# Patient Record
Sex: FEMALE | Race: WHITE | NOT HISPANIC OR LATINO | Employment: OTHER | URBAN - METROPOLITAN AREA
[De-identification: names, ages, dates, MRNs, and addresses within clinical notes are randomized per-mention and may not be internally consistent; named-entity substitution may affect disease eponyms.]

---

## 2023-09-17 PROBLEM — M85.80 OSTEOPENIA: Status: ACTIVE | Noted: 2023-09-17

## 2023-09-17 PROBLEM — M79.672 LEFT FOOT PAIN: Status: ACTIVE | Noted: 2023-09-17

## 2023-09-17 PROBLEM — I71.20 THORACIC AORTIC ANEURYSM (TAA) (CMS-HCC): Status: ACTIVE | Noted: 2023-09-17

## 2023-09-17 PROBLEM — R79.83 HOMOCYSTEINEMIA: Status: ACTIVE | Noted: 2023-09-17

## 2023-09-17 PROBLEM — E05.90 THYROTOXICOSIS WITH OR WITHOUT GOITER: Status: ACTIVE | Noted: 2023-09-17

## 2023-09-17 PROBLEM — E03.9 ACQUIRED HYPOTHYROIDISM: Status: ACTIVE | Noted: 2023-09-17

## 2023-09-17 PROBLEM — F51.01 PRIMARY INSOMNIA: Status: ACTIVE | Noted: 2023-09-17

## 2023-09-17 PROBLEM — I25.10 ASYMPTOMATIC ARTERIOSCLEROSIS OF CORONARY ARTERY: Status: ACTIVE | Noted: 2023-09-17

## 2023-09-17 PROBLEM — E04.2 NONTOXIC MULTINODULAR GOITER: Status: ACTIVE | Noted: 2023-09-17

## 2023-09-17 PROBLEM — J30.89 PERENNIAL ALLERGIC RHINITIS: Status: ACTIVE | Noted: 2023-09-17

## 2023-09-17 PROBLEM — I25.810 ATHEROSCLEROSIS OF CABG W/O ANGINA PECTORIS: Status: ACTIVE | Noted: 2023-09-17

## 2023-09-17 PROBLEM — H35.30 MACULAR DEGENERATION: Status: ACTIVE | Noted: 2023-09-17

## 2023-09-17 PROBLEM — F41.1 GAD (GENERALIZED ANXIETY DISORDER): Status: ACTIVE | Noted: 2023-09-17

## 2023-09-17 PROBLEM — F51.04 PSYCHOPHYSIOLOGIC INSOMNIA: Status: ACTIVE | Noted: 2023-09-17

## 2023-09-17 PROBLEM — B35.1 ONYCHOMYCOSIS: Status: ACTIVE | Noted: 2023-09-17

## 2023-09-17 PROBLEM — D23.70: Status: ACTIVE | Noted: 2023-09-17

## 2023-09-17 PROBLEM — R15.9 INCONTINENCE OF FECES: Status: ACTIVE | Noted: 2023-09-17

## 2023-09-17 PROBLEM — I48.0 PAROXYSMAL ATRIAL FIBRILLATION (MULTI): Status: ACTIVE | Noted: 2023-09-17

## 2023-09-17 PROBLEM — N18.31 CHRONIC KIDNEY DISEASE (CKD) STAGE G3A/A1, MODERATELY DECREASED GLOMERULAR FILTRATION RATE (GFR) BETWEEN 45-59 ML/MIN/1.73 SQUARE METER AND ALBUMINURIA CREATININE RATIO LESS THAN 30 MG/G (CMS* (MULTI): Status: ACTIVE | Noted: 2023-09-17

## 2023-09-17 PROBLEM — I65.23 OCCLUSION AND STENOSIS OF BILATERAL CAROTID ARTERIES: Status: ACTIVE | Noted: 2023-09-17

## 2023-09-17 PROBLEM — E78.1 PURE HYPERGLYCERIDEMIA: Status: ACTIVE | Noted: 2023-09-17

## 2023-09-17 PROBLEM — K59.00 CONSTIPATION: Status: ACTIVE | Noted: 2023-09-17

## 2023-09-17 PROBLEM — E78.2 MIXED HYPERLIPIDEMIA: Status: ACTIVE | Noted: 2023-09-17

## 2023-09-17 PROBLEM — M81.0 OSTEOPOROSIS: Status: ACTIVE | Noted: 2023-09-17

## 2023-09-17 PROBLEM — L60.3 ONYCHODYSTROPHY: Status: ACTIVE | Noted: 2023-09-17

## 2023-09-17 PROBLEM — Z95.2 HISTORY OF PROSTHETIC AORTIC VALVE: Status: ACTIVE | Noted: 2023-09-17

## 2023-09-17 PROBLEM — I73.9 CLAUDICATION (CMS-HCC): Status: ACTIVE | Noted: 2023-09-17

## 2023-09-17 PROBLEM — I10 ESSENTIAL HYPERTENSION: Status: ACTIVE | Noted: 2023-09-17

## 2023-09-17 PROBLEM — N81.10 BLADDER PROLAPSE, FEMALE, ACQUIRED: Status: ACTIVE | Noted: 2023-09-17

## 2023-09-17 PROBLEM — R41.3 MEMORY LOSS: Status: ACTIVE | Noted: 2023-09-17

## 2023-09-17 PROBLEM — I73.9 PERIPHERAL VASCULAR DISEASE, UNSPECIFIED (CMS-HCC): Status: ACTIVE | Noted: 2023-09-17

## 2023-09-17 PROBLEM — M79.671 RIGHT FOOT PAIN: Status: ACTIVE | Noted: 2023-09-17

## 2023-09-17 RX ORDER — MIRTAZAPINE 15 MG/1
0.5 TABLET, FILM COATED ORAL NIGHTLY
COMMUNITY
Start: 2023-08-30 | End: 2024-02-16 | Stop reason: ENTERED-IN-ERROR

## 2023-09-17 RX ORDER — VALSARTAN 160 MG/1
160 TABLET ORAL DAILY
COMMUNITY
End: 2024-03-01 | Stop reason: HOSPADM

## 2023-09-17 RX ORDER — POTASSIUM CHLORIDE 600 MG/1
8 TABLET, FILM COATED, EXTENDED RELEASE ORAL
COMMUNITY
Start: 2016-08-16 | End: 2024-02-16 | Stop reason: ENTERED-IN-ERROR

## 2023-09-17 RX ORDER — CETIRIZINE HYDROCHLORIDE 10 MG/1
1 TABLET ORAL DAILY
COMMUNITY
End: 2024-02-16 | Stop reason: ENTERED-IN-ERROR

## 2023-09-17 RX ORDER — FERROUS SULFATE 325(65) MG
65 TABLET ORAL DAILY
COMMUNITY
Start: 2012-08-08

## 2023-09-17 RX ORDER — ACETAMINOPHEN AND PHENYLEPHRINE HCL 325; 5 MG/1; MG/1
1 TABLET ORAL DAILY
COMMUNITY

## 2023-09-17 RX ORDER — ESCITALOPRAM OXALATE 10 MG/1
10 TABLET ORAL DAILY
COMMUNITY

## 2023-09-17 RX ORDER — SPIRONOLACTONE AND HYDROCHLOROTHIAZIDE 25; 25 MG/1; MG/1
0.5 TABLET ORAL DAILY
COMMUNITY
Start: 2023-07-21 | End: 2024-03-01 | Stop reason: HOSPADM

## 2023-09-17 RX ORDER — CYANOCOBALAMIN/FOLIC AC/VIT B6 2-2.5-25MG
1 TABLET ORAL DAILY
COMMUNITY
End: 2024-01-25 | Stop reason: HOSPADM

## 2023-09-17 RX ORDER — PRENATAL VIT 49/IRON FUM/FOLIC 6.75-0.2MG
TABLET ORAL
COMMUNITY
End: 2023-10-18 | Stop reason: ALTCHOICE

## 2023-09-17 RX ORDER — POTASSIUM CHLORIDE 600 MG/1
1 TABLET, FILM COATED, EXTENDED RELEASE ORAL 2 TIMES DAILY
COMMUNITY
End: 2023-10-18 | Stop reason: ALTCHOICE

## 2023-09-17 RX ORDER — ALENDRONATE SODIUM 70 MG/1
70 TABLET ORAL
COMMUNITY
Start: 2020-11-04

## 2023-09-17 RX ORDER — AMOXICILLIN 500 MG/1
4 CAPSULE ORAL SEE ADMIN INSTRUCTIONS
COMMUNITY
Start: 2022-12-06 | End: 2024-02-16 | Stop reason: ENTERED-IN-ERROR

## 2023-09-17 RX ORDER — FLUTICASONE PROPIONATE 50 MCG
1 SPRAY, SUSPENSION (ML) NASAL DAILY
COMMUNITY
End: 2023-10-18 | Stop reason: ALTCHOICE

## 2023-09-17 RX ORDER — FOLIC ACID 1 MG/1
5 TABLET ORAL DAILY
COMMUNITY
Start: 2012-08-08 | End: 2023-10-18 | Stop reason: SDUPTHER

## 2023-09-17 RX ORDER — CLOPIDOGREL BISULFATE 75 MG/1
1 TABLET, FILM COATED ORAL DAILY
COMMUNITY
Start: 2022-04-18 | End: 2023-10-05 | Stop reason: SDUPTHER

## 2023-09-17 RX ORDER — VALSARTAN 160 MG/1
160 TABLET ORAL DAILY
COMMUNITY
Start: 2023-08-30 | End: 2024-01-25 | Stop reason: HOSPADM

## 2023-09-17 RX ORDER — CHOLECALCIFEROL (VITAMIN D3) 50 MCG
50 TABLET ORAL DAILY
COMMUNITY

## 2023-09-17 RX ORDER — ATENOLOL 50 MG/1
50 TABLET ORAL 2 TIMES DAILY
COMMUNITY
Start: 2018-04-17

## 2023-09-17 RX ORDER — CEPHALEXIN 500 MG/1
500 CAPSULE ORAL EVERY 12 HOURS
COMMUNITY
Start: 2023-05-21 | End: 2024-01-25 | Stop reason: HOSPADM

## 2023-09-17 RX ORDER — CHLORPHENIRAMINE MALEATE 4 MG
TABLET ORAL
COMMUNITY
End: 2024-02-16 | Stop reason: ENTERED-IN-ERROR

## 2023-09-17 RX ORDER — ALIROCUMAB 150 MG/ML
INJECTION, SOLUTION SUBCUTANEOUS SEE ADMIN INSTRUCTIONS
COMMUNITY
Start: 2022-11-28 | End: 2023-10-16

## 2023-09-17 RX ORDER — CHLORTHALIDONE 25 MG/1
0.5 TABLET ORAL DAILY
COMMUNITY
Start: 2022-10-25 | End: 2023-10-18 | Stop reason: ALTCHOICE

## 2023-09-17 RX ORDER — ROSUVASTATIN CALCIUM 40 MG/1
40 TABLET, COATED ORAL NIGHTLY
COMMUNITY
End: 2024-03-01 | Stop reason: HOSPADM

## 2023-09-17 RX ORDER — IPRATROPIUM BROMIDE 21 UG/1
2 SPRAY, METERED NASAL 2 TIMES DAILY
COMMUNITY
Start: 2023-08-26

## 2023-09-17 RX ORDER — AZELASTINE 1 MG/ML
2 SPRAY, METERED NASAL 2 TIMES DAILY
COMMUNITY

## 2023-09-17 RX ORDER — AMLODIPINE BESYLATE 2.5 MG/1
5 TABLET ORAL DAILY
COMMUNITY
End: 2023-10-18

## 2023-09-17 RX ORDER — FOLIC ACID 1 MG/1
5 TABLET ORAL DAILY
COMMUNITY

## 2023-09-17 RX ORDER — HYDROCODONE BITARTRATE AND ACETAMINOPHEN 5; 325 MG/1; MG/1
1 TABLET ORAL EVERY 6 HOURS PRN
COMMUNITY
Start: 2023-05-22 | End: 2023-10-18 | Stop reason: ALTCHOICE

## 2023-09-17 RX ORDER — CLORAZEPATE DIPOTASSIUM 7.5 MG/1
0.5 TABLET ORAL 2 TIMES DAILY PRN
COMMUNITY
End: 2024-01-25 | Stop reason: HOSPADM

## 2023-09-17 RX ORDER — ASPIRIN 81 MG/1
81 TABLET ORAL DAILY
COMMUNITY
Start: 2008-12-08

## 2023-09-17 RX ORDER — CILOSTAZOL 100 MG/1
100 TABLET ORAL 2 TIMES DAILY
COMMUNITY
Start: 2023-08-01 | End: 2024-01-25 | Stop reason: HOSPADM

## 2023-10-05 DIAGNOSIS — I25.119 ATHEROSCLEROSIS OF NATIVE CORONARY ARTERY OF NATIVE HEART WITH ANGINA PECTORIS (CMS-HCC): Primary | ICD-10-CM

## 2023-10-05 RX ORDER — CLOPIDOGREL BISULFATE 75 MG/1
75 TABLET, FILM COATED ORAL DAILY
Qty: 30 TABLET | Refills: 0 | Status: SHIPPED | OUTPATIENT
Start: 2023-10-05 | End: 2023-10-10 | Stop reason: SDUPTHER

## 2023-10-09 ENCOUNTER — OFFICE VISIT (OUTPATIENT)
Dept: WOUND CARE | Facility: CLINIC | Age: 88
End: 2023-10-09
Payer: MEDICARE

## 2023-10-09 PROCEDURE — 99213 OFFICE O/P EST LOW 20 MIN: CPT

## 2023-10-09 PROCEDURE — G0463 HOSPITAL OUTPT CLINIC VISIT: HCPCS | Mod: 25

## 2023-10-09 PROCEDURE — 11042 DBRDMT SUBQ TIS 1ST 20SQCM/<: CPT

## 2023-10-10 ENCOUNTER — TELEPHONE (OUTPATIENT)
Dept: CARDIOLOGY | Facility: CLINIC | Age: 88
End: 2023-10-10
Payer: COMMERCIAL

## 2023-10-10 DIAGNOSIS — I25.119 ATHEROSCLEROSIS OF NATIVE CORONARY ARTERY OF NATIVE HEART WITH ANGINA PECTORIS (CMS-HCC): ICD-10-CM

## 2023-10-10 RX ORDER — CLOPIDOGREL BISULFATE 75 MG/1
75 TABLET ORAL DAILY
Qty: 30 TABLET | Refills: 0 | Status: SHIPPED | OUTPATIENT
Start: 2023-10-10 | End: 2023-10-10

## 2023-10-10 RX ORDER — CLOPIDOGREL BISULFATE 75 MG/1
75 TABLET ORAL DAILY
Qty: 90 TABLET | Refills: 0 | Status: SHIPPED | OUTPATIENT
Start: 2023-10-10 | End: 2024-03-01 | Stop reason: HOSPADM

## 2023-10-16 ENCOUNTER — OFFICE VISIT (OUTPATIENT)
Dept: WOUND CARE | Facility: CLINIC | Age: 88
End: 2023-10-16
Payer: MEDICARE

## 2023-10-16 DIAGNOSIS — E78.2 MIXED HYPERLIPIDEMIA: Primary | ICD-10-CM

## 2023-10-16 PROCEDURE — 11042 DBRDMT SUBQ TIS 1ST 20SQCM/<: CPT

## 2023-10-16 RX ORDER — ALIROCUMAB 150 MG/ML
INJECTION, SOLUTION SUBCUTANEOUS
Qty: 2 PEN | Refills: 0 | Status: SHIPPED | OUTPATIENT
Start: 2023-10-16 | End: 2023-11-09

## 2023-10-18 ENCOUNTER — HOSPITAL ENCOUNTER (OUTPATIENT)
Dept: CARDIOLOGY | Facility: CLINIC | Age: 88
Discharge: HOME | End: 2023-10-18
Payer: MEDICARE

## 2023-10-18 ENCOUNTER — OFFICE VISIT (OUTPATIENT)
Dept: CARDIOLOGY | Facility: CLINIC | Age: 88
End: 2023-10-18
Payer: MEDICARE

## 2023-10-18 VITALS
BODY MASS INDEX: 17.35 KG/M2 | HEIGHT: 63 IN | WEIGHT: 97.9 LBS | SYSTOLIC BLOOD PRESSURE: 151 MMHG | HEART RATE: 73 BPM | DIASTOLIC BLOOD PRESSURE: 69 MMHG

## 2023-10-18 DIAGNOSIS — I10 ESSENTIAL HYPERTENSION: Primary | ICD-10-CM

## 2023-10-18 DIAGNOSIS — Z95.2 PERSONAL HISTORY OF HEART VALVE REPLACEMENT: ICD-10-CM

## 2023-10-18 DIAGNOSIS — Z95.2 HISTORY OF PROSTHETIC AORTIC VALVE: ICD-10-CM

## 2023-10-18 DIAGNOSIS — I25.810 ATHEROSCLEROSIS OF CABG W/O ANGINA PECTORIS: ICD-10-CM

## 2023-10-18 DIAGNOSIS — I65.23 OCCLUSION AND STENOSIS OF BILATERAL CAROTID ARTERIES: ICD-10-CM

## 2023-10-18 DIAGNOSIS — E78.2 MIXED HYPERLIPIDEMIA: ICD-10-CM

## 2023-10-18 DIAGNOSIS — I25.810 ATHEROSCLEROSIS OF AUTOLOGOUS VEIN CORONARY ARTERY BYPASS GRAFT, UNSPECIFIED WHETHER ANGINA PRESENT: ICD-10-CM

## 2023-10-18 PROBLEM — Z95.828 STATUS POST INSERTION OF ILIAC ARTERY STENT: Status: ACTIVE | Noted: 2023-10-18

## 2023-10-18 PROBLEM — I25.10 ASYMPTOMATIC ARTERIOSCLEROSIS OF CORONARY ARTERY: Status: RESOLVED | Noted: 2023-09-17 | Resolved: 2023-10-18

## 2023-10-18 LAB — EJECTION FRACTION APICAL 4 CHAMBER: 74.3

## 2023-10-18 PROCEDURE — 3077F SYST BP >= 140 MM HG: CPT | Performed by: INTERNAL MEDICINE

## 2023-10-18 PROCEDURE — 3078F DIAST BP <80 MM HG: CPT | Performed by: INTERNAL MEDICINE

## 2023-10-18 PROCEDURE — 1036F TOBACCO NON-USER: CPT | Performed by: INTERNAL MEDICINE

## 2023-10-18 PROCEDURE — 1160F RVW MEDS BY RX/DR IN RCRD: CPT | Performed by: INTERNAL MEDICINE

## 2023-10-18 PROCEDURE — 93306 TTE W/DOPPLER COMPLETE: CPT | Performed by: INTERNAL MEDICINE

## 2023-10-18 PROCEDURE — 99214 OFFICE O/P EST MOD 30 MIN: CPT | Performed by: INTERNAL MEDICINE

## 2023-10-18 PROCEDURE — 1159F MED LIST DOCD IN RCRD: CPT | Performed by: INTERNAL MEDICINE

## 2023-10-18 PROCEDURE — 93306 TTE W/DOPPLER COMPLETE: CPT

## 2023-10-18 NOTE — PROGRESS NOTES
"Chief Complaint:   Valve Disorder     History Of Present Illness:    Maggi Gordon is a 88 y.o. female presenting with aortic valve disease s/p bioprosthetic aortic valve replacement.  The patient has been well since their last appointment and has no cardiac complaints.  The patient denies chest pain, shortness of breath, or any systemic symptoms.  The patient is compliant with aspirin and antibiotic prophylaxis to prevent endocarditis.  Their most recent echocardiogram demonstrates normal prosthetic valve function.      Review of Systems  All pertinent systems have been reviewed and are negative except for what is stated in the history of present illness.    All other systems have been reviewed and are negative and noncontributory to this patient's current ailments.  .     Last Recorded Vitals:  Visit Vitals  /69 (BP Location: Right arm, Patient Position: Sitting, BP Cuff Size: Adult)   Pulse 73   Ht 1.6 m (5' 3\")   Wt (!) 44.4 kg (97 lb 14.4 oz)   BMI 17.34 kg/m²   Smoking Status Never   BSA 1.4 m²        Past Medical History:  She has a past medical history of Asymptomatic premature menopause, Atherosclerosis of coronary artery bypass graft(s) without angina pectoris (10/11/2021), Occlusion and stenosis of bilateral carotid arteries (01/09/2020), Personal history of other diseases of the circulatory system, Personal history of other diseases of the circulatory system, Personal history of other diseases of the circulatory system, Personal history of other endocrine, nutritional and metabolic disease, Personal history of other endocrine, nutritional and metabolic disease, Personal history of other endocrine, nutritional and metabolic disease, and S/P AVR (aortic valve replacement) (10/18/2023).    Past Surgical History:  She has a past surgical history that includes Other surgical history (12/03/2018); Other surgical history (12/03/2018); Other surgical history (12/03/2018); Other surgical history " (12/03/2018); Other surgical history (12/03/2018); and CT angio aorta and bilateral iliofemoral runoff w and or wo IV contrast (7/13/2023).      Social History:  She reports that she has never smoked. She has never used smokeless tobacco. No history on file for alcohol use and drug use.    Family History:  No family history on file.     Allergies:  Ezetimibe    Outpatient Medications:  Current Outpatient Medications   Medication Instructions    alendronate (FOSAMAX) 70 mg, oral, Every 7 days    amoxicillin (Amoxil) 500 mg capsule 4 capsules, oral, See admin instructions, Take 1 hour before dental appt     aspirin 81 mg, oral, Daily    atenolol (TENORMIN) 50 mg, oral, 2 times daily    azelastine (Astelin) 137 mcg (0.1 %) nasal spray nasal    BACILLUS COAGULANS-INULIN ORAL oral    biotin 2,500 mcg capsule oral    cephalexin (KEFLEX) 500 mg, oral, Every 12 hours    cetirizine (ZyrTEC) 10 mg tablet 1 tablet, oral, Daily    chlorpheniramine (Chlor-Trimeton) 4 mg tablet oral    cholecalciferol (Vitamin D-3) 25 MCG (1000 UT) capsule oral    cilostazol (PLETAL) 100 mg, oral, 2 times daily    clopidogrel (PLAVIX) 75 mg, oral, Daily    clorazepate (Tranxene) 7.5 mg tablet 0.5 tablets, oral, 2 times daily PRN    CLORAZEPATE DIPOTASSIUM ORAL oral    escitalopram (LEXAPRO) 10 mg, Daily    ferrous sulfate 325 (65 Fe) MG tablet 65 mg of iron, oral, Daily    folic acid (Folvite) 1 mg tablet 2 tablets, oral, Daily    ipratropium (Atrovent) 21 mcg (0.03 %) nasal spray 2 sprays, Each Nostril, 2 times daily    mirtazapine (Remeron) 15 mg tablet 0.5 tablets, oral, Nightly    mv-min/FA/vit K/lutein/zeaxant (PRESERVISION AREDS 2 PLUS MV ORAL) oral    potassium chloride CR (Klor-Con 8) 8 mEq ER tablet 8 mEq, oral, 3 times daily with meals    Praluent Pen 150 mg/mL pen injector INJECT 1 MILLILITER INTO SKIN EVERY 2 WEEKS IN ABDOMEN, THIGH, OR UPPER ARM ROTATING INJECTION SITES    rosuvastatin (CRESTOR) 40 mg, oral, Nightly     "spironolacton-hydrochlorothiaz (Aldactazide) 25-25 mg tablet 0.5 tablets, oral, Daily    valsartan (Diovan) 320 mg tablet oral    valsartan (DIOVAN) 160 mg, oral, Daily    vit A/vit C/vit E/zinc/copper (PRESERVISION AREDS ORAL) 1 capsule, oral, Daily    WesTab Max 2.5-25-2 mg tablet 1 tablet, oral, Daily       Physical Exam:  Physical Exam  Vitals reviewed.   Constitutional:       General: She is not in acute distress.     Appearance: Normal appearance.   HENT:      Head: Normocephalic and atraumatic.      Nose: Nose normal.   Eyes:      Conjunctiva/sclera: Conjunctivae normal.   Cardiovascular:      Rate and Rhythm: Normal rate and regular rhythm.      Pulses: Normal pulses.      Heart sounds: Murmur heard.   Pulmonary:      Effort: Pulmonary effort is normal. No respiratory distress.      Breath sounds: Normal breath sounds. No wheezing, rhonchi or rales.   Abdominal:      General: Bowel sounds are normal. There is no distension.      Palpations: Abdomen is soft.      Tenderness: There is no abdominal tenderness.   Musculoskeletal:         General: No swelling.      Right lower leg: No edema.      Left lower leg: No edema.   Skin:     General: Skin is warm and dry.      Capillary Refill: Capillary refill takes less than 2 seconds.   Neurological:      General: No focal deficit present.      Mental Status: She is alert.   Psychiatric:         Mood and Affect: Mood normal.            Last Labs:  CBC -  Lab Results   Component Value Date    WBC 9.4 09/16/2023    HGB 11.8 (L) 09/16/2023    HCT 35.7 (L) 09/16/2023    MCV 98 09/16/2023     09/16/2023       CMP -  Lab Results   Component Value Date    CALCIUM 9.7 09/16/2023    PHOS 3.2 02/06/2023    PROT 6.1 (L) 09/16/2023    ALBUMIN 3.7 09/16/2023    AST 28 09/16/2023    ALT 18 09/16/2023    ALKPHOS 52 09/16/2023    BILITOT 0.8 09/16/2023       LIPID PANEL -   No results found for: \"CHOL\", \"HDL\", \"CHHDL\", \"LDL\", \"VLDL\", \"TRIG\", \"NHDL\"    RENAL FUNCTION PANEL - " "  Lab Results   Component Value Date    K 3.9 09/16/2023    PHOS 3.2 02/06/2023       No results found for: \"BNP\", \"HGBA1C\"    Last Cardiology Tests:  ECG:  No results found for this or any previous visit from the past 1095 days.    Echo:  No echocardiogram results found for the past 12 months     Echo Normal LV Fx and normal AVR      Assessment/Plan       1. Atherosclerosis of CABG w/o angina pectoris  CAD: The patient's CAD, as detailed in the HPI, has been clinically stable, without any anginal symptoms or dyspnea.  The patient will continue treatment with guideline-directed medical therapy with antiplatelet and statin medications and will continue a heart healthy diet.        - ECG 12 lead (Clinic Performed)  - Transthoracic echo (TTE) complete; Future  - Follow Up In Cardiology; Future    2. Essential hypertension  OP BP has been well controlled.    - Transthoracic echo (TTE) complete; Future  - Follow Up In Cardiology; Future    3. History of prosthetic aortic valve  s/p bioprosthetic AVR: Stable and asymptomatic s/p uncomplicated bioprosthetic AVR.  Echocardiogram today shows normal LV function and normal prosthetic valve function without significant aortic insufficiency or stenosis.  Will continue treatment with ASA 81 mg every day and lifelong SBE antibiotic prophylaxis.     - Transthoracic echo (TTE) complete; Future  - Follow Up In Cardiology; Future    4. Mixed hyperlipidemia  Hyperlipidemia: The patient's lipids are well controlled on chronic statin therapy and they are meeting their goal LDL cholesterol per the ACC/AHA guidelines (LDL 16)  The patient is compliant and tolerating statin therapy and is following a heart healthy diet     - Transthoracic echo (TTE) complete; Future  - Follow Up In Cardiology; Future    5. Occlusion and stenosis of bilateral carotid arteries  Stable, ASX.  Moderate stenosis bilateral ICA (May 2023)  - Transthoracic echo (TTE) complete; Future  - Follow Up In Cardiology; " Future       Jose Weathers MD    Exclusive of any other services or procedures performed, I, Jose Weathers MD , spent 30 minutes in duration for this visit today.  This time consisted of chart review, obtaining history, and/or performing the exam as documented above as well as documenting the clinical information for the encounter in the electronic record, discussing treatment options, plans, and/or goals with patient, family, and/or caregiver, refilling medications, updating the electronic record, ordering medicines, lab work, imaging, referrals, and/or procedures as documented above and communicating with other [facility] professionals. I have discussed the results of laboratory, radiology, and cardiology studies with the patient and their family/caregiver.

## 2023-10-23 ENCOUNTER — OFFICE VISIT (OUTPATIENT)
Dept: WOUND CARE | Facility: CLINIC | Age: 88
End: 2023-10-23
Payer: MEDICARE

## 2023-10-23 PROCEDURE — 11042 DBRDMT SUBQ TIS 1ST 20SQCM/<: CPT

## 2023-10-25 ENCOUNTER — TELEPHONE (OUTPATIENT)
Dept: CARDIOLOGY | Facility: CLINIC | Age: 88
End: 2023-10-25
Payer: COMMERCIAL

## 2023-10-25 NOTE — TELEPHONE ENCOUNTER
Pt needs cleared for left lower extremity profundoplasty under general.  Form on counter to be signed

## 2023-10-27 ENCOUNTER — OFFICE VISIT (OUTPATIENT)
Dept: VASCULAR SURGERY | Facility: CLINIC | Age: 88
End: 2023-10-27
Payer: MEDICARE

## 2023-10-27 VITALS — WEIGHT: 99 LBS | DIASTOLIC BLOOD PRESSURE: 71 MMHG | BODY MASS INDEX: 17.54 KG/M2 | SYSTOLIC BLOOD PRESSURE: 146 MMHG

## 2023-10-27 DIAGNOSIS — I73.9 PAD (PERIPHERAL ARTERY DISEASE) (CMS-HCC): Primary | ICD-10-CM

## 2023-10-27 PROCEDURE — 99213 OFFICE O/P EST LOW 20 MIN: CPT | Performed by: INTERNAL MEDICINE

## 2023-10-27 PROCEDURE — 3078F DIAST BP <80 MM HG: CPT | Performed by: INTERNAL MEDICINE

## 2023-10-27 PROCEDURE — 1036F TOBACCO NON-USER: CPT | Performed by: INTERNAL MEDICINE

## 2023-10-27 PROCEDURE — 1160F RVW MEDS BY RX/DR IN RCRD: CPT | Performed by: INTERNAL MEDICINE

## 2023-10-27 PROCEDURE — 1159F MED LIST DOCD IN RCRD: CPT | Performed by: INTERNAL MEDICINE

## 2023-10-27 PROCEDURE — 3077F SYST BP >= 140 MM HG: CPT | Performed by: INTERNAL MEDICINE

## 2023-10-27 ASSESSMENT — ENCOUNTER SYMPTOMS
MUSCULOSKELETAL NEGATIVE: 1
CARDIOVASCULAR NEGATIVE: 1
GASTROINTESTINAL NEGATIVE: 1
ENDOCRINE NEGATIVE: 1
CONSTITUTIONAL NEGATIVE: 1
RESPIRATORY NEGATIVE: 1
WOUND: 1
ALLERGIC/IMMUNOLOGIC NEGATIVE: 1
EYES NEGATIVE: 1
PSYCHIATRIC NEGATIVE: 1
NEUROLOGICAL NEGATIVE: 1
HEMATOLOGIC/LYMPHATIC NEGATIVE: 1

## 2023-10-27 NOTE — PROGRESS NOTES
Subjective   Patient ID: Maggi Gordon is a 88 y.o. female who presents for No chief complaint on file..  HPI    88 year old female with a past medical history of carotid artery stenosis, CKD, HTN, Pafib and PVD that presents to the office for a hospital follow up s/p  aortobifemoral angiogram with stenting of the left external iliac artery per Dr. Hood on 9/15/23. She did get admitted due to hypotension and concern for a hematoma on left groin site. Last HGB was 11.9 on 9/26/23. She denies leg claudication. However, she does complain of pain on left foot near the foot ulcer. She is going to the wound clinic once a week. She does have a wound care nurse going to her house once a week. Upon exam, the wound does not appear infected, she remains afebrile. No redness or drainage noted. Has a palpable pulse. She is on Plavix        Vascular testing:   CT aorta abd with runoff July 13/2023: Bilateral superficial femoral artery occlusion with multilevel high-grade stenosis within the visualized abdominal and lower extremity arterial vasculature.    PVR  Carotid artery ultrasound 5/15/23: Findings are consistent with 50 to 69% stenosis of the right proximal ICA.  Findings are consistent with 50 to 69% stenosis of the left proximal ICA. No evidence of hemodynamically significant stenosis in the left subclavian artery.    PVR June 22/23: Right Lower PVR: There is evidence of moderate multi vessel disease.  Left Lower PVR: There is evidence of moderate disease at the ilio-femoral level.  Carotid artery ultrasound 5/3/22: Findings are consistent with 50 to 69% stenosis of the right proximal ICA.   Findings are consistent with 50 to 69% stenosis of the left proximal ICA. Left vertebral is monophasic and the subclavian demonostrates turbulent flow which is suggestive of a more proximal stenosis or occlusion.    PVR 5/3/22: Right Lower PVR: There is evidence of moderate multi vessel disease. Left Lower PVR: There is evidence of  moderate multi-vessel disease.    Vascular procedures:  9/15/23: left iliac stent placed per Dr. Hood    Review of Systems   Constitutional: Negative.    HENT: Negative.     Eyes: Negative.    Respiratory: Negative.     Cardiovascular: Negative.    Gastrointestinal: Negative.    Endocrine: Negative.    Genitourinary: Negative.    Musculoskeletal: Negative.    Skin:  Positive for wound.   Allergic/Immunologic: Negative.    Neurological: Negative.    Hematological: Negative.    Psychiatric/Behavioral: Negative.         Objective   Physical Exam  Eyes:      Pupils: Pupils are equal, round, and reactive to light.   Cardiovascular:      Rate and Rhythm: Normal rate.   Pulmonary:      Effort: Pulmonary effort is normal.   Abdominal:      General: Bowel sounds are normal.   Musculoskeletal:      Cervical back: Normal range of motion.      Comments: Wound left foot    Skin:     General: Skin is warm and dry.      Capillary Refill: Capillary refill takes less than 2 seconds.   Neurological:      General: No focal deficit present.      Mental Status: She is alert.   Psychiatric:         Mood and Affect: Mood normal.         Assessment/Plan   88 year old female with a past medical history of carotid artery stenosis, CKD, HTN, Pafib and PVD that presents to the office for a hospital follow up s/p  aortobifemoral angiogram with stenting of the left external iliac artery per Dr. Hood on 9/15/23.    Plan:  Will obtain an arterial duplex and JOHN  Call office if you develop worsening leg swelling, leg pain or develop a new would/ulcer  Follow up after testing/reassess wound

## 2023-10-30 ENCOUNTER — LAB REQUISITION (OUTPATIENT)
Dept: LAB | Facility: HOSPITAL | Age: 88
End: 2023-10-30
Payer: COMMERCIAL

## 2023-10-30 ENCOUNTER — OFFICE VISIT (OUTPATIENT)
Dept: WOUND CARE | Facility: CLINIC | Age: 88
End: 2023-10-30
Payer: MEDICARE

## 2023-10-30 DIAGNOSIS — L97.522 NON-PRESSURE CHRONIC ULCER OF OTHER PART OF LEFT FOOT WITH FAT LAYER EXPOSED (MULTI): ICD-10-CM

## 2023-10-30 PROCEDURE — 11042 DBRDMT SUBQ TIS 1ST 20SQCM/<: CPT

## 2023-10-30 PROCEDURE — 87186 SC STD MICRODIL/AGAR DIL: CPT | Mod: OUT,PORLAB | Performed by: STUDENT IN AN ORGANIZED HEALTH CARE EDUCATION/TRAINING PROGRAM

## 2023-11-03 LAB
BACTERIA SPEC CULT: ABNORMAL
BACTERIA SPEC CULT: ABNORMAL
GRAM STN SPEC: ABNORMAL

## 2023-11-06 ENCOUNTER — OFFICE VISIT (OUTPATIENT)
Dept: WOUND CARE | Facility: CLINIC | Age: 88
End: 2023-11-06
Payer: MEDICARE

## 2023-11-06 PROCEDURE — 11042 DBRDMT SUBQ TIS 1ST 20SQCM/<: CPT

## 2023-11-07 ENCOUNTER — TELEPHONE (OUTPATIENT)
Dept: CARDIOLOGY | Facility: CLINIC | Age: 88
End: 2023-11-07
Payer: COMMERCIAL

## 2023-11-07 NOTE — TELEPHONE ENCOUNTER
----- Message from Gonzalez Johnson CMA sent at 10/26/2023 11:55 AM EDT -----        ----- Message -----  From: Jose Weathers MD  Sent: 10/26/2023   9:13 AM EDT  To: Gonzalez Johnson CMA    KEIRY first.  No OV.    ----- Message -----  From: Gonzalez Johnson CMA  Sent: 10/25/2023   4:02 PM EDT  To: Jose Weathers MD

## 2023-11-09 DIAGNOSIS — E78.2 MIXED HYPERLIPIDEMIA: ICD-10-CM

## 2023-11-09 RX ORDER — ALIROCUMAB 150 MG/ML
INJECTION, SOLUTION SUBCUTANEOUS
Qty: 6 PEN | Refills: 3 | Status: SHIPPED | OUTPATIENT
Start: 2023-11-09

## 2023-11-13 ENCOUNTER — OFFICE VISIT (OUTPATIENT)
Dept: WOUND CARE | Facility: CLINIC | Age: 88
End: 2023-11-13
Payer: MEDICARE

## 2023-11-13 PROCEDURE — 11042 DBRDMT SUBQ TIS 1ST 20SQCM/<: CPT

## 2023-11-20 ENCOUNTER — OFFICE VISIT (OUTPATIENT)
Dept: WOUND CARE | Facility: CLINIC | Age: 88
End: 2023-11-20
Payer: MEDICARE

## 2023-11-20 PROCEDURE — 11042 DBRDMT SUBQ TIS 1ST 20SQCM/<: CPT

## 2023-11-27 ENCOUNTER — OFFICE VISIT (OUTPATIENT)
Dept: WOUND CARE | Facility: CLINIC | Age: 88
End: 2023-11-27
Payer: MEDICARE

## 2023-11-27 PROCEDURE — 11042 DBRDMT SUBQ TIS 1ST 20SQCM/<: CPT | Mod: 59

## 2023-11-27 PROCEDURE — 15275 SKIN SUB GRAFT FACE/NK/HF/G: CPT

## 2023-11-29 ENCOUNTER — TELEPHONE (OUTPATIENT)
Dept: CARDIOLOGY | Facility: CLINIC | Age: 88
End: 2023-11-29

## 2023-11-29 ENCOUNTER — OFFICE VISIT (OUTPATIENT)
Dept: VASCULAR SURGERY | Facility: CLINIC | Age: 88
End: 2023-11-29
Payer: MEDICARE

## 2023-11-29 VITALS
DIASTOLIC BLOOD PRESSURE: 70 MMHG | BODY MASS INDEX: 17.4 KG/M2 | HEART RATE: 72 BPM | WEIGHT: 98.2 LBS | SYSTOLIC BLOOD PRESSURE: 138 MMHG

## 2023-11-29 DIAGNOSIS — I73.9 PAD (PERIPHERAL ARTERY DISEASE) (CMS-HCC): Primary | ICD-10-CM

## 2023-11-29 PROBLEM — K86.89 DILATED PANCREATIC DUCT (HHS-HCC): Status: ACTIVE | Noted: 2023-11-29

## 2023-11-29 PROBLEM — L03.90 CELLULITIS: Status: ACTIVE | Noted: 2023-09-27

## 2023-11-29 PROBLEM — I65.23 BILATERAL CAROTID ARTERY STENOSIS: Status: ACTIVE | Noted: 2017-11-30

## 2023-11-29 PROCEDURE — 1160F RVW MEDS BY RX/DR IN RCRD: CPT | Performed by: INTERNAL MEDICINE

## 2023-11-29 PROCEDURE — 3075F SYST BP GE 130 - 139MM HG: CPT | Performed by: INTERNAL MEDICINE

## 2023-11-29 PROCEDURE — 99213 OFFICE O/P EST LOW 20 MIN: CPT | Performed by: INTERNAL MEDICINE

## 2023-11-29 PROCEDURE — 1159F MED LIST DOCD IN RCRD: CPT | Performed by: INTERNAL MEDICINE

## 2023-11-29 PROCEDURE — 3078F DIAST BP <80 MM HG: CPT | Performed by: INTERNAL MEDICINE

## 2023-11-29 PROCEDURE — 1036F TOBACCO NON-USER: CPT | Performed by: INTERNAL MEDICINE

## 2023-11-29 RX ORDER — GENTAMICIN SULFATE 1 MG/G
CREAM TOPICAL
COMMUNITY
Start: 2023-11-03 | End: 2024-01-25 | Stop reason: HOSPADM

## 2023-11-29 RX ORDER — ATORVASTATIN CALCIUM 80 MG/1
80 TABLET, FILM COATED ORAL
COMMUNITY
End: 2024-02-16 | Stop reason: ENTERED-IN-ERROR

## 2023-11-29 RX ORDER — COLLAGENASE SANTYL 250 [ARB'U]/G
OINTMENT TOPICAL
COMMUNITY
Start: 2023-10-09 | End: 2024-01-25 | Stop reason: HOSPADM

## 2023-11-29 ASSESSMENT — ENCOUNTER SYMPTOMS
HEMATOLOGIC/LYMPHATIC NEGATIVE: 1
CARDIOVASCULAR NEGATIVE: 1
WOUND: 1
GASTROINTESTINAL NEGATIVE: 1
PSYCHIATRIC NEGATIVE: 1
EYES NEGATIVE: 1
NEUROLOGICAL NEGATIVE: 1
RESPIRATORY NEGATIVE: 1
CONSTITUTIONAL NEGATIVE: 1
ALLERGIC/IMMUNOLOGIC NEGATIVE: 1
MUSCULOSKELETAL NEGATIVE: 1
ENDOCRINE NEGATIVE: 1

## 2023-11-29 NOTE — PROGRESS NOTES
Subjective   Patient ID: Maggi Gordon is a 88 y.o. female who presents for No chief complaint on file..  HPI  88 year old female with a past medical history of carotid artery stenosis, CKD, HTN, Pafib and PVD that presents to the office for a mth follow up s/p  aortobifemoral angiogram with stenting of the left external iliac artery per Dr. Hood on 9/15/23.    She denies leg claudication. However, she does complain of pain on left foot near the foot ulcer. She is going to the wound clinic once a week. She states that the wound center has noticed improvement    Upon exam, the wound does not appear infected, she remains afebrile. No redness or drainage noted. Has a palpable pulse. She is on Plavix.         Vascular testing:   CT aorta abd with runoff July 13/2023: Bilateral superficial femoral artery occlusion with multilevel high-grade stenosis within the visualized abdominal and lower extremity arterial vasculature.     Carotid artery ultrasound 5/15/23: Findings are consistent with 50 to 69% stenosis of the right proximal ICA.  Findings are consistent with 50 to 69% stenosis of the left proximal ICA. No evidence of hemodynamically significant stenosis in the left subclavian artery.     PVR June 22/23: Right Lower PVR: There is evidence of moderate multi vessel disease.  Left Lower PVR: There is evidence of moderate disease at the ilio-femoral level.  Carotid artery ultrasound 5/3/22: Findings are consistent with 50 to 69% stenosis of the right proximal ICA.   Findings are consistent with 50 to 69% stenosis of the left proximal ICA. Left vertebral is monophasic and the subclavian demonostrates turbulent flow which is suggestive of a more proximal stenosis or occlusion.     PVR 5/3/22: Right Lower PVR: There is evidence of moderate multi vessel disease. Left Lower PVR: There is evidence of moderate multi-vessel disease.     Vascular procedures:  9/15/23: left iliac stent placed per Dr. Hood    Review of Systems    Constitutional: Negative.    HENT: Negative.     Eyes: Negative.    Respiratory: Negative.     Cardiovascular: Negative.    Gastrointestinal: Negative.    Endocrine: Negative.    Genitourinary: Negative.    Musculoskeletal: Negative.    Skin:  Positive for wound.   Allergic/Immunologic: Negative.    Neurological: Negative.    Hematological: Negative.    Psychiatric/Behavioral: Negative.         Objective   Physical Exam  Eyes:      Conjunctiva/sclera: Conjunctivae normal.   Cardiovascular:      Rate and Rhythm: Normal rate.   Pulmonary:      Effort: Pulmonary effort is normal.   Abdominal:      General: Bowel sounds are normal.   Musculoskeletal:         General: Normal range of motion.      Cervical back: Normal range of motion.   Skin:     General: Skin is warm and dry.      Capillary Refill: Capillary refill takes less than 2 seconds.   Neurological:      General: No focal deficit present.      Mental Status: She is alert.   Psychiatric:         Mood and Affect: Mood normal.         Assessment/Plan   88 year old female with a past medical history of carotid artery stenosis, CKD, HTN, Pafib and PVD that presents to the office for a hospital follow up s/p  aortobifemoral angiogram with stenting of the left external iliac artery per Dr. Hood on 9/15/23.     Plan:  Will obtain an arterial duplex and JOHN  Call office if you develop worsening leg swelling, leg pain or develop a new would/ulcer  Follow up in 6 mths   Continue going to the wound clinic

## 2023-12-04 ENCOUNTER — OFFICE VISIT (OUTPATIENT)
Dept: WOUND CARE | Facility: CLINIC | Age: 88
End: 2023-12-04
Payer: MEDICARE

## 2023-12-04 PROCEDURE — 15275 SKIN SUB GRAFT FACE/NK/HF/G: CPT

## 2023-12-04 PROCEDURE — 11042 DBRDMT SUBQ TIS 1ST 20SQCM/<: CPT | Mod: 59

## 2023-12-11 ENCOUNTER — OFFICE VISIT (OUTPATIENT)
Dept: WOUND CARE | Facility: CLINIC | Age: 88
End: 2023-12-11
Payer: MEDICARE

## 2023-12-11 PROCEDURE — 11042 DBRDMT SUBQ TIS 1ST 20SQCM/<: CPT | Mod: 59

## 2023-12-11 PROCEDURE — 15275 SKIN SUB GRAFT FACE/NK/HF/G: CPT

## 2023-12-18 ENCOUNTER — OFFICE VISIT (OUTPATIENT)
Dept: WOUND CARE | Facility: CLINIC | Age: 88
End: 2023-12-18
Payer: MEDICARE

## 2023-12-18 PROCEDURE — 15275 SKIN SUB GRAFT FACE/NK/HF/G: CPT

## 2023-12-28 ENCOUNTER — OFFICE VISIT (OUTPATIENT)
Dept: WOUND CARE | Facility: CLINIC | Age: 88
End: 2023-12-28
Payer: MEDICARE

## 2023-12-28 PROCEDURE — 15275 SKIN SUB GRAFT FACE/NK/HF/G: CPT

## 2024-01-05 ENCOUNTER — APPOINTMENT (OUTPATIENT)
Dept: VASCULAR MEDICINE | Facility: HOSPITAL | Age: 89
End: 2024-01-05
Payer: MEDICARE

## 2024-01-08 ENCOUNTER — OFFICE VISIT (OUTPATIENT)
Dept: WOUND CARE | Facility: CLINIC | Age: 89
End: 2024-01-08
Payer: MEDICARE

## 2024-01-08 PROCEDURE — 11042 DBRDMT SUBQ TIS 1ST 20SQCM/<: CPT

## 2024-01-11 ENCOUNTER — HOSPITAL ENCOUNTER (INPATIENT)
Facility: HOSPITAL | Age: 89
LOS: 13 days | Discharge: HOME HEALTH | DRG: 253 | End: 2024-01-25
Attending: EMERGENCY MEDICINE | Admitting: SURGERY
Payer: MEDICARE

## 2024-01-11 DIAGNOSIS — R94.31 ABNORMAL EKG: ICD-10-CM

## 2024-01-11 DIAGNOSIS — Z95.2 HISTORY OF PROSTHETIC AORTIC VALVE: ICD-10-CM

## 2024-01-11 DIAGNOSIS — Z95.2 S/P AVR (AORTIC VALVE REPLACEMENT): ICD-10-CM

## 2024-01-11 DIAGNOSIS — I73.9 PERIPHERAL VASCULAR DISEASE, UNSPECIFIED (CMS-HCC): ICD-10-CM

## 2024-01-11 DIAGNOSIS — I73.9 PERIPHERAL VASCULAR DISEASE (CMS-HCC): Primary | ICD-10-CM

## 2024-01-11 DIAGNOSIS — R94.31 ABNORMAL ELECTROCARDIOGRAM (ECG) (EKG): ICD-10-CM

## 2024-01-11 DIAGNOSIS — I73.89 OTHER SPECIFIED PERIPHERAL VASCULAR DISEASES (CMS-HCC): ICD-10-CM

## 2024-01-11 DIAGNOSIS — Z01.818 ENCOUNTER FOR OTHER PREPROCEDURAL EXAMINATION: ICD-10-CM

## 2024-01-11 DIAGNOSIS — I99.8 ACUTE LOWER LIMB ISCHEMIA: ICD-10-CM

## 2024-01-11 LAB
ABO GROUP (TYPE) IN BLOOD: NORMAL
ALBUMIN SERPL BCP-MCNC: 4 G/DL (ref 3.4–5)
ALP SERPL-CCNC: 84 U/L (ref 33–136)
ALT SERPL W P-5'-P-CCNC: 18 U/L (ref 7–45)
ANION GAP SERPL CALC-SCNC: 11 MMOL/L (ref 10–20)
ANTIBODY SCREEN: NORMAL
AST SERPL W P-5'-P-CCNC: 24 U/L (ref 9–39)
BASOPHILS # BLD AUTO: 0.02 X10*3/UL (ref 0–0.1)
BASOPHILS NFR BLD AUTO: 0.3 %
BILIRUB SERPL-MCNC: 0.5 MG/DL (ref 0–1.2)
BUN SERPL-MCNC: 43 MG/DL (ref 6–23)
CALCIUM SERPL-MCNC: 10 MG/DL (ref 8.6–10.6)
CHLORIDE SERPL-SCNC: 101 MMOL/L (ref 98–107)
CO2 SERPL-SCNC: 33 MMOL/L (ref 21–32)
CREAT SERPL-MCNC: 1.45 MG/DL (ref 0.5–1.05)
EGFRCR SERPLBLD CKD-EPI 2021: 35 ML/MIN/1.73M*2
EOSINOPHIL # BLD AUTO: 0.1 X10*3/UL (ref 0–0.4)
EOSINOPHIL NFR BLD AUTO: 1.3 %
ERYTHROCYTE [DISTWIDTH] IN BLOOD BY AUTOMATED COUNT: 13 % (ref 11.5–14.5)
GLUCOSE SERPL-MCNC: 107 MG/DL (ref 74–99)
HCT VFR BLD AUTO: 39 % (ref 36–46)
HGB BLD-MCNC: 13.1 G/DL (ref 12–16)
IMM GRANULOCYTES # BLD AUTO: 0.03 X10*3/UL (ref 0–0.5)
IMM GRANULOCYTES NFR BLD AUTO: 0.4 % (ref 0–0.9)
INR PPP: 2.8 (ref 0.9–1.1)
LYMPHOCYTES # BLD AUTO: 2.26 X10*3/UL (ref 0.8–3)
LYMPHOCYTES NFR BLD AUTO: 29.5 %
MCH RBC QN AUTO: 32.5 PG (ref 26–34)
MCHC RBC AUTO-ENTMCNC: 33.6 G/DL (ref 32–36)
MCV RBC AUTO: 97 FL (ref 80–100)
MONOCYTES # BLD AUTO: 0.77 X10*3/UL (ref 0.05–0.8)
MONOCYTES NFR BLD AUTO: 10.1 %
NEUTROPHILS # BLD AUTO: 4.47 X10*3/UL (ref 1.6–5.5)
NEUTROPHILS NFR BLD AUTO: 58.4 %
NRBC BLD-RTO: 0 /100 WBCS (ref 0–0)
PLATELET # BLD AUTO: 184 X10*3/UL (ref 150–450)
POTASSIUM SERPL-SCNC: 4.3 MMOL/L (ref 3.5–5.3)
PROT SERPL-MCNC: 7 G/DL (ref 6.4–8.2)
PROTHROMBIN TIME: 32.4 SECONDS (ref 9.8–12.8)
RBC # BLD AUTO: 4.03 X10*6/UL (ref 4–5.2)
RH FACTOR (ANTIGEN D): NORMAL
SODIUM SERPL-SCNC: 141 MMOL/L (ref 136–145)
UFH PPP CHRO-ACNC: 0.1 IU/ML
WBC # BLD AUTO: 7.7 X10*3/UL (ref 4.4–11.3)

## 2024-01-11 PROCEDURE — 99285 EMERGENCY DEPT VISIT HI MDM: CPT | Performed by: EMERGENCY MEDICINE

## 2024-01-11 PROCEDURE — 85520 HEPARIN ASSAY: CPT | Performed by: STUDENT IN AN ORGANIZED HEALTH CARE EDUCATION/TRAINING PROGRAM

## 2024-01-11 PROCEDURE — 85610 PROTHROMBIN TIME: CPT | Performed by: STUDENT IN AN ORGANIZED HEALTH CARE EDUCATION/TRAINING PROGRAM

## 2024-01-11 PROCEDURE — 85025 COMPLETE CBC W/AUTO DIFF WBC: CPT | Performed by: STUDENT IN AN ORGANIZED HEALTH CARE EDUCATION/TRAINING PROGRAM

## 2024-01-11 PROCEDURE — 2500000004 HC RX 250 GENERAL PHARMACY W/ HCPCS (ALT 636 FOR OP/ED): Performed by: STUDENT IN AN ORGANIZED HEALTH CARE EDUCATION/TRAINING PROGRAM

## 2024-01-11 PROCEDURE — 80053 COMPREHEN METABOLIC PANEL: CPT | Performed by: STUDENT IN AN ORGANIZED HEALTH CARE EDUCATION/TRAINING PROGRAM

## 2024-01-11 PROCEDURE — 96376 TX/PRO/DX INJ SAME DRUG ADON: CPT

## 2024-01-11 PROCEDURE — 86901 BLOOD TYPING SEROLOGIC RH(D): CPT | Performed by: STUDENT IN AN ORGANIZED HEALTH CARE EDUCATION/TRAINING PROGRAM

## 2024-01-11 PROCEDURE — 36415 COLL VENOUS BLD VENIPUNCTURE: CPT | Performed by: STUDENT IN AN ORGANIZED HEALTH CARE EDUCATION/TRAINING PROGRAM

## 2024-01-11 PROCEDURE — 96374 THER/PROPH/DIAG INJ IV PUSH: CPT

## 2024-01-11 RX ORDER — HEPARIN SODIUM 5000 [USP'U]/ML
2000-4000 INJECTION, SOLUTION INTRAVENOUS; SUBCUTANEOUS EVERY 4 HOURS PRN
Status: DISCONTINUED | OUTPATIENT
Start: 2024-01-11 | End: 2024-01-18

## 2024-01-11 RX ORDER — HEPARIN SODIUM 10000 [USP'U]/100ML
0-4500 INJECTION, SOLUTION INTRAVENOUS CONTINUOUS
Status: DISCONTINUED | OUTPATIENT
Start: 2024-01-11 | End: 2024-01-18

## 2024-01-11 RX ORDER — HEPARIN SODIUM 5000 [USP'U]/ML
80 INJECTION, SOLUTION INTRAVENOUS; SUBCUTANEOUS ONCE
Status: COMPLETED | OUTPATIENT
Start: 2024-01-11 | End: 2024-01-11

## 2024-01-11 RX ADMIN — HEPARIN SODIUM 3500 UNITS: 5000 INJECTION INTRAVENOUS; SUBCUTANEOUS at 22:12

## 2024-01-11 RX ADMIN — HEPARIN SODIUM 2000 UNITS: 5000 INJECTION, SOLUTION INTRAVENOUS; SUBCUTANEOUS at 23:02

## 2024-01-11 RX ADMIN — HEPARIN SODIUM 800 UNITS/HR: 10000 INJECTION, SOLUTION INTRAVENOUS at 22:14

## 2024-01-11 ASSESSMENT — COLUMBIA-SUICIDE SEVERITY RATING SCALE - C-SSRS
6. HAVE YOU EVER DONE ANYTHING, STARTED TO DO ANYTHING, OR PREPARED TO DO ANYTHING TO END YOUR LIFE?: NO
1. IN THE PAST MONTH, HAVE YOU WISHED YOU WERE DEAD OR WISHED YOU COULD GO TO SLEEP AND NOT WAKE UP?: NO
2. HAVE YOU ACTUALLY HAD ANY THOUGHTS OF KILLING YOURSELF?: NO

## 2024-01-11 ASSESSMENT — PAIN DESCRIPTION - PROGRESSION: CLINICAL_PROGRESSION: NOT CHANGED

## 2024-01-11 ASSESSMENT — PAIN - FUNCTIONAL ASSESSMENT: PAIN_FUNCTIONAL_ASSESSMENT: 0-10

## 2024-01-11 NOTE — PROGRESS NOTES
Received call via the transfer center, in short patient presenting with pulseless left lower extremity, patient is a chronic vasculopath, found to have multiple sites of arterial occlusion of the lower extremities.  Started on heparin.  Pain well-controlled at time of transfer, patient was accepted for evaluation in the emergency department by vascular surgery.  Patient stable at time of transfer per transferring provider.

## 2024-01-12 ENCOUNTER — CLINICAL SUPPORT (OUTPATIENT)
Dept: EMERGENCY MEDICINE | Facility: HOSPITAL | Age: 89
DRG: 253 | End: 2024-01-12
Payer: MEDICARE

## 2024-01-12 ENCOUNTER — APPOINTMENT (OUTPATIENT)
Dept: CARDIOLOGY | Facility: HOSPITAL | Age: 89
DRG: 253 | End: 2024-01-12
Payer: MEDICARE

## 2024-01-12 PROBLEM — I99.8 ACUTE LOWER LIMB ISCHEMIA: Status: ACTIVE | Noted: 2024-01-12

## 2024-01-12 LAB
ANION GAP SERPL CALC-SCNC: 17 MMOL/L (ref 10–20)
AORTIC VALVE MEAN GRADIENT: 11
AORTIC VALVE PEAK VELOCITY: 2.4
ATRIAL RATE: 69 BPM
AV PEAK GRADIENT: 23.1
AVA (PEAK VEL): 0.96
AVA (VTI): 1.04
BUN SERPL-MCNC: 42 MG/DL (ref 6–23)
CALCIUM SERPL-MCNC: 9.9 MG/DL (ref 8.6–10.6)
CHLORIDE SERPL-SCNC: 101 MMOL/L (ref 98–107)
CO2 SERPL-SCNC: 27 MMOL/L (ref 21–32)
CREAT SERPL-MCNC: 1.39 MG/DL (ref 0.5–1.05)
EGFRCR SERPLBLD CKD-EPI 2021: 37 ML/MIN/1.73M*2
EJECTION FRACTION APICAL 4 CHAMBER: 75
EJECTION FRACTION: 75
ERYTHROCYTE [DISTWIDTH] IN BLOOD BY AUTOMATED COUNT: 13.1 % (ref 11.5–14.5)
GLUCOSE SERPL-MCNC: 109 MG/DL (ref 74–99)
HCT VFR BLD AUTO: 39.5 % (ref 36–46)
HGB BLD-MCNC: 12.4 G/DL (ref 12–16)
LEFT ATRIUM VOLUME AREA LENGTH INDEX BSA: 29.4
LEFT VENTRICLE INTERNAL DIMENSION DIASTOLE: 3.05 (ref 3.5–6)
LEFT VENTRICULAR OUTFLOW TRACT DIAMETER: 1.65
MCH RBC QN AUTO: 32 PG (ref 26–34)
MCHC RBC AUTO-ENTMCNC: 31.4 G/DL (ref 32–36)
MCV RBC AUTO: 102 FL (ref 80–100)
MITRAL VALVE E/A RATIO: 1.28
NRBC BLD-RTO: 0 /100 WBCS (ref 0–0)
P OFFSET: 201 MS
P ONSET: 146 MS
PLATELET # BLD AUTO: 177 X10*3/UL (ref 150–450)
POTASSIUM SERPL-SCNC: 4.7 MMOL/L (ref 3.5–5.3)
PR INTERVAL: 162 MS
Q ONSET: 227 MS
QRS COUNT: 12 BEATS
QRS DURATION: 58 MS
QT INTERVAL: 358 MS
QTC CALCULATION(BAZETT): 383 MS
QTC FREDERICIA: 375 MS
R AXIS: 29 DEGREES
RBC # BLD AUTO: 3.87 X10*6/UL (ref 4–5.2)
RIGHT VENTRICLE FREE WALL PEAK S': 10
SODIUM SERPL-SCNC: 140 MMOL/L (ref 136–145)
T AXIS: 37 DEGREES
T OFFSET: 406 MS
TRICUSPID ANNULAR PLANE SYSTOLIC EXCURSION: 1.4
UFH PPP CHRO-ACNC: 0.8 IU/ML
UFH PPP CHRO-ACNC: 0.8 IU/ML
UFH PPP CHRO-ACNC: 0.9 IU/ML
UFH PPP CHRO-ACNC: 2 IU/ML
UFH PPP CHRO-ACNC: >2 IU/ML
VENTRICULAR RATE: 69 BPM
WBC # BLD AUTO: 9.9 X10*3/UL (ref 4.4–11.3)

## 2024-01-12 PROCEDURE — 2500000004 HC RX 250 GENERAL PHARMACY W/ HCPCS (ALT 636 FOR OP/ED): Performed by: STUDENT IN AN ORGANIZED HEALTH CARE EDUCATION/TRAINING PROGRAM

## 2024-01-12 PROCEDURE — 2500000001 HC RX 250 WO HCPCS SELF ADMINISTERED DRUGS (ALT 637 FOR MEDICARE OP)

## 2024-01-12 PROCEDURE — 85520 HEPARIN ASSAY: CPT | Performed by: STUDENT IN AN ORGANIZED HEALTH CARE EDUCATION/TRAINING PROGRAM

## 2024-01-12 PROCEDURE — 99222 1ST HOSP IP/OBS MODERATE 55: CPT | Performed by: SURGERY

## 2024-01-12 PROCEDURE — 93306 TTE W/DOPPLER COMPLETE: CPT

## 2024-01-12 PROCEDURE — 82565 ASSAY OF CREATININE: CPT

## 2024-01-12 PROCEDURE — 36415 COLL VENOUS BLD VENIPUNCTURE: CPT | Performed by: STUDENT IN AN ORGANIZED HEALTH CARE EDUCATION/TRAINING PROGRAM

## 2024-01-12 PROCEDURE — 2500000004 HC RX 250 GENERAL PHARMACY W/ HCPCS (ALT 636 FOR OP/ED)

## 2024-01-12 PROCEDURE — 93005 ELECTROCARDIOGRAM TRACING: CPT

## 2024-01-12 PROCEDURE — 93306 TTE W/DOPPLER COMPLETE: CPT | Performed by: INTERNAL MEDICINE

## 2024-01-12 PROCEDURE — 1100000001 HC PRIVATE ROOM DAILY

## 2024-01-12 PROCEDURE — 85027 COMPLETE CBC AUTOMATED: CPT

## 2024-01-12 RX ORDER — ASPIRIN 81 MG/1
81 TABLET ORAL DAILY
Status: DISCONTINUED | OUTPATIENT
Start: 2024-01-12 | End: 2024-01-25 | Stop reason: HOSPADM

## 2024-01-12 RX ORDER — ACETAMINOPHEN 650 MG/1
650 SUPPOSITORY RECTAL EVERY 4 HOURS PRN
Status: DISCONTINUED | OUTPATIENT
Start: 2024-01-12 | End: 2024-01-14

## 2024-01-12 RX ORDER — CLORAZEPATE DIPOTASSIUM 3.75 MG/1
3.75 TABLET ORAL 2 TIMES DAILY PRN
Status: DISCONTINUED | OUTPATIENT
Start: 2024-01-12 | End: 2024-01-21

## 2024-01-12 RX ORDER — ATENOLOL 50 MG/1
50 TABLET ORAL 2 TIMES DAILY
Status: DISCONTINUED | OUTPATIENT
Start: 2024-01-12 | End: 2024-01-25 | Stop reason: HOSPADM

## 2024-01-12 RX ORDER — POLYETHYLENE GLYCOL 3350 17 G/17G
17 POWDER, FOR SOLUTION ORAL DAILY
Status: DISCONTINUED | OUTPATIENT
Start: 2024-01-12 | End: 2024-01-25 | Stop reason: HOSPADM

## 2024-01-12 RX ORDER — ACETAMINOPHEN 160 MG/5ML
650 SOLUTION ORAL EVERY 4 HOURS PRN
Status: DISCONTINUED | OUTPATIENT
Start: 2024-01-12 | End: 2024-01-14

## 2024-01-12 RX ORDER — CILOSTAZOL 100 MG/1
100 TABLET ORAL 2 TIMES DAILY
Status: DISCONTINUED | OUTPATIENT
Start: 2024-01-12 | End: 2024-01-15

## 2024-01-12 RX ORDER — ONDANSETRON 4 MG/1
4 TABLET, ORALLY DISINTEGRATING ORAL EVERY 8 HOURS PRN
Status: DISCONTINUED | OUTPATIENT
Start: 2024-01-12 | End: 2024-01-25 | Stop reason: HOSPADM

## 2024-01-12 RX ORDER — ESCITALOPRAM OXALATE 10 MG/1
10 TABLET ORAL DAILY
Status: DISCONTINUED | OUTPATIENT
Start: 2024-01-12 | End: 2024-01-25 | Stop reason: HOSPADM

## 2024-01-12 RX ORDER — ACETAMINOPHEN 325 MG/1
650 TABLET ORAL EVERY 4 HOURS PRN
Status: DISCONTINUED | OUTPATIENT
Start: 2024-01-12 | End: 2024-01-14

## 2024-01-12 RX ORDER — ATORVASTATIN CALCIUM 80 MG/1
80 TABLET, FILM COATED ORAL
Status: DISCONTINUED | OUTPATIENT
Start: 2024-01-12 | End: 2024-01-21

## 2024-01-12 RX ORDER — ONDANSETRON HYDROCHLORIDE 2 MG/ML
4 INJECTION, SOLUTION INTRAVENOUS EVERY 8 HOURS PRN
Status: DISCONTINUED | OUTPATIENT
Start: 2024-01-12 | End: 2024-01-25 | Stop reason: HOSPADM

## 2024-01-12 RX ORDER — SODIUM CHLORIDE, SODIUM LACTATE, POTASSIUM CHLORIDE, CALCIUM CHLORIDE 600; 310; 30; 20 MG/100ML; MG/100ML; MG/100ML; MG/100ML
75 INJECTION, SOLUTION INTRAVENOUS CONTINUOUS
Status: DISCONTINUED | OUTPATIENT
Start: 2024-01-12 | End: 2024-01-15

## 2024-01-12 RX ORDER — MIRTAZAPINE 7.5 MG/1
7.5 TABLET, FILM COATED ORAL NIGHTLY
Status: DISCONTINUED | OUTPATIENT
Start: 2024-01-12 | End: 2024-01-25 | Stop reason: HOSPADM

## 2024-01-12 RX ADMIN — ATENOLOL 50 MG: 50 TABLET ORAL at 09:37

## 2024-01-12 RX ADMIN — CILOSTAZOL 100 MG: 100 TABLET ORAL at 20:15

## 2024-01-12 RX ADMIN — ACETAMINOPHEN 650 MG: 325 TABLET ORAL at 04:31

## 2024-01-12 RX ADMIN — ATORVASTATIN CALCIUM 80 MG: 80 TABLET, FILM COATED ORAL at 11:13

## 2024-01-12 RX ADMIN — MIRTAZAPINE 7.5 MG: 7.5 TABLET, FILM COATED ORAL at 20:15

## 2024-01-12 RX ADMIN — ASPIRIN 81 MG: 81 TABLET, COATED ORAL at 09:37

## 2024-01-12 RX ADMIN — CILOSTAZOL 100 MG: 100 TABLET ORAL at 15:03

## 2024-01-12 RX ADMIN — HEPARIN SODIUM 600 UNITS/HR: 10000 INJECTION, SOLUTION INTRAVENOUS at 16:41

## 2024-01-12 RX ADMIN — HEPARIN SODIUM 700 UNITS/HR: 10000 INJECTION, SOLUTION INTRAVENOUS at 11:13

## 2024-01-12 RX ADMIN — ESCITALOPRAM OXALATE 10 MG: 10 TABLET, FILM COATED ORAL at 09:37

## 2024-01-12 RX ADMIN — SODIUM CHLORIDE, POTASSIUM CHLORIDE, SODIUM LACTATE AND CALCIUM CHLORIDE 75 ML/HR: 600; 310; 30; 20 INJECTION, SOLUTION INTRAVENOUS at 05:50

## 2024-01-12 RX ADMIN — ATENOLOL 50 MG: 50 TABLET ORAL at 20:15

## 2024-01-12 SDOH — SOCIAL STABILITY: SOCIAL INSECURITY: ABUSE: ADULT

## 2024-01-12 SDOH — SOCIAL STABILITY: SOCIAL INSECURITY: HAS ANYONE EVER THREATENED TO HURT YOUR FAMILY OR YOUR PETS?: NO

## 2024-01-12 SDOH — SOCIAL STABILITY: SOCIAL INSECURITY: DO YOU FEEL ANYONE HAS EXPLOITED OR TAKEN ADVANTAGE OF YOU FINANCIALLY OR OF YOUR PERSONAL PROPERTY?: NO

## 2024-01-12 SDOH — SOCIAL STABILITY: SOCIAL INSECURITY: ARE YOU OR HAVE YOU BEEN THREATENED OR ABUSED PHYSICALLY, EMOTIONALLY, OR SEXUALLY BY ANYONE?: NO

## 2024-01-12 SDOH — SOCIAL STABILITY: SOCIAL INSECURITY: ARE THERE ANY APPARENT SIGNS OF INJURIES/BEHAVIORS THAT COULD BE RELATED TO ABUSE/NEGLECT?: NO

## 2024-01-12 SDOH — SOCIAL STABILITY: SOCIAL INSECURITY: WERE YOU ABLE TO COMPLETE ALL THE BEHAVIORAL HEALTH SCREENINGS?: YES

## 2024-01-12 SDOH — SOCIAL STABILITY: SOCIAL INSECURITY: DO YOU FEEL UNSAFE GOING BACK TO THE PLACE WHERE YOU ARE LIVING?: NO

## 2024-01-12 SDOH — SOCIAL STABILITY: SOCIAL INSECURITY: HAVE YOU HAD THOUGHTS OF HARMING ANYONE ELSE?: NO

## 2024-01-12 SDOH — SOCIAL STABILITY: SOCIAL INSECURITY: DOES ANYONE TRY TO KEEP YOU FROM HAVING/CONTACTING OTHER FRIENDS OR DOING THINGS OUTSIDE YOUR HOME?: NO

## 2024-01-12 ASSESSMENT — COGNITIVE AND FUNCTIONAL STATUS - GENERAL
STANDING UP FROM CHAIR USING ARMS: A LITTLE
TOILETING: A LITTLE
CLIMB 3 TO 5 STEPS WITH RAILING: A LOT
PATIENT BASELINE BEDBOUND: NO
DRESSING REGULAR LOWER BODY CLOTHING: A LITTLE
MOVING FROM LYING ON BACK TO SITTING ON SIDE OF FLAT BED WITH BEDRAILS: A LITTLE
WALKING IN HOSPITAL ROOM: A LITTLE
DRESSING REGULAR UPPER BODY CLOTHING: A LITTLE
PERSONAL GROOMING: A LITTLE
DAILY ACTIVITIY SCORE: 19
MOBILITY SCORE: 17
TURNING FROM BACK TO SIDE WHILE IN FLAT BAD: A LITTLE
HELP NEEDED FOR BATHING: A LITTLE
MOVING TO AND FROM BED TO CHAIR: A LITTLE

## 2024-01-12 ASSESSMENT — PAIN - FUNCTIONAL ASSESSMENT
PAIN_FUNCTIONAL_ASSESSMENT: 0-10

## 2024-01-12 ASSESSMENT — LIFESTYLE VARIABLES
HAVE PEOPLE ANNOYED YOU BY CRITICIZING YOUR DRINKING: NO
EVER FELT BAD OR GUILTY ABOUT YOUR DRINKING: NO
HOW OFTEN DO YOU HAVE A DRINK CONTAINING ALCOHOL: NEVER
HOW OFTEN DO YOU HAVE 6 OR MORE DRINKS ON ONE OCCASION: NEVER
REASON UNABLE TO ASSESS: NO
HOW MANY STANDARD DRINKS CONTAINING ALCOHOL DO YOU HAVE ON A TYPICAL DAY: PATIENT DOES NOT DRINK
SKIP TO QUESTIONS 9-10: 1
EVER HAD A DRINK FIRST THING IN THE MORNING TO STEADY YOUR NERVES TO GET RID OF A HANGOVER: NO
AUDIT-C TOTAL SCORE: 0
HAVE YOU EVER FELT YOU SHOULD CUT DOWN ON YOUR DRINKING: NO
AUDIT-C TOTAL SCORE: 0

## 2024-01-12 ASSESSMENT — ACTIVITIES OF DAILY LIVING (ADL)
LACK_OF_TRANSPORTATION: NO
TOILETING: NEEDS ASSISTANCE
DRESSING YOURSELF: INDEPENDENT
HEARING - RIGHT EAR: DIFFICULTY WITH NOISE
PATIENT'S MEMORY ADEQUATE TO SAFELY COMPLETE DAILY ACTIVITIES?: YES
HEARING - LEFT EAR: DIFFICULTY WITH NOISE
WALKS IN HOME: NEEDS ASSISTANCE
FEEDING YOURSELF: INDEPENDENT
GROOMING: INDEPENDENT
JUDGMENT_ADEQUATE_SAFELY_COMPLETE_DAILY_ACTIVITIES: YES
BATHING: NEEDS ASSISTANCE
ADEQUATE_TO_COMPLETE_ADL: YES

## 2024-01-12 ASSESSMENT — PAIN SCALES - GENERAL
PAINLEVEL_OUTOF10: 0 - NO PAIN
PAINLEVEL_OUTOF10: 4

## 2024-01-12 ASSESSMENT — PATIENT HEALTH QUESTIONNAIRE - PHQ9
1. LITTLE INTEREST OR PLEASURE IN DOING THINGS: NOT AT ALL
SUM OF ALL RESPONSES TO PHQ9 QUESTIONS 1 & 2: 0
2. FEELING DOWN, DEPRESSED OR HOPELESS: NOT AT ALL

## 2024-01-12 NOTE — PROGRESS NOTES
89-year-old female here as a transfer with acute limb ischemia of the bilateral lower extremities.  Patient on heparin, vascular consulted by previous provider recommendation signed out to me.  They recommended admission to their service with no plan for emergent operative intervention at this time, will continue on heparin, no decompensation throughout my time caring for her in the emergency department, was admitted.

## 2024-01-12 NOTE — CONSULTS
"Nutrition Initial Assessment:   Nutrition Assessment    Reason for Assessment: Dietitian discretion (BMI <18)    Patient is a 89 y.o. female presenting with LE ischemia.     She presented to the ED yesterday and underwent a CTA which showed bilateral SFA occlusion and additional occlusions of SMA, right internal iliac, right popliteal, right anterior tibial artery, left popliteal artery, and left anterior tibial artery.      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 Drinks Boost at home - 2-3 per day. She doesn't like Ensure.  She lives alone and gets help with shopping from family. States that she doesn't do a lot of cooking anymore. She used to follow a strict low cholesterol diet but she was losing weight so her PCP told her to eat what she wants.  Vitamin/Herbal Supplement Use: Vitamin D, MVI  Food Allergies/Intolerances:  None  GI Symptoms: None  Oral Problems: None     Anthropometrics:  Height: 160 cm (5' 3\")   Weight: (!) 44 kg (97 lb)   BMI (Calculated): 17.2  IBW/kg (Dietitian Calculated): 52.3 kg  Percent of IBW: 84 %       Weight History:   Wt Readings from Last 8 Encounters:   01/11/24 (!) 44 kg (97 lb)   11/29/23 (!) 44.5 kg (98 lb 3.2 oz)   10/27/23 (!) 44.9 kg (99 lb)   10/18/23 (!) 44.4 kg (97 lb 14.4 oz)   08/01/23 45.4 kg (100 lb)   05/04/23 47.6 kg (105 lb)   08/10/22 47.9 kg (105 lb 9.6 oz)   03/31/22 50.5 kg (111 lb 6.4 oz)      Weight Change %:   3% wt loss in 3 months, 7.6% wt loss in 8 months - not clinically significant.  Pt reports that she has always been thin.     Nutrition Focused Physical Exam Findings:    Subcutaneous Fat Loss:   Orbital Fat Pads: Mild-Moderate (slight dark circles and slight hollowing)  Buccal Fat Pads: Mild-Moderate (flat " cheeks, minimal bounce)  Triceps: Severe (negligible fat tissue)  Muscle Wasting:  Temporalis: Mild-Moderate (slight depression)  Pectoralis (Clavicular Region): Severe (protruding prominent clavicle)  Deltoid/Trapezius: Severe (squared shoulders, acromion process prominent)  Interosseous: Mild-Moderate (slightly depressed area between thumb and forefinger)  Quadriceps: Severe (depressions on inner and outer thigh)  Gastrocnemius: Severe (minimal muscle definition)  Physical Findings:   Wound - foot ulcer    Nutrition Significant Labs:  CBC Trend:   Results from last 7 days   Lab Units 01/12/24  0337 01/11/24  2202   WBC AUTO x10*3/uL 9.9 7.7   RBC AUTO x10*6/uL 3.87* 4.03   HEMOGLOBIN g/dL 12.4 13.1   HEMATOCRIT % 39.5 39.0   MCV fL 102* 97   PLATELETS AUTO x10*3/uL 177 184    , BMP Trend:   Results from last 7 days   Lab Units 01/12/24  0337 01/11/24  2202   GLUCOSE mg/dL 109* 107*   CALCIUM mg/dL 9.9 10.0   SODIUM mmol/L 140 141   POTASSIUM mmol/L 4.7 4.3   CO2 mmol/L 27 33*   CHLORIDE mmol/L 101 101   BUN mg/dL 42* 43*   CREATININE mg/dL 1.39* 1.45*     Nutrition Specific Medications:  Lipitor, remeron, miralax      Dietary Orders (From admission, onward)       Start     Ordered    01/12/24 0307  May Participate in Room Service  Once        Question:  .  Answer:  Yes    01/12/24 0318 01/12/24 0154  Adult diet Regular  Diet effective now        Question:  Diet type  Answer:  Regular    01/12/24 0155                     Estimated Needs:   Total Energy Estimated Needs (kCal): 1500 kCal  Method for Estimating Needs: 30kcal/kg IBW  Total Protein Estimated Needs (g): 55 g  Method for Estimating Needs: 1.0gm/kg IBW  Total Fluid Estimated Needs (mL):  (per team)        Nutrition Diagnosis   Malnutrition Diagnosis  Patient has Malnutrition Diagnosis: Yes  Diagnosis Status: New  Malnutrition Diagnosis: Moderate malnutrition related to chronic disease or condition  As Evidenced by: moderate to severe muscle wasting and  fat loss. Underwt at BMI of 17.1 and 84% IBW.        Nutrition Interventions/Recommendations         Nutrition Prescription:  Individualized Nutrition Prescription Provided for : Boost VHC once per day to provide 530 calories and 22 gm protein.        Nutrition Recommendations:    Continue regular diet. Add Boost VHC once per day - RDN to order.   Monitor wt closely during admission.     Nutrition Education:   Discussed importance of adequate nutrition/calories to prevent further wt loss.        Nutrition Monitoring and Evaluation   Food/Nutrient Related History Monitoring  Monitoring and Evaluation Plan: Energy intake, Amount of food (supplement intake)  Criteria: Consume >75% of meals and supplements.    Body Composition/Growth/Weight History  Monitoring and Evaluation Plan: Weight  Criteria: maintain stable weight/promote weight gain    Biochemical Data, Medical Tests and Procedures  Monitoring and Evaluation Plan: Electrolyte/renal panel, Glucose/endocrine profile  Criteria: Labs WNL      Time Spent/Follow-up Reminder:   Time Spent (min): 45 minutes  Last Date of Nutrition Visit: 01/12/24  Nutrition Follow-Up Needed?: Dietitian to reassess per policy

## 2024-01-12 NOTE — ED PROVIDER NOTES
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  Patient states that she has been having recent left lower extremity pain that is new from baseline therefore presented to outside ED.  Patient reportedly found to have bilateral SFA occlusion and additional occlusions of SMA, right internal iliac, right popliteal, right anterior tibial artery, left popliteal artery, and left anterior tibial artery.  Started on a heparin drip.  Patient complaining of pain specifically arising from the wound her left heel.  Endorses missing various doses of her Plavix and aspirin.      Limitations to History: None    Additional History Obtained from: EMS    Records Reviewed: Recent available ED and inpatient notes reviewed in EMR.    PMHx/PSHx:  Per HPI.   - has a past medical history of Asymptomatic premature menopause, Atherosclerosis of coronary artery bypass graft(s) without angina pectoris (10/11/2021), Occlusion and stenosis of bilateral carotid arteries (01/09/2020), Personal history of other diseases of the circulatory system, Personal history of other diseases of the circulatory system, Personal history of other diseases of the circulatory system, Personal history of other endocrine, nutritional and metabolic disease, Personal history of other endocrine, nutritional and metabolic disease, Personal history of other endocrine, nutritional and metabolic disease, and S/P AVR (aortic valve replacement) (10/18/2023).  - has a past surgical history that includes Other surgical history (12/03/2018); Other surgical history (12/03/2018); Other surgical history (12/03/2018);  Other surgical history (12/03/2018); Other surgical history (12/03/2018); and CT angio aorta and bilateral iliofemoral runoff w and or wo IV contrast (7/13/2023).    Medications: Reviewed in EMR. See EMR for complete list of medications and doses.    Allergies:  Ezetimibe and Hydrocodone    Social History:  - Tobacco:  reports that she has never smoked. She has never used smokeless tobacco.   - Alcohol:  has no history on file for alcohol use.   - Illicit Drugs:  has no history on file for drug use.   ???????????????????????????????????????????????????????????????  Triage Vitals:  T 36.2 °C (97.2 °F)  HR 66  /71  RR 21  O2 97 % None (Room air)    PHYSICAL EXAM:   VS: As documented in the triage note and EMR flowsheet from this visit were reviewed.  Gen: Elderly female resting in bed not in acute distress  Eyes: PERRL, EOMI. Clear scerla.  HENT: NC/AT, Mucosal membranes moist.   Neck: Supple, no cervical LAD  Resp: Non-labored breathing on RA, CTAB, no wheezes or crackles  CV: RRR, nl S1, S2, no murmurs  Abd: Soft, non-distended, non-tender, no rebound or guarding  Ext: no LE edema, no palpable or dopplerable DPs or PTs in bilateral lower extremities, palpable femoral pulses bilaterally, small wound to the posterior left heel, bilateral legs warm to touch  Skin: WWP. No systemic rashes or lesions.  MSK: diminished muscle bulk, no obvious joint swelling in extremities  Neuro:  AAOx3, speech fluent, MAEx4, no focal deficit  Psych: Maintains eye contact, Appropriate mood and affect  ???????????????????????????????????????????????????????????????      ED Labs/Imaging:   Labs Reviewed   COMPREHENSIVE METABOLIC PANEL - Abnormal       Result Value    Glucose 107 (*)     Sodium 141      Potassium 4.3      Chloride 101      Bicarbonate 33 (*)     Anion Gap 11      Urea Nitrogen 43 (*)     Creatinine 1.45 (*)     eGFR 35 (*)     Calcium 10.0      Albumin 4.0      Alkaline Phosphatase 84      Total Protein 7.0       AST 24      Bilirubin, Total 0.5      ALT 18     PROTIME-INR - Abnormal    Protime 32.4 (*)     INR 2.8 (*)    CBC - Abnormal    WBC 9.9      nRBC 0.0      RBC 3.87 (*)     Hemoglobin 12.4      Hematocrit 39.5       (*)     MCH 32.0      MCHC 31.4 (*)     RDW 13.1      Platelets 177     BASIC METABOLIC PANEL - Abnormal    Glucose 109 (*)     Sodium 140      Potassium 4.7      Chloride 101      Bicarbonate 27      Anion Gap 17      Urea Nitrogen 42 (*)     Creatinine 1.39 (*)     eGFR 37 (*)     Calcium 9.9     HEPARIN ASSAY - Abnormal    Heparin Unfractionated >2.0 (*)     Narrative:     The therapeutic reference range for UFH may be either 0.3-0.6 IU/mL or 0.3-0.7 IU/mL based on the clinical setting for anticoagulant therapy and the associated nomogram used. For Heparin dosing guidelines based on clinical scenario and Heparin Assay results, please refer to local Pharmacy and [facility] Guidelines for Anticoagulation Therapy available on the [facility] intranet at: [email or web address]   HEPARIN ASSAY - Abnormal    Heparin Unfractionated 2.0 (*)     Narrative:     The therapeutic reference range for UFH may be either 0.3-0.6 IU/mL or 0.3-0.7 IU/mL based on the clinical setting for anticoagulant therapy and the associated nomogram used. For Heparin dosing guidelines based on clinical scenario and Heparin Assay results, please refer to local Pharmacy and [facility] Guidelines for Anticoagulation Therapy available on the [facility] intranet at: [email or web address]   HEPARIN ASSAY - Normal    Heparin Unfractionated 0.1      Narrative:     The therapeutic reference range for UFH may be either 0.3-0.6 IU/mL or 0.3-0.7 IU/mL based on the clinical setting for anticoagulant therapy and the associated nomogram used. For Heparin dosing  guidelines based on clinical scenario and Heparin Assay results, please refer to local Pharmacy and [facility] Guidelines for Anticoagulation Therapy available on the [facility] intranet at: [email or web address]   HEPARIN ASSAY - Normal    Heparin Unfractionated 0.9      Narrative:     The therapeutic reference range for UFH may be either 0.3-0.6 IU/mL or 0.3-0.7 IU/mL based on the clinical setting for anticoagulant therapy and the associated nomogram used. For Heparin dosing guidelines based on clinical scenario and Heparin Assay results, please refer to local Pharmacy and [facility] Guidelines for Anticoagulation Therapy available on the [facility] intranet at: [email or web address]   CBC WITH AUTO DIFFERENTIAL    WBC 7.7      nRBC 0.0      RBC 4.03      Hemoglobin 13.1      Hematocrit 39.0      MCV 97      MCH 32.5      MCHC 33.6      RDW 13.0      Platelets 184      Neutrophils % 58.4      Immature Granulocytes %, Automated 0.4      Lymphocytes % 29.5      Monocytes % 10.1      Eosinophils % 1.3      Basophils % 0.3      Neutrophils Absolute 4.47      Immature Granulocytes Absolute, Automated 0.03      Lymphocytes Absolute 2.26      Monocytes Absolute 0.77      Eosinophils Absolute 0.10      Basophils Absolute 0.02     TYPE AND SCREEN    ABO TYPE A      Rh TYPE POS      ANTIBODY SCREEN NEG       Transthoracic Echo (TTE) Complete               ED Course & MDM   Diagnoses as of 01/12/24 1432   Acute lower limb ischemia           Medical Decision Making:  This is a 89-year-old female with history of PAD status post left external iliac artery stent in September presenting to the ED as a transfer for vascular surgery consult due to concern for acute limb ischemia.  Patient arrives on a heparin drip hemodynamically stable and not in acute  distress.  CT imaging reviewed from outside hospital which shows bilateral SFA occlusions and additional occlusions as stated above.  Patient has been noncompliant with her antiplatelets.  On arrival patient showed no palpable DP or PT pulses bilaterally.  Possibly hearing monophasic signals in these areas.  Patient does have right palpable femorals bilaterally and legs do feel warm. Am concerned for bilateral limb ischemia. Vascular surgery consulted for further recommendations.  Repeat labs drawn as unable to obtain results from outside hospital at this time.  Patient was handed off to oncoming team pending vascular surgery recommendations and admission.    Social Determinants Limiting Care:  None identified    Disposition:  Patient was signed out at 2300 pending completion of their work-up.  Please see the next provider's transition of care note for the remainder of the patient's care.    Patient seen and discussed with attending physician.    Toshia Horn MD PGY3  Emergency Medicine      Procedures ? SmartLinks last updated 1/12/2024 2:32 PM          Toshia Horn MD  Resident  01/12/24 0227

## 2024-01-12 NOTE — H&P
Vascular Surgery History and Physical      HPI:  Maggi Gordon is a 89 y.o. 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 Patient states that she hit her foot on her bed frame a few days ago and noticed she started to develop a wound over her left achilles. She started to have severe pain and difficulty with ambulation. She presented to the ED yesterday and underwent a CTA which showed bilateral SFA occlusion and additional occlusions of SMA, right internal iliac, right popliteal, right anterior tibial artery, left popliteal artery, and left anterior tibial artery. Patient reports that she has missed several doses of her plavix and aspirin. She was initiated on a heparin gtt and transferred to [facility] for further evaluation.    On presentation at [facility], patient otherwise feels well, aside from pain in her left foot, specifically her heel. She states that she has had difficulty ambulating since the new ulceration developed.       Past Medical History:   Past Medical History:   Diagnosis Date    Asymptomatic premature menopause     Premature menopause    Atherosclerosis of coronary artery bypass graft(s) without angina pectoris 10/11/2021    Atherosclerosis of CABG w/o angina pectoris    Occlusion and stenosis of bilateral carotid arteries 01/09/2020    Occlusion and stenosis of bilateral carotid arteries    Personal history of other diseases of the circulatory system     History of coronary artery disease    Personal history of other diseases of the circulatory system     History of hypertension    Personal history of other diseases of the circulatory system     History of stenosis of renal artery    Personal history of other endocrine, nutritional and metabolic disease     History of hypothyroidism    Personal history of other endocrine, nutritional and  metabolic disease     History of hyperlipidemia    Personal history of other endocrine, nutritional and metabolic disease     History of goiter    S/P AVR (aortic valve replacement) 10/18/2023    #21 CE AVR       Past Surgical History:   Past Surgical History:   Procedure Laterality Date    CT AORTA AND BILATERAL ILIOFEMORAL RUNOFF ANGIOGRAM W AND/OR WO IV CONTRAST  7/13/2023    CT AORTA AND BILATERAL ILIOFEMORAL RUNOFF ANGIOGRAM W AND/OR WO IV CONTRAST 7/13/2023 [facility] CT    OTHER SURGICAL HISTORY  12/03/2018    Cataract surgery    OTHER SURGICAL HISTORY  12/03/2018    Coronary artery bypass graft    OTHER SURGICAL HISTORY  12/03/2018    Aortic valve replacement    OTHER SURGICAL HISTORY  12/03/2018    Eye surgery    OTHER SURGICAL HISTORY  12/03/2018    Surgery       Medications:     Current Facility-Administered Medications:     heparin (porcine) injection 2,000-4,000 Units, 2,000-4,000 Units, intravenous, q4h PRN, Toshia Horn MD, 2,000 Units at 01/11/24 2302    heparin 25,000 Units in dextrose 5% 250 mL (100 Units/mL) infusion (premix), 0-4,500 Units/hr, intravenous, Continuous, Toshia Horn MD, Last Rate: 9 mL/hr at 01/11/24 2300, 900 Units/hr at 01/11/24 2300    Current Outpatient Medications:     alendronate (Fosamax) 70 mg tablet, Take 1 tablet (70 mg) by mouth every 7 days., Disp: , Rfl:     amoxicillin (Amoxil) 500 mg capsule, Take 4 capsules (2,000 mg) by mouth see administration instructions. Take 1 hour before dental appt, Disp: , Rfl:     aspirin 81 mg capsule, once every 24 hours., Disp: , Rfl:     aspirin 81 mg EC tablet, Take 1 tablet (81 mg) by mouth once daily., Disp: , Rfl:     atenolol (Tenormin) 50 mg tablet, Take 1 tablet (50 mg) by mouth 2 times a day., Disp: , Rfl:     atorvastatin (Lipitor) 80 mg tablet, Take 1 tablet (80 mg) by mouth once daily., Disp: , Rfl:     azelastine (Astelin) 137 mcg (0.1 %) nasal spray, Administer into affected nostril(s)., Disp: , Rfl:     BACILLUS  COAGULANS-INULIN ORAL, Take by mouth., Disp: , Rfl:     biotin 2,500 mcg capsule, Take by mouth., Disp: , Rfl:     cephalexin (Keflex) 500 mg capsule, Take 1 capsule (500 mg) by mouth every 12 hours., Disp: , Rfl:     cetirizine (ZyrTEC) 10 mg tablet, Take 1 tablet (10 mg) by mouth once daily., Disp: , Rfl:     chlorpheniramine (Chlor-Trimeton) 4 mg tablet, Take by mouth., Disp: , Rfl:     cholecalciferol (Vitamin D-3) 25 MCG (1000 UT) capsule, Take by mouth., Disp: , Rfl:     cilostazol (Pletal) 100 mg tablet, Take 1 tablet (100 mg) by mouth 2 times a day., Disp: , Rfl:     clopidogrel (Plavix) 75 mg tablet, TAKE 1 TABLET BY MOUTH EVERY DAY, Disp: 90 tablet, Rfl: 0    clorazepate (Tranxene) 7.5 mg tablet, Take 0.5 tablets (3.75 mg) by mouth 2 times a day as needed., Disp: , Rfl:     CLORAZEPATE DIPOTASSIUM ORAL, Take by mouth., Disp: , Rfl:     escitalopram (Lexapro) 10 mg tablet, 1 tablet (10 mg) once daily., Disp: , Rfl:     ferrous sulfate 325 (65 Fe) MG tablet, Take 1 tablet by mouth once daily., Disp: , Rfl:     folic acid (Folvite) 1 mg tablet, Take 2 tablets (2 mg) by mouth once daily., Disp: , Rfl:     gentamicin (Garamycin) 0.1 % cream, APPLY TO WOUND AS DIRECTED ONCE DAILY, Disp: , Rfl:     ipratropium (Atrovent) 21 mcg (0.03 %) nasal spray, Administer 2 sprays into each nostril 2 times a day., Disp: , Rfl:     mirtazapine (Remeron) 15 mg tablet, Take 0.5 tablets (7.5 mg) by mouth once daily at bedtime., Disp: , Rfl:     mv-min/FA/vit K/lutein/zeaxant (PRESERVISION AREDS 2 PLUS MV ORAL), Take by mouth., Disp: , Rfl:     potassium chloride CR (Klor-Con 8) 8 mEq ER tablet, Take 1 tablet (8 mEq) by mouth 3 times a day with meals., Disp: , Rfl:     Praluent Pen 150 mg/mL pen injector, INJECT 1 MILLILITER INTO SKIN EVERY 2 WEEKS IN ABDOMEN, THIGH, OR UPPER ARM ROTATING INJECTION SITES, Disp: 6 Pen, Rfl: 3    rosuvastatin (Crestor) 40 mg tablet, Take 1 tablet (40 mg) by mouth once daily at bedtime., Disp: ,  Rfl:     SantyL 250 unit/gram ointment, APPLY TO WOUND AS DIRECTED ONCE DAILY FOR 30 DAYS, Disp: , Rfl:     spironolacton-hydrochlorothiaz (Aldactazide) 25-25 mg tablet, Take 0.5 tablets (12.5 mg) by mouth once daily., Disp: , Rfl:     valsartan (Diovan) 160 mg tablet, Take 1 tablet (160 mg) by mouth once daily., Disp: , Rfl:     valsartan (Diovan) 320 mg tablet, Take by mouth., Disp: , Rfl:     vit A/vit C/vit E/zinc/copper (PRESERVISION AREDS ORAL), Take 1 capsule by mouth once daily., Disp: , Rfl:     vitamin D3-vitamin K2 1,250-200 mcg capsule, Vitamin D3, Disp: , Rfl:     WesTab Max 2.5-25-2 mg tablet, Take 1 tablet by mouth once daily., Disp: , Rfl:      Social History:   Social History     Socioeconomic History    Marital status:      Spouse name: None    Number of children: None    Years of education: None    Highest education level: None   Occupational History    None   Tobacco Use    Smoking status: Never    Smokeless tobacco: Never   Substance and Sexual Activity    Alcohol use: None    Drug use: None    Sexual activity: None   Other Topics Concern    None   Social History Narrative    None     Social Determinants of Health     Financial Resource Strain: Not on file   Food Insecurity: Not on file   Transportation Needs: Not on file   Physical Activity: Not on file   Stress: Not on file   Social Connections: Not on file   Intimate Partner Violence: Not on file   Housing Stability: Not on file        Family History:   No family history on file.     Allergies:   Allergies   Allergen Reactions    Ezetimibe Unknown    Hydrocodone Other        Review of Systems:   Denies chest pain, shortness of breath, nausea, vomiting, weakness, vertigo, leg swelling, fevers, chills    ------------------------------------------------------------------------------------------    Objective    Vitals:   Heart Rate:  [66] 66  Resp:  [21] 21  BP: (151)/(71) 151/71      I/O  No intake/output data recorded.      Physical  Exam  GENERAL APPEARANCE:  AxOx4, generally well-appearing no acute distress.  HEART:  Normal rate and regular rhythm  LUNGS:  Equal chest rise and fall, non-labored breathing  ABDOMEN:  Soft, nontender, nondistended  NEUROLOGICAL:  Grossly nonfocal. Alert and oriented, moving all 4 extremities.   Skin:  Warm and dry without any rash.  EXTREMITIES: bilateral lower extremities are warm to the touch. Unable to dorsiflex or plantar flex the left foot. Decreased sensation bilaterally. Left posterior heel wound approximately 2x3cm. No lower extremity edema  Pulse exam:  R fem palpable  R DP/PT monophasic   L femo non-palpable, monophasic fem  L DP monophasic, non-dopplerable PT    Labs:  Lab Results   Component Value Date    WBC 7.7 2024    HGB 13.1 2024    HCT 39.0 2024    MCV 97 2024     2024     Lab Results   Component Value Date    GLUCOSE 107 (H) 2024    CALCIUM 10.0 2024     2024    K 4.3 2024    CO2 33 (H) 2024     2024    BUN 43 (H) 2024    CREATININE 1.45 (H) 2024     Lab Results   Component Value Date    ALT 18 2024    AST 24 2024    ALKPHOS 84 2024    BILITOT 0.5 2024     Results from last 7 days   Lab Units 24  2207   INR  2.8*             Imaging  CT angio aorta and bilateral iliofemoral runoff w and or wo IV contrast    Result Date: 2024  Patient Name: TOMASA BALTAZAR : 1935 MRN: [id number] Acct#: [id number] Accession: [id number] Exam Date/Time: 2024 12:38 Procedure: CTA AORTA BL ILIOFEMORAL W WO Ordering Provider: DURAN SAMANTHA Reason For Exam: Claudication or leg ischemia EXAMINATION: CTA of the abdomen and pelvis with bilateral lower extremity runoff. EXAM DATE & TIME: 2024 12:38 PM EST INDICATION: Claudication or leg ischemia ADDITIONAL INFORMATION: 89-year-old female with a provided history of claudication/leg ischemia presents for evaluation  COMPARISON: CT abdomen dated 12/1/2016 and renal angiography dated 9/15/2009 LIMITATIONS: Evaluation of the hollow viscera is limited due to the lack of oral contrast. TECHNIQUE: Contiguous multiplanar 1 mm images were obtained from the levels of the lung bases through the toes during dynamic infusion of 100 mL of intravenous Isovue 370. Multiplanar and 3-D maximum intensity projection reformulations were created from the raw CT data with independent workstation software by the radiologist. These were interpreted in conjunction with the axial images to render the findings listed below. Before infusion of intravenous contrast, radiology personnel investigated the possibility of an allergic history and any history of reaction to iodinated contrast material. Dose reduction was employed with automated exposure control. CTA ABDOMINAL VESSELS: Celiac axis: There is severe stenosis of the celiac axis origin with poststenotic dilation. SMA: The SMA origin is occluded and reconstitutes via collaterals. INDERJIT: There is severe ostial stenosis. Right renal vessels: There is a single right renal artery. Right renal artery stent appears patent. There is moderate ostial stenosis. Left renal vessels: The patient is status post stenting of the left renal artery, with the proximal stent located within the aorta, unchanged dating back to 2009. The renal artery stent is patent. No significant stenosis. Infrarenal abdominal aorta: No significant stenosis. CTA PELVIC VESSELS: Right common iliac artery: Mild to moderate stenosis. Right internal iliac artery: Occluded at its origin but reconstitutes distally via pelvic collaterals. Right external iliac artery: No significant stenosis. Left common iliac artery: Moderate stenosis. Left internal iliac artery: Severe ostial stenosis. Left external iliac artery: Left external iliac artery stent appears patent. There is moderate stenosis. CTA RIGHT LOWER EXTREMITY: Right common femoral artery: No  significant stenosis. Right profunda femoris artery: Severe ostial stenosis. Right SFA: Occluded just distal to its origin. Right popliteal artery: Partially opacified distally secondary to collateralization but largely occluded. Right anterior tibial artery: There is high takeoff of the right anterior tibial artery. This appears occluded distally. Right tibioperoneal trunk: No significant stenosis. Right posterior tibial artery: No significant stenosis.   Right peroneal artery: No significant stenosis. Right dorsalis pedis artery: There is diminished opacification. Right plantar artery: No significant stenosis. CTA LEFT LOWER EXTREMITY: Left common femoral artery: There is severe stenosis.  Left profunda femoris artery: Moderate ostial stenosis. Left SFA: Occluded just distal to its origin. Left popliteal artery: Occluded. Left anterior tibial artery: Occluded just distal to its origin. Left tibioperoneal trunk: No significant stenosis. Left posterior tibial artery: No significant stenosis. Left peroneal artery: No significant stenosis. Left dorsalis pedis artery: Diminished opacification, likely secondary to ankle collaterals. Left plantar artery: No significant stenosis. NONVASCULAR FINDINGS: Included images of the lower thorax: There is bibasilar and dependent scarring/atelectasis No focal lung consolidation or pleural effusion. Hepatobiliary: Coarse hepatic calcifications are seen. No significant intrahepatic biliary ductal dilation or focal hepatic mass lesion is identified. Gallbladder appears normal. Spleen: Unremarkable. Pancreas: There is mild, nonspecific prominence of the main pancreatic duct up to 3 mm. Adrenal glands: Mild bilateral adrenal nodularity is seen. Kidneys, ureters and bladder: Bilateral renal marginal irregularity could relate to scarring from prior trauma or infection. Several simple left renal cysts are seen. No evidence of hydronephrosis or nephrolithiasis. The urinary bladder is  unremarkable in appearance. Abdominal and pelvic vasculature: Atherosclerotic vascular calcifications are present in the abdominal aorta and its proximal major branches. Gastrointestinal: Fairly extensive colonic diverticulosis is seen without evidence of diverticulitis. A large fecal burden is noted. No evidence of bowel obstruction. The appendix is within normal limits. Peritoneum, retroperitoneum and mesentery: No free fluid or free air. Lymph nodes: No abdominal or pelvic lymphadenopathy is evident. Solid pelvic viscera: Unremarkable. Visualized musculoskeletal structures: Multilevel spondylosis is present. There is also grade 1 anterolisthesis of L3 on L4. There are also degenerative changes of the hips and knees.     1.  Bilateral SFA occlusion with bivessel runoff to the bilateral lower extremities. 2.  Additional occlusion noted of the SMA origin, right internal iliac artery, right popliteal artery, right anterior tibial artery, left popliteal artery and left anterior tibial artery. 3.  Varying degrees of vascular stenosis as described. 4.  Stable positioning of the renal stents as described, with the left renal stent partially within the aorta. This is unchanged dating back to 2009. 5.  Additional chronic findings as above. Report Dictated on Workstation: [id number]  Electronically Signed By: Wesley Temple MD  Electronically Signed Date/Time: 1/11/2024 1:27 PM EST       -------------------------------------------------------------------------------------------    Assessment  89 y.o. female with significant PMHx as well as PAD s/p L EIA stent in September 2023 at [facility]. She presented to an OSH yesterday d/t concern for left lower extremity pain in conjunction with a new heel ulcer. Imaging revealed bilateral SFA occlusion and additional occlusions of SMA, right internal iliac, right popliteal, right anterior tibial artery, left popliteal artery, and left anterior tibial artery.    On exam in the  "ED, patient is unable to dorsiflex or plantarflex her left foot. She states that since she had the stent placed in September, she has had \"decreased movement.\" However, she states that for the past several days she has lost ability to flex her foot.    Based on outside imaging, patient will need open left fem endarterectomy and potential bypass. Will attempt to have imaging physically brought from [facility], as they are unable to digitally forward the images. Repeat imaging not preferred due to patient's decreased kidney function. Recommend admission and medical workup with planning of OR early next week.    Plan  - Admit to vascular surgery  - Continue heparin gtt  - Continue ASA/Pletal  - Consult pre-operative medicine for operative clearance  - Continue home antihypertensives, Lexapro, and Remeron  - Regular diet  - IVFs for LUCRETIA    Patient seen and discussed with attending physician, Dr. Ravindra Moore, DO  Vascular Surgery  Department of Surgery / IR Integrated PGY1              "

## 2024-01-12 NOTE — ED TRIAGE NOTES
Transferred from [facility] ED for c/o R-foot pain. Pmx of iliac stent placement in 2023, now presenting with BL-DVTs. Placed on Hep ggt prior to arrival.

## 2024-01-13 LAB
ERYTHROCYTE [DISTWIDTH] IN BLOOD BY AUTOMATED COUNT: 13.1 % (ref 11.5–14.5)
HCT VFR BLD AUTO: 32.7 % (ref 36–46)
HGB BLD-MCNC: 10 G/DL (ref 12–16)
MCH RBC QN AUTO: 32.7 PG (ref 26–34)
MCHC RBC AUTO-ENTMCNC: 30.6 G/DL (ref 32–36)
MCV RBC AUTO: 107 FL (ref 80–100)
NRBC BLD-RTO: 0 /100 WBCS (ref 0–0)
PLATELET # BLD AUTO: 158 X10*3/UL (ref 150–450)
RBC # BLD AUTO: 3.06 X10*6/UL (ref 4–5.2)
UFH PPP CHRO-ACNC: 0.5 IU/ML
UFH PPP CHRO-ACNC: 0.6 IU/ML
WBC # BLD AUTO: 8.7 X10*3/UL (ref 4.4–11.3)

## 2024-01-13 PROCEDURE — 36415 COLL VENOUS BLD VENIPUNCTURE: CPT | Performed by: STUDENT IN AN ORGANIZED HEALTH CARE EDUCATION/TRAINING PROGRAM

## 2024-01-13 PROCEDURE — 85027 COMPLETE CBC AUTOMATED: CPT | Performed by: STUDENT IN AN ORGANIZED HEALTH CARE EDUCATION/TRAINING PROGRAM

## 2024-01-13 PROCEDURE — 2500000001 HC RX 250 WO HCPCS SELF ADMINISTERED DRUGS (ALT 637 FOR MEDICARE OP)

## 2024-01-13 PROCEDURE — 1100000001 HC PRIVATE ROOM DAILY

## 2024-01-13 PROCEDURE — 2500000004 HC RX 250 GENERAL PHARMACY W/ HCPCS (ALT 636 FOR OP/ED): Performed by: STUDENT IN AN ORGANIZED HEALTH CARE EDUCATION/TRAINING PROGRAM

## 2024-01-13 PROCEDURE — 85520 HEPARIN ASSAY: CPT | Performed by: STUDENT IN AN ORGANIZED HEALTH CARE EDUCATION/TRAINING PROGRAM

## 2024-01-13 RX ORDER — LIDOCAINE 40 MG/G
CREAM TOPICAL 2 TIMES DAILY PRN
Status: DISCONTINUED | OUTPATIENT
Start: 2024-01-13 | End: 2024-01-15

## 2024-01-13 RX ADMIN — LIDOCAINE 4%: 4 CREAM TOPICAL at 18:48

## 2024-01-13 RX ADMIN — ACETAMINOPHEN 650 MG: 325 TABLET ORAL at 13:09

## 2024-01-13 RX ADMIN — ATENOLOL 50 MG: 50 TABLET ORAL at 20:37

## 2024-01-13 RX ADMIN — CILOSTAZOL 100 MG: 100 TABLET ORAL at 09:03

## 2024-01-13 RX ADMIN — MIRTAZAPINE 7.5 MG: 7.5 TABLET, FILM COATED ORAL at 20:36

## 2024-01-13 RX ADMIN — HEPARIN SODIUM 500 UNITS/HR: 10000 INJECTION, SOLUTION INTRAVENOUS at 20:54

## 2024-01-13 RX ADMIN — CILOSTAZOL 100 MG: 100 TABLET ORAL at 20:37

## 2024-01-13 RX ADMIN — ATORVASTATIN CALCIUM 80 MG: 80 TABLET, FILM COATED ORAL at 07:25

## 2024-01-13 RX ADMIN — ATENOLOL 50 MG: 50 TABLET ORAL at 09:03

## 2024-01-13 RX ADMIN — ESCITALOPRAM OXALATE 10 MG: 10 TABLET, FILM COATED ORAL at 09:03

## 2024-01-13 RX ADMIN — ASPIRIN 81 MG: 81 TABLET, COATED ORAL at 09:03

## 2024-01-13 ASSESSMENT — COGNITIVE AND FUNCTIONAL STATUS - GENERAL
PERSONAL GROOMING: A LITTLE
DRESSING REGULAR LOWER BODY CLOTHING: A LITTLE
DAILY ACTIVITIY SCORE: 20
TOILETING: A LITTLE
HELP NEEDED FOR BATHING: A LITTLE
STANDING UP FROM CHAIR USING ARMS: A LITTLE
MOBILITY SCORE: 20
MOVING TO AND FROM BED TO CHAIR: A LITTLE
WALKING IN HOSPITAL ROOM: A LITTLE
CLIMB 3 TO 5 STEPS WITH RAILING: A LITTLE

## 2024-01-13 ASSESSMENT — PAIN SCALES - GENERAL
PAINLEVEL_OUTOF10: 3
PAINLEVEL_OUTOF10: 0 - NO PAIN
PAINLEVEL_OUTOF10: 3
PAINLEVEL_OUTOF10: 0 - NO PAIN
PAINLEVEL_OUTOF10: 0 - NO PAIN

## 2024-01-13 ASSESSMENT — PAIN - FUNCTIONAL ASSESSMENT
PAIN_FUNCTIONAL_ASSESSMENT: 0-10

## 2024-01-13 NOTE — PROGRESS NOTES
"Maggi Gordon is a 89 y.o. female on day 1 of admission presenting with Acute lower limb ischemia.    Subjective   Seen this morning at bedside.  No overnight events.  Therapeutic on heparin drip.  Planning for L fem endart next week, Tuesday vs Wednesday.  Cardiology saw, recommending Lexiscan.  To be seen by wound care.  Pain tolerable.    Objective     Physical Exam  GENERAL APPEARANCE:  AxOx4, generally well-appearing no acute distress. Sleepy.  HEART:  RRR  LUNGS:  Equal chest rise and fall, non-labored breathing  ABDOMEN:  Soft, nontender, nondistended  NEUROLOGICAL:  Grossly nonfocal. Alert and oriented, moving all 4 extremities.   Skin:  Warm and dry without any rash.  EXTREMITIES: bilateral lower extremities are warm to the touch. Unable to dorsiflex or plantar flex the left foot. Decreased sensation bilaterally. Left posterior heel wound approximately 2x3cm. No lower extremity edema  Pulse exam:  R fem palpable  R DP/PT monophasic   L femo non-palpable, monophasic fem  L DP monophasic, non-dopplerable PT    Last Recorded Vitals  Blood pressure (!) 144/95, pulse 78, temperature 36.5 °C (97.7 °F), resp. rate 18, height 1.6 m (5' 3\"), weight (!) 44 kg (97 lb), SpO2 95 %.  Intake/Output last 3 Shifts:  I/O last 3 completed shifts:  In: 1618 (36.8 mL/kg) [P.O.:480; I.V.:1138 (25.9 mL/kg)]  Out: - (0 mL/kg)   Weight: 44 kg     Relevant Results  Results for orders placed or performed during the hospital encounter of 01/11/24 (from the past 24 hour(s))   Transthoracic Echo (TTE) Complete   Result Value Ref Range    AV pk juan 2.40     AV mn grad 11.0     LVOT diam 1.65     LV biplane EF 75     MV E/A ratio 1.28     LA vol index A/L 29.4     Tricuspid annular plane systolic excursion 1.4     RV free wall pk S' 10.00     LVIDd 3.05     Aortic Valve Area by Continuity of VTI 1.04     Aortic Valve Area by Continuity of Peak Velocity 0.96     AV pk grad 23.1     LV A4C EF 75.0    Heparin Assay, UFH   Result Value Ref " Range    Heparin Unfractionated 0.8 See Comment Below for Therapeutic Ranges IU/mL   Heparin Assay, UFH   Result Value Ref Range    Heparin Unfractionated 0.8 See Comment Below for Therapeutic Ranges IU/mL   CBC   Result Value Ref Range    WBC 8.7 4.4 - 11.3 x10*3/uL    nRBC 0.0 0.0 - 0.0 /100 WBCs    RBC 3.06 (L) 4.00 - 5.20 x10*6/uL    Hemoglobin 10.0 (L) 12.0 - 16.0 g/dL    Hematocrit 32.7 (L) 36.0 - 46.0 %     (H) 80 - 100 fL    MCH 32.7 26.0 - 34.0 pg    MCHC 30.6 (L) 32.0 - 36.0 g/dL    RDW 13.1 11.5 - 14.5 %    Platelets 158 150 - 450 x10*3/uL   Heparin Assay, UFH   Result Value Ref Range    Heparin Unfractionated 0.6 See Comment Below for Therapeutic Ranges IU/mL   Heparin Assay, UFH   Result Value Ref Range    Heparin Unfractionated 0.5 See Comment Below for Therapeutic Ranges IU/mL     Assessment/Plan   89 y.o. female with significant PMHx as well as PAD s/p L EIA stent in September 2023 at [facility]. She presented to an OSH yesterday d/t concern for left lower extremity pain in conjunction with a new heel ulcer. Imaging revealed bilateral SFA occlusion and additional occlusions of SMA, right internal iliac, right popliteal, right anterior tibial artery, left popliteal artery, and left anterior tibial artery. Planning for a L femoral endarterectomy Tuesday vs. Wednesday. Undergoing preoperative assessment by cardiology.    Plan:  -Obtain Lexiscan per cardiology recommendations  -Wound care for L foot wounds  -Continue heparin gtt and ensure therapeutic  -Continue ASA/Pletal  -Continue home antihypertensives, Lexapro, Remeron  -Regular diet  -mIVF    Seen and discussed with attending surgeon.    John Carl MD  Vascular Surgery, PGY3  Team Pager: [phone]

## 2024-01-13 NOTE — H&P (VIEW-ONLY)
Wound Care Consult     Visit Date: 1/13/2024      Patient Name: Maggi Gordon         MRN: [id number]           YOB: 1935    Reason for Consult: Left lateral foot            Wound Team Summary Assessment:   Wound location: left lateral foot   size:1.5cm x 1.5cm x 0.2cm                        undermining: none    tracking: none                                    Wound type: chronic vascular wound  Wound bed: patient states there is a graft (?) to wound bed.  Draining: scant  Periwound skin: white from maceration; achilles to same extremity with dry painful scab.    Recommendation: Patient states she sees a wound doctor on an outpatient basis. After speaking with patient's sister on the phone, the wound is not to be cleansed with NS (unknown reason at this time). The base should be protected with [employer], followed by Aqu[employer]el [employer], and covered with gauze or mepilex. Dressing should be done daily (by bedside RN).        Karen Hanna RN  1/13/2024  11:46 AM

## 2024-01-13 NOTE — H&P (VIEW-ONLY)
Inpatient consult to Cardiology  Consult performed by: Eben Kelley MD  Consult ordered by: Javier Waite MD  Reason for consult: pre-op risk assessment      History Of Present Illness:    Maggi Gordon is a 89 y.o. female with PMH of HTN, DLD, CKD, vertebral artery syndrome status post L PICA coiling and stent placement (2006), CAD s/p CABG x3 (1995), A-fib, aortic stenosis, and PAD s/p L EIA stent in September 2023 at [facility]. Patient states that she hit her foot on her bed frame a few days ago and noticed she started to develop a wound over her left achilles. She started to have severe pain and difficulty with ambulation. She presented to the ED yesterday and underwent a CTA which showed bilateral SFA occlusion and additional occlusions of SMA, right internal iliac, right popliteal, right anterior tibial artery, left popliteal artery, and left anterior tibial artery. Patient reports that she has missed several doses of her plavix and aspirin. She was initiated on a heparin gtt and transferred to [facility] for further evaluation. Vascular surgery planning for open L femoral endarterectomy with potential bypass. Cardiology is consulted for pre-op risk assessment.    On interview, patient is generally well-appearing. She is hard of hearing and not a great historian overall. She is HDS, appears euvolemic, and is without acute cardiopulmonary complaints. She reports that she gets around the house OK, but does not really know how much she is able to exert herself (reports she never really needs to take stairs). She does require help for cooking and cleaning. She does not know much about her cardiac history, but does not believe she has had a coronary/bypass angiogram since her original CABG in 1995.     Cardiac Hx:  ECG sinus without any ischemic changes, stable from prior  TTE pending  TTE 10/18/23: EF normal, mod increased septal thickenss, pseudonormal diastology, MV mod thickened, mod MR, bovine aortic valve  "bioprosthesis (MG 8.1, DI 0.58)  No recent cath in syngo  s/p CABG x 3 6/1995(SVG to Cx, SVG to RCA and LIMA to LAD). At [facility]     Last Recorded Vitals:  Vitals:    01/12/24 0745 01/12/24 1227 01/12/24 1550 01/12/24 2005   BP: 116/53 134/66 111/52 100/52   BP Location:       Patient Position:       Pulse: 68 70 84 80   Resp: 14 16 17 18   Temp: 36.9 °C (98.4 °F) 36.6 °C (97.9 °F) 36.4 °C (97.5 °F) 36.7 °C (98.1 °F)   TempSrc:       SpO2: 95% 93% 97% 92%   Weight:       Height:           Last Labs:  CBC - 1/12/2024:  3:37 AM  9.9 12.4 177    39.5      CMP - 1/12/2024:  3:37 AM  9.9 7.0 24 --- 0.5   3.2 4.0 18 84      PTT - No results in last year.  2.8   32.4 _     No results found for: \"TROPHS\", \"BNP\", \"HGBA1C\", \"LDLCALC\", \"VLDL\"   Last I/O:  I/O last 3 completed shifts:  In: 480 (10.9 mL/kg) [P.O.:480]  Out: - (0 mL/kg)   Weight: 44 kg     Past Cardiology Tests (Last 3 Years):  EKG:  ECG 12 Lead 01/12/2024    Echo:  Transthoracic Echo (TTE) Complete 01/12/2024      Transthoracic Echo (TTE) Complete 10/18/2023    Ejection Fractions:  EF   Date/Time Value Ref Range Status   01/12/2024 01:55 PM 75       Cath:  No results found for this or any previous visit from the past 1095 days.    Stress Test:  No results found for this or any previous visit from the past 1095 days.    Cardiac Imaging:  No results found for this or any previous visit from the past 1095 days.      Past Medical History:  She has a past medical history of Asymptomatic premature menopause, Atherosclerosis of coronary artery bypass graft(s) without angina pectoris (10/11/2021), Occlusion and stenosis of bilateral carotid arteries (01/09/2020), Personal history of other diseases of the circulatory system, Personal history of other diseases of the circulatory system, Personal history of other diseases of the circulatory system, Personal history of other endocrine, nutritional and metabolic disease, Personal history of other endocrine, nutritional " and metabolic disease, Personal history of other endocrine, nutritional and metabolic disease, and S/P AVR (aortic valve replacement) (10/18/2023).    Past Surgical History:  She has a past surgical history that includes Other surgical history (12/03/2018); Other surgical history (12/03/2018); Other surgical history (12/03/2018); Other surgical history (12/03/2018); Other surgical history (12/03/2018); and CT angio aorta and bilateral iliofemoral runoff w and or wo IV contrast (7/13/2023).      Social History:  She reports that she has never smoked. She has never used smokeless tobacco. No history on file for alcohol use and drug use.    Family History:  No family history on file.     Allergies:  Ezetimibe and Hydrocodone    Inpatient Medications:  Scheduled medications   Medication Dose Route Frequency    aspirin  81 mg oral Daily    atenolol  50 mg oral BID    atorvastatin  80 mg oral Daily    cilostazol  100 mg oral BID    escitalopram  10 mg oral Daily    mirtazapine  7.5 mg oral Nightly    polyethylene glycol  17 g oral Daily     PRN medications   Medication    acetaminophen    Or    acetaminophen    Or    acetaminophen    clorazepate    heparin    ondansetron ODT    Or    ondansetron     Continuous Medications   Medication Dose Last Rate    heparin  0-4,500 Units/hr 600 Units/hr (01/12/24 1641)    lactated Ringer's  75 mL/hr 75 mL/hr (01/12/24 0550)     Outpatient Medications:  Current Outpatient Medications   Medication Instructions    alendronate (FOSAMAX) 70 mg, oral, Every 7 days    amoxicillin (Amoxil) 500 mg capsule 4 capsules, oral, See admin instructions, Take 1 hour before dental appt     aspirin 81 mg capsule Every 24 hours    aspirin 81 mg, oral, Daily    atenolol (TENORMIN) 50 mg, oral, 2 times daily    atorvastatin (LIPITOR) 80 mg, oral, Daily RT    azelastine (Astelin) 137 mcg (0.1 %) nasal spray nasal    BACILLUS COAGULANS-INULIN ORAL oral    biotin 2,500 mcg capsule oral    cephalexin (KEFLEX)  500 mg, oral, Every 12 hours    cetirizine (ZyrTEC) 10 mg tablet 1 tablet, oral, Daily    chlorpheniramine (Chlor-Trimeton) 4 mg tablet oral    cholecalciferol (Vitamin D-3) 25 MCG (1000 UT) capsule oral    cilostazol (PLETAL) 100 mg, oral, 2 times daily    clopidogrel (PLAVIX) 75 mg, oral, Daily    clorazepate (Tranxene) 7.5 mg tablet 0.5 tablets, oral, 2 times daily PRN    CLORAZEPATE DIPOTASSIUM ORAL oral    escitalopram (LEXAPRO) 10 mg, Daily    ferrous sulfate 325 (65 Fe) MG tablet 65 mg of iron, oral, Daily    folic acid (Folvite) 1 mg tablet 2 tablets, oral, Daily    gentamicin (Garamycin) 0.1 % cream APPLY TO WOUND AS DIRECTED ONCE DAILY    ipratropium (Atrovent) 21 mcg (0.03 %) nasal spray 2 sprays, Each Nostril, 2 times daily    mirtazapine (Remeron) 15 mg tablet 0.5 tablets, oral, Nightly    mv-min/FA/vit K/lutein/zeaxant (PRESERVISION AREDS 2 PLUS MV ORAL) oral    potassium chloride CR (Klor-Con 8) 8 mEq ER tablet 8 mEq, oral, 3 times daily with meals    Praluent Pen 150 mg/mL pen injector INJECT 1 MILLILITER INTO SKIN EVERY 2 WEEKS IN ABDOMEN, THIGH, OR UPPER ARM ROTATING INJECTION SITES    rosuvastatin (CRESTOR) 40 mg, oral, Nightly    SantyL 250 unit/gram ointment APPLY TO WOUND AS DIRECTED ONCE DAILY FOR 30 DAYS    spironolacton-hydrochlorothiaz (Aldactazide) 25-25 mg tablet 0.5 tablets, oral, Daily    valsartan (Diovan) 320 mg tablet oral    valsartan (DIOVAN) 160 mg, oral, Daily    vit A/vit C/vit E/zinc/copper (PRESERVISION AREDS ORAL) 1 capsule, oral, Daily    vitamin D3-vitamin K2 1,250-200 mcg capsule Vitamin D3    WesTab Max 2.5-25-2 mg tablet 1 tablet, oral, Daily       Physical Exam:  Constitutional: NAD, pleasant  HEENT: PERRLA, EOMI, no scleral icterus, MMM  CV: RRR, no m/r/g, no JVD  Pulm: CTAB, no increased WOB  GI: Soft, NT, ND, normoactive BS  Extremities: no LE edema  Skin: WWP  Neuro: A&Ox4, no FND  Psych: Mood and affect appropriate to situation     Assessment/Plan   Maggi Gordon is  a 89 y.o. female with PMH of HTN, DLD, CKD, vertebral artery syndrome status post L PICA coiling and stent placement (2006), CAD s/p CABG x3 (1995), A-fib, aortic stenosis, and PAD s/p L EIA stent in September 2023 at [facility]. She underwent a CTA which showed bilateral SFA occlusion and additional occlusions of SMA, right internal iliac, right popliteal, right anterior tibial artery, left popliteal artery, and left anterior tibial artery. Vascular surgery planning for open L femoral endarterectomy with potential bypass. Cardiology is consulted for pre-op risk assessment.    #Pre-op risk assessment for open left femoral endarterectomy with possible bypass  #Hx CAD s/p 3v CABG in 1995 (SVG-Lcx, SVG-RCA, LIMA-LAD)  #Aortic stenosis s/p AVR    Recommendations:  -Patient is not endorsing any significant cardiac symptoms, but exercise capacity appears limited; she appears euvolemic  -RCRI 2 points, class III risk: 10.1% 30-day risk of death, MI, or cardiac arrest  -Echo complete today with EF 70%, normal gradient across prosthetic AV  -Patient is moderate risk, would benefit from ischemic evaluation prior to procedure; recommend lexiscan for non-invasive testing    Patient seen and discussed with cardiology attending, Dr. Ashby. Cardiology will continue to follow.     I spent 45 minutes in the professional and overall care of this patient.    Eben Kelley MD

## 2024-01-13 NOTE — CONSULTS
Inpatient consult to Cardiology  Consult performed by: Eben Kelley MD  Consult ordered by: Javier Waite MD  Reason for consult: pre-op risk assessment      History Of Present Illness:    Maggi Gordon is a 89 y.o. female with PMH of HTN, DLD, CKD, vertebral artery syndrome status post L PICA coiling and stent placement (2006), CAD s/p CABG x3 (1995), A-fib, aortic stenosis, and PAD s/p L EIA stent in September 2023 at [facility]. Patient states that she hit her foot on her bed frame a few days ago and noticed she started to develop a wound over her left achilles. She started to have severe pain and difficulty with ambulation. She presented to the ED yesterday and underwent a CTA which showed bilateral SFA occlusion and additional occlusions of SMA, right internal iliac, right popliteal, right anterior tibial artery, left popliteal artery, and left anterior tibial artery. Patient reports that she has missed several doses of her plavix and aspirin. She was initiated on a heparin gtt and transferred to [facility] for further evaluation. Vascular surgery planning for open L femoral endarterectomy with potential bypass. Cardiology is consulted for pre-op risk assessment.    On interview, patient is generally well-appearing. She is hard of hearing and not a great historian overall. She is HDS, appears euvolemic, and is without acute cardiopulmonary complaints. She reports that she gets around the house OK, but does not really know how much she is able to exert herself (reports she never really needs to take stairs). She does require help for cooking and cleaning. She does not know much about her cardiac history, but does not believe she has had a coronary/bypass angiogram since her original CABG in 1995.     Cardiac Hx:  ECG sinus without any ischemic changes, stable from prior  TTE pending  TTE 10/18/23: EF normal, mod increased septal thickenss, pseudonormal diastology, MV mod thickened, mod MR, bovine aortic valve  "bioprosthesis (MG 8.1, DI 0.58)  No recent cath in syngo  s/p CABG x 3 6/1995(SVG to Cx, SVG to RCA and LIMA to LAD). At [facility]     Last Recorded Vitals:  Vitals:    01/12/24 0745 01/12/24 1227 01/12/24 1550 01/12/24 2005   BP: 116/53 134/66 111/52 100/52   BP Location:       Patient Position:       Pulse: 68 70 84 80   Resp: 14 16 17 18   Temp: 36.9 °C (98.4 °F) 36.6 °C (97.9 °F) 36.4 °C (97.5 °F) 36.7 °C (98.1 °F)   TempSrc:       SpO2: 95% 93% 97% 92%   Weight:       Height:           Last Labs:  CBC - 1/12/2024:  3:37 AM  9.9 12.4 177    39.5      CMP - 1/12/2024:  3:37 AM  9.9 7.0 24 --- 0.5   3.2 4.0 18 84      PTT - No results in last year.  2.8   32.4 _     No results found for: \"TROPHS\", \"BNP\", \"HGBA1C\", \"LDLCALC\", \"VLDL\"   Last I/O:  I/O last 3 completed shifts:  In: 480 (10.9 mL/kg) [P.O.:480]  Out: - (0 mL/kg)   Weight: 44 kg     Past Cardiology Tests (Last 3 Years):  EKG:  ECG 12 Lead 01/12/2024    Echo:  Transthoracic Echo (TTE) Complete 01/12/2024      Transthoracic Echo (TTE) Complete 10/18/2023    Ejection Fractions:  EF   Date/Time Value Ref Range Status   01/12/2024 01:55 PM 75       Cath:  No results found for this or any previous visit from the past 1095 days.    Stress Test:  No results found for this or any previous visit from the past 1095 days.    Cardiac Imaging:  No results found for this or any previous visit from the past 1095 days.      Past Medical History:  She has a past medical history of Asymptomatic premature menopause, Atherosclerosis of coronary artery bypass graft(s) without angina pectoris (10/11/2021), Occlusion and stenosis of bilateral carotid arteries (01/09/2020), Personal history of other diseases of the circulatory system, Personal history of other diseases of the circulatory system, Personal history of other diseases of the circulatory system, Personal history of other endocrine, nutritional and metabolic disease, Personal history of other endocrine, nutritional " and metabolic disease, Personal history of other endocrine, nutritional and metabolic disease, and S/P AVR (aortic valve replacement) (10/18/2023).    Past Surgical History:  She has a past surgical history that includes Other surgical history (12/03/2018); Other surgical history (12/03/2018); Other surgical history (12/03/2018); Other surgical history (12/03/2018); Other surgical history (12/03/2018); and CT angio aorta and bilateral iliofemoral runoff w and or wo IV contrast (7/13/2023).      Social History:  She reports that she has never smoked. She has never used smokeless tobacco. No history on file for alcohol use and drug use.    Family History:  No family history on file.     Allergies:  Ezetimibe and Hydrocodone    Inpatient Medications:  Scheduled medications   Medication Dose Route Frequency    aspirin  81 mg oral Daily    atenolol  50 mg oral BID    atorvastatin  80 mg oral Daily    cilostazol  100 mg oral BID    escitalopram  10 mg oral Daily    mirtazapine  7.5 mg oral Nightly    polyethylene glycol  17 g oral Daily     PRN medications   Medication    acetaminophen    Or    acetaminophen    Or    acetaminophen    clorazepate    heparin    ondansetron ODT    Or    ondansetron     Continuous Medications   Medication Dose Last Rate    heparin  0-4,500 Units/hr 600 Units/hr (01/12/24 1641)    lactated Ringer's  75 mL/hr 75 mL/hr (01/12/24 0550)     Outpatient Medications:  Current Outpatient Medications   Medication Instructions    alendronate (FOSAMAX) 70 mg, oral, Every 7 days    amoxicillin (Amoxil) 500 mg capsule 4 capsules, oral, See admin instructions, Take 1 hour before dental appt     aspirin 81 mg capsule Every 24 hours    aspirin 81 mg, oral, Daily    atenolol (TENORMIN) 50 mg, oral, 2 times daily    atorvastatin (LIPITOR) 80 mg, oral, Daily RT    azelastine (Astelin) 137 mcg (0.1 %) nasal spray nasal    BACILLUS COAGULANS-INULIN ORAL oral    biotin 2,500 mcg capsule oral    cephalexin (KEFLEX)  500 mg, oral, Every 12 hours    cetirizine (ZyrTEC) 10 mg tablet 1 tablet, oral, Daily    chlorpheniramine (Chlor-Trimeton) 4 mg tablet oral    cholecalciferol (Vitamin D-3) 25 MCG (1000 UT) capsule oral    cilostazol (PLETAL) 100 mg, oral, 2 times daily    clopidogrel (PLAVIX) 75 mg, oral, Daily    clorazepate (Tranxene) 7.5 mg tablet 0.5 tablets, oral, 2 times daily PRN    CLORAZEPATE DIPOTASSIUM ORAL oral    escitalopram (LEXAPRO) 10 mg, Daily    ferrous sulfate 325 (65 Fe) MG tablet 65 mg of iron, oral, Daily    folic acid (Folvite) 1 mg tablet 2 tablets, oral, Daily    gentamicin (Garamycin) 0.1 % cream APPLY TO WOUND AS DIRECTED ONCE DAILY    ipratropium (Atrovent) 21 mcg (0.03 %) nasal spray 2 sprays, Each Nostril, 2 times daily    mirtazapine (Remeron) 15 mg tablet 0.5 tablets, oral, Nightly    mv-min/FA/vit K/lutein/zeaxant (PRESERVISION AREDS 2 PLUS MV ORAL) oral    potassium chloride CR (Klor-Con 8) 8 mEq ER tablet 8 mEq, oral, 3 times daily with meals    Praluent Pen 150 mg/mL pen injector INJECT 1 MILLILITER INTO SKIN EVERY 2 WEEKS IN ABDOMEN, THIGH, OR UPPER ARM ROTATING INJECTION SITES    rosuvastatin (CRESTOR) 40 mg, oral, Nightly    SantyL 250 unit/gram ointment APPLY TO WOUND AS DIRECTED ONCE DAILY FOR 30 DAYS    spironolacton-hydrochlorothiaz (Aldactazide) 25-25 mg tablet 0.5 tablets, oral, Daily    valsartan (Diovan) 320 mg tablet oral    valsartan (DIOVAN) 160 mg, oral, Daily    vit A/vit C/vit E/zinc/copper (PRESERVISION AREDS ORAL) 1 capsule, oral, Daily    vitamin D3-vitamin K2 1,250-200 mcg capsule Vitamin D3    WesTab Max 2.5-25-2 mg tablet 1 tablet, oral, Daily       Physical Exam:  Constitutional: NAD, pleasant  HEENT: PERRLA, EOMI, no scleral icterus, MMM  CV: RRR, no m/r/g, no JVD  Pulm: CTAB, no increased WOB  GI: Soft, NT, ND, normoactive BS  Extremities: no LE edema  Skin: WWP  Neuro: A&Ox4, no FND  Psych: Mood and affect appropriate to situation     Assessment/Plan   Mgagi Gordon is  a 89 y.o. female with PMH of HTN, DLD, CKD, vertebral artery syndrome status post L PICA coiling and stent placement (2006), CAD s/p CABG x3 (1995), A-fib, aortic stenosis, and PAD s/p L EIA stent in September 2023 at [facility]. She underwent a CTA which showed bilateral SFA occlusion and additional occlusions of SMA, right internal iliac, right popliteal, right anterior tibial artery, left popliteal artery, and left anterior tibial artery. Vascular surgery planning for open L femoral endarterectomy with potential bypass. Cardiology is consulted for pre-op risk assessment.    #Pre-op risk assessment for open left femoral endarterectomy with possible bypass  #Hx CAD s/p 3v CABG in 1995 (SVG-Lcx, SVG-RCA, LIMA-LAD)  #Aortic stenosis s/p AVR    Recommendations:  -Patient is not endorsing any significant cardiac symptoms, but exercise capacity appears limited; she appears euvolemic  -RCRI 2 points, class III risk: 10.1% 30-day risk of death, MI, or cardiac arrest  -Echo complete today with EF 70%, normal gradient across prosthetic AV  -Patient is moderate risk, would benefit from ischemic evaluation prior to procedure; recommend lexiscan for non-invasive testing    Patient seen and discussed with cardiology attending, Dr. Ashby. Cardiology will continue to follow.     I spent 45 minutes in the professional and overall care of this patient.    Eben Kelley MD

## 2024-01-13 NOTE — CONSULTS
Wound Care Consult     Visit Date: 1/13/2024      Patient Name: Maggi Gordon         MRN: [id number]           YOB: 1935    Reason for Consult: Left lateral foot            Wound Team Summary Assessment:   Wound location: left lateral foot   size:1.5cm x 1.5cm x 0.2cm                        undermining: none    tracking: none                                    Wound type: chronic vascular wound  Wound bed: patient states there is a graft (?) to wound bed.  Draining: scant  Periwound skin: white from maceration; achilles to same extremity with dry painful scab.    Recommendation: Patient states she sees a wound doctor on an outpatient basis. After speaking with patient's sister on the phone, the wound is not to be cleansed with NS (unknown reason at this time). The base should be protected with [employer], followed by Aqu[employer]el [employer], and covered with gauze or mepilex. Dressing should be done daily (by bedside RN).        Karen Hanna RN  1/13/2024  11:46 AM

## 2024-01-14 LAB
ALBUMIN SERPL BCP-MCNC: 3.2 G/DL (ref 3.4–5)
ANION GAP SERPL CALC-SCNC: 11 MMOL/L (ref 10–20)
BUN SERPL-MCNC: 39 MG/DL (ref 6–23)
CALCIUM SERPL-MCNC: 9 MG/DL (ref 8.6–10.6)
CHLORIDE SERPL-SCNC: 108 MMOL/L (ref 98–107)
CO2 SERPL-SCNC: 31 MMOL/L (ref 21–32)
CREAT SERPL-MCNC: 1.41 MG/DL (ref 0.5–1.05)
EGFRCR SERPLBLD CKD-EPI 2021: 36 ML/MIN/1.73M*2
ERYTHROCYTE [DISTWIDTH] IN BLOOD BY AUTOMATED COUNT: 13.2 % (ref 11.5–14.5)
GLUCOSE SERPL-MCNC: 94 MG/DL (ref 74–99)
HCT VFR BLD AUTO: 33.4 % (ref 36–46)
HGB BLD-MCNC: 10.5 G/DL (ref 12–16)
MAGNESIUM SERPL-MCNC: 2.03 MG/DL (ref 1.6–2.4)
MCH RBC QN AUTO: 33.3 PG (ref 26–34)
MCHC RBC AUTO-ENTMCNC: 31.4 G/DL (ref 32–36)
MCV RBC AUTO: 106 FL (ref 80–100)
NRBC BLD-RTO: 0 /100 WBCS (ref 0–0)
PHOSPHATE SERPL-MCNC: 1.9 MG/DL (ref 2.5–4.9)
PLATELET # BLD AUTO: 161 X10*3/UL (ref 150–450)
POTASSIUM SERPL-SCNC: 4.7 MMOL/L (ref 3.5–5.3)
RBC # BLD AUTO: 3.15 X10*6/UL (ref 4–5.2)
SODIUM SERPL-SCNC: 145 MMOL/L (ref 136–145)
UFH PPP CHRO-ACNC: 0.4 IU/ML
WBC # BLD AUTO: 7.6 X10*3/UL (ref 4.4–11.3)

## 2024-01-14 PROCEDURE — 85520 HEPARIN ASSAY: CPT | Performed by: STUDENT IN AN ORGANIZED HEALTH CARE EDUCATION/TRAINING PROGRAM

## 2024-01-14 PROCEDURE — 2500000001 HC RX 250 WO HCPCS SELF ADMINISTERED DRUGS (ALT 637 FOR MEDICARE OP)

## 2024-01-14 PROCEDURE — 1100000001 HC PRIVATE ROOM DAILY

## 2024-01-14 PROCEDURE — 36415 COLL VENOUS BLD VENIPUNCTURE: CPT | Performed by: STUDENT IN AN ORGANIZED HEALTH CARE EDUCATION/TRAINING PROGRAM

## 2024-01-14 PROCEDURE — 85027 COMPLETE CBC AUTOMATED: CPT | Performed by: STUDENT IN AN ORGANIZED HEALTH CARE EDUCATION/TRAINING PROGRAM

## 2024-01-14 PROCEDURE — 80069 RENAL FUNCTION PANEL: CPT | Performed by: STUDENT IN AN ORGANIZED HEALTH CARE EDUCATION/TRAINING PROGRAM

## 2024-01-14 PROCEDURE — 83735 ASSAY OF MAGNESIUM: CPT | Performed by: STUDENT IN AN ORGANIZED HEALTH CARE EDUCATION/TRAINING PROGRAM

## 2024-01-14 RX ORDER — OXYCODONE HYDROCHLORIDE 5 MG/1
2.5 TABLET ORAL EVERY 6 HOURS PRN
Status: DISCONTINUED | OUTPATIENT
Start: 2024-01-14 | End: 2024-01-14

## 2024-01-14 RX ORDER — AMINOPHYLLINE 25 MG/ML
125 INJECTION, SOLUTION INTRAVENOUS AS NEEDED
Status: CANCELLED | OUTPATIENT
Start: 2024-01-14

## 2024-01-14 RX ORDER — OXYCODONE HYDROCHLORIDE 5 MG/1
2.5 TABLET ORAL EVERY 4 HOURS PRN
Status: DISCONTINUED | OUTPATIENT
Start: 2024-01-14 | End: 2024-01-20

## 2024-01-14 RX ORDER — ACETAMINOPHEN 325 MG/1
975 TABLET ORAL EVERY 8 HOURS
Status: DISCONTINUED | OUTPATIENT
Start: 2024-01-14 | End: 2024-01-20

## 2024-01-14 RX ADMIN — ASPIRIN 81 MG: 81 TABLET, COATED ORAL at 08:00

## 2024-01-14 RX ADMIN — CILOSTAZOL 100 MG: 100 TABLET ORAL at 08:00

## 2024-01-14 RX ADMIN — ATENOLOL 50 MG: 50 TABLET ORAL at 22:31

## 2024-01-14 RX ADMIN — OXYCODONE HYDROCHLORIDE 2.5 MG: 5 TABLET ORAL at 14:38

## 2024-01-14 RX ADMIN — LIDOCAINE 4%: 4 CREAM TOPICAL at 08:11

## 2024-01-14 RX ADMIN — ACETAMINOPHEN 975 MG: 325 TABLET ORAL at 12:58

## 2024-01-14 RX ADMIN — ATENOLOL 50 MG: 50 TABLET ORAL at 08:00

## 2024-01-14 RX ADMIN — ACETAMINOPHEN 975 MG: 325 TABLET ORAL at 22:30

## 2024-01-14 RX ADMIN — MIRTAZAPINE 7.5 MG: 7.5 TABLET, FILM COATED ORAL at 22:30

## 2024-01-14 RX ADMIN — ATORVASTATIN CALCIUM 80 MG: 80 TABLET, FILM COATED ORAL at 06:54

## 2024-01-14 RX ADMIN — CILOSTAZOL 100 MG: 100 TABLET ORAL at 22:30

## 2024-01-14 RX ADMIN — ESCITALOPRAM OXALATE 10 MG: 10 TABLET, FILM COATED ORAL at 08:00

## 2024-01-14 ASSESSMENT — COGNITIVE AND FUNCTIONAL STATUS - GENERAL
WALKING IN HOSPITAL ROOM: A LOT
DRESSING REGULAR LOWER BODY CLOTHING: A LITTLE
DRESSING REGULAR UPPER BODY CLOTHING: A LITTLE
TOILETING: A LITTLE
HELP NEEDED FOR BATHING: A LITTLE
MOVING TO AND FROM BED TO CHAIR: A LITTLE
DAILY ACTIVITIY SCORE: 20
MOBILITY SCORE: 17
STANDING UP FROM CHAIR USING ARMS: A LITTLE
CLIMB 3 TO 5 STEPS WITH RAILING: TOTAL

## 2024-01-14 ASSESSMENT — PAIN SCALES - GENERAL
PAINLEVEL_OUTOF10: 5 - MODERATE PAIN
PAINLEVEL_OUTOF10: 3
PAINLEVEL_OUTOF10: 9
PAINLEVEL_OUTOF10: 5 - MODERATE PAIN
PAINLEVEL_OUTOF10: 3

## 2024-01-14 ASSESSMENT — PAIN - FUNCTIONAL ASSESSMENT
PAIN_FUNCTIONAL_ASSESSMENT: 0-10

## 2024-01-14 NOTE — PROGRESS NOTES
"Maggi Gordon is a 89 y.o. female on day 2 of admission presenting with Acute lower limb ischemia.    Subjective   No overnight events.  Therapeutic on heparin drip.  Planning for L fem endart next week, Tuesday vs Wednesday.  Seen by wound care; they provided their recommendations.  Pain tolerable, but present, especially over L heel.  Had a few episodes of diarrhea.  No other concerns.    Objective     Physical Exam  GENERAL APPEARANCE:  AxOx4, generally well-appearing no acute distress. Sleepy.  HEART:  RRR  LUNGS:  Equal chest rise and fall, non-labored breathing  ABDOMEN:  Soft, nontender, nondistended  NEUROLOGICAL:  Grossly nonfocal. Alert and oriented, moving all 4 extremities.   Skin:  Warm and dry without any rash.  EXTREMITIES: bilateral lower extremities are warm to the touch. Unable to dorsiflex or plantar flex the left foot. Decreased sensation bilaterally. Left posterior heel wound approximately 2x3cm, surrounding erythema. No lower extremity edema  Pulse exam:  R fem palpable  R DP/PT monophasic   L femo non-palpable, monophasic fem  L DP monophasic, non-dopplerable PT    Last Recorded Vitals  Blood pressure 109/51, pulse 93, temperature 37.1 °C (98.8 °F), temperature source Temporal, resp. rate 17, height 1.6 m (5' 3\"), weight (!) 44 kg (97 lb), SpO2 93 %.  Intake/Output last 3 Shifts:  I/O last 3 completed shifts:  In: 1978 (45 mL/kg) [P.O.:840; I.V.:1138 (25.9 mL/kg)]  Out: - (0 mL/kg)   Weight: 44 kg     Relevant Results  Results for orders placed or performed during the hospital encounter of 01/11/24 (from the past 24 hour(s))   Heparin Assay, UFH   Result Value Ref Range    Heparin Unfractionated 0.5 See Comment Below for Therapeutic Ranges IU/mL     Assessment/Plan   89 y.o. female with significant PMHx as well as PAD s/p L EIA stent in September 2023 at [facility]. She presented to an OSH yesterday d/t concern for left lower extremity pain in conjunction with a new heel ulcer. Imaging revealed " bilateral SFA occlusion and additional occlusions of SMA, right internal iliac, right popliteal, right anterior tibial artery, left popliteal artery, and left anterior tibial artery. Planning for a L femoral endarterectomy Tuesday vs. Wednesday. Undergoing preoperative assessment by cardiology.    1/14: pending Lexiscan (ordered). Seen by wound care. Needs puffy green offloading heel boots.    Plan:  -Ordered Lexiscan per cardiology recommendations  - Supply request placed for green puffy boots to offload heels. (1/14)  - Offload heels in meantime as able to prevent heel ulcerations. (1/14)  -Wound care recommendations appreciated from wound care team.  -Continue heparin gtt and ensure therapeutic  -Continue ASA/Pletal  -Continue home antihypertensives, Lexapro, Remeron  -Regular diet  -mIVF    Seen and discussed with attending surgeon.    John Carl MD  Vascular Surgery, PGY3  Team Pager: [phone]   No

## 2024-01-15 ENCOUNTER — APPOINTMENT (OUTPATIENT)
Dept: RADIOLOGY | Facility: HOSPITAL | Age: 89
DRG: 253 | End: 2024-01-15
Payer: MEDICARE

## 2024-01-15 ENCOUNTER — ANESTHESIA EVENT (OUTPATIENT)
Dept: OPERATING ROOM | Facility: HOSPITAL | Age: 89
DRG: 253 | End: 2024-01-15
Payer: MEDICARE

## 2024-01-15 ENCOUNTER — APPOINTMENT (OUTPATIENT)
Dept: CARDIOLOGY | Facility: HOSPITAL | Age: 89
DRG: 253 | End: 2024-01-15
Payer: MEDICARE

## 2024-01-15 ENCOUNTER — APPOINTMENT (OUTPATIENT)
Dept: WOUND CARE | Facility: CLINIC | Age: 89
End: 2024-01-15
Payer: MEDICARE

## 2024-01-15 LAB
ABO GROUP (TYPE) IN BLOOD: NORMAL
ALBUMIN SERPL BCP-MCNC: 3.1 G/DL (ref 3.4–5)
ANION GAP SERPL CALC-SCNC: 11 MMOL/L (ref 10–20)
ANTIBODY SCREEN: NORMAL
BUN SERPL-MCNC: 27 MG/DL (ref 6–23)
CALCIUM SERPL-MCNC: 9.5 MG/DL (ref 8.6–10.6)
CHLORIDE SERPL-SCNC: 109 MMOL/L (ref 98–107)
CO2 SERPL-SCNC: 31 MMOL/L (ref 21–32)
CREAT SERPL-MCNC: 1.23 MG/DL (ref 0.5–1.05)
EGFRCR SERPLBLD CKD-EPI 2021: 43 ML/MIN/1.73M*2
ERYTHROCYTE [DISTWIDTH] IN BLOOD BY AUTOMATED COUNT: 13.2 % (ref 11.5–14.5)
GLUCOSE SERPL-MCNC: 83 MG/DL (ref 74–99)
HCT VFR BLD AUTO: 32.2 % (ref 36–46)
HGB BLD-MCNC: 10.1 G/DL (ref 12–16)
INR PPP: 1.1 (ref 0.9–1.1)
MAGNESIUM SERPL-MCNC: 1.92 MG/DL (ref 1.6–2.4)
MCH RBC QN AUTO: 33 PG (ref 26–34)
MCHC RBC AUTO-ENTMCNC: 31.4 G/DL (ref 32–36)
MCV RBC AUTO: 105 FL (ref 80–100)
NRBC BLD-RTO: 0 /100 WBCS (ref 0–0)
PHOSPHATE SERPL-MCNC: 1.8 MG/DL (ref 2.5–4.9)
PLATELET # BLD AUTO: 149 X10*3/UL (ref 150–450)
POTASSIUM SERPL-SCNC: 4.6 MMOL/L (ref 3.5–5.3)
PROTHROMBIN TIME: 12.8 SECONDS (ref 9.8–12.8)
RBC # BLD AUTO: 3.06 X10*6/UL (ref 4–5.2)
RH FACTOR (ANTIGEN D): NORMAL
SODIUM SERPL-SCNC: 146 MMOL/L (ref 136–145)
UFH PPP CHRO-ACNC: 0.4 IU/ML
WBC # BLD AUTO: 7.5 X10*3/UL (ref 4.4–11.3)

## 2024-01-15 PROCEDURE — 3430000001 HC RX 343 DIAGNOSTIC RADIOPHARMACEUTICALS: Performed by: SURGERY

## 2024-01-15 PROCEDURE — 93017 CV STRESS TEST TRACING ONLY: CPT

## 2024-01-15 PROCEDURE — 86901 BLOOD TYPING SEROLOGIC RH(D): CPT | Performed by: NURSE PRACTITIONER

## 2024-01-15 PROCEDURE — 36415 COLL VENOUS BLD VENIPUNCTURE: CPT | Performed by: STUDENT IN AN ORGANIZED HEALTH CARE EDUCATION/TRAINING PROGRAM

## 2024-01-15 PROCEDURE — 2500000004 HC RX 250 GENERAL PHARMACY W/ HCPCS (ALT 636 FOR OP/ED)

## 2024-01-15 PROCEDURE — 2500000001 HC RX 250 WO HCPCS SELF ADMINISTERED DRUGS (ALT 637 FOR MEDICARE OP)

## 2024-01-15 PROCEDURE — 1100000001 HC PRIVATE ROOM DAILY

## 2024-01-15 PROCEDURE — 85027 COMPLETE CBC AUTOMATED: CPT | Performed by: STUDENT IN AN ORGANIZED HEALTH CARE EDUCATION/TRAINING PROGRAM

## 2024-01-15 PROCEDURE — 2500000004 HC RX 250 GENERAL PHARMACY W/ HCPCS (ALT 636 FOR OP/ED): Performed by: STUDENT IN AN ORGANIZED HEALTH CARE EDUCATION/TRAINING PROGRAM

## 2024-01-15 PROCEDURE — A9502 TC99M TETROFOSMIN: HCPCS | Performed by: SURGERY

## 2024-01-15 PROCEDURE — 99233 SBSQ HOSP IP/OBS HIGH 50: CPT | Performed by: NURSE PRACTITIONER

## 2024-01-15 PROCEDURE — 85610 PROTHROMBIN TIME: CPT | Performed by: NURSE PRACTITIONER

## 2024-01-15 PROCEDURE — 80069 RENAL FUNCTION PANEL: CPT | Performed by: STUDENT IN AN ORGANIZED HEALTH CARE EDUCATION/TRAINING PROGRAM

## 2024-01-15 PROCEDURE — 78452 HT MUSCLE IMAGE SPECT MULT: CPT

## 2024-01-15 PROCEDURE — 36415 COLL VENOUS BLD VENIPUNCTURE: CPT | Performed by: NURSE PRACTITIONER

## 2024-01-15 PROCEDURE — 93016 CV STRESS TEST SUPVJ ONLY: CPT | Performed by: INTERNAL MEDICINE

## 2024-01-15 PROCEDURE — 83735 ASSAY OF MAGNESIUM: CPT | Performed by: STUDENT IN AN ORGANIZED HEALTH CARE EDUCATION/TRAINING PROGRAM

## 2024-01-15 PROCEDURE — 85520 HEPARIN ASSAY: CPT | Performed by: STUDENT IN AN ORGANIZED HEALTH CARE EDUCATION/TRAINING PROGRAM

## 2024-01-15 RX ORDER — AMINOPHYLLINE 25 MG/ML
125 INJECTION, SOLUTION INTRAVENOUS AS NEEDED
Status: CANCELLED | OUTPATIENT
Start: 2024-01-16

## 2024-01-15 RX ORDER — POLYETHYLENE GLYCOL 3350 17 G/17G
17 POWDER, FOR SOLUTION ORAL DAILY PRN
Status: DISCONTINUED | OUTPATIENT
Start: 2024-01-15 | End: 2024-01-25 | Stop reason: HOSPADM

## 2024-01-15 RX ORDER — SODIUM CHLORIDE, SODIUM LACTATE, POTASSIUM CHLORIDE, CALCIUM CHLORIDE 600; 310; 30; 20 MG/100ML; MG/100ML; MG/100ML; MG/100ML
75 INJECTION, SOLUTION INTRAVENOUS CONTINUOUS
Status: DISCONTINUED | OUTPATIENT
Start: 2024-01-16 | End: 2024-01-16

## 2024-01-15 RX ORDER — REGADENOSON 0.08 MG/ML
0.4 INJECTION, SOLUTION INTRAVENOUS
Status: COMPLETED | OUTPATIENT
Start: 2024-01-15 | End: 2024-01-15

## 2024-01-15 RX ORDER — REGADENOSON 0.08 MG/ML
0.4 INJECTION, SOLUTION INTRAVENOUS
Status: COMPLETED | OUTPATIENT
Start: 2024-01-16 | End: 2024-01-16

## 2024-01-15 RX ADMIN — TETROFOSMIN 9 MILLICURIE: 0.23 INJECTION, POWDER, LYOPHILIZED, FOR SOLUTION INTRAVENOUS at 08:55

## 2024-01-15 RX ADMIN — OXYCODONE HYDROCHLORIDE 2.5 MG: 5 TABLET ORAL at 12:15

## 2024-01-15 RX ADMIN — ATENOLOL 50 MG: 50 TABLET ORAL at 20:56

## 2024-01-15 RX ADMIN — ACETAMINOPHEN 975 MG: 325 TABLET ORAL at 20:56

## 2024-01-15 RX ADMIN — ATORVASTATIN CALCIUM 80 MG: 80 TABLET, FILM COATED ORAL at 06:01

## 2024-01-15 RX ADMIN — REGADENOSON 0.4 MG: 0.08 INJECTION, SOLUTION INTRAVENOUS at 10:20

## 2024-01-15 RX ADMIN — HEPARIN SODIUM 500 UNITS/HR: 10000 INJECTION, SOLUTION INTRAVENOUS at 19:46

## 2024-01-15 RX ADMIN — MIRTAZAPINE 7.5 MG: 7.5 TABLET, FILM COATED ORAL at 20:56

## 2024-01-15 RX ADMIN — ESCITALOPRAM OXALATE 10 MG: 10 TABLET, FILM COATED ORAL at 12:14

## 2024-01-15 RX ADMIN — SODIUM CHLORIDE, POTASSIUM CHLORIDE, SODIUM LACTATE AND CALCIUM CHLORIDE 75 ML/HR: 600; 310; 30; 20 INJECTION, SOLUTION INTRAVENOUS at 06:02

## 2024-01-15 RX ADMIN — ACETAMINOPHEN 975 MG: 325 TABLET ORAL at 13:17

## 2024-01-15 RX ADMIN — ASPIRIN 81 MG: 81 TABLET, COATED ORAL at 12:14

## 2024-01-15 RX ADMIN — TETROFOSMIN 12.5 MILLICURIE: 0.23 INJECTION, POWDER, LYOPHILIZED, FOR SOLUTION INTRAVENOUS at 10:15

## 2024-01-15 RX ADMIN — ATENOLOL 50 MG: 50 TABLET ORAL at 16:20

## 2024-01-15 RX ADMIN — ACETAMINOPHEN 975 MG: 325 TABLET ORAL at 06:01

## 2024-01-15 ASSESSMENT — COGNITIVE AND FUNCTIONAL STATUS - GENERAL
STANDING UP FROM CHAIR USING ARMS: A LITTLE
MOBILITY SCORE: 19
DRESSING REGULAR LOWER BODY CLOTHING: A LITTLE
HELP NEEDED FOR BATHING: A LITTLE
DRESSING REGULAR UPPER BODY CLOTHING: A LITTLE
DAILY ACTIVITIY SCORE: 20
TOILETING: A LITTLE
CLIMB 3 TO 5 STEPS WITH RAILING: A LOT
STANDING UP FROM CHAIR USING ARMS: A LITTLE
DRESSING REGULAR LOWER BODY CLOTHING: A LITTLE
WALKING IN HOSPITAL ROOM: A LOT
MOVING TO AND FROM BED TO CHAIR: A LITTLE
DRESSING REGULAR UPPER BODY CLOTHING: A LITTLE
CLIMB 3 TO 5 STEPS WITH RAILING: TOTAL
DAILY ACTIVITIY SCORE: 21
MOVING TO AND FROM BED TO CHAIR: A LITTLE
HELP NEEDED FOR BATHING: A LITTLE
WALKING IN HOSPITAL ROOM: A LITTLE
MOBILITY SCORE: 17

## 2024-01-15 ASSESSMENT — PAIN SCALES - GENERAL
PAINLEVEL_OUTOF10: 5 - MODERATE PAIN
PAINLEVEL_OUTOF10: 3
PAINLEVEL_OUTOF10: 7
PAINLEVEL_OUTOF10: 0 - NO PAIN

## 2024-01-15 ASSESSMENT — PAIN - FUNCTIONAL ASSESSMENT
PAIN_FUNCTIONAL_ASSESSMENT: 0-10

## 2024-01-15 NOTE — PROGRESS NOTES
VASCULAR SURGERY PROGRESS NOTE  Subjective   No issues.     Objective   Vitals:    01/15/24 0729   BP: 124/64   Pulse: 86   Resp: 18   Temp: 37 °C (98.6 °F)   SpO2: 92%      Exam:  GENERAL APPEARANCE:  AxOx4, generally well-appearing no acute distress. Sleepy.  HEART:  RRR  LUNGS:  Equal chest rise and fall, non-labored breathing  ABDOMEN:  Soft, nontender, nondistended  NEUROLOGICAL:  Grossly nonfocal. Alert and oriented, moving all 4 extremities.   Skin:  Warm and dry without any rash.  EXTREMITIES: bilateral lower extremities are warm to the touch. Unable to dorsiflex or plantar flex the left foot. Sensation intact. Left posterior heel wound approximately 2x3cm, surrounding erythema. No lower extremity edema  Pulse exam:  R fem palpable  R DP/PT monophasic   L femo non-palpable, monophasic fem  L DP monophasic, non-dopplerable PT    Relevant Results  Medications:  Scheduled Meds:  acetaminophen, 975 mg, oral, q8h  aspirin, 81 mg, oral, Daily  atenolol, 50 mg, oral, BID  atorvastatin, 80 mg, oral, Daily  cilostazol, 100 mg, oral, BID  escitalopram, 10 mg, oral, Daily  mirtazapine, 7.5 mg, oral, Nightly  [Held by provider] polyethylene glycol, 17 g, oral, Daily      Continuous Infusions:  heparin, 0-4,500 Units/hr, Last Rate: 500 Units/hr (01/13/24 2054)  lactated Ringer's, 75 mL/hr, Last Rate: 75 mL/hr (01/15/24 0602)      PRN Meds:.  PRN medications: clorazepate, heparin, lidocaine, ondansetron ODT **OR** ondansetron, oxyCODONE    Labs:  Results from last 7 days   Lab Units 01/15/24  0757 01/14/24  0756 01/13/24  0128   WBC AUTO x10*3/uL 7.5 7.6 8.7   HEMOGLOBIN g/dL 10.1* 10.5* 10.0*   PLATELETS AUTO x10*3/uL 149* 161 158      Results from last 7 days   Lab Units 01/14/24  0756 01/12/24  0337 01/11/24  2202   SODIUM mmol/L 145 140 141   POTASSIUM mmol/L 4.7 4.7 4.3   CHLORIDE mmol/L 108* 101 101   CO2 mmol/L 31 27 33*   BUN mg/dL 39* 42* 43*   CREATININE mg/dL 1.41* 1.39* 1.45*   GLUCOSE mg/dL 94 109* 107*    MAGNESIUM mg/dL 2.03  --   --    PHOSPHORUS mg/dL 1.9*  --   --       Results from last 7 days   Lab Units 01/11/24  2207   INR  2.8*   PROTIME seconds 32.4*     Assessment/Plan   89 y.o. female with significant PMHx as well as PAD s/p L EIA stent in September 2023 at [facility]. She presented to an OSH yesterday d/t concern for left lower extremity pain in conjunction with a new left heel ulcer. Imaging revealed bilateral SFA occlusion and additional occlusions of SMA, right internal iliac, right popliteal, right anterior tibial artery, left popliteal artery, and left anterior tibial artery. Planning for a L femoral endarterectomy Tuesday vs. Wednesday. Undergoing preoperative assessment by cardiology.    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  - continue tylenol/oxy PRN for pain control  - continue neurovascular checks every 4 hrs  - continue home mirtazapine & lexapro    CV: HTN, HLD, PAD, CAD (s/p CABG 1995), afib, aortic stenosis, renal artery stenosis (s/p L renal stent)   - maintain blood pressure control with home atenolol, holding home losartan, spironolactone-HCTZ  - continue atorvastatin/ASA  - continue heparin drip   - cardiology recs: moderate risk for surgical intervention   - obtain KEIRY scan     Pulm:   - continue to encourage IS hourly while awake  - OOB to chair and increase ambulation as tolerated    FENGI: CKD3, hypoalbuminemia   - continue regular diet with oral supplements  - continue bowel regimen  - strict I&O  - continue mIVF  - RFP as clinically indicated    Endo:    - no issues     Heme: supratherapeutic INR on admission, chronic anemia   - no s/sx of bleeding  - recheck INR today   - type and screen   - CBC as clinically indicated    ID: ischemic ulcers   - trend temp q4  - offload heels   - appreciate wound care recs   - CBC as clinically indicated    Dispo:   - continue care on regular nursing floor  - plan for OR Wednesday with Dr. Montes      Tena Cr, APRN-CNP

## 2024-01-15 NOTE — PROGRESS NOTES
"Subjective Data:  Stress test today       Objective Data:  Last Recorded Vitals:  Vitals:    01/15/24 0035 01/15/24 0258 01/15/24 0729 01/15/24 1156   BP: 119/56 144/66 124/64 162/87   Pulse: 68 79 86 90   Resp: 17 17 18 18   Temp: 37 °C (98.6 °F) 36.7 °C (98.1 °F) 37 °C (98.6 °F) 36.4 °C (97.5 °F)   TempSrc:       SpO2: 94% 95% 92% 95%   Weight:       Height:           Last Labs:  CBC - 1/15/2024:  7:57 AM  7.5 10.1 149    32.2      CMP - 1/15/2024:  7:57 AM  9.5 7.0 24 --- 0.5   1.8 3.1 18 84      PTT - No results in last year.  1.1   12.8 _     No results found for: \"TROPHS\", \"BNP\", \"HGBA1C\", \"LDLCALC\", \"VLDL\"   Last I/O:  No intake/output data recorded.    Past Cardiology Tests (Last 3 Years):  EKG:  ECG 12 Lead 01/12/2024    Echo:  Transthoracic Echo (TTE) Complete 01/12/2024      Transthoracic Echo (TTE) Complete 10/18/2023    Ejection Fractions:  EF   Date/Time Value Ref Range Status   01/12/2024 01:55 PM 75       Cath:  No results found for this or any previous visit from the past 1095 days.    Stress Test:  No results found for this or any previous visit from the past 1095 days.    Cardiac Imaging:  No results found for this or any previous visit from the past 1095 days.      Inpatient Medications:  Scheduled medications   Medication Dose Route Frequency    acetaminophen  975 mg oral q8h    aspirin  81 mg oral Daily    atenolol  50 mg oral BID    atorvastatin  80 mg oral Daily    escitalopram  10 mg oral Daily    mirtazapine  7.5 mg oral Nightly    [Held by provider] polyethylene glycol  17 g oral Daily    [START ON 1/16/2024] regadenoson  0.4 mg intravenous Once in imaging     PRN medications   Medication    clorazepate    heparin    ondansetron ODT    Or    ondansetron    oxyCODONE    polyethylene glycol     Continuous Medications   Medication Dose Last Rate    heparin  0-4,500 Units/hr 500 Units/hr (01/13/24 2054)    lactated Ringer's  75 mL/hr 75 mL/hr (01/15/24 0602)       Physical " Exam:  Constitutional: NAD, pleasant  HEENT: PERRLA, EOMI, no scleral icterus, MMM  CV: RRR, no m/r/g, no JVD  Pulm: CTAB, no increased WOB  GI: Soft, NT, ND, normoactive BS  Extremities: no LE edema  Skin: WWP  Neuro: A&Ox4, no FND  Psych: Mood and affect appropriate to situation     Assessment/Plan   Maggi Gordon is a 89 y.o. female with PMH of HTN, DLD, CKD, vertebral artery syndrome status post L PICA coiling and stent placement (2006), CAD s/p CABG x3 (1995), A-fib, aortic stenosis, and PAD s/p L EIA stent in September 2023 at [facility]. She underwent a CTA which showed bilateral SFA occlusion and additional occlusions of SMA, right internal iliac, right popliteal, right anterior tibial artery, left popliteal artery, and left anterior tibial artery. Vascular surgery planning for open L femoral endarterectomy with potential bypass. Cardiology is consulted for pre-op risk assessment.     #Pre-op risk assessment for open left femoral endarterectomy with possible bypass  #Hx CAD s/p 3v CABG in 1995 (SVG-Lcx, SVG-RCA, LIMA-LAD)  #Aortic stenosis s/p AVR     Recommendations:  -Patient is not endorsing any significant cardiac symptoms, but exercise capacity appears limited; she appears euvolemic  -RCRI 2 points, class III risk: 10.1% 30-day risk of death, MI, or cardiac arrest  -Echo complete today with EF 70%, normal gradient across prosthetic AV  - Nuclear stress: done pending read    We will follow     Discussed with Dr. Anthony Gonzalez DNP, APRN-CNP  Cardiology Consults     Please call with any questions  Pager [phone] M-F 8a-5p; Saturday 8a-2p  Pager [phone] all other times         Full Code

## 2024-01-15 NOTE — ANESTHESIA PREPROCEDURE EVALUATION
Patient: Maggi Gordon    Procedure Information       Date/Time: 01/17/24 0815    Procedures:       Endarterectomy Lower Extremity (Left)      Angiogram Lower Extremity (Left)    Location: [facility] OR 16 / [facility]    Surgeons: Pura Montes MD          89 y.o. female with significant PMHx HTN, HLD, PAD, CAD (s/p CABG 1995), afib, aortic stenosis, renal artery stenosis (s/p L renal stent)  as well as PAD s/p L EIA stent in September 2023 at [facility]. She presented to an OSH yesterday d/t concern for left lower extremity pain in conjunction with a new left heel ulcer. Imaging revealed bilateral SFA occlusion and additional occlusions of SMA, right internal iliac, right popliteal, right anterior tibial artery, left popliteal artery, and left anterior tibial artery.       ALLERGIES:  Allergies   Allergen Reactions    Ezetimibe Unknown    Hydrocodone Hallucinations        MEDICAL HISTORY:  Past Medical History:   Diagnosis Date    Asymptomatic premature menopause     Premature menopause    Atherosclerosis of coronary artery bypass graft(s) without angina pectoris 10/11/2021    Atherosclerosis of CABG w/o angina pectoris    Occlusion and stenosis of bilateral carotid arteries 01/09/2020    Occlusion and stenosis of bilateral carotid arteries    Personal history of other diseases of the circulatory system     History of coronary artery disease    Personal history of other diseases of the circulatory system     History of hypertension    Personal history of other diseases of the circulatory system     History of stenosis of renal artery    Personal history of other endocrine, nutritional and metabolic disease     History of hypothyroidism    Personal history of other endocrine, nutritional and metabolic disease     History of hyperlipidemia    Personal history of other endocrine, nutritional and metabolic disease     History of goiter    S/P AVR (aortic valve replacement) 10/18/2023    #21 CE AVR        Relevant  Problems   Anesthesia (within normal limits)      Cardiovascular   (+) Acute lower limb ischemia   (+) Atherosclerosis of CABG w/o angina pectoris   (+) Bilateral carotid artery stenosis   (+) 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   (+) Essential hypertension   (+) Mixed hyperlipidemia   (+) Occlusion and stenosis of bilateral carotid arteries   (+) Paroxysmal atrial fibrillation (CMS/HCC)   (+) Peripheral vascular disease (CMS/[facility])   (+) Peripheral vascular disease, unspecified (CMS/[facility])   (+) Pure hyperglyceridemia   (+) Thoracic aortic aneurysm (TAA) (CMS/[facility])      Endocrine   (+) Acquired hypothyroidism   (+) Nontoxic multinodular goiter      /Renal   (+) Chronic kidney disease (CKD) stage G3a/A1, moderately decreased glomerular filtration rate (GFR) between 45-59 mL/min/1.73 square meter and albuminuria creatinine ratio less than 30 mg/g (CMS/[facility])      Neuro/Psych   (+) Bilateral carotid artery stenosis   (+) OLLIE (generalized anxiety disorder)   (+) Occlusion and stenosis of bilateral carotid arteries      Hematology   (+) Anemia      Infectious Disease   (+) Herpes zoster   (+) Onychomycosis        SURGICAL HISTORY:  Past Surgical History:   Procedure Laterality Date    CT AORTA AND BILATERAL ILIOFEMORAL RUNOFF ANGIOGRAM W AND/OR WO IV CONTRAST  7/13/2023    CT AORTA AND BILATERAL ILIOFEMORAL RUNOFF ANGIOGRAM W AND/OR WO IV CONTRAST 7/13/2023 POR CT    OTHER SURGICAL HISTORY  12/03/2018    Cataract surgery    OTHER SURGICAL HISTORY  12/03/2018    Coronary artery bypass graft    OTHER SURGICAL HISTORY  12/03/2018    Aortic valve replacement    OTHER SURGICAL HISTORY  12/03/2018    Eye surgery    OTHER SURGICAL HISTORY  12/03/2018    Surgery        VITALS:      1/18/2024    11:59 AM 1/18/2024     7:57 AM 1/17/2024    11:27 PM   Vitals   Systolic 164 163 166   Diastolic 80 84 81   Heart Rate 75 77 81   Temp 37 °C (98.6 °F) 36.7 °C (98.1 °F) 36.6 °C (97.9 °F)   Resp 20         LABS:   BMP  "  Lab Results   Component Value Date    GLUCOSE 91 01/18/2024    CALCIUM 9.3 01/18/2024     01/18/2024    K 3.8 01/18/2024    CO2 28 01/18/2024     01/18/2024    BUN 33 (H) 01/18/2024    CREATININE 1.27 (H) 01/18/2024   , LFT   Lab Results   Component Value Date    ALT 18 01/11/2024    AST 24 01/11/2024    ALKPHOS 84 01/11/2024    BILITOT 0.5 01/11/2024   , CBC  Lab Results   Component Value Date    WBC 8.9 01/18/2024    HGB 11.2 (L) 01/18/2024    HCT 35.6 (L) 01/18/2024     (H) 01/18/2024     01/18/2024          , Coags   Lab Results   Component Value Date/Time    PROTIME 12.8 01/15/2024 0757    INR 1.1 01/15/2024 0757      , A1C No results found for: \"HGBA1C\"    IMAGES:  EKG          Encounter Date: 01/11/24   Electrocardiogram, 12-lead PRN ACS symptoms   Result Value    Ventricular Rate 90    Atrial Rate 90    KS Interval 214    QRS Duration 72    QT Interval 362    QTC Calculation(Bazett) 442    P Axis 41    R Axis 40    T Axis 47    QRS Count 15    Q Onset 222    P Onset 115    P Offset 187    T Offset 403    QTC Fredericia 414    Narrative    Sinus rhythm with 1st degree AV block  Otherwise normal ECG  When compared with ECG of 16-JAN-2024 10:06,  No significant change was found      , ECHO         Transthoracic Echo (TTE) Complete 01/12/2024    [facility], [address] [place] [address]  Tel [phone] and Fax [phone]    TRANSTHORACIC ECHOCARDIOGRAM REPORT      Patient Name:      TOMASA Nino Physician:    [id number] Florencio Ledbetter MD  Study Date:        1/12/2024           Ordering Provider:    [id number] NELL GAMINO  MRN/PID:           [id number]            Fellow:  Accession#:        [id number]        Nurse:  Date of Birth/Age: 1935 / 89 years Sonographer:          Veronica Koenig [facility]  Gender:            F                   Additional Staff:  Height:            160.02 cm           Admit Date:           1/11/2024  Weight:            44.00 " kg            Admission Status:     Inpatient - Routine  BSA:               1.42 m2             Encounter#:           [id number]  Department Location:  [facility] Non  Invasive  Blood Pressure: 134 /66 mmHg    Study Type:    TRANSTHORACIC ECHO (TTE) COMPLETE  Diagnosis/ICD: Presence of prosthetic heart valve-Z95.2  Indication:    TAVR  CPT Code:      Echo Complete w Full Doppler-[id number]    Patient History:  Pertinent History: HTN, Hyperlipidemia and A-Fib. Aortic stenosis,TAVR #21 CE  AVR (10/18/23),CABG,TAA,CKD.    Study Detail: The following Echo studies were performed: 2D, M-Mode, Doppler and  color flow. Technically challenging study due to body habitus and  patient lying in supine position.      PHYSICIAN INTERPRETATION:  Left Ventricle: The left ventricular systolic function is normal, with an estimated ejection fraction of 70%. There are no regional wall motion abnormalities. The left ventricular cavity size is decreased. The left ventricular septal wall thickness is mildly increased. There is left ventricular concentric remodeling. Abnormal (paradoxical) septal motion consistent with post-operative status. Spectral Doppler shows a pseudonormal pattern of left ventricular diastolic filling.  Left Atrium: The left atrium is moderate to severely dilated.  Right Ventricle: The right ventricle is normal in size. There is mildly reduced right ventricular systolic function.  Right Atrium: The right atrium is mildly dilated.  Aortic Valve: There is a prosthetic aortic valve present. The aortic valve dimensionless index is 0.49. There is a Enrico bovine aortic valve bioprosthesis, with a 21 mm reported size. There is no ashley-prosthetic aortic valve regurgitation. There is no evidence of aortic valve regurgitation. The peak instantaneous gradient of the aortic valve is 23.1 mmHg. The mean gradient of the aortic valve is 11.0 mmHg.  Mitral Valve: The mitral valve is mildly thickened. There is mild mitral valve  regurgitation.  Tricuspid Valve: The tricuspid valve is structurally normal. There is trace tricuspid regurgitation. The right ventricular systolic pressure is unable to be estimated.  Pulmonic Valve: The pulmonic valve is structurally normal. There is physiologic pulmonic valve regurgitation.  Pericardium: There is no pericardial effusion noted.  Aorta: The aortic root is normal. The aortic root is at the upper limits of normal size.  In comparison to the previous echocardiogram(s): Compared with study from 10/18/2023, the aortic gradients have increased slightly (were 18 and 8 mmHg).      CONCLUSIONS:  1. Left ventricular systolic function is normal with a 70% estimated ejection fraction.  2. Abnormal septal motion consistent with post-operative status.  3. Spectral Doppler shows a pseudonormal pattern of left ventricular diastolic filling.  4. There is mildly reduced right ventricular systolic function.  5. The left atrium is moderate to severely dilated.  6. There is a bovine aortic valve bioprosthesis.  7. Left ventricular cavity size is decreased.    QUANTITATIVE DATA SUMMARY:  2D MEASUREMENTS:  Normal Ranges:  LAs:           3.20 cm   (2.7-4.0cm)  IVSd:          1.20 cm   (0.6-1.1cm)  LVPWd:         1.11 cm   (0.6-1.1cm)  LVIDd:         3.05 cm   (3.9-5.9cm)  LVIDs:         2.06 cm  LV Mass Index: 73.4 g/m2  LV % FS        32.5 %    LA VOLUME:  Normal Ranges:  LA Vol A4C:        66.9 ml    (22+/-6mL/m2)  LA Vol A2C:        21.9 ml  LA Vol BP:         41.8 ml  LA Vol Index A4C:  47.1ml/m2  LA Vol Index A2C:  15.4 ml/m2  LA Vol Index BP:   29.4 ml/m2  LA Area A4C:       21.0 cm2  LA Area A2C:       11.0 cm2  LA Major Axis A4C: 5.6 cm  LA Major Axis A2C: 4.7 cm  LA Volume Index:   29.0 ml/m2  LA Vol A4C:        63.0 ml  LA Vol A2C:        21.0 ml    RA VOLUME BY A/L METHOD:  Normal Ranges:  RA Vol A4C:        53.8 ml    (8.3-19.5ml)  RA Vol Index A4C:  37.9 ml/m2  RA Area A4C:       19.0 cm2  RA Major Axis A4C:  5.7 cm    M-MODE MEASUREMENTS:  Normal Ranges:  Ao Root: 3.60 cm (2.0-3.7cm)  LAs:     3.20 cm (2.7-4.0cm)    AORTA MEASUREMENTS:  Normal Ranges:  Asc Ao, d: 2.90 cm (2.1-3.4cm)  Ao Arch:   2.50 cm (2.0-3.6cm)    LV SYSTOLIC FUNCTION BY 2D PLANIMETRY (MOD):  Normal Ranges:  EF-A4C View: 75.0 % (>=55%)  EF-A2C View: 73.7 %  EF-Biplane:  74.5 %    LV DIASTOLIC FUNCTION:  Normal Ranges:  MV Peak E:        1.30 m/s    (0.7-1.2 m/s)  MV Peak A:        1.01 m/s    (0.42-0.7 m/s)  E/A Ratio:        1.28        (1.0-2.2)  MV lateral e'     0.09 m/s  MV medial e'      0.04 m/s  MV A Dur:         136.10 msec  PulmV Sys Ruddy:    75.80 cm/s  PulmV Quiñonez Ruddy:   72.16 cm/s  PulmV S/D Ruddy:    1.05  PulmV A Revs Ruddy: 19.36 cm/s  PulmV A Revs Dur: 115.34 msec    MITRAL VALVE:  Normal Ranges:  MV DT: 207 msec (150-240msec)    AORTIC VALVE:  Normal Ranges:  AoV Vmax:                2.40 m/s  (<=1.7m/s)  AoV Peak P.1 mmHg (<20mmHg)  AoV Mean P.0 mmHg (1.7-11.5mmHg)  LVOT Max Ruddy:            1.08 m/s  (<=1.1m/s)  AoV VTI:                 50.77 cm  (18-25cm)  LVOT VTI:                24.70 cm  LVOT Diameter:           1.65 cm   (1.8-2.4cm)  AoV Area, VTI:           1.04 cm2  (2.5-5.5cm2)  AoV Area,Vmax:           0.96 cm2  (2.5-4.5cm2)  AoV Dimensionless Index: 0.49      RIGHT VENTRICLE:  RV Basal 3.00 cm  RV Mid   1.50 cm  RV Major 5.9 cm  TAPSE:   14.0 mm  RV s'    0.10 m/s    PULMONIC VALVE:  Normal Ranges:  PV Accel Time: 99 msec  (>120ms)  PV Max Ruddy:    0.9 m/s  (0.6-0.9m/s)  PV Max PG:     3.2 mmHg    Pulmonary Veins:  PulmV A Revs Dur: 115.34 msec  PulmV A Revs Ruddy: 19.36 cm/s  PulmV Quiñonez Ruddy:   72.16 cm/s  PulmV S/D Ruddy:    1.05  PulmV Sys Ruddy:    75.80 cm/s      [id number] Florencio Ledbetter MD  Electronically signed on 2024 at 2:52:57 PM        Nuclear Stress Test 24  IMPRESSION:  1. Negative myocardial perfusion study without evidence of inducible  myocardial ischemia or prior infarction.  2. The  left ventricle is normal in size.  3. normal LV wall motion with a post-stress LV EF estimated at 65%.      XR chest 1 view 01/16/2024    Narrative  Interpreted By:  Hanh Matthews,  STUDY:  XR CHEST 1 VIEW;  1/16/2024 1:06 pm    INDICATION:  Signs/Symptoms:increased O2 requirements.    COMPARISON:  10/19/2006    ACCESSION NUMBER(S):  [id number]    ORDERING CLINICIAN:  SARAH LEDBETTER    FINDINGS:  Patient is status post median sternotomy.        CARDIOMEDIASTINAL SILHOUETTE:  Cardiomediastinal silhouette is normal in size and configuration.    LUNGS:  Airspace opacity within the right upper lobe. Minimal increased  markings in a bibasilar distribution..    ABDOMEN:  No remarkable upper abdominal findings.    BONES:  No acute osseous changes.    Impression  1.  Right upper lobe infiltrates suggestive of pneumonia.  2. Minimal bibasilar infiltrates question atelectasis.        MACRO:  None    Signed by: Hanh Matthews 1/17/2024 10:27 AM  Dictation workstation:   [email or web address]    , CT Head/Neck    [email or web address]    SOCIAL:  Social History     Tobacco Use   Smoking Status Never   Smokeless Tobacco Never      Social History     Substance and Sexual Activity   Alcohol Use None      Social History     Substance and Sexual Activity   Drug Use Not on file        NPO STATUS:  No data recorded    Clinical Areas Reviewed:   Tobacco  Allergies  Meds   Med Hx  Surg Hx   Fam Hx  Soc Hx      Physical Exam    Airway  Mallampati: II  TM distance: >3 FB  Neck ROM: full     Cardiovascular - normal exam     Dental    Pulmonary - normal exam     Abdominal - normal exam             Anesthesia Plan    History of general anesthesia?: yes  History of complications of general anesthesia?: no    ASA 3     general     intravenous induction   Postoperative administration of opioids is intended.  Anesthetic plan and risks discussed with patient.  Use of blood products discussed with patient who.    Plan discussed with CAA.

## 2024-01-15 NOTE — CARE PLAN
The patient's goals for the shift include      The clinical goals for the shift include pt will remain HDS throughout this shift    Over the shift, the patient did make progress toward the following goals.

## 2024-01-16 ENCOUNTER — APPOINTMENT (OUTPATIENT)
Dept: RADIOLOGY | Facility: HOSPITAL | Age: 89
DRG: 253 | End: 2024-01-16
Payer: MEDICARE

## 2024-01-16 ENCOUNTER — APPOINTMENT (OUTPATIENT)
Dept: CARDIOLOGY | Facility: HOSPITAL | Age: 89
DRG: 253 | End: 2024-01-16
Payer: MEDICARE

## 2024-01-16 LAB
ALBUMIN SERPL BCP-MCNC: 3.5 G/DL (ref 3.4–5)
ANION GAP SERPL CALC-SCNC: 12 MMOL/L (ref 10–20)
BUN SERPL-MCNC: 27 MG/DL (ref 6–23)
CALCIUM SERPL-MCNC: 9.5 MG/DL (ref 8.6–10.6)
CHLORIDE SERPL-SCNC: 104 MMOL/L (ref 98–107)
CO2 SERPL-SCNC: 31 MMOL/L (ref 21–32)
CREAT SERPL-MCNC: 1.23 MG/DL (ref 0.5–1.05)
EGFRCR SERPLBLD CKD-EPI 2021: 43 ML/MIN/1.73M*2
ERYTHROCYTE [DISTWIDTH] IN BLOOD BY AUTOMATED COUNT: 13.2 % (ref 11.5–14.5)
GLUCOSE SERPL-MCNC: 104 MG/DL (ref 74–99)
HCT VFR BLD AUTO: 38.7 % (ref 36–46)
HGB BLD-MCNC: 11.8 G/DL (ref 12–16)
MAGNESIUM SERPL-MCNC: 1.85 MG/DL (ref 1.6–2.4)
MCH RBC QN AUTO: 31.5 PG (ref 26–34)
MCHC RBC AUTO-ENTMCNC: 30.5 G/DL (ref 32–36)
MCV RBC AUTO: 103 FL (ref 80–100)
NRBC BLD-RTO: 0 /100 WBCS (ref 0–0)
PHOSPHATE SERPL-MCNC: 3 MG/DL (ref 2.5–4.9)
PLATELET # BLD AUTO: 183 X10*3/UL (ref 150–450)
POTASSIUM SERPL-SCNC: 3.6 MMOL/L (ref 3.5–5.3)
RBC # BLD AUTO: 3.75 X10*6/UL (ref 4–5.2)
SODIUM SERPL-SCNC: 143 MMOL/L (ref 136–145)
UFH PPP CHRO-ACNC: 0.4 IU/ML
WBC # BLD AUTO: 11.2 X10*3/UL (ref 4.4–11.3)

## 2024-01-16 PROCEDURE — 2500000001 HC RX 250 WO HCPCS SELF ADMINISTERED DRUGS (ALT 637 FOR MEDICARE OP): Performed by: STUDENT IN AN ORGANIZED HEALTH CARE EDUCATION/TRAINING PROGRAM

## 2024-01-16 PROCEDURE — 93017 CV STRESS TEST TRACING ONLY: CPT

## 2024-01-16 PROCEDURE — 99233 SBSQ HOSP IP/OBS HIGH 50: CPT | Performed by: NURSE PRACTITIONER

## 2024-01-16 PROCEDURE — 2500000004 HC RX 250 GENERAL PHARMACY W/ HCPCS (ALT 636 FOR OP/ED)

## 2024-01-16 PROCEDURE — 2500000001 HC RX 250 WO HCPCS SELF ADMINISTERED DRUGS (ALT 637 FOR MEDICARE OP)

## 2024-01-16 PROCEDURE — 3430000001 HC RX 343 DIAGNOSTIC RADIOPHARMACEUTICALS: Performed by: SURGERY

## 2024-01-16 PROCEDURE — 93005 ELECTROCARDIOGRAM TRACING: CPT

## 2024-01-16 PROCEDURE — 85027 COMPLETE CBC AUTOMATED: CPT

## 2024-01-16 PROCEDURE — 71045 X-RAY EXAM CHEST 1 VIEW: CPT

## 2024-01-16 PROCEDURE — 93016 CV STRESS TEST SUPVJ ONLY: CPT | Performed by: INTERNAL MEDICINE

## 2024-01-16 PROCEDURE — 85520 HEPARIN ASSAY: CPT | Performed by: STUDENT IN AN ORGANIZED HEALTH CARE EDUCATION/TRAINING PROGRAM

## 2024-01-16 PROCEDURE — 83735 ASSAY OF MAGNESIUM: CPT

## 2024-01-16 PROCEDURE — 1100000001 HC PRIVATE ROOM DAILY

## 2024-01-16 PROCEDURE — 36415 COLL VENOUS BLD VENIPUNCTURE: CPT | Performed by: STUDENT IN AN ORGANIZED HEALTH CARE EDUCATION/TRAINING PROGRAM

## 2024-01-16 PROCEDURE — 71045 X-RAY EXAM CHEST 1 VIEW: CPT | Performed by: RADIOLOGY

## 2024-01-16 PROCEDURE — 93010 ELECTROCARDIOGRAM REPORT: CPT | Performed by: INTERNAL MEDICINE

## 2024-01-16 PROCEDURE — A9502 TC99M TETROFOSMIN: HCPCS | Performed by: SURGERY

## 2024-01-16 PROCEDURE — 2500000004 HC RX 250 GENERAL PHARMACY W/ HCPCS (ALT 636 FOR OP/ED): Performed by: STUDENT IN AN ORGANIZED HEALTH CARE EDUCATION/TRAINING PROGRAM

## 2024-01-16 PROCEDURE — 78452 HT MUSCLE IMAGE SPECT MULT: CPT | Performed by: STUDENT IN AN ORGANIZED HEALTH CARE EDUCATION/TRAINING PROGRAM

## 2024-01-16 PROCEDURE — 80069 RENAL FUNCTION PANEL: CPT

## 2024-01-16 RX ORDER — FUROSEMIDE 10 MG/ML
20 INJECTION INTRAMUSCULAR; INTRAVENOUS ONCE
Status: COMPLETED | OUTPATIENT
Start: 2024-01-16 | End: 2024-01-16

## 2024-01-16 RX ORDER — POTASSIUM CHLORIDE 1.5 G/1.58G
20 POWDER, FOR SOLUTION ORAL ONCE
Status: COMPLETED | OUTPATIENT
Start: 2024-01-16 | End: 2024-01-16

## 2024-01-16 RX ADMIN — ESCITALOPRAM OXALATE 10 MG: 10 TABLET, FILM COATED ORAL at 09:52

## 2024-01-16 RX ADMIN — ATORVASTATIN CALCIUM 80 MG: 80 TABLET, FILM COATED ORAL at 06:20

## 2024-01-16 RX ADMIN — FUROSEMIDE 20 MG: 10 INJECTION, SOLUTION INTRAVENOUS at 17:53

## 2024-01-16 RX ADMIN — SODIUM CHLORIDE, POTASSIUM CHLORIDE, SODIUM LACTATE AND CALCIUM CHLORIDE 75 ML/HR: 600; 310; 30; 20 INJECTION, SOLUTION INTRAVENOUS at 00:13

## 2024-01-16 RX ADMIN — ATENOLOL 50 MG: 50 TABLET ORAL at 21:42

## 2024-01-16 RX ADMIN — TETROFOSMIN 35 MILLICURIE: 0.23 INJECTION, POWDER, LYOPHILIZED, FOR SOLUTION INTRAVENOUS at 07:47

## 2024-01-16 RX ADMIN — ASPIRIN 81 MG: 81 TABLET, COATED ORAL at 09:52

## 2024-01-16 RX ADMIN — ATENOLOL 50 MG: 50 TABLET ORAL at 09:52

## 2024-01-16 RX ADMIN — POTASSIUM CHLORIDE 20 MEQ: 1.5 POWDER, FOR SOLUTION ORAL at 17:57

## 2024-01-16 RX ADMIN — ACETAMINOPHEN 975 MG: 325 TABLET ORAL at 21:42

## 2024-01-16 RX ADMIN — MIRTAZAPINE 7.5 MG: 7.5 TABLET, FILM COATED ORAL at 21:42

## 2024-01-16 RX ADMIN — REGADENOSON 0.4 MG: 0.08 INJECTION, SOLUTION INTRAVENOUS at 07:57

## 2024-01-16 RX ADMIN — ACETAMINOPHEN 975 MG: 325 TABLET ORAL at 12:41

## 2024-01-16 RX ADMIN — ACETAMINOPHEN 975 MG: 325 TABLET ORAL at 06:20

## 2024-01-16 RX ADMIN — FUROSEMIDE 20 MG: 10 INJECTION, SOLUTION INTRAVENOUS at 10:10

## 2024-01-16 ASSESSMENT — PAIN SCALES - GENERAL
PAINLEVEL_OUTOF10: 0 - NO PAIN
PAINLEVEL_OUTOF10: 4

## 2024-01-16 ASSESSMENT — COGNITIVE AND FUNCTIONAL STATUS - GENERAL
MOBILITY SCORE: 19
CLIMB 3 TO 5 STEPS WITH RAILING: A LOT
WALKING IN HOSPITAL ROOM: A LITTLE
DRESSING REGULAR UPPER BODY CLOTHING: A LITTLE
DAILY ACTIVITIY SCORE: 21
STANDING UP FROM CHAIR USING ARMS: A LITTLE
MOVING TO AND FROM BED TO CHAIR: A LITTLE
DRESSING REGULAR LOWER BODY CLOTHING: A LITTLE
HELP NEEDED FOR BATHING: A LITTLE

## 2024-01-16 ASSESSMENT — PAIN SCALES - WONG BAKER
WONGBAKER_NUMERICALRESPONSE: NO HURT

## 2024-01-16 ASSESSMENT — PAIN SCALES - PAIN ASSESSMENT IN ADVANCED DEMENTIA (PAINAD)
BODYLANGUAGE: RELAXED
TOTALSCORE: 0
BREATHING: NORMAL
FACIALEXPRESSION: SMILING OR INEXPRESSIVE
CONSOLABILITY: NO NEED TO CONSOLE

## 2024-01-16 NOTE — PROGRESS NOTES
VASCULAR SURGERY PROGRESS NOTE  Subjective   No NAEO. Patient has no complaints. Issues with image capture/ equipment during stress test yesterday, so will get repeat stress test today    Objective   Vitals:    01/16/24 0422   BP: 169/88   Pulse: 88   Resp: 25   Temp: 36.2 °C (97.2 °F)   SpO2: 94%      Exam:  GENERAL APPEARANCE:  AxOx4, generally well-appearing no acute distress. Sleepy.  HEART:  RRR  LUNGS:  Equal chest rise and fall, non-labored breathing  ABDOMEN:  Soft, nontender, nondistended  NEUROLOGICAL:  Grossly nonfocal. Alert and oriented, moving all 4 extremities.   Skin:  Warm and dry without any rash.  EXTREMITIES: bilateral lower extremities are warm to the touch. Unable to dorsiflex or plantar flex the left foot. Sensation intact. Left posterior heel wound approximately 2x3cm, surrounding erythema. No lower extremity edema  Pulse exam:  R fem palpable  R DP/PT monophasic   L femo non-palpable, monophasic fem  L DP monophasic, non-dopplerable PT    Relevant Results  Medications:  Scheduled Meds:  acetaminophen, 975 mg, oral, q8h  aspirin, 81 mg, oral, Daily  atenolol, 50 mg, oral, BID  atorvastatin, 80 mg, oral, Daily  escitalopram, 10 mg, oral, Daily  mirtazapine, 7.5 mg, oral, Nightly  [Held by provider] polyethylene glycol, 17 g, oral, Daily      Continuous Infusions:  heparin, 0-4,500 Units/hr, Last Rate: 500 Units/hr (01/16/24 0228)  lactated Ringer's, 75 mL/hr, Last Rate: 75 mL/hr (01/16/24 0228)      PRN Meds:.  PRN medications: clorazepate, heparin, ondansetron ODT **OR** ondansetron, oxyCODONE, polyethylene glycol    Labs:  Results from last 7 days   Lab Units 01/15/24  0757 01/14/24  0756 01/13/24  0128   WBC AUTO x10*3/uL 7.5 7.6 8.7   HEMOGLOBIN g/dL 10.1* 10.5* 10.0*   PLATELETS AUTO x10*3/uL 149* 161 158        Results from last 7 days   Lab Units 01/15/24  0757 01/14/24  0756 01/12/24  0337 01/12/24  0337   SODIUM mmol/L 146* 145  --  140   POTASSIUM mmol/L 4.6 4.7  --  4.7   CHLORIDE  mmol/L 109* 108*  --  101   CO2 mmol/L 31 31  --  27   BUN mg/dL 27* 39*  --  42*   CREATININE mg/dL 1.23* 1.41*  --  1.39*   GLUCOSE mg/dL 83 94  --  109*   MAGNESIUM mg/dL 1.92 2.03   < >  --    PHOSPHORUS mg/dL 1.8* 1.9*   < >  --     < > = values in this interval not displayed.        Results from last 7 days   Lab Units 01/15/24  0757 01/11/24  2207   INR  1.1 2.8*   PROTIME seconds 12.8 32.4*       Assessment/Plan   89 y.o. female with significant PMHx as well as PAD s/p L EIA stent in September 2023 at [facility]. She presented to an OSH yesterday d/t concern for left lower extremity pain in conjunction with a new left heel ulcer. Imaging revealed bilateral SFA occlusion and additional occlusions of SMA, right internal iliac, right popliteal, right anterior tibial artery, left popliteal artery, and left anterior tibial artery. Planning for a L femoral endarterectomy Weds 1/17. Undergoing preoperative assessment by cardiology.    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  - continue tylenol/oxy PRN for pain control  - continue neurovascular checks every 4 hrs  - continue home mirtazapine & lexapro    CV: HTN, HLD, PAD, CAD (s/p CABG 1995), afib, aortic stenosis, renal artery stenosis (s/p L renal stent)   - maintain blood pressure control with home atenolol, holding home losartan, spironolactone-HCTZ  - continue atorvastatin/ASA  - continue heparin drip   - cardiology recs: moderate risk for surgical intervention   - obtain KEIRY scan today    Pulm:   - continue to encourage IS hourly while awake  - OOB to chair and increase ambulation as tolerated    FENGI: CKD3, hypoalbuminemia, Moderate malnutrition    - continue regular diet with oral supplements  - continue bowel regimen  - strict I&O  - continue mIVF  - RFP as clinically indicated    Endo:    - no issues     Heme: supratherapeutic INR on admission, chronic anemia   - no s/sx of bleeding  - INR WNL 1/15  - type  and screen complete 1/15  - CBC as clinically indicated    ID: ischemic ulcers   - trend temp q4  - offload heels   - appreciate wound care recs   - CBC as clinically indicated    Dispo:   - continue care on regular nursing floor  - plan for OR Wednesday with Dr. Montes     Patient seen with vascular surgery fellow Dr. Jose Addison MD  Vascular Surgery [id number]

## 2024-01-16 NOTE — PROGRESS NOTES
"Subjective Data:  Stress test  IMPRESSION:  1. Negative myocardial perfusion study without evidence of inducible  myocardial ischemia or prior infarction.  2. The left ventricle is normal in size.  3. normal LV wall motion with a post-stress LV EF estimated at 65%.    Hypertensive, dyspneic and hypoxic upon returning from stress test.   LR stopped and Lasix 20mg IV x1   Responded well to IV Lasix   SR 90s     Objective Data:  Last Recorded Vitals:  Vitals:    01/16/24 0012 01/16/24 0422 01/16/24 0952 01/16/24 1109   BP: 157/73 169/88 (!) 187/98 166/78   Pulse: 89 88 93 83   Resp: 18 25 24 20   Temp: 35.2 °C (95.4 °F) 36.2 °C (97.2 °F) 37 °C (98.6 °F) 36.9 °C (98.4 °F)   TempSrc:       SpO2: 95% 94% 95% 93%   Weight:       Height:           Last Labs:  CBC - 1/16/2024: 11:45 AM  11.2 11.8 183    38.7      CMP - 1/16/2024: 11:45 AM  9.5 7.0 24 --- 0.5   3.0 3.5 18 84      PTT - No results in last year.  1.1   12.8 _     No results found for: \"TROPHS\", \"BNP\", \"HGBA1C\", \"LDLCALC\", \"VLDL\"   Last I/O:  I/O last 3 completed shifts:  In: 536.8 (12.2 mL/kg) [I.V.:536.8 (12.2 mL/kg)]  Out: - (0 mL/kg)   Weight: 44 kg     Past Cardiology Tests (Last 3 Years):  EKG:  ECG 12 Lead 01/12/2024    Echo:  Transthoracic Echo (TTE) Complete 01/12/2024      Transthoracic Echo (TTE) Complete 10/18/2023    Ejection Fractions:  EF   Date/Time Value Ref Range Status   01/12/2024 01:55 PM 75       Cath:  No results found for this or any previous visit from the past 1095 days.    Stress Test:  No results found for this or any previous visit from the past 1095 days.    Cardiac Imaging:  No results found for this or any previous visit from the past 1095 days.      Inpatient Medications:  Scheduled medications   Medication Dose Route Frequency    acetaminophen  975 mg oral q8h    aspirin  81 mg oral Daily    atenolol  50 mg oral BID    atorvastatin  80 mg oral Daily    escitalopram  10 mg oral Daily    mirtazapine  7.5 mg oral Nightly    [Held " by provider] polyethylene glycol  17 g oral Daily     PRN medications   Medication    clorazepate    heparin    ondansetron ODT    Or    ondansetron    oxyCODONE    polyethylene glycol     Continuous Medications   Medication Dose Last Rate    heparin  0-4,500 Units/hr 500 Units/hr (01/16/24 9569)       Physical Exam:  Constitutional: sitting up in bed, dyspneic upon returning from stress test 2L nc  HEENT: EOMI, no scleral icterus, MMM  CV: RRR, no m/r/g, JVD midneck at 60 degrees   Pulm: rale and rhonchi   GI: Soft, NT, ND  Extremities: trace 1+ hard pitting LE edema  Skin: WWP  Neuro: A&Ox4, no FND  Psych: Mood and affect appropriate to situation     Assessment/Plan   Maggi Gordon is a 89 y.o. female with PMH of HTN, DLD, CKD, vertebral artery syndrome status post L PICA coiling and stent placement (2006), CAD s/p CABG x3 (1995), A-fib, aortic stenosis, and PAD s/p L EIA stent in September 2023 at [facility]. She underwent a CTA which showed bilateral SFA occlusion and additional occlusions of SMA, right internal iliac, right popliteal, right anterior tibial artery, left popliteal artery, and left anterior tibial artery. Vascular surgery planning for open L femoral endarterectomy with potential bypass. Cardiology is consulted for pre-op risk assessment.     #Pre-op risk assessment for open left femoral endarterectomy with possible bypass  #Hx CAD s/p 3v CABG in 1995 (SVG-Lcx, SVG-RCA, LIMA-LAD)  #Aortic stenosis s/p AVR     Recommendations:  -RCRI 2 points, class III risk: 10.1% 30-day risk of death, MI, or cardiac arrest  -Echo complete with EF 70%, normal gradient across prosthetic AV  - stress test without evidence of inducible myocardial ischemia or prior infarction.  - Overall he appears to be intermediate risk: currently no angina, normal LVEF, no reported rwma, no ischemia on stress test     Cardiology will follow  Case discussed with Dr. Anthony Vargas, CNP   Cardiology Consults    Please call with any  questions  Pager [phone] M-F 8a-5p; Saturday 8a-2p  Pager [phone] all other times          Full Code

## 2024-01-16 NOTE — PROGRESS NOTES
1/16/2024 Care coordination  Maggi Gordon is a 89 y.o. female on day 4 of admission presenting with Acute lower limb ischemia.   Pt  lives alone and said she manages ok.   Vascular surgery planning for open L femoral endarterectomy with potential bypass. Cardiology is consulted for pre-op risk assessment.  Will cont to follow.

## 2024-01-16 NOTE — CARE PLAN
The clinical goals for the shift include pt will remain free from falls and injury throughout shift    Over the shift, the patient did make progress toward the following goals. Recommendations to address these barriers include purposeful rounding.

## 2024-01-17 ENCOUNTER — ANESTHESIA (OUTPATIENT)
Dept: OPERATING ROOM | Facility: HOSPITAL | Age: 89
DRG: 253 | End: 2024-01-17
Payer: MEDICARE

## 2024-01-17 LAB
ALBUMIN SERPL BCP-MCNC: 3.1 G/DL (ref 3.4–5)
ANION GAP SERPL CALC-SCNC: 15 MMOL/L (ref 10–20)
BUN SERPL-MCNC: 31 MG/DL (ref 6–23)
CALCIUM SERPL-MCNC: 9 MG/DL (ref 8.6–10.6)
CHLORIDE SERPL-SCNC: 104 MMOL/L (ref 98–107)
CO2 SERPL-SCNC: 29 MMOL/L (ref 21–32)
CREAT SERPL-MCNC: 1.41 MG/DL (ref 0.5–1.05)
EGFRCR SERPLBLD CKD-EPI 2021: 36 ML/MIN/1.73M*2
ERYTHROCYTE [DISTWIDTH] IN BLOOD BY AUTOMATED COUNT: 13.2 % (ref 11.5–14.5)
GLUCOSE SERPL-MCNC: 99 MG/DL (ref 74–99)
HCT VFR BLD AUTO: 33.1 % (ref 36–46)
HGB BLD-MCNC: 10.4 G/DL (ref 12–16)
MAGNESIUM SERPL-MCNC: 1.68 MG/DL (ref 1.6–2.4)
MCH RBC QN AUTO: 32.6 PG (ref 26–34)
MCHC RBC AUTO-ENTMCNC: 31.4 G/DL (ref 32–36)
MCV RBC AUTO: 104 FL (ref 80–100)
NRBC BLD-RTO: 0 /100 WBCS (ref 0–0)
PHOSPHATE SERPL-MCNC: 2.5 MG/DL (ref 2.5–4.9)
PLATELET # BLD AUTO: 184 X10*3/UL (ref 150–450)
POTASSIUM SERPL-SCNC: 3.8 MMOL/L (ref 3.5–5.3)
RBC # BLD AUTO: 3.19 X10*6/UL (ref 4–5.2)
SODIUM SERPL-SCNC: 144 MMOL/L (ref 136–145)
WBC # BLD AUTO: 8.9 X10*3/UL (ref 4.4–11.3)

## 2024-01-17 PROCEDURE — 2500000004 HC RX 250 GENERAL PHARMACY W/ HCPCS (ALT 636 FOR OP/ED): Performed by: STUDENT IN AN ORGANIZED HEALTH CARE EDUCATION/TRAINING PROGRAM

## 2024-01-17 PROCEDURE — 2500000001 HC RX 250 WO HCPCS SELF ADMINISTERED DRUGS (ALT 637 FOR MEDICARE OP)

## 2024-01-17 PROCEDURE — 83735 ASSAY OF MAGNESIUM: CPT

## 2024-01-17 PROCEDURE — 99233 SBSQ HOSP IP/OBS HIGH 50: CPT | Performed by: NURSE PRACTITIONER

## 2024-01-17 PROCEDURE — 36415 COLL VENOUS BLD VENIPUNCTURE: CPT

## 2024-01-17 PROCEDURE — 1100000001 HC PRIVATE ROOM DAILY

## 2024-01-17 PROCEDURE — 80069 RENAL FUNCTION PANEL: CPT

## 2024-01-17 PROCEDURE — 85027 COMPLETE CBC AUTOMATED: CPT | Performed by: STUDENT IN AN ORGANIZED HEALTH CARE EDUCATION/TRAINING PROGRAM

## 2024-01-17 RX ADMIN — OXYCODONE HYDROCHLORIDE 2.5 MG: 5 TABLET ORAL at 05:53

## 2024-01-17 RX ADMIN — ATENOLOL 50 MG: 50 TABLET ORAL at 08:20

## 2024-01-17 RX ADMIN — HEPARIN SODIUM 500 UNITS/HR: 10000 INJECTION, SOLUTION INTRAVENOUS at 22:01

## 2024-01-17 RX ADMIN — ATORVASTATIN CALCIUM 80 MG: 80 TABLET, FILM COATED ORAL at 07:00

## 2024-01-17 RX ADMIN — ATENOLOL 50 MG: 50 TABLET ORAL at 20:31

## 2024-01-17 RX ADMIN — ACETAMINOPHEN 975 MG: 325 TABLET ORAL at 20:30

## 2024-01-17 RX ADMIN — ESCITALOPRAM OXALATE 10 MG: 10 TABLET, FILM COATED ORAL at 08:20

## 2024-01-17 RX ADMIN — ASPIRIN 81 MG: 81 TABLET, COATED ORAL at 08:20

## 2024-01-17 RX ADMIN — ACETAMINOPHEN 975 MG: 325 TABLET ORAL at 05:52

## 2024-01-17 RX ADMIN — MIRTAZAPINE 7.5 MG: 7.5 TABLET, FILM COATED ORAL at 20:31

## 2024-01-17 ASSESSMENT — COGNITIVE AND FUNCTIONAL STATUS - GENERAL
DRESSING REGULAR LOWER BODY CLOTHING: A LITTLE
MOBILITY SCORE: 19
CLIMB 3 TO 5 STEPS WITH RAILING: A LOT
HELP NEEDED FOR BATHING: A LITTLE
STANDING UP FROM CHAIR USING ARMS: A LITTLE
DAILY ACTIVITIY SCORE: 21
DRESSING REGULAR UPPER BODY CLOTHING: A LITTLE
WALKING IN HOSPITAL ROOM: A LITTLE
MOVING TO AND FROM BED TO CHAIR: A LITTLE

## 2024-01-17 ASSESSMENT — PAIN - FUNCTIONAL ASSESSMENT: PAIN_FUNCTIONAL_ASSESSMENT: 0-10

## 2024-01-17 ASSESSMENT — PAIN SCALES - GENERAL
PAINLEVEL_OUTOF10: 9
PAINLEVEL_OUTOF10: 0 - NO PAIN

## 2024-01-17 ASSESSMENT — PAIN DESCRIPTION - ORIENTATION: ORIENTATION: LEFT

## 2024-01-17 ASSESSMENT — PAIN DESCRIPTION - LOCATION: LOCATION: FOOT

## 2024-01-17 NOTE — PROGRESS NOTES
"Subjective Data:  Responded well to IV Lasix  Close to euvolemic   SR 80s   Hypertensive  Has not been receiving home BP meds outside of Atenolol  Plan for surgery this afternoon    Objective Data:  Last Recorded Vitals:  Vitals:    01/16/24 2309 01/17/24 0330 01/17/24 0803 01/17/24 1007   BP: 168/86 168/84 (!) 184/98 149/64   Pulse: 82 77 77 71   Resp: 18  19    Temp: 36 °C (96.8 °F) 36.6 °C (97.9 °F) 37.1 °C (98.8 °F) 36.9 °C (98.4 °F)   TempSrc:       SpO2: 95% 96% 94% 95%   Weight:       Height:           Last Labs:  CBC - 1/16/2024: 11:45 AM  11.2 11.8 183    38.7      CMP - 1/16/2024: 11:45 AM  9.5 7.0 24 --- 0.5   3.0 3.5 18 84      PTT - No results in last year.  1.1   12.8 _     No results found for: \"TROPHS\", \"BNP\", \"HGBA1C\", \"LDLCALC\", \"VLDL\"   Last I/O:  I/O last 3 completed shifts:  In: 536.8 (12.2 mL/kg) [I.V.:536.8 (12.2 mL/kg)]  Out: 2250 (51.1 mL/kg) [Urine:2250 (1.4 mL/kg/hr)]  Weight: 44 kg   Ejection Fractions:  EF   Date/Time Value Ref Range Status   01/12/2024 01:55 PM 75         Inpatient Medications:  Scheduled medications   Medication Dose Route Frequency    acetaminophen  975 mg oral q8h    aspirin  81 mg oral Daily    atenolol  50 mg oral BID    atorvastatin  80 mg oral Daily    escitalopram  10 mg oral Daily    mirtazapine  7.5 mg oral Nightly    [Held by provider] polyethylene glycol  17 g oral Daily     PRN medications   Medication    clorazepate    heparin    ondansetron ODT    Or    ondansetron    oxyCODONE    polyethylene glycol     Continuous Medications   Medication Dose Last Rate    heparin  0-4,500 Units/hr 500 Units/hr (01/16/24 0228)       Physical Exam:  Constitutional: sitting up in bed, room air, NAD  HEENT: EOMI, no scleral icterus, MMM  CV: RRR, systolic murmur, +HJR   Pulm: CTAB   GI: Soft, NT, ND  Extremities: trace 1+ hard pitting LE edema  Skin: WWP  Neuro: A&O  Psych: Mildly confused and anxious     Assessment/Plan   Maggi Gordon is a 89 y.o. female with PMH of HTN, " DLD, CKD, vertebral artery syndrome status post L PICA coiling and stent placement (2006), CAD s/p CABG x3 (1995), A-fib, aortic stenosis, and PAD s/p L EIA stent in September 2023 at [facility]. She underwent a CTA which showed bilateral SFA occlusion and additional occlusions of SMA, right internal iliac, right popliteal, right anterior tibial artery, left popliteal artery, and left anterior tibial artery. Vascular surgery planning for open L femoral endarterectomy with potential bypass. Cardiology is consulted for pre-op risk assessment.     #Pre-op risk assessment for open left femoral endarterectomy with possible bypass  #Hx CAD s/p 3v CABG in 1995 (SVG-Lcx, SVG-RCA, LIMA-LAD)  #Aortic stenosis s/p AVR  #Uncontrolled HTN     Recommendations:  -RCRI 2 points, class III risk: 10.1% 30-day risk of death, MI, or cardiac arrest  -Echo complete with EF 70%, normal gradient across prosthetic AV  - stress test without evidence of inducible myocardial ischemia or prior infarction.  - Overall she appears to be intermediate risk: currently no angina, normal LVEF, no reported rwma, no ischemia on stress test   - Recommend restarting home Daniel/hydrochlorothiazide and Valsartan   -prior to discharge, please arrange for follow up with her Cardiologist Dr. Jose Weathers [phone]      Cardiology will follow    Aubree Vargas CNP   Cardiology Consults    Please call with any questions  Pager [phone] M-F 8a-5p; Saturday 8a-2p  Pager [phone] all other times          Full Code

## 2024-01-17 NOTE — PROGRESS NOTES
VASCULAR SURGERY PROGRESS NOTE  Subjective   No NAEO. Patient has no complaints. OR today vs tmrw pending scheduling    Objective   Vitals:    01/17/24 1007   BP: 149/64   Pulse: 71   Resp:    Temp: 36.9 °C (98.4 °F)   SpO2: 95%      Exam:  GENERAL APPEARANCE:  AxOx4, generally well-appearing no acute distress. Sleepy.  HEART:  RRR  LUNGS:  Equal chest rise and fall, non-labored breathing  ABDOMEN:  Soft, nontender, nondistended  NEUROLOGICAL:  Grossly nonfocal. Alert and oriented, moving all 4 extremities.   Skin:  Warm and dry without any rash.  EXTREMITIES: bilateral lower extremities are warm to the touch. Unable to dorsiflex or plantar flex the left foot. Sensation intact. Left posterior heel wound approximately 2x3cm, surrounding erythema. No lower extremity edema  Pulse exam:  R fem palpable  R DP/PT monophasic   L femo non-palpable, monophasic fem  L DP monophasic, non-dopplerable PT    Relevant Results  Medications:  Scheduled Meds:  acetaminophen, 975 mg, oral, q8h  aspirin, 81 mg, oral, Daily  atenolol, 50 mg, oral, BID  atorvastatin, 80 mg, oral, Daily  escitalopram, 10 mg, oral, Daily  mirtazapine, 7.5 mg, oral, Nightly  [Held by provider] polyethylene glycol, 17 g, oral, Daily      Continuous Infusions:  heparin, 0-4,500 Units/hr, Last Rate: 500 Units/hr (01/16/24 0228)      PRN Meds:.  PRN medications: clorazepate, heparin, ondansetron ODT **OR** ondansetron, oxyCODONE, polyethylene glycol    Labs:  Results from last 7 days   Lab Units 01/16/24  1145 01/15/24  0757 01/14/24  0756   WBC AUTO x10*3/uL 11.2 7.5 7.6   HEMOGLOBIN g/dL 11.8* 10.1* 10.5*   PLATELETS AUTO x10*3/uL 183 149* 161        Results from last 7 days   Lab Units 01/16/24  1145 01/15/24  0757 01/14/24  0756   SODIUM mmol/L 143 146* 145   POTASSIUM mmol/L 3.6 4.6 4.7   CHLORIDE mmol/L 104 109* 108*   CO2 mmol/L 31 31 31   BUN mg/dL 27* 27* 39*   CREATININE mg/dL 1.23* 1.23* 1.41*   GLUCOSE mg/dL 104* 83 94   MAGNESIUM mg/dL 1.85 1.92  2.03   PHOSPHORUS mg/dL 3.0 1.8* 1.9*        Results from last 7 days   Lab Units 01/15/24  0757 01/11/24  2207   INR  1.1 2.8*   PROTIME seconds 12.8 32.4*       Assessment/Plan   89 y.o. female with significant PMHx as well as PAD s/p L EIA stent in September 2023 at [facility]. She presented to an OSH yesterday d/t concern for left lower extremity pain in conjunction with a new left heel ulcer. Imaging revealed bilateral SFA occlusion and additional occlusions of SMA, right internal iliac, right popliteal, right anterior tibial artery, left popliteal artery, and left anterior tibial artery. 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   - continue atorvastatin/ASA  - continue heparin drip   - cardiology recs: moderate risk for surgical intervention   - KEIRY scan 1/16, no ischemia    Pulm:   - continue to encourage IS hourly while awake  - OOB to chair and increase ambulation as tolerated    FENGI: CKD3, hypoalbuminemia, Moderate malnutrition    - continue regular diet with oral supplements, NPO for potential OR  - continue bowel regimen  - strict I&O  - continue mIVF  - RFP as clinically indicated    Endo:    - no issues     Heme: supratherapeutic INR on admission, chronic anemia   - no s/sx of bleeding  - INR WNL 1/15  - type and screen complete 1/15  - CBC as clinically indicated    ID: ischemic ulcers   - trend temp q4  -  offload heels   - appreciate wound care recs   - CBC as clinically indicated    Dispo:   - continue care on regular nursing floor  - plan for OR Wednesday vs Thursday with Dr. Montes     Patient seen with vascular surgery fellow Dr. Jose Addison MD  Vascular Surgery [id number]

## 2024-01-18 ENCOUNTER — APPOINTMENT (OUTPATIENT)
Dept: RADIOLOGY | Facility: HOSPITAL | Age: 89
DRG: 253 | End: 2024-01-18
Payer: MEDICARE

## 2024-01-18 LAB
ALBUMIN SERPL BCP-MCNC: 3.9 G/DL (ref 3.4–5)
ANION GAP SERPL CALC-SCNC: 15 MMOL/L (ref 10–20)
BUN SERPL-MCNC: 33 MG/DL (ref 6–23)
CALCIUM SERPL-MCNC: 9.3 MG/DL (ref 8.6–10.6)
CHLORIDE SERPL-SCNC: 103 MMOL/L (ref 98–107)
CO2 SERPL-SCNC: 28 MMOL/L (ref 21–32)
CREAT SERPL-MCNC: 1.27 MG/DL (ref 0.5–1.05)
EGFRCR SERPLBLD CKD-EPI 2021: 41 ML/MIN/1.73M*2
ERYTHROCYTE [DISTWIDTH] IN BLOOD BY AUTOMATED COUNT: 13.3 % (ref 11.5–14.5)
GLUCOSE SERPL-MCNC: 91 MG/DL (ref 74–99)
HCT VFR BLD AUTO: 35.6 % (ref 36–46)
HGB BLD-MCNC: 11.2 G/DL (ref 12–16)
MAGNESIUM SERPL-MCNC: 1.8 MG/DL (ref 1.6–2.4)
MCH RBC QN AUTO: 32.3 PG (ref 26–34)
MCHC RBC AUTO-ENTMCNC: 31.5 G/DL (ref 32–36)
MCV RBC AUTO: 103 FL (ref 80–100)
NRBC BLD-RTO: 0 /100 WBCS (ref 0–0)
PHOSPHATE SERPL-MCNC: 2.6 MG/DL (ref 2.5–4.9)
PLATELET # BLD AUTO: 197 X10*3/UL (ref 150–450)
POTASSIUM SERPL-SCNC: 3.8 MMOL/L (ref 3.5–5.3)
RBC # BLD AUTO: 3.47 X10*6/UL (ref 4–5.2)
SODIUM SERPL-SCNC: 142 MMOL/L (ref 136–145)
UFH PPP CHRO-ACNC: 0.4 IU/ML
UFH PPP CHRO-ACNC: 0.4 IU/ML
WBC # BLD AUTO: 8.9 X10*3/UL (ref 4.4–11.3)

## 2024-01-18 PROCEDURE — 37221 PR REVSC OPN/PRQ ILIAC ART W/STNT PLMT & ANGIOPLSTY: CPT | Performed by: SURGERY

## 2024-01-18 PROCEDURE — 2500000004 HC RX 250 GENERAL PHARMACY W/ HCPCS (ALT 636 FOR OP/ED): Performed by: ANESTHESIOLOGY

## 2024-01-18 PROCEDURE — 36415 COLL VENOUS BLD VENIPUNCTURE: CPT

## 2024-01-18 PROCEDURE — 2500000005 HC RX 250 GENERAL PHARMACY W/O HCPCS: Performed by: ANESTHESIOLOGY

## 2024-01-18 PROCEDURE — 0752T DGTZ GLS MCRSCP SLD LVL III: CPT | Mod: TC,SUR | Performed by: SURGERY

## 2024-01-18 PROCEDURE — 3700000002 HC GENERAL ANESTHESIA TIME - EACH INCREMENTAL 1 MINUTE: Performed by: SURGERY

## 2024-01-18 PROCEDURE — 2500000005 HC RX 250 GENERAL PHARMACY W/O HCPCS: Performed by: ANESTHESIOLOGIST ASSISTANT

## 2024-01-18 PROCEDURE — A35371 PR THROMBOENDARTECTMY FEMORAL COMMON: Performed by: ANESTHESIOLOGY

## 2024-01-18 PROCEDURE — 85027 COMPLETE CBC AUTOMATED: CPT

## 2024-01-18 PROCEDURE — 2500000001 HC RX 250 WO HCPCS SELF ADMINISTERED DRUGS (ALT 637 FOR MEDICARE OP): Performed by: STUDENT IN AN ORGANIZED HEALTH CARE EDUCATION/TRAINING PROGRAM

## 2024-01-18 PROCEDURE — C1725 CATH, TRANSLUMIN NON-LASER: HCPCS | Performed by: SURGERY

## 2024-01-18 PROCEDURE — 7100000002 HC RECOVERY ROOM TIME - EACH INCREMENTAL 1 MINUTE: Performed by: SURGERY

## 2024-01-18 PROCEDURE — 2500000004 HC RX 250 GENERAL PHARMACY W/ HCPCS (ALT 636 FOR OP/ED)

## 2024-01-18 PROCEDURE — 85347 COAGULATION TIME ACTIVATED: CPT

## 2024-01-18 PROCEDURE — 88311 DECALCIFY TISSUE: CPT | Performed by: SPECIALIST

## 2024-01-18 PROCEDURE — 37223 PR REVSC OPN/PRQ ILIAC ART W/STNT & ANGIOP IPSILATL: CPT | Performed by: SURGERY

## 2024-01-18 PROCEDURE — 04UL0KZ SUPPLEMENT LEFT FEMORAL ARTERY WITH NONAUTOLOGOUS TISSUE SUBSTITUTE, OPEN APPROACH: ICD-10-PCS | Performed by: SURGERY

## 2024-01-18 PROCEDURE — C1876 STENT, NON-COA/NON-COV W/DEL: HCPCS | Performed by: SURGERY

## 2024-01-18 PROCEDURE — 85520 HEPARIN ASSAY: CPT | Performed by: STUDENT IN AN ORGANIZED HEALTH CARE EDUCATION/TRAINING PROGRAM

## 2024-01-18 PROCEDURE — 36620 INSERTION CATHETER ARTERY: CPT | Performed by: ANESTHESIOLOGY

## 2024-01-18 PROCEDURE — 2720000007 HC OR 272 NO HCPCS: Performed by: SURGERY

## 2024-01-18 PROCEDURE — 80069 RENAL FUNCTION PANEL: CPT

## 2024-01-18 PROCEDURE — 047J0DZ DILATION OF LEFT EXTERNAL ILIAC ARTERY WITH INTRALUMINAL DEVICE, OPEN APPROACH: ICD-10-PCS | Performed by: SURGERY

## 2024-01-18 PROCEDURE — 3600000004 HC OR TIME - INITIAL BASE CHARGE - PROCEDURE LEVEL FOUR: Performed by: SURGERY

## 2024-01-18 PROCEDURE — 88304 TISSUE EXAM BY PATHOLOGIST: CPT | Performed by: SPECIALIST

## 2024-01-18 PROCEDURE — 36415 COLL VENOUS BLD VENIPUNCTURE: CPT | Performed by: STUDENT IN AN ORGANIZED HEALTH CARE EDUCATION/TRAINING PROGRAM

## 2024-01-18 PROCEDURE — 99232 SBSQ HOSP IP/OBS MODERATE 35: CPT | Performed by: NURSE PRACTITIONER

## 2024-01-18 PROCEDURE — C1769 GUIDE WIRE: HCPCS | Performed by: SURGERY

## 2024-01-18 PROCEDURE — C1887 CATHETER, GUIDING: HCPCS | Performed by: SURGERY

## 2024-01-18 PROCEDURE — 35372 RECHANNELING OF ARTERY: CPT | Performed by: SURGERY

## 2024-01-18 PROCEDURE — 7100000001 HC RECOVERY ROOM TIME - INITIAL BASE CHARGE: Performed by: SURGERY

## 2024-01-18 PROCEDURE — C1874 STENT, COATED/COV W/DEL SYS: HCPCS | Performed by: SURGERY

## 2024-01-18 PROCEDURE — 83735 ASSAY OF MAGNESIUM: CPT

## 2024-01-18 PROCEDURE — 2550000001 HC RX 255 CONTRASTS: Performed by: SURGERY

## 2024-01-18 PROCEDURE — 2780000003 HC OR 278 NO HCPCS: Performed by: SURGERY

## 2024-01-18 PROCEDURE — 2500000004 HC RX 250 GENERAL PHARMACY W/ HCPCS (ALT 636 FOR OP/ED): Performed by: STUDENT IN AN ORGANIZED HEALTH CARE EDUCATION/TRAINING PROGRAM

## 2024-01-18 PROCEDURE — 3600000009 HC OR TIME - EACH INCREMENTAL 1 MINUTE - PROCEDURE LEVEL FOUR: Performed by: SURGERY

## 2024-01-18 PROCEDURE — 2500000004 HC RX 250 GENERAL PHARMACY W/ HCPCS (ALT 636 FOR OP/ED): Performed by: ANESTHESIOLOGIST ASSISTANT

## 2024-01-18 PROCEDURE — 99100 ANES PT EXTEME AGE<1 YR&>70: CPT | Performed by: ANESTHESIOLOGY

## 2024-01-18 PROCEDURE — C1762 CONN TISS, HUMAN(INC FASCIA): HCPCS | Performed by: SURGERY

## 2024-01-18 PROCEDURE — 047D0DZ DILATION OF LEFT COMMON ILIAC ARTERY WITH INTRALUMINAL DEVICE, OPEN APPROACH: ICD-10-PCS | Performed by: SURGERY

## 2024-01-18 PROCEDURE — 2060000001 HC INTERMEDIATE ICU ROOM DAILY

## 2024-01-18 PROCEDURE — 2500000001 HC RX 250 WO HCPCS SELF ADMINISTERED DRUGS (ALT 637 FOR MEDICARE OP)

## 2024-01-18 PROCEDURE — 2500000005 HC RX 250 GENERAL PHARMACY W/O HCPCS

## 2024-01-18 PROCEDURE — 04CL0ZZ EXTIRPATION OF MATTER FROM LEFT FEMORAL ARTERY, OPEN APPROACH: ICD-10-PCS | Performed by: SURGERY

## 2024-01-18 PROCEDURE — 3700000001 HC GENERAL ANESTHESIA TIME - INITIAL BASE CHARGE: Performed by: SURGERY

## 2024-01-18 DEVICE — VIABAHN BX BALLOON EXP ENDO 8MMX39MM 7FR 135CMCATH HEPARIN
Type: IMPLANTABLE DEVICE | Site: ARTERIAL | Status: FUNCTIONAL
Brand: GORE VIABAHN VBX BALLOON EXPANDABLE ENDO

## 2024-01-18 DEVICE — XENOSURE BIOLOGIC PATCH, 0.8CM X 8CM, EIFU
Type: IMPLANTABLE DEVICE | Site: ARTERIAL | Status: FUNCTIONAL
Brand: XENOSURE BIOLOGIC PATCH

## 2024-01-18 DEVICE — STENT EVD35-07-040-080 EF ENTRUST V01
Type: IMPLANTABLE DEVICE | Site: ARTERIAL | Status: FUNCTIONAL
Brand: EVERFLEX™

## 2024-01-18 RX ORDER — LABETALOL HYDROCHLORIDE 5 MG/ML
5 INJECTION, SOLUTION INTRAVENOUS ONCE AS NEEDED
Status: DISCONTINUED | OUTPATIENT
Start: 2024-01-18 | End: 2024-01-18 | Stop reason: HOSPADM

## 2024-01-18 RX ORDER — POTASSIUM CHLORIDE 14.9 MG/ML
20 INJECTION INTRAVENOUS ONCE
Status: DISCONTINUED | OUTPATIENT
Start: 2024-01-18 | End: 2024-01-25 | Stop reason: HOSPADM

## 2024-01-18 RX ORDER — LIDOCAINE HYDROCHLORIDE 10 MG/ML
INJECTION INFILTRATION; PERINEURAL AS NEEDED
Status: DISCONTINUED | OUTPATIENT
Start: 2024-01-18 | End: 2024-01-18

## 2024-01-18 RX ORDER — ROCURONIUM BROMIDE 10 MG/ML
INJECTION, SOLUTION INTRAVENOUS AS NEEDED
Status: DISCONTINUED | OUTPATIENT
Start: 2024-01-18 | End: 2024-01-18

## 2024-01-18 RX ORDER — HYDROMORPHONE HYDROCHLORIDE 1 MG/ML
INJECTION, SOLUTION INTRAMUSCULAR; INTRAVENOUS; SUBCUTANEOUS AS NEEDED
Status: DISCONTINUED | OUTPATIENT
Start: 2024-01-18 | End: 2024-01-18

## 2024-01-18 RX ORDER — HYDROMORPHONE HYDROCHLORIDE 1 MG/ML
0.2 INJECTION, SOLUTION INTRAMUSCULAR; INTRAVENOUS; SUBCUTANEOUS EVERY 5 MIN PRN
Status: DISCONTINUED | OUTPATIENT
Start: 2024-01-18 | End: 2024-01-18 | Stop reason: HOSPADM

## 2024-01-18 RX ORDER — CEFAZOLIN 1 G/1
INJECTION, POWDER, FOR SOLUTION INTRAVENOUS AS NEEDED
Status: DISCONTINUED | OUTPATIENT
Start: 2024-01-18 | End: 2024-01-18

## 2024-01-18 RX ORDER — PROTAMINE SULFATE 10 MG/ML
INJECTION, SOLUTION INTRAVENOUS AS NEEDED
Status: DISCONTINUED | OUTPATIENT
Start: 2024-01-18 | End: 2024-01-18

## 2024-01-18 RX ORDER — PHENYLEPHRINE HCL IN 0.9% NACL 0.4MG/10ML
SYRINGE (ML) INTRAVENOUS AS NEEDED
Status: DISCONTINUED | OUTPATIENT
Start: 2024-01-18 | End: 2024-01-18

## 2024-01-18 RX ORDER — IODIXANOL 320 MG/ML
INJECTION, SOLUTION INTRAVASCULAR AS NEEDED
Status: DISCONTINUED | OUTPATIENT
Start: 2024-01-18 | End: 2024-01-18 | Stop reason: HOSPADM

## 2024-01-18 RX ORDER — PHENYLEPHRINE 10 MG/250 ML(40 MCG/ML)IN 0.9 % SOD.CHLORIDE INTRAVENOUS
CONTINUOUS PRN
Status: DISCONTINUED | OUTPATIENT
Start: 2024-01-18 | End: 2024-01-18

## 2024-01-18 RX ORDER — HYDRALAZINE HYDROCHLORIDE 20 MG/ML
5 INJECTION INTRAMUSCULAR; INTRAVENOUS EVERY 30 MIN PRN
Status: DISCONTINUED | OUTPATIENT
Start: 2024-01-18 | End: 2024-01-18 | Stop reason: HOSPADM

## 2024-01-18 RX ORDER — LABETALOL HYDROCHLORIDE 5 MG/ML
INJECTION, SOLUTION INTRAVENOUS AS NEEDED
Status: DISCONTINUED | OUTPATIENT
Start: 2024-01-18 | End: 2024-01-18

## 2024-01-18 RX ORDER — PROPOFOL 10 MG/ML
INJECTION, EMULSION INTRAVENOUS AS NEEDED
Status: DISCONTINUED | OUTPATIENT
Start: 2024-01-18 | End: 2024-01-18

## 2024-01-18 RX ORDER — HYDRALAZINE HYDROCHLORIDE 20 MG/ML
INJECTION INTRAMUSCULAR; INTRAVENOUS AS NEEDED
Status: DISCONTINUED | OUTPATIENT
Start: 2024-01-18 | End: 2024-01-18

## 2024-01-18 RX ORDER — OXYCODONE HYDROCHLORIDE 5 MG/1
5 TABLET ORAL EVERY 4 HOURS PRN
Status: DISCONTINUED | OUTPATIENT
Start: 2024-01-18 | End: 2024-01-18 | Stop reason: HOSPADM

## 2024-01-18 RX ORDER — HYDROMORPHONE HYDROCHLORIDE 1 MG/ML
0.5 INJECTION, SOLUTION INTRAMUSCULAR; INTRAVENOUS; SUBCUTANEOUS EVERY 5 MIN PRN
Status: DISCONTINUED | OUTPATIENT
Start: 2024-01-18 | End: 2024-01-18 | Stop reason: HOSPADM

## 2024-01-18 RX ORDER — ALBUTEROL SULFATE 0.83 MG/ML
2.5 SOLUTION RESPIRATORY (INHALATION) ONCE AS NEEDED
Status: DISCONTINUED | OUTPATIENT
Start: 2024-01-18 | End: 2024-01-18 | Stop reason: HOSPADM

## 2024-01-18 RX ORDER — FENTANYL CITRATE 50 UG/ML
INJECTION, SOLUTION INTRAMUSCULAR; INTRAVENOUS AS NEEDED
Status: DISCONTINUED | OUTPATIENT
Start: 2024-01-18 | End: 2024-01-18

## 2024-01-18 RX ORDER — HEPARIN SODIUM 1000 [USP'U]/ML
INJECTION, SOLUTION INTRAVENOUS; SUBCUTANEOUS AS NEEDED
Status: DISCONTINUED | OUTPATIENT
Start: 2024-01-18 | End: 2024-01-18

## 2024-01-18 RX ORDER — HYDROCHLOROTHIAZIDE 25 MG/1
12.5 TABLET ORAL DAILY
Status: DISCONTINUED | OUTPATIENT
Start: 2024-01-18 | End: 2024-01-25 | Stop reason: HOSPADM

## 2024-01-18 RX ORDER — ESMOLOL HYDROCHLORIDE 10 MG/ML
INJECTION INTRAVENOUS AS NEEDED
Status: DISCONTINUED | OUTPATIENT
Start: 2024-01-18 | End: 2024-01-18

## 2024-01-18 RX ORDER — SPIRONOLACTONE 25 MG/1
12.5 TABLET ORAL DAILY
Status: DISCONTINUED | OUTPATIENT
Start: 2024-01-18 | End: 2024-01-25 | Stop reason: HOSPADM

## 2024-01-18 RX ORDER — MAGNESIUM SULFATE HEPTAHYDRATE 40 MG/ML
2 INJECTION, SOLUTION INTRAVENOUS ONCE
Status: DISCONTINUED | OUTPATIENT
Start: 2024-01-18 | End: 2024-01-19

## 2024-01-18 RX ORDER — SODIUM CHLORIDE, SODIUM LACTATE, POTASSIUM CHLORIDE, CALCIUM CHLORIDE 600; 310; 30; 20 MG/100ML; MG/100ML; MG/100ML; MG/100ML
100 INJECTION, SOLUTION INTRAVENOUS CONTINUOUS
Status: DISCONTINUED | OUTPATIENT
Start: 2024-01-18 | End: 2024-01-18 | Stop reason: HOSPADM

## 2024-01-18 RX ORDER — NORETHINDRONE AND ETHINYL ESTRADIOL 0.5-0.035
KIT ORAL AS NEEDED
Status: DISCONTINUED | OUTPATIENT
Start: 2024-01-18 | End: 2024-01-18

## 2024-01-18 RX ORDER — OXYCODONE HYDROCHLORIDE 5 MG/1
10 TABLET ORAL EVERY 4 HOURS PRN
Status: DISCONTINUED | OUTPATIENT
Start: 2024-01-18 | End: 2024-01-18 | Stop reason: HOSPADM

## 2024-01-18 RX ADMIN — Medication 0.2 MCG/KG/MIN: at 15:06

## 2024-01-18 RX ADMIN — HYDROMORPHONE HYDROCHLORIDE 0.2 MG: 1 INJECTION, SOLUTION INTRAMUSCULAR; INTRAVENOUS; SUBCUTANEOUS at 16:10

## 2024-01-18 RX ADMIN — ESMOLOL HYDROCHLORIDE 20 MG: 10 INJECTION, SOLUTION INTRAVENOUS at 16:43

## 2024-01-18 RX ADMIN — SODIUM CHLORIDE, SODIUM LACTATE, POTASSIUM CHLORIDE, AND CALCIUM CHLORIDE: 600; 310; 30; 20 INJECTION, SOLUTION INTRAVENOUS at 13:34

## 2024-01-18 RX ADMIN — Medication 80 MCG: at 15:00

## 2024-01-18 RX ADMIN — ATENOLOL 50 MG: 50 TABLET ORAL at 08:29

## 2024-01-18 RX ADMIN — CEFAZOLIN 1.2 G: 1 INJECTION, POWDER, FOR SOLUTION INTRAMUSCULAR; INTRAVENOUS at 13:15

## 2024-01-18 RX ADMIN — HYDRALAZINE HYDROCHLORIDE 5 MG: 20 INJECTION INTRAMUSCULAR; INTRAVENOUS at 17:01

## 2024-01-18 RX ADMIN — ESCITALOPRAM OXALATE 10 MG: 10 TABLET, FILM COATED ORAL at 08:29

## 2024-01-18 RX ADMIN — HEPARIN SODIUM 5000 UNITS: 1000 INJECTION INTRAVENOUS; SUBCUTANEOUS at 14:10

## 2024-01-18 RX ADMIN — ACETAMINOPHEN 975 MG: 325 TABLET ORAL at 04:22

## 2024-01-18 RX ADMIN — LABETALOL HYDROCHLORIDE 5 MG: 5 INJECTION, SOLUTION INTRAVENOUS at 16:42

## 2024-01-18 RX ADMIN — Medication 80 MCG: at 15:52

## 2024-01-18 RX ADMIN — PROPOFOL 100 MG: 10 INJECTION, EMULSION INTRAVENOUS at 13:05

## 2024-01-18 RX ADMIN — ONDANSETRON 4 MG: 2 INJECTION, SOLUTION INTRAMUSCULAR; INTRAVENOUS at 16:19

## 2024-01-18 RX ADMIN — ROCURONIUM BROMIDE 10 MG: 10 INJECTION INTRAVENOUS at 14:40

## 2024-01-18 RX ADMIN — LABETALOL HYDROCHLORIDE 5 MG: 5 INJECTION, SOLUTION INTRAVENOUS at 16:33

## 2024-01-18 RX ADMIN — Medication 40 MCG: at 16:02

## 2024-01-18 RX ADMIN — Medication 5 L/MIN: at 17:00

## 2024-01-18 RX ADMIN — PROPOFOL 10 MG: 10 INJECTION, EMULSION INTRAVENOUS at 16:37

## 2024-01-18 RX ADMIN — ASPIRIN 81 MG: 81 TABLET, COATED ORAL at 08:29

## 2024-01-18 RX ADMIN — SODIUM CHLORIDE, POTASSIUM CHLORIDE, SODIUM LACTATE AND CALCIUM CHLORIDE 100 ML/HR: 600; 310; 30; 20 INJECTION, SOLUTION INTRAVENOUS at 17:15

## 2024-01-18 RX ADMIN — Medication 40 MCG: at 15:06

## 2024-01-18 RX ADMIN — ATORVASTATIN CALCIUM 80 MG: 80 TABLET, FILM COATED ORAL at 06:18

## 2024-01-18 RX ADMIN — Medication 80 MCG: at 14:44

## 2024-01-18 RX ADMIN — EPHEDRINE SULFATE 5 MG: 50 INJECTION, SOLUTION INTRAVENOUS at 15:46

## 2024-01-18 RX ADMIN — ROCURONIUM BROMIDE 50 MG: 10 INJECTION INTRAVENOUS at 13:05

## 2024-01-18 RX ADMIN — LIDOCAINE HYDROCHLORIDE 50 ML: 10 INJECTION, SOLUTION INFILTRATION; PERINEURAL at 13:05

## 2024-01-18 RX ADMIN — Medication 40 MCG: at 15:29

## 2024-01-18 RX ADMIN — SPIRONOLACTONE 12.5 MG: 25 TABLET, FILM COATED ORAL at 08:29

## 2024-01-18 RX ADMIN — FENTANYL CITRATE 50 MCG: 50 INJECTION, SOLUTION INTRAMUSCULAR; INTRAVENOUS at 14:36

## 2024-01-18 RX ADMIN — Medication 40 MCG: at 15:23

## 2024-01-18 RX ADMIN — EPHEDRINE SULFATE 5 MG: 50 INJECTION, SOLUTION INTRAVENOUS at 15:36

## 2024-01-18 RX ADMIN — SODIUM CHLORIDE: 0.9 INJECTION, SOLUTION INTRAVENOUS at 12:59

## 2024-01-18 RX ADMIN — HEPARIN SODIUM 2000 UNITS: 1000 INJECTION INTRAVENOUS; SUBCUTANEOUS at 14:20

## 2024-01-18 RX ADMIN — PROTAMINE SULFATE 40 MG: 10 INJECTION, SOLUTION INTRAVENOUS at 16:07

## 2024-01-18 RX ADMIN — SUGAMMADEX 100 MG: 100 INJECTION, SOLUTION INTRAVENOUS at 16:28

## 2024-01-18 RX ADMIN — FENTANYL CITRATE 50 MCG: 50 INJECTION, SOLUTION INTRAMUSCULAR; INTRAVENOUS at 13:05

## 2024-01-18 RX ADMIN — Medication 40 MCG: at 13:05

## 2024-01-18 RX ADMIN — EPHEDRINE SULFATE 10 MG: 50 INJECTION, SOLUTION INTRAVENOUS at 16:15

## 2024-01-18 RX ADMIN — HYDROCHLOROTHIAZIDE 12.5 MG: 25 TABLET ORAL at 08:29

## 2024-01-18 RX ADMIN — PROPOFOL 20 MG: 10 INJECTION, EMULSION INTRAVENOUS at 16:31

## 2024-01-18 RX ADMIN — Medication 80 MCG: at 14:52

## 2024-01-18 ASSESSMENT — PAIN SCALES - GENERAL
PAINLEVEL_OUTOF10: 0 - NO PAIN
PAINLEVEL_OUTOF10: 5 - MODERATE PAIN
PAINLEVEL_OUTOF10: 0 - NO PAIN

## 2024-01-18 ASSESSMENT — PAIN - FUNCTIONAL ASSESSMENT
PAIN_FUNCTIONAL_ASSESSMENT: 0-10

## 2024-01-18 ASSESSMENT — COGNITIVE AND FUNCTIONAL STATUS - GENERAL
MOBILITY SCORE: 19
CLIMB 3 TO 5 STEPS WITH RAILING: A LOT
HELP NEEDED FOR BATHING: A LITTLE
STANDING UP FROM CHAIR USING ARMS: A LITTLE
CLIMB 3 TO 5 STEPS WITH RAILING: A LOT
WALKING IN HOSPITAL ROOM: A LITTLE
DAILY ACTIVITIY SCORE: 21
DRESSING REGULAR LOWER BODY CLOTHING: A LITTLE
MOBILITY SCORE: 19
MOVING TO AND FROM BED TO CHAIR: A LITTLE
HELP NEEDED FOR BATHING: A LITTLE
WALKING IN HOSPITAL ROOM: A LITTLE
DRESSING REGULAR UPPER BODY CLOTHING: A LITTLE
STANDING UP FROM CHAIR USING ARMS: A LITTLE
MOVING TO AND FROM BED TO CHAIR: A LITTLE
DRESSING REGULAR UPPER BODY CLOTHING: A LITTLE
DAILY ACTIVITIY SCORE: 21
DRESSING REGULAR LOWER BODY CLOTHING: A LITTLE

## 2024-01-18 ASSESSMENT — PAIN SCALES - WONG BAKER: WONGBAKER_NUMERICALRESPONSE: NO HURT

## 2024-01-18 NOTE — NURSING NOTE
Patient has been getting out of bed without assistance even when shown and voiced understanding about the call light. Bed alarm in use after finding patient in the bathroom unsteady with iv pole.   SELVIN Orozco RN

## 2024-01-18 NOTE — BRIEF OP NOTE
Date: 2024  OR Location: [facility] OR    Name: Maggi Gordon : 1935, Age: 89 y.o., MRN: [id number], Sex: female    Diagnosis  Pre-op Diagnosis     * Peripheral vascular disease (CMS/HCC) [I73.9] Post-op Diagnosis     * Peripheral vascular disease (CMS/HCC) [I73.9]     Procedures  Endarterectomy Lower Extremity  46101 - MS TEAEC W/WO PATCH GRAFT COMMON FEMORAL    Angiogram Lower Extremity  83345 - CHG ANGIOGRAPHY EXTREMITY UNILATERAL RS&I      Surgeons      * Pura Montes - Primary     * John Carl    Resident/Fellow/Other Assistant:  Surgeon(s) and Role:     * John Carl MD    Procedure Summary  Anesthesia: General  ASA: III  Anesthesia Staff: Anesthesiologist: Serafin Rizzo MD; Harris Jackson MD  C-AA: ALIX Perea  Anesthesia Resident: Eben Vizcaino DO  Estimated Blood Loss: 50mL  Intra-op Medications:   Medication Name Total Dose   iodixanol (VISIPaque) 320 mg iodine/mL injection 47.5 mL   oxygen (O2) therapy 265 L              Anesthesia Record               Intraprocedure I/O Totals          Intake    LR bolus 1100.00 mL    NaCl 0.9 % bolus 800.00 mL    Phenylephrine Drip 0.00 mL    The total shown is the total volume documented since Anesthesia Start was filed.    Total Intake 1900 mL       Output    Urine 225 mL    Est. Blood Loss 50 mL    Total Output 275 mL       Net    Net Volume 1625 mL          Specimen:   ID Type Source Tests Collected by Time   1 : LEFT FEMORAL PLAQUE Tissue PLAQUE SURGICAL PATHOLOGY EXAM Pura Montes MD 2024 1432        Staff:   Circulator: Serafin Mascorro RN; Sadia Pop RN  Scrub Person: Yas Bernabe; Harris Pena; Cassy Peters    Findings:   Flow-limiting L common and external iliac lesions, markedly better after stenting.  Good hemostasis at conclusion of case.  Post-operatively, L PT and peroneal signals (marked), absent L DP signal.    Complications:  None; patient tolerated the procedure well.     Disposition: PACU -  hemodynamically stable.  Condition: stable  Specimens Collected:   ID Type Source Tests Collected by Time   1 : LEFT FEMORAL PLAQUE Tissue PLAQUE SURGICAL PATHOLOGY EXAM Pura Montes MD 1/18/2024 0997     John Carl MD  Vascular Surgery, PGY3  Team Pager: [phone]

## 2024-01-18 NOTE — INTERVAL H&P NOTE
H&P reviewed. The patient was examined and there are no changes to the H&P.    See H&P from Dr. Moore dated 1/12/2024.    Patient to proceed to OR for L fem endart, possible angiogram, possible iliac stenting.

## 2024-01-18 NOTE — ANESTHESIA PROCEDURE NOTES
Peripheral IV  Date/Time: 1/18/2024 1:31 PM      Placement  Needle size: 16 G  Laterality: right  Location: hand  Site prep: alcohol  Attempts: 2

## 2024-01-18 NOTE — PROGRESS NOTES
"Subjective Data:  SR 80s   SBP 160s  Has been restarted on home [place]/hydrochlorothiazide  Not taken to OR yesterday. Plan for OR this afternoon  Up in  for AM care  Denies CP/ SOB/dizziness, palpitations       Objective Data:  Last Recorded Vitals:  Vitals:    01/17/24 2030 01/17/24 2327 01/18/24 0757 01/18/24 1159   BP: 160/85 166/81 163/84 164/80   BP Location:    Right arm   Patient Position:    Sitting   Pulse: 88 81 77 75   Resp:    20   Temp:  36.6 °C (97.9 °F) 36.7 °C (98.1 °F) 37 °C (98.6 °F)   TempSrc:    Temporal   SpO2:  95% 94% 97%   Weight:       Height:           Last Labs:  CBC - 1/18/2024:  7:36 AM  8.9 11.2 197    35.6      CMP - 1/18/2024:  7:36 AM  9.3 7.0 24 --- 0.5   2.6 3.9 18 84      PTT - No results in last year.  1.1   12.8 _     No results found for: \"TROPHS\", \"BNP\", \"HGBA1C\", \"LDLCALC\", \"VLDL\"   Last I/O:  I/O last 3 completed shifts:  In: 85 (1.9 mL/kg) [P.O.:25; I.V.:60 (1.4 mL/kg)]  Out: 2950 (67 mL/kg) [Urine:2950 (1.9 mL/kg/hr)]  Weight: 44 kg   Ejection Fractions:  EF   Date/Time Value Ref Range Status   01/12/2024 01:55 PM 75         Inpatient Medications:  Scheduled medications   Medication Dose Route Frequency    acetaminophen  975 mg oral q8h    aspirin  81 mg oral Daily    atenolol  50 mg oral BID    atorvastatin  80 mg oral Daily    escitalopram  10 mg oral Daily    hydroCHLOROthiazide  12.5 mg oral Daily    magnesium sulfate  2 g intravenous Once    mirtazapine  7.5 mg oral Nightly    [Held by provider] polyethylene glycol  17 g oral Daily    potassium chloride  20 mEq intravenous Once    spironolactone  12.5 mg oral Daily     PRN medications   Medication    clorazepate    heparin    ondansetron ODT    Or    ondansetron    oxyCODONE    polyethylene glycol     Continuous Medications   Medication Dose Last Rate    heparin  0-4,500 Units/hr 500 Units/hr (01/17/24 2201)       Physical Exam:  Constitutional: Up in BR, room air, NAD  HEENT: EOMI, no scleral icterus, MMM  CV: RRR, " systolic murmur, difficult to assess JVD while standing  Pulm: CTAB   GI: Soft, NT, ND  Extremities: trace 1+ hard pitting LE edema, L>R  Skin: Warm and dry  Neuro: A&O  Psych: Mildly confused and anxious     Assessment/Plan   Maggi Gordon is a 89 y.o. female with PMH of HTN, DLD, CKD, vertebral artery syndrome status post L PICA coiling and stent placement (2006), CAD s/p CABG x3 (1995), A-fib, aortic stenosis, and PAD s/p L EIA stent in September 2023 at [facility]. She underwent a CTA which showed bilateral SFA occlusion and additional occlusions of SMA, right internal iliac, right popliteal, right anterior tibial artery, left popliteal artery, and left anterior tibial artery. Vascular surgery planning for open L femoral endarterectomy with potential bypass. Cardiology is consulted for pre-op risk assessment.     #Pre-op risk assessment for open left femoral endarterectomy with possible bypass  #Hx CAD s/p 3v CABG in 1995 (SVG-Lcx, SVG-RCA, LIMA-LAD)  #Aortic stenosis s/p AVR  #Uncontrolled HTN     Recommendations:  -RCRI 2 points, class III risk: 10.1% 30-day risk of death, MI, or cardiac arrest  -Echo complete with EF 70%, normal gradient across prosthetic AV  - stress test without evidence of inducible myocardial ischemia or prior infarction.  - Overall she appears to be intermediate risk: currently no angina, normal LVEF, no reported rwma, no ischemia on stress test   - Recommend restarting home Valsartan 320 mg post op as appropriate    -prior to discharge, please arrange for follow up with her Cardiologist Dr. Jose Weathers [phone]      Cardiology will follow    Aubree Vargas CNP   Cardiology Consults    Please call with any questions  Pager [phone] M-F 8a-5p; Saturday 8a-2p  Pager [phone] all other times          Full Code

## 2024-01-18 NOTE — ANESTHESIA PROCEDURE NOTES
Airway  Date/Time: 1/18/2024 1:07 PM  Urgency: elective      Staffing  Performed: ALIX   Authorized by: Serafin Rizzo MD    Performed by: ALIX Perea  Patient location during procedure: OR    Indications and Patient Condition  Indications for airway management: anesthesia  Patient position: sniffing      Final Airway Details  Final airway type: endotracheal airway      Successful airway: ETT  Cuffed: yes   Successful intubation technique: direct laryngoscopy  Blade: Demario  Blade size: #3  ETT size (mm): 7.0  Placement verified by: chest auscultation   Measured from: gums  Number of attempts at approach: 1

## 2024-01-18 NOTE — ANESTHESIA PROCEDURE NOTES
Arterial Line:    Date/Time: 1/18/2024 1:25 PM    Staffing  Performed: attending   Authorized by: Serafin Rizzo MD    Performed by: ALIX Perea    An arterial line was placed. in the OB for the following indication(s): continuous blood pressure monitoring.    A 20 gauge (size) (length) (type) catheter was placed into the Left radial artery, secured by   Seldinger technique not used.  Events:  patient tolerated procedure well with no complications.

## 2024-01-18 NOTE — CARE PLAN
The patient's goals for the shift include      The clinical goals for the shift include pt will remain safe and comfortable throughout the shift        Problem: Fall/Injury  Goal: Be free from injury by end of the shift  Outcome: Progressing     Problem: Fall/Injury  Goal: Not fall by end of shift  Outcome: Progressing

## 2024-01-18 NOTE — SIGNIFICANT EVENT
Vascular Surgery Post-Op Plan of Care    Patient underwent L femoral endarterectomy with bovine patch angioplasty, angiogram, and stenting of L common and external iliac arteries. Case was uncomplicated and EBL was approximately 50cc. Patient was transported to the PACU in stable condition.    Post-operatively, L PT and peroneal signals, but absent DP. The signals were marked.    L groin wound closed with suture and dressed with Dermabond and an island dressing.    Plan:  - 4-hour PACU stay with frequent neurovascular checks.  - [place] 7 Stepdown after PACU.    - Okay for regular diet after recovering sufficiently in PACU.    - Will start DAPT tomorrow (1/19).  - Okay for SQH for chemoprophylaxis.  - Pre-operative heparin drip to be discontinued.  - Continue post-operative medications.    - Post-operative check at 2030.    Please page with any questions or concerns.    John Carl MD  Vascular Surgery, PGY3  Team Pager: [phone]

## 2024-01-18 NOTE — NURSING NOTE
"Patient assisted to the bathroom. Remains forgetful with  hallucinations. Speaking to her  \"Ulysses\" and brother in law \"Luis Armando,\"  SELVIN Orozco RN  "

## 2024-01-18 NOTE — ANESTHESIA POSTPROCEDURE EVALUATION
Patient: Maggi Gordon    Procedure Summary       Date: 01/18/24 Room / Location: [facility] OR 27 / [facility]    Anesthesia Start: 1300 Anesthesia Stop: 1704    Procedures:       Endarterectomy Lower Extremity (Left)      Angiogram Lower Extremity (Left) Diagnosis:       Peripheral vascular disease (CMS/HCC)      (Peripheral vascular disease (CMS/HCC) [I73.9])    Surgeons: Pura Montes MD Responsible Provider: Serafin Rizzo MD    Anesthesia Type: general ASA Status: 3            Anesthesia Type: general    Vitals Value Taken Time   /68 01/18/24 1659   Temp 36.2 °C (97.2 °F) 01/18/24 1700   Pulse 84 01/18/24 1700   Resp 15 01/18/24 1700   SpO2 100 % 01/18/24 1700   Vitals shown include unvalidated device data.    Anesthesia Post Evaluation    Patient location during evaluation: PACU  Patient participation: complete - patient cannot participate  Level of consciousness: sleepy but conscious  Pain management: adequate  Multimodal analgesia pain management approach  Airway patency: patent  Cardiovascular status: acceptable  Respiratory status: acceptable  Hydration status: acceptable  Postoperative Nausea and Vomiting: none        No notable events documented.

## 2024-01-18 NOTE — PERIOPERATIVE NURSING NOTE
Pt arrived to PACU, RASS -2, pulses doppled and present on left lower extremity. Per vascular surgery, pt is a 4 hr PACU stay for vascular checks. 4 hour stay up at 2100.     Mansi, PACU CECILIO

## 2024-01-19 ENCOUNTER — APPOINTMENT (OUTPATIENT)
Dept: RADIOLOGY | Facility: HOSPITAL | Age: 89
DRG: 253 | End: 2024-01-19
Payer: MEDICARE

## 2024-01-19 ENCOUNTER — APPOINTMENT (OUTPATIENT)
Dept: VASCULAR MEDICINE | Facility: HOSPITAL | Age: 89
DRG: 253 | End: 2024-01-19
Payer: MEDICARE

## 2024-01-19 DIAGNOSIS — I73.9 PERIPHERAL VASCULAR DISEASE (CMS-HCC): Primary | ICD-10-CM

## 2024-01-19 LAB
ACT BLD: 160 SEC (ref 89–169)
ACT BLD: 262 SEC (ref 89–169)
ACT BLD: 301 SEC (ref 89–169)
ALBUMIN SERPL BCP-MCNC: 3.4 G/DL (ref 3.4–5)
ANION GAP SERPL CALC-SCNC: 16 MMOL/L (ref 10–20)
BUN SERPL-MCNC: 28 MG/DL (ref 6–23)
CALCIUM SERPL-MCNC: 8.8 MG/DL (ref 8.6–10.6)
CHLORIDE SERPL-SCNC: 101 MMOL/L (ref 98–107)
CO2 SERPL-SCNC: 26 MMOL/L (ref 21–32)
CREAT SERPL-MCNC: 1.25 MG/DL (ref 0.5–1.05)
EGFRCR SERPLBLD CKD-EPI 2021: 42 ML/MIN/1.73M*2
ERYTHROCYTE [DISTWIDTH] IN BLOOD BY AUTOMATED COUNT: 14 % (ref 11.5–14.5)
GLUCOSE SERPL-MCNC: 65 MG/DL (ref 74–99)
HCT VFR BLD AUTO: 29.7 % (ref 36–46)
HGB BLD-MCNC: 9.5 G/DL (ref 12–16)
MAGNESIUM SERPL-MCNC: 1.68 MG/DL (ref 1.6–2.4)
MCH RBC QN AUTO: 33.3 PG (ref 26–34)
MCHC RBC AUTO-ENTMCNC: 32 G/DL (ref 32–36)
MCV RBC AUTO: 104 FL (ref 80–100)
NRBC BLD-RTO: 0 /100 WBCS (ref 0–0)
PHOSPHATE SERPL-MCNC: 3.6 MG/DL (ref 2.5–4.9)
PLATELET # BLD AUTO: 184 X10*3/UL (ref 150–450)
POTASSIUM SERPL-SCNC: 3.9 MMOL/L (ref 3.5–5.3)
RBC # BLD AUTO: 2.85 X10*6/UL (ref 4–5.2)
SODIUM SERPL-SCNC: 139 MMOL/L (ref 136–145)
WBC # BLD AUTO: 7.9 X10*3/UL (ref 4.4–11.3)

## 2024-01-19 PROCEDURE — 2500000001 HC RX 250 WO HCPCS SELF ADMINISTERED DRUGS (ALT 637 FOR MEDICARE OP): Performed by: STUDENT IN AN ORGANIZED HEALTH CARE EDUCATION/TRAINING PROGRAM

## 2024-01-19 PROCEDURE — 75710 ARTERY X-RAYS ARM/LEG: CPT | Performed by: SURGERY

## 2024-01-19 PROCEDURE — 97116 GAIT TRAINING THERAPY: CPT | Mod: GP

## 2024-01-19 PROCEDURE — 97535 SELF CARE MNGMENT TRAINING: CPT | Mod: GO

## 2024-01-19 PROCEDURE — 80069 RENAL FUNCTION PANEL: CPT

## 2024-01-19 PROCEDURE — 36415 COLL VENOUS BLD VENIPUNCTURE: CPT

## 2024-01-19 PROCEDURE — 93922 UPR/L XTREMITY ART 2 LEVELS: CPT

## 2024-01-19 PROCEDURE — 75710 ARTERY X-RAYS ARM/LEG: CPT

## 2024-01-19 PROCEDURE — 97165 OT EVAL LOW COMPLEX 30 MIN: CPT | Mod: GO

## 2024-01-19 PROCEDURE — 2500000004 HC RX 250 GENERAL PHARMACY W/ HCPCS (ALT 636 FOR OP/ED): Performed by: STUDENT IN AN ORGANIZED HEALTH CARE EDUCATION/TRAINING PROGRAM

## 2024-01-19 PROCEDURE — 85027 COMPLETE CBC AUTOMATED: CPT

## 2024-01-19 PROCEDURE — 83735 ASSAY OF MAGNESIUM: CPT

## 2024-01-19 PROCEDURE — 97530 THERAPEUTIC ACTIVITIES: CPT | Mod: GO

## 2024-01-19 PROCEDURE — 2500000001 HC RX 250 WO HCPCS SELF ADMINISTERED DRUGS (ALT 637 FOR MEDICARE OP)

## 2024-01-19 PROCEDURE — 97161 PT EVAL LOW COMPLEX 20 MIN: CPT | Mod: GP

## 2024-01-19 PROCEDURE — 93922 UPR/L XTREMITY ART 2 LEVELS: CPT | Performed by: INTERNAL MEDICINE

## 2024-01-19 PROCEDURE — 2060000001 HC INTERMEDIATE ICU ROOM DAILY

## 2024-01-19 PROCEDURE — 99232 SBSQ HOSP IP/OBS MODERATE 35: CPT | Performed by: NURSE PRACTITIONER

## 2024-01-19 RX ORDER — CLOPIDOGREL BISULFATE 75 MG/1
75 TABLET ORAL DAILY
Status: DISCONTINUED | OUTPATIENT
Start: 2024-01-19 | End: 2024-01-25 | Stop reason: HOSPADM

## 2024-01-19 RX ORDER — MAGNESIUM SULFATE HEPTAHYDRATE 40 MG/ML
4 INJECTION, SOLUTION INTRAVENOUS ONCE
Status: COMPLETED | OUTPATIENT
Start: 2024-01-19 | End: 2024-01-19

## 2024-01-19 RX ADMIN — ESCITALOPRAM OXALATE 10 MG: 10 TABLET, FILM COATED ORAL at 08:58

## 2024-01-19 RX ADMIN — CLORAZEPATE DIPOTASSIUM 3.75 MG: 3.75 TABLET ORAL at 00:01

## 2024-01-19 RX ADMIN — ATORVASTATIN CALCIUM 80 MG: 80 TABLET, FILM COATED ORAL at 08:55

## 2024-01-19 RX ADMIN — CLOPIDOGREL BISULFATE 75 MG: 75 TABLET ORAL at 15:40

## 2024-01-19 RX ADMIN — ATENOLOL 50 MG: 50 TABLET ORAL at 20:19

## 2024-01-19 RX ADMIN — MAGNESIUM SULFATE IN WATER 4 G: 40 INJECTION, SOLUTION INTRAVENOUS at 15:40

## 2024-01-19 RX ADMIN — ATENOLOL 50 MG: 50 TABLET ORAL at 08:56

## 2024-01-19 RX ADMIN — ACETAMINOPHEN 975 MG: 325 TABLET ORAL at 15:40

## 2024-01-19 RX ADMIN — SPIRONOLACTONE 12.5 MG: 25 TABLET, FILM COATED ORAL at 08:56

## 2024-01-19 RX ADMIN — HYDROCHLOROTHIAZIDE 12.5 MG: 25 TABLET ORAL at 08:56

## 2024-01-19 RX ADMIN — ASPIRIN 81 MG: 81 TABLET, COATED ORAL at 08:54

## 2024-01-19 RX ADMIN — ACETAMINOPHEN 975 MG: 325 TABLET ORAL at 00:01

## 2024-01-19 RX ADMIN — ACETAMINOPHEN 975 MG: 325 TABLET ORAL at 20:19

## 2024-01-19 RX ADMIN — ACETAMINOPHEN 975 MG: 325 TABLET ORAL at 08:54

## 2024-01-19 RX ADMIN — MIRTAZAPINE 7.5 MG: 7.5 TABLET, FILM COATED ORAL at 20:19

## 2024-01-19 ASSESSMENT — COGNITIVE AND FUNCTIONAL STATUS - GENERAL
DAILY ACTIVITIY SCORE: 16
STANDING UP FROM CHAIR USING ARMS: A LITTLE
MOVING TO AND FROM BED TO CHAIR: A LITTLE
WALKING IN HOSPITAL ROOM: A LITTLE
WALKING IN HOSPITAL ROOM: A LITTLE
DRESSING REGULAR LOWER BODY CLOTHING: A LITTLE
CLIMB 3 TO 5 STEPS WITH RAILING: A LOT
MOBILITY SCORE: 17
TOILETING: TOTAL
DRESSING REGULAR UPPER BODY CLOTHING: A LITTLE
MOBILITY SCORE: 19
WALKING IN HOSPITAL ROOM: A LITTLE
DRESSING REGULAR UPPER BODY CLOTHING: A LITTLE
MOVING FROM LYING ON BACK TO SITTING ON SIDE OF FLAT BED WITH BEDRAILS: A LITTLE
TURNING FROM BACK TO SIDE WHILE IN FLAT BAD: A LITTLE
STANDING UP FROM CHAIR USING ARMS: A LITTLE
DRESSING REGULAR LOWER BODY CLOTHING: A LITTLE
MOBILITY SCORE: 17
STANDING UP FROM CHAIR USING ARMS: A LITTLE
HELP NEEDED FOR BATHING: A LITTLE
DRESSING REGULAR UPPER BODY CLOTHING: A LITTLE
CLIMB 3 TO 5 STEPS WITH RAILING: A LOT
DAILY ACTIVITIY SCORE: 19
MOVING TO AND FROM BED TO CHAIR: A LITTLE
MOVING TO AND FROM BED TO CHAIR: A LITTLE
CLIMB 3 TO 5 STEPS WITH RAILING: A LOT
DRESSING REGULAR LOWER BODY CLOTHING: A LITTLE
PERSONAL GROOMING: A LITTLE
DAILY ACTIVITIY SCORE: 21
MOVING FROM LYING ON BACK TO SITTING ON SIDE OF FLAT BED WITH BEDRAILS: A LITTLE
TOILETING: A LITTLE
HELP NEEDED FOR BATHING: A LITTLE
HELP NEEDED FOR BATHING: A LITTLE
PERSONAL GROOMING: A LITTLE
EATING MEALS: A LITTLE
TURNING FROM BACK TO SIDE WHILE IN FLAT BAD: A LITTLE

## 2024-01-19 ASSESSMENT — PAIN - FUNCTIONAL ASSESSMENT
PAIN_FUNCTIONAL_ASSESSMENT: 0-10

## 2024-01-19 ASSESSMENT — PAIN SCALES - GENERAL
PAINLEVEL_OUTOF10: 0 - NO PAIN

## 2024-01-19 ASSESSMENT — ACTIVITIES OF DAILY LIVING (ADL)
HOME_MANAGEMENT_TIME_ENTRY: 15
ADL_ASSISTANCE: INDEPENDENT
ADL_ASSISTANCE: INDEPENDENT

## 2024-01-19 NOTE — CARE PLAN
The patient's goals for the shift include      The clinical goals for the shift include Pt will sleep a minimum of 4 hours by the end of this shift      Problem: Fall/Injury  Goal: Not fall by end of shift  Outcome: Progressing  Goal: Be free from injury by end of the shift  Outcome: Progressing  Goal: Verbalize understanding of personal risk factors for fall in the hospital  Outcome: Progressing  Goal: Verbalize understanding of risk factor reduction measures to prevent injury from fall in the home  Outcome: Progressing  Goal: Use assistive devices by end of the shift  Outcome: Progressing  Goal: Pace activities to prevent fatigue by end of the shift  Outcome: Progressing

## 2024-01-19 NOTE — PROGRESS NOTES
"Subjective Data:  S/p L femoral endarterectomy with bovine patch angioplasty, angiogram, and stenting of L common and external iliac arteries.     Delirious overnight   SR 90s   SBP 140s  Has been restarted on home [place]/hydrochlorothiazide      Denies CP/ SOB/dizziness, palpitations       Objective Data:  Last Recorded Vitals:  Vitals:    01/19/24 1000 01/19/24 1030 01/19/24 1100 01/19/24 1157   BP: 147/67      Pulse: 92 98 85    Resp: 14 (!) 29 17    Temp:    36.7 °C (98.1 °F)   TempSrc:       SpO2: 94%      Weight:       Height:           Last Labs:  CBC - 1/19/2024:  6:30 AM  7.9 9.5 184    29.7      CMP - 1/19/2024:  6:30 AM  8.8 7.0 24 --- 0.5   3.6 3.4 18 84      PTT - No results in last year.  1.1   12.8 _     No results found for: \"TROPHS\", \"BNP\", \"HGBA1C\", \"LDLCALC\", \"VLDL\"   Last I/O:  I/O last 3 completed shifts:  In: 2360 (53.6 mL/kg) [P.O.:25; I.V.:435 (9.9 mL/kg); IV Piggyback:1900]  Out: 2470 (56.1 mL/kg) [Urine:2420 (1.5 mL/kg/hr); Blood:50]  Weight: 44 kg   Ejection Fractions:  EF   Date/Time Value Ref Range Status   01/12/2024 01:55 PM 75         Inpatient Medications:  Scheduled medications   Medication Dose Route Frequency    acetaminophen  975 mg oral q8h    aspirin  81 mg oral Daily    atenolol  50 mg oral BID    atorvastatin  80 mg oral Daily    clopidogrel  75 mg oral Daily    escitalopram  10 mg oral Daily    hydroCHLOROthiazide  12.5 mg oral Daily    magnesium sulfate  4 g intravenous Once    mirtazapine  7.5 mg oral Nightly    [Held by provider] polyethylene glycol  17 g oral Daily    potassium chloride  20 mEq intravenous Once    spironolactone  12.5 mg oral Daily     PRN medications   Medication    clorazepate    ondansetron ODT    Or    ondansetron    oxyCODONE    polyethylene glycol     Continuous Medications   Medication Dose Last Rate       Physical Exam:  Constitutional: Up in BR, room air, NAD  HEENT: EOMI, no scleral icterus, MMM  CV: RRR, systolic murmur, difficult to assess JVD " while standing  Pulm: CTAB   GI: Soft, NT, ND  Extremities: trace 1+ hard pitting LE edema, L>R  Skin: Warm and dry  Neuro: A&O x2-3  Psych: calm and cooperative      Assessment/Plan   Maggi Gordon is a 89 y.o. 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 She underwent a CTA which showed bilateral SFA occlusion and additional occlusions of SMA, right internal iliac, right popliteal, right anterior tibial artery, left popliteal artery, and left anterior tibial artery. Vascular surgery planning for open L femoral endarterectomy with potential bypass. Cardiology is consulted for pre-op risk assessment.     #POD 1 L femoral endarterectomy with bovine patch angioplasty, angiogram, and stenting of L common and external iliac arteries.   #Hx CAD s/p 3v CABG in 1995 (SVG-Lcx, SVG-RCA, LIMA-LAD)  #Aortic stenosis s/p AVR  #HTN       Recommendations:   - Recommend restarting home Valsartan 320 mg post op as appropriate    -prior to discharge, please arrange for follow up with her Cardiologist Dr. Jose Weathers [phone]      Cardiology will sign off     Aubree Vargas CNP   Cardiology Consults    Please call with any questions  Pager [phone] M-F 8a-5p; Saturday 8a-2p  Pager [phone] all other times          Full Code

## 2024-01-19 NOTE — PROGRESS NOTES
VASCULAR SURGERY PROGRESS NOTE  Subjective   Delirious overnight, talking to imaginary people    Objective   Vitals:    01/19/24 0630   BP:    Pulse: 88   Resp: 17   Temp:    SpO2: 92%      Exam:  GENERAL APPEARANCE:  Awake, not in distress  HEART:  RRR  LUNGS:  Equal chest rise and fall, non-labored breathing  ABDOMEN:  Soft, nontender, nondistended  NEUROLOGICAL:  Grossly nonfocal. Alert, moving all 4 extremities.   Skin:  Warm and dry without any rash.  EXTREMITIES: bilateral lower extremities are warm to the touch. L DP and PT dopplerable      Relevant Results  Medications:  Scheduled Meds:  acetaminophen, 975 mg, oral, q8h  aspirin, 81 mg, oral, Daily  atenolol, 50 mg, oral, BID  atorvastatin, 80 mg, oral, Daily  escitalopram, 10 mg, oral, Daily  hydroCHLOROthiazide, 12.5 mg, oral, Daily  magnesium sulfate, 2 g, intravenous, Once  mirtazapine, 7.5 mg, oral, Nightly  [Held by provider] polyethylene glycol, 17 g, oral, Daily  potassium chloride, 20 mEq, intravenous, Once  spironolactone, 12.5 mg, oral, Daily      Continuous Infusions:       PRN Meds:.  PRN medications: clorazepate, ondansetron ODT **OR** ondansetron, oxyCODONE, polyethylene glycol    Labs:  Results from last 7 days   Lab Units 01/18/24  0736 01/17/24  1113 01/16/24  1145   WBC AUTO x10*3/uL 8.9 8.9 11.2   HEMOGLOBIN g/dL 11.2* 10.4* 11.8*   PLATELETS AUTO x10*3/uL 197 184 183        Results from last 7 days   Lab Units 01/18/24  0736 01/17/24  1113 01/16/24  1145   SODIUM mmol/L 142 144 143   POTASSIUM mmol/L 3.8 3.8 3.6   CHLORIDE mmol/L 103 104 104   CO2 mmol/L 28 29 31   BUN mg/dL 33* 31* 27*   CREATININE mg/dL 1.27* 1.41* 1.23*   GLUCOSE mg/dL 91 99 104*   MAGNESIUM mg/dL 1.80 1.68 1.85   PHOSPHORUS mg/dL 2.6 2.5 3.0        Results from last 7 days   Lab Units 01/15/24  0757   INR  1.1   PROTIME seconds 12.8       Assessment/Plan   89 y.o. female with significant PMHx as well as PAD s/p L EIA stent in September 2023 at [facility]. She presented to  an OSH yesterday d/t concern for left lower extremity pain in conjunction with a new left heel ulcer. Imaging revealed bilateral SFA occlusion and additional occlusions of SMA, right internal iliac, right popliteal, right anterior tibial artery, left popliteal artery, and left anterior tibial artery. S/p L fem EA with bovine patch angioplasty, and stenting of L common and external iliac arteries on 1/18.    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  - continue tylenol/oxy PRN for pain control  - continue neurovascular checks every 2 hrs  - continue home mirtazapine & lexapro    CV: HTN, HLD, PAD, CAD (s/p CABG 1995), afib, aortic stenosis, renal artery stenosis (s/p L renal stent)   - maintain blood pressure control with home atenolol, holding home losartan, spironolactone-hydrochlorothiazide  -Home ana/hydrocholorathiazide and valsartan held for optimize renal perfusion in [place]-op setting, will optimize anti-hypertensive meds.   - continue atorvastatin/ASA  - KEIRY scan 1/16, no ischemia    Pulm:   - continue to encourage IS hourly while awake  - OOB to chair and increase ambulation as tolerated    FENGI: CKD3, hypoalbuminemia, Moderate malnutrition    - continue regular diet with oral supplements  - continue bowel regimen  - strict I&O  - RFP as clinically indicated    Endo:    - no issues     Heme: supratherapeutic INR on admission, chronic anemia   - no s/sx of bleeding  - CBC as clinically indicated    ID: ischemic ulcers   - trend temp q4  - offload heels   - appreciate wound care recs   - CBC as clinically indicated    Dispo:   - continue care on step down     Patient seen with vascular surgery fellow Dr. Jose Addison MD  Vascular Surgery [id number]

## 2024-01-19 NOTE — PROGRESS NOTES
Physical Therapy    Physical Therapy Evaluation & Treatment    Patient Name: Maggi Gordon  MRN: [id number]  Today's Date: 1/19/2024   Time Calculation  Start Time: 1119  Stop Time: 1203  Time Calculation (min): 44 min    Assessment/Plan   PT Assessment  PT Assessment Results: Decreased strength, Decreased endurance, Impaired balance, Decreased mobility, Decreased coordination, Decreased cognition, Impaired judgement, Decreased safety awareness, Impaired hearing  Rehab Prognosis: Good  Barriers to Discharge: none  Evaluation/Treatment Tolerance: Patient limited by fatigue  Medical Staff Made Aware: Yes  Strengths: Attitude of self  Barriers to Participation:  (none)  End of Session Communication: Bedside nurse  Assessment Comment: The pt required close guarding during amb using a wheeled walker, but presented with significant impaired cognition.  End of Session Patient Position: Up in chair, Alarm on   IP OR SWING BED PT PLAN  Inpatient or Swing Bed: Inpatient  PT Plan  Treatment/Interventions: Bed mobility, Transfer training, Gait training, Balance training, Strengthening, Endurance training, Therapeutic exercise, Therapeutic activity, Home exercise program  PT Plan: Skilled PT  PT Frequency: 3 times per week  PT Discharge Recommendations: Moderate intensity level of continued care  Equipment Recommended upon Discharge:  (none)  PT Recommended Transfer Status: Contact guard  PT - OK to Discharge: Yes      Subjective     General Visit Information:  General  Reason for Referral: Left acute lower limb ischemia s/p left endarterectomy and angiogram  Past Medical History Relevant to Rehab: CAD s/p CABG, [place] carotid  artery stenosis, HTN, renal artery stenosis, hypothyroidism, HLD, goiter, aortic stenosis s/p AVR, PAD, A-fib  Prior to Session Communication: Bedside nurse  Patient Position Received: Bed, 3 rail up, Alarm on  Preferred Learning Style: verbal, visual, auditory  General Comment: The pt was pleasantly confused,  but willing to participate in therapy.  Home Living:  Home Living  Type of Home: [place]  Lives With: Alone  Home Adaptive Equipment: Walker rolling or standard  Home Layout: One level  Home Access: No concerns  Bathroom Shower/Tub: Walk-in shower  Bathroom Toilet: Standard  Bathroom Equipment: None  Prior Level of Function:  Prior Function Per Pt/Caregiver Report  Level of [place]: Independent with ADLs and functional transfers, Independent with homemaking with ambulation  Receives Help From: Family  ADL Assistance: Independent  Homemaking Assistance: Independent  Ambulatory Assistance: Independent  Vocational: Retired  Leisure: shopping  Hand Dominance: Right  Prior Function Comments: The pt is independent with all mobility using a wheeled walker as needed in the household and in the community.  Precautions:  Precautions  Hearing/Visual Limitations: [place] has right hearing aid; vision WFL with glasses  LE Weight Bearing Status: Weight Bearing as Tolerated (L LE)  Medical Precautions: Fall precautions  Precautions Comment: Pt in compliance with precaution throughout therapy session.  Vital Signs:  Vital Signs  Heart Rate: 92  Heart Rate Source: Monitor  Resp: 19  SpO2: 94 %  BP: 147/67  Patient Position: Lying    Objective   Pain:  Pain Assessment  Pain Assessment: 0-10  Pain Score: 0 - No pain  Cognition:  Cognition  Overall Cognitive Status: Impaired  Orientation Level: Disoriented to time, Disoriented to situation (Pt unable to correctly state the day, but able to correctly state the month and year.)  Following Commands: Follows one step commands with increased time  Problem Solving: Assistance required to identify errors made  Cognition Comments: The pt was given socks to put on her feet, but initially began putting them on her hands.  Attention: Exceptions to WFL  Alternating Attention: Impaired  Memory: Exceptions to WFL  Long-Term Memory: Impaired  Short-Term Memory: Impaired  Working Memory:  Impaired  Problem Solving: Exceptions to WFL  Complex Functional Tasks: Impaired  Processing Speed: Delayed    General Assessments:  General Observation  General Observation: The pt presented with significant cognitive impairments that would create an unsafe environment with insufficient support to assist everyday living.     Activity Tolerance  Endurance: Decreased tolerance for upright activites    Strength  Strength Comments: generalized weakness  Coordination  Movements are Fluid and Coordinated: Yes    Postural Control  Postural Control: Within Functional Limits  Posture Comment: The pt presented with good sitting and standing posture using a wheeled walker.    Static Sitting Balance  Static Sitting-Balance Support: Bilateral upper extremity supported, Feet supported  Static Sitting-Level of Assistance: Close supervision  Dynamic Sitting Balance  Dynamic Sitting-Balance Support: Bilateral upper extremity supported, Feet supported    Static Standing Balance  Static Standing-Balance Support: Bilateral upper extremity supported  Static Standing-Level of Assistance: Close supervision  Static Standing-Comment/Number of Minutes: using a wheeled walker  Dynamic Standing Balance  Dynamic Standing-Balance Support: Bilateral upper extremity supported  Dynamic Standing-Comments: SBA using a wheeled walker  Functional Assessments:  Bed Mobility  Bed Mobility: Yes  Bed Mobility 1  Bed Mobility 1: Supine to sitting  Level of Assistance 1: Close supervision  Bed Mobility Comments 1: HOB elevated    Transfers  Transfer: Yes  Transfer 1  Transfer From 1: Sit to  Transfer to 1: Stand  Transfer Device 1: Walker  Transfer Level of Assistance 1: Close supervision  Transfers 2  Transfer From 2: Stand to  Transfer to 2: Sit  Transfer Device 2: Walker  Transfer Level of Assistance 2: Close supervision  Transfers 3  Transfer From 3: Bed to  Transfer to 3: Chair with arms  Transfer Device 3: Walker  Transfer Level of Assistance 3:  Contact guard  Trials/Comments 3: Pt required v/c to keep walker with her and to feel the front of the chair on the back of her legs for increased safety.    Ambulation/Gait Training  Ambulation/Gait Training Performed: Yes  Ambulation/Gait Training 1  Surface 1: Level tile, Carpet  Device 1: Rolling walker  Assistance 1: Close supervision  Quality of Gait 1: Decreased step length (slightly unsteady, decreased olive, slightly decreased endurance)  Comments/Distance (ft) 1: 100ft  Extremity/Trunk Assessments:  Cervical Spine   Cervical Spine: Within Functional Limits  Lumbar Spine   Lumbar Spine : Within Functional Limits    RUE   RUE : Within Functional Limits  LUE   LUE: Within Functional Limits  RLE   RLE : Exceptions to WFL  AROM RLE (degrees)  RLE AROM Comment: WFL  Strength RLE  R Hip Flexion: 4/5  R Knee Flexion: 4+/5  R Knee Extension: 4+/5  R Ankle Dorsiflexion: 5/5  R Ankle Plantar Flexion: 5/5  LLE   LLE : Exceptions to WFL  AROM LLE (degrees)  LLE AROM Comment: WFL  Strength LLE  L Hip Flexion: 4/5  L Knee Flexion: 4+/5  L Knee Extension: 4+/5  L Ankle Dorsiflexion: 5/5  L Ankle Plantar Flexion: 5/5  Treatments:     Bed Mobility  Bed Mobility: Yes  Bed Mobility 1  Bed Mobility 1: Supine to sitting  Level of Assistance 1: Close supervision  Bed Mobility Comments 1: HOB elevated    Ambulation/Gait Training  Ambulation/Gait Training Performed: Yes  Ambulation/Gait Training 1  Surface 1: Level tile, Carpet  Device 1: Rolling walker  Assistance 1: Close supervision  Quality of Gait 1: Decreased step length (slightly unsteady, decreased olive, slightly decreased endurance)  Comments/Distance (ft) 1: 100ft  Transfers  Transfer: Yes  Transfer 1  Transfer From 1: Sit to  Transfer to 1: Stand  Transfer Device 1: Walker  Transfer Level of Assistance 1: Close supervision  Transfers 2  Transfer From 2: Stand to  Transfer to 2: Sit  Transfer Device 2: Walker  Transfer Level of Assistance 2: Close  supervision  Transfers 3  Transfer From 3: Bed to  Transfer to 3: Chair with arms  Transfer Device 3: Walker  Transfer Level of Assistance 3: Contact guard  Trials/Comments 3: Pt required v/c to keep walker with her and to feel the front of the chair on the back of her legs for increased safety.  Outcome Measures:  [facility] Basic Mobility  Turning from your back to your side while in a flat bed without using bedrails: A little  Moving from lying on your back to sitting on the side of a flat bed without using bedrails: A little  Moving to and from bed to chair (including a wheelchair): A little  Standing up from a chair using your arms (e.g. wheelchair or bedside chair): A little  To walk in hospital room: A little  Climbing 3-5 steps with railing: A lot  Basic Mobility - Total Score: 17    Encounter Problems       Encounter Problems (Active)       Balance       STG - Maintains supervision assist dynamic standing balance with upper extremity support using a wheeled walker. (Progressing)       Start:  01/19/24    Expected End:  02/02/24       I         STG - Maintains supervision assist static standing balance with upper extremity support using a wheeled walker. (Progressing)       Start:  01/19/24    Expected End:  02/02/24               Mobility       STG - Patient will ambulate 300ft using a wheeled walker with supervision assistance. (Progressing)       Start:  01/19/24    Expected End:  02/02/24               Pain - Adult          Transfers       STG - Transfer from bed to chair using a wheeled walker with supervision assistance. (Progressing)       Start:  01/19/24    Expected End:  02/02/24            STG - Patient to transfer to and from sit to supine with supervision assistance. (Progressing)       Start:  01/19/24    Expected End:  02/02/24            STG - Patient will transfer sit to and from stand using a wheeled walker with supervision assistance. (Progressing)       Start:  01/19/24    Expected End:   02/02/24                   Education Documentation  Body Mechanics, taught by Andrew Ahuja, PT at 1/19/2024  1:39 PM.  Learner: Patient  Readiness: Acceptance  Method: Explanation, Demonstration  Response: Verbalizes Understanding, Demonstrated Understanding    Home Exercise Program, taught by Andrew Ahuja, PT at 1/19/2024  1:39 PM.  Learner: Patient  Readiness: Acceptance  Method: Explanation, Demonstration  Response: Verbalizes Understanding, Demonstrated Understanding    Mobility Training, taught by Andrew Ahuja, PT at 1/19/2024  1:39 PM.  Learner: Patient  Readiness: Acceptance  Method: Explanation, Demonstration  Response: Verbalizes Understanding, Demonstrated Understanding    Education Comments  No comments found.

## 2024-01-19 NOTE — CONSULTS
Consults    Reason For Consult  Left foot wound    History Of Present Illness  Maggi Gordon is a 89 y.o. female presenting with left heel and lateral column wound. Patient follows with outside podiatrist at [facility] for local wound care. States it has been improving and she has been receiving grafts during her office visits. Patient denies drainage out of the wounds, but states she keeps it dressed with kerlix and tape. Had angiogram this week. No other pedal complaints at this time.     Past Medical History  She has a past medical history of Asymptomatic premature menopause, Atherosclerosis of coronary artery bypass graft(s) without angina pectoris (10/11/2021), Occlusion and stenosis of bilateral carotid arteries (01/09/2020), Personal history of other diseases of the circulatory system, Personal history of other diseases of the circulatory system, Personal history of other diseases of the circulatory system, Personal history of other endocrine, nutritional and metabolic disease, Personal history of other endocrine, nutritional and metabolic disease, Personal history of other endocrine, nutritional and metabolic disease, and S/P AVR (aortic valve replacement) (10/18/2023).    Surgical History  She has a past surgical history that includes Other surgical history (12/03/2018); Other surgical history (12/03/2018); Other surgical history (12/03/2018); Other surgical history (12/03/2018); Other surgical history (12/03/2018); and CT angio aorta and bilateral iliofemoral runoff w and or wo IV contrast (7/13/2023).     Social History  She reports that she has never smoked. She has never used smokeless tobacco. No history on file for alcohol use and drug use.    Family History  No family history on file.     Allergies  Ezetimibe and Hydrocodone    Review of Systems    REVIEW OF SYSTEMS  GENERAL:  Negative for malaise, significant weight loss, fever  HEENT:  No changes in hearing or vision, no nose bleeds or other nasal  problems and Negative for frequent or significant headaches  NECK:  Negative for lumps, goiter, pain and significant neck swelling  RESPIRATORY:  Negative for cough, wheezing and shortness of breath  CARDIOVASCULAR:  Negative for chest pain, leg swelling and palpitations  GI:  Negative for abdominal discomfort, blood in stools or black stools and change in bowel habits  :  Negative for dysuria, frequency and incontinence  MUSCULOSKELETAL:  Negative for joint pain or swelling, back pain, and muscle pain.  SKIN:   left foot ulcerations lateral foot and heel.  PSYCH:  Negative for sleep disturbance, mood disorder and recent psychosocial stressors  HEMATOLOGY/LYMPHOLOGY:  Negative for prolonged bleeding, bruising easily, and swollen nodes.  ENDOCRINE:  Negative for cold or heat intolerance, polyuria, polydipsia and goiter  NEURO: Negative, denies any burning, tingling or numbness     Objective:   Vasc: DP and PT pulses are palpable bilateral.  CFT is less than 3 seconds bilateral.  Skin temperature is warm to cool proximal to distal bilateral.      Neuro:  Light touch is intact to the foot bilateral.  Protective sensation is intact to the foot when tested with the 5.07 SWM bilateral.  There is no clonus noted.  The hallux is downgoing bilateral.      Derm: Nails 1-5 bilateral are intact.  Skin is supple with normal texture and turgor noted.  Webspaces are clean, dry and intact bilateral.  1.5x1.5x0.2 cm ulceration to the left lateral foot overlying the base of the 5th metatarsal. 100% fibrotic base with mild serous drainage. No undermining of the skin edges or tunneling noted. No ashley-wound erythema, mild edema noted. Abrasion of the left heel noted too retrocalcaneally, superficial in nature. Mild fibrotic slough, no drainage noted.     Ortho: Muscle strength is 5/5 for all pedal groups tested.  Ankle joint, subtalar joint, 1st MPJ and lesser MPJ ROM is full and without pain or crepitus.  The foot type is rectus  "bilateral off weight bearing.  There are no structural deformities noted.     Physical Exam     Last Recorded Vitals  Blood pressure (!) 109/49, pulse 79, temperature 36.2 °C (97.2 °F), resp. rate 18, height 1.6 m (5' 3\"), weight (!) 44 kg (97 lb), SpO2 98 %.       Assessment/Plan   # Chronic nonpressure ulceration with fat necrosis, left foot  # superficial heel wound left foot  #Diabetes  # Lower extremity edema    -Patient was seen and evaluated; all findings were discussed and all questions were answered to patient's satisfaction.  - Charts, labs, vitals and imaging all reviewed.   - Imaging: reviewed  - Labs: no leukocytosis  - PVRs: moderate occlusive diseases b/l. Dampened waveforms on left leg    RECOMMENDATIONS:   -No surgical intervention needed at this time  - Recommend compression  - Dressings: betadine adaptic, acel, kerlix, ace  - Nursing staff is able to change/reinforce dressing if & as necessary until next day’s dressing change. Thank you.  - Ok for discharge from podiatry perspective. Follow up with established provider  - To sign off at this time    Case to be discussed with attending, A&P above reflect tentative plan. Please await for final signature from attending physician on service.    Mauricio William DPM PGY-1  Podiatric Medicine & Surgery  Epic Chat  "

## 2024-01-19 NOTE — CONSULTS
"Nutrition Follow Up Assessment:   Nutrition Assessment    Reason for Assessment: Dietitian discretion (nutrition follow up)    Patient is a 89 y.o. female presenting with left lower extremity pain in conjunction with a new left heel ulcer.     S/p L fem EA with bovine patch angioplasty, and stenting of L common and external iliac arteries on 1/18.     Nutrition History:  Food and Nutrient History: Seen at breakfast. Ate most of the eggs, a banana and was starting to eat corn flakes. States that she has been drinking Boost. Boost [facility] has only been ordered at breakfast and she has been NPO at breakfast 3 of 5 days this week so she hasn't received it since Monday. Pt with confusion and delerium today - thought she was seeing people in the room who weren't there. No intake records available to assess and pt not able to provide accurate history.     Anthropometrics:  Height: 160 cm (5' 3\")   Weight: (!) 44 kg (97 lb)   BMI (Calculated): 18.94  IBW/kg (Dietitian Calculated): 52.3 kg  Percent of IBW: 84 %       Weight History:   No new wt since last RDN assessment.     Nutrition Focused Physical Exam Findings:  defer: completed during initial eval.     Skin: Heel ulcer    Nutrition Significant Labs:  CBC Trend:   Results from last 7 days   Lab Units 01/19/24  0630 01/18/24  0736 01/17/24  1113 01/16/24  1145   WBC AUTO x10*3/uL 7.9 8.9 8.9 11.2   RBC AUTO x10*6/uL 2.85* 3.47* 3.19* 3.75*   HEMOGLOBIN g/dL 9.5* 11.2* 10.4* 11.8*   HEMATOCRIT % 29.7* 35.6* 33.1* 38.7   MCV fL 104* 103* 104* 103*   PLATELETS AUTO x10*3/uL 184 197 184 183    , BMP Trend:   Results from last 7 days   Lab Units 01/19/24  0630 01/18/24  0736 01/17/24  1113 01/16/24  1145   GLUCOSE mg/dL 65* 91 99 104*   CALCIUM mg/dL 8.8 9.3 9.0 9.5   SODIUM mmol/L 139 142 144 143   POTASSIUM mmol/L 3.9 3.8 3.8 3.6   CO2 mmol/L 26 28 29 31   CHLORIDE mmol/L 101 103 104 104   BUN mg/dL 28* 33* 31* 27*   CREATININE mg/dL 1.25* 1.27* 1.41* 1.23*        Nutrition " Specific Medications:  Remeron, miralax,     I/O:   Stool Appearance: Formed (01/17/24 1108)        Dietary Orders (From admission, onward)       Start     Ordered    01/18/24 2133  Adult diet Regular  Diet effective now        Question:  Diet type  Answer:  Regular    01/18/24 2132 01/12/24 1537  Oral nutritional supplements  Until discontinued        Question Answer Comment   Deliver with Breakfast    Select supplement: Boost [facility]        01/12/24 1537                     Estimated Needs:   Total Energy Estimated Needs (kCal): 1500 kCal  Method for Estimating Needs: 30kcal/kg IBW  Total Protein Estimated Needs (g): 55 g  Method for Estimating Needs: 1.0gm/kg IBW  Total Fluid Estimated Needs (mL):  (per team)          Nutrition Diagnosis   Malnutrition Diagnosis  Patient has Malnutrition Diagnosis: Yes  Diagnosis Status: Ongoing  Malnutrition Diagnosis: Moderate malnutrition related to chronic disease or condition  As Evidenced by: moderate to severe muscle wasting and fat loss. Underwt at BMI of 17.1 and 84% IBW.         Nutrition Interventions/Recommendations         Nutrition Prescription:  Individualized Nutrition Prescription Provided for : Continue Boost [facility] once per day to provide 530 calories and 22 gm protein.        Nutrition Recommendations:    RDN to change supplement to lunch time to make sure she get it.   Recommend obtaining updated weight  Recommend recording meal intake daily    Nutrition Education:   Not appropriate.        Nutrition Monitoring and Evaluation   Food/Nutrient Related History Monitoring  Monitoring and Evaluation Plan: Energy intake, Amount of food (supplement intake)  Criteria: Consume >75% of meals and supplements.    Body Composition/Growth/Weight History  Monitoring and Evaluation Plan: Weight  Criteria: maintain stable weight/promote weight gain    Biochemical Data, Medical Tests and Procedures  Monitoring and Evaluation Plan: Electrolyte/renal panel, Glucose/endocrine  profile  Criteria: Labs WNL       Time Spent/Follow-up Reminder:   Time Spent (min): 45 minutes  Last Date of Nutrition Visit: 01/19/24  Nutrition Follow-Up Needed?: Dietitian to reassess per policy

## 2024-01-19 NOTE — PROGRESS NOTES
DC Planning:  Went in and met with the pt, confirmed demographics.      Maggi Gordon is a 89 y.o. female on day 7 of admission presenting with Acute lower limb ischemia.      FREDA ABAD

## 2024-01-19 NOTE — PROGRESS NOTES
Occupational Therapy    Evaluation and Treatment    Patient Name: Maggi Gordon  MRN: [id number]  Today's Date: 1/19/2024  Time Calculation  Start Time: 1119  Stop Time: 1203  Time Calculation (min): 44 min    Assessment  IP OT Assessment  OT Assessment: Pt presents with decreased endurance, balance and cognition impairing functional mobility and ADLs. Pt would benefit from continued skilled OT  End of Session Communication: Bedside nurse  End of Session Patient Position: Up in chair, Alarm on  Plan:  Treatment Interventions: ADL retraining, Functional transfer training, Cognitive reorientation, Compensatory technique education  OT Frequency: 3 times per week  OT Discharge Recommendations: Moderate intensity level of continued care  Equipment Recommended upon Discharge:  (shower chair, grab bars in shower)  OT - OK to Discharge: Yes (Indicates completed eval and discharge recommendation)    Subjective   Current Problem:  1. Peripheral vascular disease (CMS/HCC)  Case Request Operating Room: Endarterectomy Lower Extremity, Angiogram Lower Extremity    Case Request Operating Room: Endarterectomy Lower Extremity, Angiogram Lower Extremity    Case Request Operating Room: Endarterectomy Lower Extremity    Case Request Operating Room: Endarterectomy Lower Extremity    Surgical Pathology Exam    Surgical Pathology Exam    Vascular US ankle brachial index (JOHN) without exercise    Vascular US ankle brachial index (JOHN) without exercise    CANCELED: Case Request Operating Room: Endarterectomy Lower Extremity, Insertion Stent Aorta    CANCELED: Case Request Operating Room: Endarterectomy Lower Extremity, Insertion Stent Aorta      2. Acute lower limb ischemia        3. Peripheral vascular disease, unspecified (CMS/HCC)        4. S/P AVR (aortic valve replacement)  Transthoracic Echo (TTE) Complete    Transthoracic Echo (TTE) Complete    Nuclear Stress Test    Nuclear Stress Test    Cardiology Interpretation Of Nuclear Stress -  See Other Report For Nuclear Portion    Cardiology Interpretation Of Nuclear Stress - See Other Report For Nuclear Portion      5. History of prosthetic aortic valve  Nuclear Stress Test    Nuclear Stress Test    Cardiology Interpretation Of Nuclear Stress - See Other Report For Nuclear Portion    Cardiology Interpretation Of Nuclear Stress - See Other Report For Nuclear Portion      6. Abnormal electrocardiogram (ECG) (EKG)  Nuclear Stress Test    Nuclear Stress Test    Cardiology Interpretation Of Nuclear Stress - See Other Report For Nuclear Portion    Cardiology Interpretation Of Nuclear Stress - See Other Report For Nuclear Portion      7. Abnormal EKG  Nuclear Stress Test    Nuclear Stress Test    Cardiology Interpretation Of Nuclear Stress - See Other Report For Nuclear Portion    Cardiology Interpretation Of Nuclear Stress - See Other Report For Nuclear Portion      8. Encounter for other preprocedural examination  Cardiology Interpretation Of Nuclear Stress - See Other Report For Nuclear Portion        General:  Reason for Referral: Left acute lower limb ischemia s/p left endarterectomy and angiogram  Past Medical History Relevant to Rehab: CAD s/p CABG, [place] carotid  artery stenosis, HTN, renal artery stenosis, hypothyroidism, HLD, goiter, aortic stenosis s/p AVR, PAD, A-fib  Co-Treatment: PT  Co-Treatment Reason: maximize Pt therapeutic success  Prior to Session Communication: Bedside nurse  Patient Position Received: Bed, 3 rail up, Alarm on  General Comment: pleasantly confused, agreeable to OT   Precautions:  Hearing/Visual Limitations: [place] has right hearing aid; vision WFL with glasses  LE Weight Bearing Status: Weight Bearing as Tolerated (L LE)  Medical Precautions: Fall precautions  Vital Signs:  Heart Rate: 92  Heart Rate Source: Monitor  Resp: 19  SpO2: 94 %  BP: 147/67  Patient Position: Lying  Pain:  Pain Assessment  Pain Assessment: 0-10  Pain Score: 0 - No pain  Lines/Tubes/Drains:  Urethral  "Catheter Non-latex 16 Fr. (Active)   Number of days: 1         Objective   Cognition:  Overall Cognitive Status: Impaired at baseline  Orientation Level: Disoriented to time, Disoriented to situation (\"\" for date, \"hosopital\" for place, name, )  Following Commands: Follows one step commands with increased time  Safety Judgment: Decreased awareness of need for safety precautions  Problem Solving: Assistance required to identify errors made  Cognition Comments: Pt appeared to have ideational dressing apraxia d/t trying to put sock on hand; Pt required VC for correct use of sock  Alternating Attention: Impaired  Long-Term Memory: Impaired  Short-Term Memory: Impaired  Working Memory: Impaired  Complex Functional Tasks: Impaired  Simple Functional Tasks: Impaired  Complex Functional Tasks: Moderate  Novel Situations: Minimal  Routine Tasks: Minimal  Unable to Self-Monitor and Self-Correct Consistently: Minimal  Insight: Moderate  Impulsive: Mildly  Task Initiation: Initiates with cues  Flexibility of Thought: Reduced flexibility  Planning: Reduced planning skills  Organization: Moderately disorganized  Processing Speed: Delayed           Home Living:  Type of Home: Condo  Lives With: Alone  Home Adaptive Equipment: Walker rolling or standard  Home Layout: One level  Home Access: Level entry  Bathroom Shower/Tub: Walk-in shower  Bathroom Toilet: Standard  Bathroom Equipment: None  Home Living Comments: +cat   Prior Function:  Level of [place]: Independent with ADLs and functional transfers, Independent with homemaking with ambulation  Receives Help From: Family  ADL Assistance: Independent  Homemaking Assistance:  (sister in law's granddtr cleans house every 2 weeks)  Ambulatory Assistance: Independent  Vocational: Retired  Leisure: shopping  Hand Dominance: Right  Prior Function Comments: Pt reported used FWW prn  IADL History:  Current License: No  Mode of Transportation: Family  ADL:  Eating " Deficit:  (setup only)  Grooming Deficit:  (SBA standing anticipated)  Bathing Deficit:  (min A at least 2/2 clothing management, functional mobility, and cognition)  UE Dressing Deficit:  (Pt donned gown min A seated EOB)  LE Dressing Deficit:  (Pt donned B socks min A; Pt able to bring feet to self)  Toileting Deficit:  (total, ruiz; SBA at least 2/2 clothing managment, functional mobility, and cognition)  Functional Deficit:  (Pt performed functional mobility max household distance from bed to 1 hallway length to chair in room with FWW SBA, VC for safety)  Activity Tolerance:  Endurance: Decreased tolerance for upright activites    Bed Mobility/Transfers: Bed Mobility  Bed Mobility: Yes  Bed Mobility 1  Bed Mobility 1: Supine to sitting  Level of Assistance 1: Close supervision   and Transfers  Transfer: Yes  Transfer 1  Transfer From 1: Bed to  Transfer to 1: Stand  Technique 1: Sit to stand  Transfer Device 1: Walker  Transfer Level of Assistance 1: Close supervision, Minimal verbal cues  Trials/Comments 1: 1  Transfers 2  Transfer From 2: Stand to  Transfer to 2: Chair with arms  Technique 2: Stand to sit  Transfer Device 2: Walker  Transfer Level of Assistance 2: Close supervision, Minimal verbal cues  Trials/Comments 2: 1; VC for safety on descent  IADL's:   Current License: No  Mode of Transportation: Family  Vision: Vision - Basic Assessment  Current Vision: Wears glasses all the time  Patient Visual Report:  (Pt reported needs new lenses)    Sensation:  Light Touch: No apparent deficits      Hand Function:  Hand Function  Gross Grasp: Functional  Coordination: Functional  Extremities: RUE   RUE : Within Functional Limits, LUE   LUE: Within Functional Limits,  , and      Outcome Measures: [facility] Daily Activity  Putting on and taking off regular lower body clothing: A little  Bathing (including washing, rinsing, drying): A little  Putting on and taking off regular upper body clothing: A little  Toileting,  which includes using toilet, bedpan or urinal: Total (ruiz)  Taking care of personal grooming such as brushing teeth: A little  Eating Meals: A little  Daily Activity - Total Score: 16         ,     OT Adult Other Outcome Measures  4AT: 4AT-  Short Blessed Test (SBT): 11/28 (indicates cognitive impairment)    Education Documentation  Precautions, taught by Lyndsay Pham OT at 1/19/2024  1:51 PM.  Learner: Patient  Readiness: Acceptance  Method: Explanation  Response: Verbalizes Understanding    ADL Training, taught by Lyndsay Pham OT at 1/19/2024  1:51 PM.  Learner: Patient  Readiness: Acceptance  Method: Explanation  Response: Verbalizes Understanding    OT Treatment:  Pt performed LBD supine with HOB elevated and seated EOB. Pt donned B socks supine in bed HOB elevated, Pt initially trying to put socks on hands, Pt required VC for correct use of socks, min A donning sock, Pt able to bring B feet to self; extended time required d/t poor attention and required VC for redirection. Pt donned B shoes seated EOB min A, VC for redirection to task  Pt performed functional mobility max household distance from bed to 1 hallway length to chair in room with FWW SBA, VC for safety and redirection to task      Goals:   Encounter Problems       Encounter Problems (Active)       ADLs       Patient with complete lower body dressing with independent level of assistance donning and doffing all LE clothes   (Progressing)       Start:  01/19/24    Expected End:  02/02/24            Patient will complete toileting including hygiene clothing management/hygiene with modified independent level of assistance and DME prn. (Progressing)       Start:  01/19/24    Expected End:  02/02/24               BALANCE       Pt will maintain dynamic standing balance during ADL task with modified independent level of assistance in order to demonstrate decreased risk of falling and improved postural control. (Progressing)       Start:  01/19/24     Expected End:  02/02/24                 COGNITION/SAFETY       Patient will demonstrated orientation x 4 with verbal cues. (Progressing)       Start:  01/19/24    Expected End:  02/02/24       ORIENTATION         Pt will complete all functional tasks with appropriate sequencing and time frame with no VC (Progressing)       Start:  01/19/24    Expected End:  02/02/24               MOBILITY       Patient will perform Functional mobility max Household distances/Community Distances with modified independent level of assistance and least restrictive device in order to improve safety and functional mobility. (Progressing)       Start:  01/19/24    Expected End:  02/02/24 01/19/24 at 1:52 PM   Lyndsay Pham, OT   Rehab Office: [phone]

## 2024-01-19 NOTE — SIGNIFICANT EVENT
Postoperative Check Note    Subjective  Maggi Gordon is a 89 y.o. female who is now POD0 s/p L femoral endarterectomy with bovine patch angioplasty, angiogram, and stenting of L common and external iliac arteries. Postoperatively, patient feels well, and denies fevers/chills, n/v, chest pain, shortness of breath. Feels her pain is well-controlled and appropriate for this time. Has no other concerns.    Objective  Vitals:  Visit Vitals  /65   Pulse 91   Temp 37 °C (98.6 °F) (Temporal)   Resp 22       Physical Exam:  GEN: No acute distress. Alert, awake and conversive  HEENT: Atraumatic. Sclera anicteric. Moist mucous membranes.  RESP: Breathing non-labored, equal chest rise. On NC.  CV: Regular rate, normotensive  GI: Abdomen soft, nondistended, nontender.   : Nova in place with yellow urine.  MSK: No gross deformities. Moves all extremities spontaneously.  NEURO: Alert and oriented x3. No focal deficits.  PSYCH: Appropriate mood and affect.  SKIN: Left groin incision site cdi. No signs of hematoma formation. Left DP monophasic, left PT strong, biphasic    Most recent labs:            11.2     8.9>-----<197              35.6     142  103  33                  ----------------<91     3.8  28  1.27            Mg 1.80         Assessment  Maggi Gordon is a 89 y.o. female who is now POD0 s/p s/p L femoral endarterectomy with bovine patch angioplasty, angiogram, and stenting of L common and external iliac arteries.  Patient is in stable condition, appropriate for postoperative course. The plan is as follows:    - Will continue to optimize pain management, current pain regimen with tylenol and oxycodone  - Regular diet   - HLIV     Will update day team in AM regarding exam. Will revisit patient on an as-needed basis.    Arnold Moore DO  Department of Surgery / IR Integrated PGY1  Vascular Surgery [phone]

## 2024-01-19 NOTE — DOCUMENTATION CLARIFICATION NOTE
"    PATIENT:               TOMASA BALTAZAR  ACCT #:                  [id number]  MRN:                       [id number]  :                       1935  ADMIT DATE:       2024 9:19 PM  DISCH DATE:  RESPONDING PROVIDER #:        [id number]          PROVIDER RESPONSE TEXT:    Acute Kidney Injury is ruled out    CDI QUERY TEXT:    [facility]    Instruction:    Based on your assessment of the patient and the clinical information, please provide the requested documentation by clicking on the appropriate radio button and enter any additional information if prompted.    Question: Please clarify the diagnosis of Acute Kidney Injury    When answering this query, please exercise your independent professional judgment. The fact that a question is being asked, does not imply that any particular answer is desired or expected.    The patient's clinical indicators include:  Clinical Information:  24  Dr. Moore  \"89 y.o. female with significant PMHx as well as PAD s/p L EIA stent in 2023 at [facility]. She presented to an OSH yesterday d/t concern for left lower extremity pain in conjunction with a new heel ulcer. Imaging revealed bilateral SFA occlusion and additional occlusions of SMA, right internal iliac, right popliteal, right anterior tibial artery, left popliteal artery, and left anterior tibial artery.\"    Documented Diagnosis:  24  Dr. Moore [facility] for [facility]    Clinical Indicators and Documentation:  -Vital Signs: 24 2133 No T 66-/71 97%  -Baseline Creatinine: not documented  -SCr lab values: 1.45, 1.39, 1.41, 1.23, 1.41  -Urine Output: (voided and incontinence) first measured amount  1100 650 ml, 1200 650 ml, 1536 200 ml, 1800 50 ml, 2100 400 ml,  0330 300 ml  -Electrolyte lab values: K WNLs, Na 1/15 146 otherwise WNLs, Mag WNL, Phos 1.9, 1.8, otherwise WNL, Ca WNL  -Nephrology consult: NA    Treatment:  IVFs LR  75 ml hr start 0550 to 1/15 1827  Labs    Risk Factors: CKD, " HTN  Options provided:  -- Acute Kidney Injury is ruled out  -- Acute Kidney Injury ruled in as evidenced by, Please specify additional information below  -- Other - I will add my own diagnosis  -- Refer to Clinical Documentation Reviewer    Query created by: Porsche Nixon on 1/17/2024 3:04 PM      Electronically signed by:  JW CHRISTENSEN DO 1/19/2024 3:17 AM

## 2024-01-20 ENCOUNTER — DOCUMENTATION (OUTPATIENT)
Dept: BEHAVIORAL HEALTH | Facility: HOSPITAL | Age: 89
End: 2024-01-20
Payer: MEDICARE

## 2024-01-20 LAB
ACT BLD: 160 SEC (ref 89–169)
ACT BLD: 256 SEC (ref 89–169)
ALBUMIN SERPL BCP-MCNC: 3.8 G/DL (ref 3.4–5)
ANION GAP SERPL CALC-SCNC: 16 MMOL/L (ref 10–20)
BUN SERPL-MCNC: 36 MG/DL (ref 6–23)
CALCIUM SERPL-MCNC: 9.7 MG/DL (ref 8.6–10.6)
CHLORIDE SERPL-SCNC: 98 MMOL/L (ref 98–107)
CO2 SERPL-SCNC: 28 MMOL/L (ref 21–32)
CREAT SERPL-MCNC: 1.3 MG/DL (ref 0.5–1.05)
EGFRCR SERPLBLD CKD-EPI 2021: 40 ML/MIN/1.73M*2
ERYTHROCYTE [DISTWIDTH] IN BLOOD BY AUTOMATED COUNT: 13.8 % (ref 11.5–14.5)
GLUCOSE SERPL-MCNC: 86 MG/DL (ref 74–99)
HCT VFR BLD AUTO: 33.7 % (ref 36–46)
HGB BLD-MCNC: 10.4 G/DL (ref 12–16)
MAGNESIUM SERPL-MCNC: 3.19 MG/DL (ref 1.6–2.4)
MCH RBC QN AUTO: 32.1 PG (ref 26–34)
MCHC RBC AUTO-ENTMCNC: 30.9 G/DL (ref 32–36)
MCV RBC AUTO: 104 FL (ref 80–100)
NRBC BLD-RTO: 0 /100 WBCS (ref 0–0)
PHOSPHATE SERPL-MCNC: 1.9 MG/DL (ref 2.5–4.9)
PLATELET # BLD AUTO: 257 X10*3/UL (ref 150–450)
POTASSIUM SERPL-SCNC: 4.1 MMOL/L (ref 3.5–5.3)
RBC # BLD AUTO: 3.24 X10*6/UL (ref 4–5.2)
SODIUM SERPL-SCNC: 138 MMOL/L (ref 136–145)
WBC # BLD AUTO: 9.8 X10*3/UL (ref 4.4–11.3)

## 2024-01-20 PROCEDURE — 2500000001 HC RX 250 WO HCPCS SELF ADMINISTERED DRUGS (ALT 637 FOR MEDICARE OP): Performed by: STUDENT IN AN ORGANIZED HEALTH CARE EDUCATION/TRAINING PROGRAM

## 2024-01-20 PROCEDURE — 2500000004 HC RX 250 GENERAL PHARMACY W/ HCPCS (ALT 636 FOR OP/ED)

## 2024-01-20 PROCEDURE — 2500000005 HC RX 250 GENERAL PHARMACY W/O HCPCS

## 2024-01-20 PROCEDURE — 85027 COMPLETE CBC AUTOMATED: CPT | Performed by: STUDENT IN AN ORGANIZED HEALTH CARE EDUCATION/TRAINING PROGRAM

## 2024-01-20 PROCEDURE — 36415 COLL VENOUS BLD VENIPUNCTURE: CPT | Performed by: STUDENT IN AN ORGANIZED HEALTH CARE EDUCATION/TRAINING PROGRAM

## 2024-01-20 PROCEDURE — 2500000004 HC RX 250 GENERAL PHARMACY W/ HCPCS (ALT 636 FOR OP/ED): Performed by: STUDENT IN AN ORGANIZED HEALTH CARE EDUCATION/TRAINING PROGRAM

## 2024-01-20 PROCEDURE — 2060000001 HC INTERMEDIATE ICU ROOM DAILY

## 2024-01-20 PROCEDURE — 99222 1ST HOSP IP/OBS MODERATE 55: CPT | Performed by: PSYCHIATRY & NEUROLOGY

## 2024-01-20 PROCEDURE — 2500000001 HC RX 250 WO HCPCS SELF ADMINISTERED DRUGS (ALT 637 FOR MEDICARE OP)

## 2024-01-20 PROCEDURE — 83735 ASSAY OF MAGNESIUM: CPT | Performed by: STUDENT IN AN ORGANIZED HEALTH CARE EDUCATION/TRAINING PROGRAM

## 2024-01-20 PROCEDURE — 80069 RENAL FUNCTION PANEL: CPT | Performed by: STUDENT IN AN ORGANIZED HEALTH CARE EDUCATION/TRAINING PROGRAM

## 2024-01-20 RX ORDER — ACETAMINOPHEN 10 MG/ML
15 INJECTION, SOLUTION INTRAVENOUS EVERY 6 HOURS SCHEDULED
Status: DISCONTINUED | OUTPATIENT
Start: 2024-01-20 | End: 2024-01-20

## 2024-01-20 RX ORDER — VALSARTAN 160 MG/1
160 TABLET ORAL DAILY
Status: DISCONTINUED | OUTPATIENT
Start: 2024-01-20 | End: 2024-01-21

## 2024-01-20 RX ORDER — QUETIAPINE FUMARATE 25 MG/1
12.5 TABLET, FILM COATED ORAL NIGHTLY
Status: DISCONTINUED | OUTPATIENT
Start: 2024-01-20 | End: 2024-01-25 | Stop reason: HOSPADM

## 2024-01-20 RX ORDER — TRAZODONE HYDROCHLORIDE 50 MG/1
50 TABLET ORAL NIGHTLY
Status: DISCONTINUED | OUTPATIENT
Start: 2024-01-20 | End: 2024-01-20

## 2024-01-20 RX ORDER — ACETAMINOPHEN 10 MG/ML
15 INJECTION, SOLUTION INTRAVENOUS EVERY 6 HOURS
Status: DISCONTINUED | OUTPATIENT
Start: 2024-01-20 | End: 2024-01-21

## 2024-01-20 RX ORDER — ACETAMINOPHEN 10 MG/ML
15 INJECTION, SOLUTION INTRAVENOUS EVERY 6 HOURS
Status: DISCONTINUED | OUTPATIENT
Start: 2024-01-21 | End: 2024-01-20

## 2024-01-20 RX ADMIN — ACETAMINOPHEN 650 MG: 10 INJECTION INTRAVENOUS at 22:21

## 2024-01-20 RX ADMIN — ATORVASTATIN CALCIUM 80 MG: 80 TABLET, FILM COATED ORAL at 08:13

## 2024-01-20 RX ADMIN — SPIRONOLACTONE 12.5 MG: 25 TABLET, FILM COATED ORAL at 08:11

## 2024-01-20 RX ADMIN — ACETAMINOPHEN 975 MG: 325 TABLET ORAL at 05:07

## 2024-01-20 RX ADMIN — ASPIRIN 81 MG: 81 TABLET, COATED ORAL at 08:10

## 2024-01-20 RX ADMIN — ATENOLOL 50 MG: 50 TABLET ORAL at 08:11

## 2024-01-20 RX ADMIN — ATENOLOL 50 MG: 50 TABLET ORAL at 21:59

## 2024-01-20 RX ADMIN — CLOPIDOGREL BISULFATE 75 MG: 75 TABLET ORAL at 08:11

## 2024-01-20 RX ADMIN — CLORAZEPATE DIPOTASSIUM 3.75 MG: 3.75 TABLET ORAL at 21:54

## 2024-01-20 RX ADMIN — ACETAMINOPHEN 975 MG: 325 TABLET ORAL at 13:17

## 2024-01-20 RX ADMIN — ESCITALOPRAM OXALATE 10 MG: 10 TABLET, FILM COATED ORAL at 08:11

## 2024-01-20 RX ADMIN — VALSARTAN 160 MG: 160 TABLET, FILM COATED ORAL at 08:11

## 2024-01-20 RX ADMIN — MIRTAZAPINE 7.5 MG: 7.5 TABLET, FILM COATED ORAL at 21:58

## 2024-01-20 RX ADMIN — HYDROCHLOROTHIAZIDE 12.5 MG: 25 TABLET ORAL at 08:10

## 2024-01-20 RX ADMIN — QUETIAPINE FUMARATE 12.5 MG: 25 TABLET ORAL at 21:58

## 2024-01-20 RX ADMIN — CLORAZEPATE DIPOTASSIUM 3.75 MG: 3.75 TABLET ORAL at 05:09

## 2024-01-20 RX ADMIN — SODIUM PHOSPHATE, MONOBASIC, MONOHYDRATE AND SODIUM PHOSPHATE, DIBASIC, ANHYDROUS 21 MMOL: 142; 276 INJECTION, SOLUTION INTRAVENOUS at 15:49

## 2024-01-20 ASSESSMENT — COGNITIVE AND FUNCTIONAL STATUS - GENERAL
HELP NEEDED FOR BATHING: A LITTLE
PERSONAL GROOMING: A LITTLE
DRESSING REGULAR UPPER BODY CLOTHING: A LITTLE
DAILY ACTIVITIY SCORE: 19
MOBILITY SCORE: 19
DRESSING REGULAR UPPER BODY CLOTHING: A LITTLE
STANDING UP FROM CHAIR USING ARMS: A LITTLE
MOBILITY SCORE: 20
MOVING TO AND FROM BED TO CHAIR: A LITTLE
TOILETING: A LITTLE
MOVING TO AND FROM BED TO CHAIR: A LITTLE
WALKING IN HOSPITAL ROOM: A LITTLE
STANDING UP FROM CHAIR USING ARMS: A LITTLE
PERSONAL GROOMING: A LITTLE
DRESSING REGULAR LOWER BODY CLOTHING: A LITTLE
DRESSING REGULAR LOWER BODY CLOTHING: A LITTLE
TOILETING: A LITTLE
WALKING IN HOSPITAL ROOM: A LITTLE
DAILY ACTIVITIY SCORE: 19
CLIMB 3 TO 5 STEPS WITH RAILING: A LITTLE
HELP NEEDED FOR BATHING: A LITTLE
CLIMB 3 TO 5 STEPS WITH RAILING: A LOT

## 2024-01-20 ASSESSMENT — PAIN - FUNCTIONAL ASSESSMENT: PAIN_FUNCTIONAL_ASSESSMENT: 0-10

## 2024-01-20 ASSESSMENT — PAIN SCALES - GENERAL
PAINLEVEL_OUTOF10: 0 - NO PAIN
PAINLEVEL_OUTOF10: 0 - NO PAIN

## 2024-01-20 NOTE — CONSULTS
"Inpatient consult to Psychiatry  Consult performed by: Anusha Cabral MD  Consult ordered by: Pura Montes MD  Reason for consult: delerium      HISTORY OF PRESENT ILLNESS  Maggi Gordon is a 89 y.o. female with a past medical history of HTN, HLD, CKD, vertebral artery syndrome status post L PICA coiling and stent placement (2006), CAD s/p CABG x3 (1995), A-fib, aortic stenosis, and PAD s/p L EIA stent in September 2023 at [facility]  and a past psychiatry history of chronic insomnia and anxiety, now with progressively altered mentation s/p L fem EA with bovine patch angioplasty, and stenting of L common and external iliac arteries on 1/18/24. Psychiatry consulted due to concern for delirium.       Per primary team and bedside nurse, patient has become progressively more altered the past few days. She has been observed speaking to unseen others and appears to be actively hallucinating. Mental status seems to change throughout day. Intermittently agitated. Has been trying to pull out lines. Almost hit one of the PCTs. Nurse things she gets upset when she feels confused. Not sleeping through night. Per nurse, did not sleep at all last night. Seems very confused.     Nova was removed several days ago. She is urinated spontaneously. There is no current concern for infection. Has been receiving Oxycodone PRN pain. They feel that pain is well controlled.     On interview:   Patient states she is doing \"OK.\" Talks about pain in her foot but is difficult to understand. States it is better than it used to be. States she is not able to sleep and agrees that she needs to sleep. Then starts talking about this experience \"a long time ago\" where the hospital bed changed shape in front of her eyes and states she was afraid of heights. Also recalls remembering there were spiders hanging from the ceiling. Denies currently seeing spiders, but then becomes confused and starts plucking at her bandage with her straw. When asked what she " "was doing, she states she seems \"strings\" and \"I don't want to ruin my sweater.\"     Collateral:  Spoke with sister in law who lives in [place]. Per SHAE, patient is a \"baby\" and needs to be treated like a genie. Does not tolerate pain well but can not be given narcotics as that makes her hallucinate. Has not responded well to Tramadol or Toradol in past. Need to stick to Tylenol for patient. Also reports that patient does not eat well, has barely been eating in hospital. They are planning to visit tomorrow around noon. They have not been able to reach pt today because staff disconnected her phone. They agree she does not seem herself but have seen her like this in past. Reports that she has chronic issues with insomnia. Gets anxious easily. Does not drink alcohol in excess. Has had issues with thyroid in past, but has been told different things by different doctors.     PSYCHIATRIC ROS  As per HPI    MEDICAL ROS  Musculoskeletal: DENIES pain  Otherwise unable to obtain due to AMS    PAST PSYCHIATRIC HISTORY  Prior Diagnoses: Anxiety, insomnia   Current psychiatric medications: Clorazepate rapate 7.5 mg PRN anxiety (home med is 7.5 mg PRN), Mirtazapine 7.5 at bedtime, Lexparo 10 mg daily  Past psychiatric medications: unknown   OARRS: no concerns; Clozrepate last filled 11/27/23    FAMILY HISTORY  No family history on file.     SOCIAL HISTORY  Currently lives: lives alone in [facility] with cats   Marital history/children: son who lives in [place]  Social support: has sister in law who lives in [place] area   Unable to obtain remainder of social hx due to AMS     Social History     Socioeconomic History    Marital status:      Spouse name: None    Number of children: None    Years of education: None    Highest education level: None   Occupational History    None   Tobacco Use    Smoking status: Never    Smokeless tobacco: Never   Vaping Use    Vaping Use: Never used   Substance and Sexual Activity    Alcohol use: None    " Drug use: None    Sexual activity: None   Other Topics Concern    None   Social History Narrative    None     Social Determinants of Health     Financial Resource Strain: Low Risk  (1/12/2024)    Overall Financial Resource Strain (CARDIA)     Difficulty of Paying Living Expenses: Not hard at all   Food Insecurity: Not on file   Transportation Needs: No Transportation Needs (1/12/2024)    PRAPARE - Transportation     Lack of Transportation (Medical): No     Lack of Transportation (Non-Medical): No   Physical Activity: Not on file   Stress: Not on file   Social Connections: Not on file   Intimate Partner Violence: Not on file   Housing Stability: Low Risk  (1/12/2024)    Housing Stability Vital Sign     Unable to Pay for Housing in the Last Year: No     Number of Places Lived in the Last Year: 1     Unstable Housing in the Last Year: No      SUBSTANCE HISTORY  Alcohol: rarely  Sister in law denies other illicit substance use     She  reports that she has never smoked. She has never used smokeless tobacco. No history on file for alcohol use and drug use.    PAST MEDICAL HISTORY  Past Medical History:   Diagnosis Date    Asymptomatic premature menopause     Premature menopause    Atherosclerosis of coronary artery bypass graft(s) without angina pectoris 10/11/2021    Atherosclerosis of CABG w/o angina pectoris    Occlusion and stenosis of bilateral carotid arteries 01/09/2020    Occlusion and stenosis of bilateral carotid arteries    Personal history of other diseases of the circulatory system     History of coronary artery disease    Personal history of other diseases of the circulatory system     History of hypertension    Personal history of other diseases of the circulatory system     History of stenosis of renal artery    Personal history of other endocrine, nutritional and metabolic disease     History of hypothyroidism    Personal history of other endocrine, nutritional and metabolic disease     History of  hyperlipidemia    Personal history of other endocrine, nutritional and metabolic disease     History of goiter    S/P AVR (aortic valve replacement) 10/18/2023    #21 CE AVR        PAST SURGICAL HISTORY  Past Surgical History:   Procedure Laterality Date    CT AORTA AND BILATERAL ILIOFEMORAL RUNOFF ANGIOGRAM W AND/OR WO IV CONTRAST  7/13/2023    CT AORTA AND BILATERAL ILIOFEMORAL RUNOFF ANGIOGRAM W AND/OR WO IV CONTRAST 7/13/2023 [facility] CT    OTHER SURGICAL HISTORY  12/03/2018    Cataract surgery    OTHER SURGICAL HISTORY  12/03/2018    Coronary artery bypass graft    OTHER SURGICAL HISTORY  12/03/2018    Aortic valve replacement    OTHER SURGICAL HISTORY  12/03/2018    Eye surgery    OTHER SURGICAL HISTORY  12/03/2018    Surgery        HOME MEDICATIONS  Medication Documentation Review Audit       Reviewed by Rere Nevarez RN (Registered Nurse) on 01/12/24 at 0259      Medication Order Taking? Sig Documenting Provider Last Dose Status   alendronate (Fosamax) 70 mg tablet [id number]  Take 1 tablet (70 mg) by mouth every 7 days. Historical Provider, MD  Active   amoxicillin (Amoxil) 500 mg capsule [id number]  Take 4 capsules (2,000 mg) by mouth see administration instructions. Take 1 hour before dental appt Historical Provider, MD  Active   aspirin 81 mg capsule [id number]  once every 24 hours. Historical Provider, MD  Active   aspirin 81 mg EC tablet [id number]  Take 1 tablet (81 mg) by mouth once daily. Historical Provider, MD  Active   atenolol (Tenormin) 50 mg tablet [id number]  Take 1 tablet (50 mg) by mouth 2 times a day. Yenny Reddy MD  Active   atorvastatin (Lipitor) 80 mg tablet [id number]  Take 1 tablet (80 mg) by mouth once daily. Historical Provider, MD  Active   azelastine (Astelin) 137 mcg (0.1 %) nasal spray [id number]  Administer into affected nostril(s). Historical Provider, MD  Active   BACILLUS COAGULANS-INULIN ORAL [id number]  Take by mouth. Historical Provider, MD  Active   biotin 2,500 mcg  capsule [id number]  Take by mouth. Historical MD Barry  Active   cephalexin (Keflex) 500 mg capsule [id number]  Take 1 capsule (500 mg) by mouth every 12 hours. Yenny Reddy MD  Active   cetirizine (ZyrTEC) 10 mg tablet [id number]  Take 1 tablet (10 mg) by mouth once daily. Yenny Reddy MD  Active   chlorpheniramine (Chlor-Trimeton) 4 mg tablet [id number]  Take by mouth. Yenny Reddy MD  Active   cholecalciferol (Vitamin D-3) 25 MCG (1000 UT) capsule [id number]  Take by mouth. Yenny Reddy MD  Active   cilostazol (Pletal) 100 mg tablet [id number]  Take 1 tablet (100 mg) by mouth 2 times a day. Yenny Reddy MD  Active   clopidogrel (Plavix) 75 mg tablet [id number]  TAKE 1 TABLET BY MOUTH EVERY DAY Jose Weathers MD  Active   clorazepate (Tranxene) 7.5 mg tablet [id number]  Take 0.5 tablets (3.75 mg) by mouth 2 times a day as needed. Historical MD Barry  Active   CLORAZEPATE DIPOTASSIUM ORAL [id number]  Take by mouth. Historical Provider, MD  Active   escitalopram (Lexapro) 10 mg tablet [id number]  1 tablet (10 mg) once daily. Historical MD Barry  Active   ferrous sulfate 325 (65 Fe) MG tablet [id number]  Take 1 tablet by mouth once daily. Historical MD Barry  Active   folic acid (Folvite) 1 mg tablet [id number]  Take 2 tablets (2 mg) by mouth once daily. Historical Provider, MD  Active   gentamicin (Garamycin) 0.1 % cream [id number]  APPLY TO WOUND AS DIRECTED ONCE DAILY Historical Provider, MD  Active   ipratropium (Atrovent) 21 mcg (0.03 %) nasal spray [id number]  Administer 2 sprays into each nostril 2 times a day. Yenny Reddy MD  Active   mirtazapine (Remeron) 15 mg tablet [id number]  Take 0.5 tablets (7.5 mg) by mouth once daily at bedtime. Historical Provider, MD  Active   mv-min/FA/vit K/lutein/zeaxant (PRESERVISION AREDS 2 PLUS MV ORAL) [id number]  Take by mouth. Yenny Reddy MD  Active   potassium chloride CR (Klor-Con 8) 8 mEq ER tablet [id number]  Take  1 tablet (8 mEq) by mouth 3 times a day with meals. Historical Provider, MD  Active   Praluent Pen 150 mg/mL pen injector [id number]  INJECT 1 MILLILITER INTO SKIN EVERY 2 WEEKS IN ABDOMEN, THIGH, OR UPPER ARM ROTATING INJECTION SITES Jose Weathers MD  Active   rosuvastatin (Crestor) 40 mg tablet [id number]  Take 1 tablet (40 mg) by mouth once daily at bedtime. Historical MD Barry  Active   SantyL 250 unit/gram ointment [id number]  APPLY TO WOUND AS DIRECTED ONCE DAILY FOR 30 DAYS Historical MD Barry  Active   spironolacton-hydrochlorothiaz (Aldactazide) 25-25 mg tablet [id number]  Take 0.5 tablets (12.5 mg) by mouth once daily. Historical Provider, MD  Active   valsartan (Diovan) 160 mg tablet [id number]  Take 1 tablet (160 mg) by mouth once daily. Historical Provider, MD   23 [id number]   valsartan (Diovan) 320 mg tablet [id number]  Take by mouth. Historical Provider, MD  Active   vit A/vit C/vit E/zinc/copper (PRESERVISION AREDS ORAL) [id number]  Take 1 capsule by mouth once daily. Historical Provider, MD  Active   vitamin D3-vitamin K2 1,250-200 mcg capsule [id number]  Vitamin D3 Historical Provider, MD  Active   WesTab Max 2.5-25-2 mg tablet [id number]  Take 1 tablet by mouth once daily. Historical Provider, MD  Active                     CURRENT MEDICATIONS  Scheduled medications  acetaminophen, 975 mg, oral, q8h  aspirin, 81 mg, oral, Daily  atenolol, 50 mg, oral, BID  atorvastatin, 80 mg, oral, Daily  clopidogrel, 75 mg, oral, Daily  escitalopram, 10 mg, oral, Daily  hydroCHLOROthiazide, 12.5 mg, oral, Daily  mirtazapine, 7.5 mg, oral, Nightly  [Held by provider] polyethylene glycol, 17 g, oral, Daily  potassium chloride, 20 mEq, intravenous, Once  sodium phosphate, 21 mmol, intravenous, Once  spironolactone, 12.5 mg, oral, Daily  traZODone, 50 mg, oral, Nightly  valsartan, 160 mg, oral, Daily    Continuous medications       PRN medications  PRN medications: clorazepate, ondansetron ODT **OR**  "ondansetron, polyethylene glycol     ALLERGIES  Ezetimibe and Hydrocodone    OBJECTIVE  VITALS  /80   Pulse 82   Temp 36.7 °C (98.1 °F)   Resp 18   Ht 1.6 m (5' 3\")   Wt (!) 44 kg (97 lb)   SpO2 95%   BMI 17.18 kg/m²   Body mass index is 17.18 kg/m².  Facility age limit for growth %robin is 20 years.  Wt Readings from Last 4 Encounters:   01/11/24 (!) 44 kg (97 lb)   11/29/23 (!) 44.5 kg (98 lb 3.2 oz)   10/27/23 (!) 44.9 kg (99 lb)   10/18/23 (!) 44.4 kg (97 lb 14.4 oz)       MENTAL STATUS EXAM  General: NAD but confused at times   Appearance: very thin appearing, significant bruising noted on UE around site of IV and hands   Attitude: mostly cooperative but confused and somewhat guarded at times   Behavior: intermittent eye contact  Motor Activity: no psychomotor agitation or retardation, no abnormal movements  Speech: briefly responds to questions only after being prompted several times but difficult to understand   Mood: \"okay\"  Affect: euthymic, congruent with mood  Thought Content: Denies SI/HI. No obvious delusions present but difficult to say definitively.   Thought Perception: Reports seeing spiders, ?lint balls, various strings. Refers to the ace wrap around her IV as her sweater. Appears to be responding to internal stimuli at times.   Thought Process: at times appears organized but other times less linear, tangential; will be speaking about one thing and then switches topics seemingly at random   Cognition: oriented to person, knows she is in [place] but states city is [place]; Knows year and month but not date or day of week. Knows she is in hospital but not sure which one. Aware that she had recent surgery in her LE. Attention is impaired.   Insight: impaired   Judgement: fair     LABS  Results for orders placed or performed during the hospital encounter of 01/11/24 (from the past 24 hour(s))   CBC   Result Value Ref Range    WBC 9.8 4.4 - 11.3 x10*3/uL    nRBC 0.0 0.0 - 0.0 /100 WBCs    RBC 3.24 " (L) 4.00 - 5.20 x10*6/uL    Hemoglobin 10.4 (L) 12.0 - 16.0 g/dL    Hematocrit 33.7 (L) 36.0 - 46.0 %     (H) 80 - 100 fL    MCH 32.1 26.0 - 34.0 pg    MCHC 30.9 (L) 32.0 - 36.0 g/dL    RDW 13.8 11.5 - 14.5 %    Platelets 257 150 - 450 x10*3/uL   Renal function panel   Result Value Ref Range    Glucose 86 74 - 99 mg/dL    Sodium 138 136 - 145 mmol/L    Potassium 4.1 3.5 - 5.3 mmol/L    Chloride 98 98 - 107 mmol/L    Bicarbonate 28 21 - 32 mmol/L    Anion Gap 16 10 - 20 mmol/L    Urea Nitrogen 36 (H) 6 - 23 mg/dL    Creatinine 1.30 (H) 0.50 - 1.05 mg/dL    eGFR 40 (L) >60 mL/min/1.73m*2    Calcium 9.7 8.6 - 10.6 mg/dL    Phosphorus 1.9 (L) 2.5 - 4.9 mg/dL    Albumin 3.8 3.4 - 5.0 g/dL   Magnesium   Result Value Ref Range    Magnesium 3.19 (H) 1.60 - 2.40 mg/dL        IMAGING    Vascular US ankle brachial index (JOHN) without exercise    Result Date: 1/19/2024            [place] [address]   Tel [phone] and Fax [phone]  [facility] Lab Report [facility] ANKLE BRACHIAL INDEX (JOHN) WITHOUT EXERCISE  Patient Name:      TOMASA Nino Physician: [id number] Mattie Baker MD Study Date:        1/19/2024           Ordering           [id number] RUDY HICKEY                                        Physician: MRN/PID:           [id number]            Technologist:      Adam Orantes S Accession#:        [id number]        Technologist 2: Date of Birth/Age: 1935 / 89 years Encounter#:        [id number] Gender:            F Admission Status:  Inpatient           Location           [facility]                                        Performed:  Diagnosis/ICD: Other specified peripheral vascular diseases-I73.89 CPT Codes:     49257 Peripheral artery JOHN Only  Patient History Stent left external iliac artery.  CONCLUSIONS: Right Lower PVR: Evidence of moderate arterial occlusive disease in the right lower  extremity at rest. Decreased digital perfusion noted. Monophasic flow is noted in the right dorsalis pedis artery. Biphasic flow is noted in the right common femoral artery and right posterior tibial artery. Left Lower PVR: Evidence of moderate arterial occlusive disease in the left lower extremity at rest. Decreased digital perfusion noted. Biphasic flow is noted in the left common femoral artery, left posterior tibial artery and left dorsalis pedis artery.  Comparison: Compared with study from 6/23/2023, no significant change.  Imaging & Doppler Findings:  RIGHT Lower PVR                Pressures Ratios Right Posterior Tibial (Ankle) 54 mmHg   0.48 Right Dorsalis Pedis (Ankle)   59 mmHg   0.52 Right Digit (Great Toe)        32 mmHg   0.28   LEFT Lower PVR                Pressures Ratios Left Posterior Tibial (Ankle) 55 mmHg   0.49 Left Dorsalis Pedis (Ankle)   46 mmHg   0.41 Left Digit (Great Toe)        21 mmHg   0.19                     Right     Left Brachial Pressure 109 mmHg 113 mmHg   [id number] Mattie Baker MD Electronically signed by [id number] Mattie Baker MD on 1/19/2024 at 2:17:54 PM  ** Final **        PSYCHIATRIC RISK ASSESSMENT  Violence Risk Factors:  altered mentation   Acute Risk of Harm to Others is Considered: Low  Suicide Risk Factors: none  Acute Risk of Harm to Self is Considered: Low    ASSESSMENT AND PLAN    Maggi Gordon is a 89 y.o. female with a past medical history of HTN, HLD, CKD, vertebral artery syndrome status post L PICA coiling and stent placement (2006), CAD s/p CABG x3 (1995), A-fib, aortic stenosis, and PAD s/p L EIA stent in September 2023 at [facility]  and a past psychiatry history of chronic insomnia and anxiety, now with progressively altered mentation s/p L fem EA with bovine patch angioplasty, and stenting of L common and external iliac arteries on 1/18/24. Psychiatry consulted due to concern for delirium.       On initial evaluation, she is cooperative but seems confused at times.  Grossly oriented x4, but difficult to fully assess as patient is difficult to understand. There is notable deficits in attention and concentration, and she is easily distracted by external stimuli. Waxing and waning presentation is c/w delerium for which she hs nuemrous risk factors (recent surgery, prolonged hospitalization, recent iatrogenic risk factors including use of benzo and narcotics, older age). Infection and electrolyte imablance was considered but labs are grossly unremakrable, and there is no obvious source of infection. No history of significant alcohol use to suggest alcohol withdrawal induced hallucinations. She is faiurly malnoursihed on exam and has evidence of macryotic anemia, so reasonable to assess for nutritonal deficienciety which are certainly possible and may be exacerbating presention.     Of note, patient has home medication of Clorazepate 7.5 mg which she takes PRN for anxiety/sleep. It was last filled on 11/27, dispensed 20 tabs. It was held for first 7 days of hospitalization and then restarted on 1/19. She has been receiving 3.75 mg BID PRN for anxiety. Based on timeline of when this medication was restarted and onset of delirium, suspect this may be significant contributor as well.     IMPRESSION  Delirium, likely multifactorial   Unspecified anxiety  Chronic insomnia     RECOMMENDATIONS  -Do NOT recommend Trazodone for insomnia as histamine antagonism may worsen delirium.   - Start Seroquel 6.25 mg at bedtime. If patient remains awake 1-2 hours after first dose, OK to give an additional 6.25 mg dose.   -Recommend avoid all narcotics as she has history of delirium in past associated with these medications (particularly has had adverse response to tramadol and oxycodone in past). Collateral also reports adverse response to Toradol in past. To be safe, recommend Tylenol PO/IV for pain management.  -Recommend holding PRN Clorazepate as likely worsening delirium.     Work-up:  -recommend  obtaining serum thiamine and B12 levels in setting of chronic malnutrition and macrocytic anemia.   -recommend thyroid studies as she has history of abnormal TSH in past   - can consider HIV and syphilis screening labs although less likely based on collateral from family   --Please check U/A (if not already done)     Delirium Guidelines  Non-Pharmacologic:  -Assess visual and hearing impairments and provide aids and communication boards.  -Assess immobility and advocate for early evaluation and intervention by physical therapy, out of bed when medically indicated, and expeditious removal of tethers.  -Promote physiologic sleep and maintenance of sleep/wake cycle by ensuring blinds are open during the day, maintaining dark/quiet room at night with minimal interruptions, and minimizing daytime naps.  -Minimize room and staff changes.  -Engage the patient in cognitively stimulating activities and provide frequent reorientation.   -At home, patient sleeps with her cats. Consider giving patient a stuffed animal as family believes this would be comforting for patient.     Pharmacologic:  -Minimize use of deliriogenic medications such as benzodiazepines, anticholinergic medications, and opiates (while ensuring adequate treatment of pain).  -Assess and treat disruption in bowel and bladder function.   -Assess and treat abnormalities in nutrition and hydration status.     --Minimize use of restraints to situations where necessary to keep patient and staff safe and to prevent patient from removing lines, tubes, medical devices, dressings, etc. Please check ROM every TWO hours whenever patient is actively restrained. Continue to regularly re-evaluate continued need for any ordered restraints.     -Discussed recommendations with primary team.  -Psychiatry will continue to follow.    Thank you for allowing us to participate in the care of this patient. Please page [phone] with any questions or concerns.    Patient  "staffed/discussed with attending, Dr. Gibson, who agrees with above plan.    Anusha Cabral MD  Post Pediatric Portal Fellow, PGY5    Medication Consent  Medication Consent: n/a; consult service     **    ATTENDING STAFFING ATTESTATION:    Saw patient for staffing on 1/21/24; agree with key elements of very thorough note as above. Per RN staff, patient less tearful today. Slept in this AM, and has been interacting well with staff.  Has sitter at bedside.    Upon interview, patient denies significant troubles today. When asked about her thinking, she exclaims \"Well, I am not hallucinating today, if that's what you mean!\" Reports that even at home she was having hallucinations \"with that one pill, the pain pill.\"  Identifies that she cannot take and tolerate heavy pain medications; also concerns for chlorazepate as above. Patient reports sleep was fair, appetite intact, spirits are good. Looking forward to sister-in-law coming to visit; comments \"She probably will have a lot to say.\"  Patient without additional concerns today; hopes that she will be able to be DC in coming days.    MSE notable for elderly F in hospital clothing, sitting up in chair next to 1:1 sitter, in NAD. Wearing glasses (of note she comments that she is [place] and cannot find her L hearing aid). Thin, bruising especially noted on L hand, EC good. Speech fluent with normal rate/tone. Mood stable, affect congruent and appropriately bright. TP linear with direction, wanders a bit when speaking extemporaneously. TC no AVH and no evidence of SI/HI. Oriented to self, \"[facility]\", Sunday in January 2024.    Agree with A/P as above; delirium likely 2/2 multiple issues but notably controlled substances. Rec to reduce/eliminate as able. Also rec thiamine supplementation 100mg PO daily. Our team will follow, please page [phone] PRN questions.    MD Keya  Attending, Psychiatry    "

## 2024-01-20 NOTE — PROGRESS NOTES
VASCULAR SURGERY PROGRESS NOTE  Subjective   Continues to be delirious overnight. Starting trazadone for sleep     Objective   Vitals:    01/20/24 0807   BP: (!) 164/103   Pulse: 79   Resp: 20   Temp: 36.6 °C (97.9 °F)   SpO2: 96%      Exam:  GENERAL APPEARANCE:  Awake, not in distress  HEART:  RRR  LUNGS:  Equal chest rise and fall, non-labored breathing  ABDOMEN:  Soft, nontender, nondistended  NEUROLOGICAL:  Grossly nonfocal. Alert, moving all 4 extremities.   Skin:  Warm and dry without any rash.  EXTREMITIES: bilateral lower extremities are warm to the touch. L DP and PT dopplerable      Relevant Results  Medications:  Scheduled Meds:  acetaminophen, 975 mg, oral, q8h  aspirin, 81 mg, oral, Daily  atenolol, 50 mg, oral, BID  atorvastatin, 80 mg, oral, Daily  clopidogrel, 75 mg, oral, Daily  escitalopram, 10 mg, oral, Daily  hydroCHLOROthiazide, 12.5 mg, oral, Daily  mirtazapine, 7.5 mg, oral, Nightly  [Held by provider] polyethylene glycol, 17 g, oral, Daily  potassium chloride, 20 mEq, intravenous, Once  spironolactone, 12.5 mg, oral, Daily  traZODone, 50 mg, oral, Nightly  valsartan, 160 mg, oral, Daily      Continuous Infusions:       PRN Meds:.  PRN medications: clorazepate, ondansetron ODT **OR** ondansetron, polyethylene glycol    Labs:  Results from last 7 days   Lab Units 01/20/24  0901 01/19/24  0630 01/18/24  0736   WBC AUTO x10*3/uL 9.8 7.9 8.9   HEMOGLOBIN g/dL 10.4* 9.5* 11.2*   PLATELETS AUTO x10*3/uL 257 184 197        Results from last 7 days   Lab Units 01/19/24  0630 01/18/24  0736 01/17/24  1113   SODIUM mmol/L 139 142 144   POTASSIUM mmol/L 3.9 3.8 3.8   CHLORIDE mmol/L 101 103 104   CO2 mmol/L 26 28 29   BUN mg/dL 28* 33* 31*   CREATININE mg/dL 1.25* 1.27* 1.41*   GLUCOSE mg/dL 65* 91 99   MAGNESIUM mg/dL 1.68 1.80 1.68   PHOSPHORUS mg/dL 3.6 2.6 2.5        Results from last 7 days   Lab Units 01/15/24  0757   INR  1.1   PROTIME seconds 12.8       Assessment/Plan   89 y.o. female with  significant PMHx as well as PAD s/p L EIA stent in September 2023 at [facility]. She presented to an OSH yesterday d/t concern for left lower extremity pain in conjunction with a new left heel ulcer. Imaging revealed bilateral SFA occlusion and additional occlusions of SMA, right internal iliac, right popliteal, right anterior tibial artery, left popliteal artery, and left anterior tibial artery. S/p L fem EA with bovine patch angioplasty, and stenting of L common and external iliac arteries on 1/18.    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  - continue tylenol for pain control  - dc oxy d/t delirium   - continue neurovascular checks every 2 hrs  - continue home mirtazapine & lexapro    CV: HTN, HLD, PAD, CAD (s/p CABG 1995), afib, aortic stenosis, renal artery stenosis (s/p L renal stent)   - maintain blood pressure control with home atenolol, holding home losartan, spironolactone-hydrochlorothiazide  -Home ana/hydrocholorathiazide and half-dose home valsartan  - continue atorvastatin/ASA  - KEIRY scan 1/16, no ischemia    Pulm:   - continue to encourage IS hourly while awake  - OOB to chair and increase ambulation as tolerated    FENGI: CKD3, hypoalbuminemia, Moderate malnutrition    - continue regular diet with oral supplements  - continue bowel regimen  - strict I&O  - RFP as clinically indicated    Endo:    - no issues     Heme: supratherapeutic INR on admission, chronic anemia   - no s/sx of bleeding  - CBC as clinically indicated    ID: ischemic ulcers   - trend temp q4  - offload heels   - appreciate wound care recs   - CBC as clinically indicated    Dispo:   - continue care on floor    Patient seen with vascular surgery fellow Dr. Tayo Addison MD  Vascular Surgery [phone]

## 2024-01-20 NOTE — CARE PLAN
The patient's goals for the shift include      The clinical goals for the shift include Patient will remain HDS throughout shift      Problem: Fall/Injury  Goal: Not fall by end of shift  Outcome: Progressing  Goal: Be free from injury by end of the shift  Outcome: Progressing  Goal: Verbalize understanding of personal risk factors for fall in the hospital  Outcome: Progressing  Goal: Verbalize understanding of risk factor reduction measures to prevent injury from fall in the home  Outcome: Progressing  Goal: Use assistive devices by end of the shift  Outcome: Progressing  Goal: Pace activities to prevent fatigue by end of the shift  Outcome: Progressing     Problem: Pain - Adult  Goal: Verbalizes/displays adequate comfort level or baseline comfort level  Outcome: Progressing     Problem: Safety - Adult  Goal: Free from fall injury  Outcome: Progressing     Problem: Discharge Planning  Goal: Discharge to home or other facility with appropriate resources  Outcome: Progressing     Problem: Chronic Conditions and Co-morbidities  Goal: Patient's chronic conditions and co-morbidity symptoms are monitored and maintained or improved  Outcome: Progressing     Problem: Skin  Goal: Prevent/manage excess moisture  Outcome: Progressing  Goal: Prevent/minimize sheer/friction injuries  Outcome: Progressing  Goal: Promote/optimize nutrition  Outcome: Progressing

## 2024-01-21 LAB
ALBUMIN SERPL BCP-MCNC: 3.7 G/DL (ref 3.4–5)
ANION GAP SERPL CALC-SCNC: 13 MMOL/L (ref 10–20)
BUN SERPL-MCNC: 33 MG/DL (ref 6–23)
CALCIUM SERPL-MCNC: 9.4 MG/DL (ref 8.6–10.6)
CHLORIDE SERPL-SCNC: 102 MMOL/L (ref 98–107)
CO2 SERPL-SCNC: 32 MMOL/L (ref 21–32)
CREAT SERPL-MCNC: 1.23 MG/DL (ref 0.5–1.05)
EGFRCR SERPLBLD CKD-EPI 2021: 43 ML/MIN/1.73M*2
ERYTHROCYTE [DISTWIDTH] IN BLOOD BY AUTOMATED COUNT: 13.9 % (ref 11.5–14.5)
GLUCOSE SERPL-MCNC: 93 MG/DL (ref 74–99)
HCT VFR BLD AUTO: 32.6 % (ref 36–46)
HGB BLD-MCNC: 10.1 G/DL (ref 12–16)
MAGNESIUM SERPL-MCNC: 2.47 MG/DL (ref 1.6–2.4)
MCH RBC QN AUTO: 32.7 PG (ref 26–34)
MCHC RBC AUTO-ENTMCNC: 31 G/DL (ref 32–36)
MCV RBC AUTO: 106 FL (ref 80–100)
NRBC BLD-RTO: 0 /100 WBCS (ref 0–0)
PHOSPHATE SERPL-MCNC: 3.7 MG/DL (ref 2.5–4.9)
PLATELET # BLD AUTO: 250 X10*3/UL (ref 150–450)
POTASSIUM SERPL-SCNC: 3.5 MMOL/L (ref 3.5–5.3)
RBC # BLD AUTO: 3.09 X10*6/UL (ref 4–5.2)
SODIUM SERPL-SCNC: 143 MMOL/L (ref 136–145)
T4 FREE SERPL-MCNC: 1.14 NG/DL (ref 0.78–1.48)
TSH SERPL-ACNC: 4.28 MIU/L (ref 0.44–3.98)
VIT B12 SERPL-MCNC: 1851 PG/ML (ref 211–911)
WBC # BLD AUTO: 8 X10*3/UL (ref 4.4–11.3)

## 2024-01-21 PROCEDURE — 2500000004 HC RX 250 GENERAL PHARMACY W/ HCPCS (ALT 636 FOR OP/ED)

## 2024-01-21 PROCEDURE — 2500000001 HC RX 250 WO HCPCS SELF ADMINISTERED DRUGS (ALT 637 FOR MEDICARE OP): Performed by: STUDENT IN AN ORGANIZED HEALTH CARE EDUCATION/TRAINING PROGRAM

## 2024-01-21 PROCEDURE — 36415 COLL VENOUS BLD VENIPUNCTURE: CPT

## 2024-01-21 PROCEDURE — 85027 COMPLETE CBC AUTOMATED: CPT

## 2024-01-21 PROCEDURE — 2500000004 HC RX 250 GENERAL PHARMACY W/ HCPCS (ALT 636 FOR OP/ED): Performed by: STUDENT IN AN ORGANIZED HEALTH CARE EDUCATION/TRAINING PROGRAM

## 2024-01-21 PROCEDURE — 2500000001 HC RX 250 WO HCPCS SELF ADMINISTERED DRUGS (ALT 637 FOR MEDICARE OP)

## 2024-01-21 PROCEDURE — 84439 ASSAY OF FREE THYROXINE: CPT

## 2024-01-21 PROCEDURE — 84443 ASSAY THYROID STIM HORMONE: CPT

## 2024-01-21 PROCEDURE — 80069 RENAL FUNCTION PANEL: CPT

## 2024-01-21 PROCEDURE — 82607 VITAMIN B-12: CPT

## 2024-01-21 PROCEDURE — 84425 ASSAY OF VITAMIN B-1: CPT

## 2024-01-21 PROCEDURE — 83735 ASSAY OF MAGNESIUM: CPT

## 2024-01-21 PROCEDURE — 2060000001 HC INTERMEDIATE ICU ROOM DAILY

## 2024-01-21 RX ORDER — ATORVASTATIN CALCIUM 80 MG/1
80 TABLET, FILM COATED ORAL NIGHTLY
Status: DISCONTINUED | OUTPATIENT
Start: 2024-01-21 | End: 2024-01-25 | Stop reason: HOSPADM

## 2024-01-21 RX ORDER — VALSARTAN 320 MG/1
320 TABLET ORAL DAILY
Status: DISCONTINUED | OUTPATIENT
Start: 2024-01-21 | End: 2024-01-25 | Stop reason: HOSPADM

## 2024-01-21 RX ORDER — LANOLIN ALCOHOL/MO/W.PET/CERES
100 CREAM (GRAM) TOPICAL DAILY
Status: DISCONTINUED | OUTPATIENT
Start: 2024-01-21 | End: 2024-01-23

## 2024-01-21 RX ORDER — POTASSIUM CHLORIDE 20 MEQ/1
40 TABLET, EXTENDED RELEASE ORAL ONCE
Status: COMPLETED | OUTPATIENT
Start: 2024-01-21 | End: 2024-01-21

## 2024-01-21 RX ORDER — ACETAMINOPHEN 325 MG/1
975 TABLET ORAL EVERY 6 HOURS
Status: DISCONTINUED | OUTPATIENT
Start: 2024-01-21 | End: 2024-01-25 | Stop reason: HOSPADM

## 2024-01-21 RX ADMIN — ACETAMINOPHEN 975 MG: 325 TABLET ORAL at 12:43

## 2024-01-21 RX ADMIN — ESCITALOPRAM OXALATE 10 MG: 10 TABLET, FILM COATED ORAL at 10:29

## 2024-01-21 RX ADMIN — SPIRONOLACTONE 12.5 MG: 25 TABLET, FILM COATED ORAL at 10:29

## 2024-01-21 RX ADMIN — ASPIRIN 81 MG: 81 TABLET, COATED ORAL at 10:29

## 2024-01-21 RX ADMIN — ACETAMINOPHEN 975 MG: 325 TABLET ORAL at 18:06

## 2024-01-21 RX ADMIN — ATENOLOL 50 MG: 50 TABLET ORAL at 21:04

## 2024-01-21 RX ADMIN — VALSARTAN 320 MG: 320 TABLET, FILM COATED ORAL at 10:29

## 2024-01-21 RX ADMIN — MIRTAZAPINE 7.5 MG: 7.5 TABLET, FILM COATED ORAL at 21:04

## 2024-01-21 RX ADMIN — ACETAMINOPHEN 650 MG: 10 INJECTION INTRAVENOUS at 04:36

## 2024-01-21 RX ADMIN — HYDROCHLOROTHIAZIDE 12.5 MG: 25 TABLET ORAL at 10:29

## 2024-01-21 RX ADMIN — POTASSIUM CHLORIDE 40 MEQ: 1500 TABLET, EXTENDED RELEASE ORAL at 10:29

## 2024-01-21 RX ADMIN — THIAMINE HCL TAB 100 MG 100 MG: 100 TAB at 18:06

## 2024-01-21 RX ADMIN — ATORVASTATIN CALCIUM 80 MG: 80 TABLET, FILM COATED ORAL at 21:04

## 2024-01-21 RX ADMIN — QUETIAPINE FUMARATE 12.5 MG: 25 TABLET ORAL at 21:04

## 2024-01-21 RX ADMIN — ATENOLOL 50 MG: 50 TABLET ORAL at 10:29

## 2024-01-21 RX ADMIN — CLOPIDOGREL BISULFATE 75 MG: 75 TABLET ORAL at 10:29

## 2024-01-21 ASSESSMENT — COGNITIVE AND FUNCTIONAL STATUS - GENERAL
PERSONAL GROOMING: A LITTLE
MOBILITY SCORE: 19
DRESSING REGULAR UPPER BODY CLOTHING: A LITTLE
TOILETING: A LITTLE
MOVING TO AND FROM BED TO CHAIR: A LITTLE
DAILY ACTIVITIY SCORE: 19
CLIMB 3 TO 5 STEPS WITH RAILING: A LOT
HELP NEEDED FOR BATHING: A LITTLE
TOILETING: A LITTLE
MOVING TO AND FROM BED TO CHAIR: A LITTLE
DRESSING REGULAR LOWER BODY CLOTHING: A LITTLE
WALKING IN HOSPITAL ROOM: A LITTLE
DRESSING REGULAR UPPER BODY CLOTHING: A LITTLE
PERSONAL GROOMING: A LITTLE
DRESSING REGULAR LOWER BODY CLOTHING: A LITTLE
STANDING UP FROM CHAIR USING ARMS: A LITTLE
CLIMB 3 TO 5 STEPS WITH RAILING: A LOT
DAILY ACTIVITIY SCORE: 19
HELP NEEDED FOR BATHING: A LITTLE
MOBILITY SCORE: 19
STANDING UP FROM CHAIR USING ARMS: A LITTLE
WALKING IN HOSPITAL ROOM: A LITTLE

## 2024-01-21 ASSESSMENT — PAIN SCALES - GENERAL
PAINLEVEL_OUTOF10: 0 - NO PAIN
PAINLEVEL_OUTOF10: 0 - NO PAIN

## 2024-01-21 ASSESSMENT — PAIN - FUNCTIONAL ASSESSMENT: PAIN_FUNCTIONAL_ASSESSMENT: 0-10

## 2024-01-21 NOTE — SIGNIFICANT EVENT
PSYCHIATRY ATTENDING NOTE:    Saw patient for consult staffing today (1/21/24); please refer to complete Psychiatry consultation note from 1/20/24 for details. In sum, agree with Delirium likely related to controlled substances on top of other risk factors (recent surgery, relative nutritional status, age).    RECS:   Rec thiamine supplementation 100mg PO daily  Reduce/eliminate opioids, benzos, anticholinergics  Ensure routine delirium labs are completed    Our team will followup on 1/22/24; please page [phone] PRN questions.    MD Keya  Attending, Psychiatry

## 2024-01-21 NOTE — PROGRESS NOTES
Discussed SNF recommendation with sister in law, Vicenta. Agreeable to pursue SNF. Choice list provided.    Felicia Salas, BIMALW

## 2024-01-21 NOTE — PROGRESS NOTES
VASCULAR SURGERY PROGRESS NOTE  Subjective   Continues to have waning and waxing delirium.  No other overnight events.  Psych on board.  Started Seroquel overnight.  No new concents.    Objective   Vitals:    01/21/24 0815   BP: 150/64   Pulse: 80   Resp: 18   Temp: 36.3 °C (97.3 °F)   SpO2: 92%      Exam:  GENERAL APPEARANCE:  Awake, not in distress. Somewhat confused.  HEART:  RRR  LUNGS:  Equal chest rise and fall, non-labored breathing  ABDOMEN:  Soft, nontender, nondistended  NEUROLOGICAL:  Grossly nonfocal. Alert, moving all 4 extremities.   Skin:  Warm and dry without any rash.  EXTREMITIES: bilateral lower extremities are warm to the touch. L DP and PT dopplerable    Relevant Results  Medications:  Scheduled Meds:  acetaminophen, 15 mg/kg, intravenous, q6h  aspirin, 81 mg, oral, Daily  atenolol, 50 mg, oral, BID  atorvastatin, 80 mg, oral, Nightly  clopidogrel, 75 mg, oral, Daily  escitalopram, 10 mg, oral, Daily  hydroCHLOROthiazide, 12.5 mg, oral, Daily  mirtazapine, 7.5 mg, oral, Nightly  [Held by provider] polyethylene glycol, 17 g, oral, Daily  potassium chloride, 20 mEq, intravenous, Once  potassium chloride CR, 40 mEq, oral, Once  QUEtiapine, 12.5 mg, oral, Nightly  spironolactone, 12.5 mg, oral, Daily  valsartan, 320 mg, oral, Daily      Continuous Infusions:       PRN Meds:.  PRN medications: ondansetron ODT **OR** ondansetron, polyethylene glycol    Labs:  Results from last 7 days   Lab Units 01/21/24  0653 01/20/24  0901 01/19/24  0630   WBC AUTO x10*3/uL 8.0 9.8 7.9   HEMOGLOBIN g/dL 10.1* 10.4* 9.5*   PLATELETS AUTO x10*3/uL 250 257 184        Results from last 7 days   Lab Units 01/21/24  0653 01/20/24  0901 01/19/24  0630   SODIUM mmol/L 143 138 139   POTASSIUM mmol/L 3.5 4.1 3.9   CHLORIDE mmol/L 102 98 101   CO2 mmol/L 32 28 26   BUN mg/dL 33* 36* 28*   CREATININE mg/dL 1.23* 1.30* 1.25*   GLUCOSE mg/dL 93 86 65*   MAGNESIUM mg/dL 2.47* 3.19* 1.68   PHOSPHORUS mg/dL 3.7 1.9* 3.6         Results from last 7 days   Lab Units 01/15/24  0757   INR  1.1   PROTIME seconds 12.8       Assessment/Plan   89 y.o. female with significant PMHx as well as PAD s/p L EIA stent in September 2023 at [facility]. She presented to an OSH yesterday d/t concern for left lower extremity pain in conjunction with a new left heel ulcer. Imaging revealed bilateral SFA occlusion and additional occlusions of SMA, right internal iliac, right popliteal, right anterior tibial artery, left popliteal artery, and left anterior tibial artery. S/p L fem EA with bovine patch angioplasty, and stenting of L common and external iliac arteries on 1/18.    1/21: Psych on board - started Seroquel last night. Tolerating.    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  - continue tylenol for pain control  - dc oxy d/t delirium   - continue neurovascular checks every 2 hrs  - continue home mirtazapine & lexapro  - Continue Seroquel (1/21)    CV: HTN, HLD, PAD, CAD (s/p CABG 1995), afib, aortic stenosis, renal artery stenosis (s/p L renal stent)   - maintain blood pressure control with home atenolol, holding home losartan, spironolactone-hydrochlorothiazide  -Home ana/hydrocholorathiazide and half-dose home valsartan  - continue atorvastatin/ASA  - KEIRY scan 1/16, no ischemia    Pulm:   - continue to encourage IS hourly while awake  - OOB to chair and increase ambulation as tolerated    FENGI: CKD3, hypoalbuminemia, Moderate malnutrition    - continue regular diet with oral supplements  - continue bowel regimen  - strict I&O  - RFP as clinically indicated    Endo:    - no issues     Heme: supratherapeutic INR on admission, chronic anemia   - no s/sx of bleeding  - CBC as clinically indicated    ID: ischemic ulcers   - trend temp q4  - offload heels   - appreciate wound care recs   - CBC as clinically indicated    Dispo:   - continue care on floor    Discussed with attending surgeon.    John  MD Meseret  Vascular Surgery, PGY3  Team Pager: [phone]

## 2024-01-22 ENCOUNTER — APPOINTMENT (OUTPATIENT)
Dept: WOUND CARE | Facility: CLINIC | Age: 89
End: 2024-01-22
Payer: MEDICARE

## 2024-01-22 LAB
ALBUMIN SERPL BCP-MCNC: 3.7 G/DL (ref 3.4–5)
ANION GAP SERPL CALC-SCNC: 13 MMOL/L (ref 10–20)
BUN SERPL-MCNC: 31 MG/DL (ref 6–23)
CALCIUM SERPL-MCNC: 9.6 MG/DL (ref 8.6–10.6)
CHLORIDE SERPL-SCNC: 104 MMOL/L (ref 98–107)
CO2 SERPL-SCNC: 27 MMOL/L (ref 21–32)
CREAT SERPL-MCNC: 1.12 MG/DL (ref 0.5–1.05)
EGFRCR SERPLBLD CKD-EPI 2021: 48 ML/MIN/1.73M*2
ERYTHROCYTE [DISTWIDTH] IN BLOOD BY AUTOMATED COUNT: 13.7 % (ref 11.5–14.5)
GLUCOSE SERPL-MCNC: 107 MG/DL (ref 74–99)
HCT VFR BLD AUTO: 37 % (ref 36–46)
HGB BLD-MCNC: 11 G/DL (ref 12–16)
MAGNESIUM SERPL-MCNC: 2.35 MG/DL (ref 1.6–2.4)
MCH RBC QN AUTO: 31.9 PG (ref 26–34)
MCHC RBC AUTO-ENTMCNC: 29.7 G/DL (ref 32–36)
MCV RBC AUTO: 107 FL (ref 80–100)
NRBC BLD-RTO: 0 /100 WBCS (ref 0–0)
PHOSPHATE SERPL-MCNC: 2.4 MG/DL (ref 2.5–4.9)
PLATELET # BLD AUTO: 301 X10*3/UL (ref 150–450)
POTASSIUM SERPL-SCNC: 5.1 MMOL/L (ref 3.5–5.3)
RBC # BLD AUTO: 3.45 X10*6/UL (ref 4–5.2)
SODIUM SERPL-SCNC: 139 MMOL/L (ref 136–145)
WBC # BLD AUTO: 9.3 X10*3/UL (ref 4.4–11.3)

## 2024-01-22 PROCEDURE — 36415 COLL VENOUS BLD VENIPUNCTURE: CPT

## 2024-01-22 PROCEDURE — 2500000001 HC RX 250 WO HCPCS SELF ADMINISTERED DRUGS (ALT 637 FOR MEDICARE OP): Performed by: STUDENT IN AN ORGANIZED HEALTH CARE EDUCATION/TRAINING PROGRAM

## 2024-01-22 PROCEDURE — 2060000001 HC INTERMEDIATE ICU ROOM DAILY

## 2024-01-22 PROCEDURE — 80069 RENAL FUNCTION PANEL: CPT

## 2024-01-22 PROCEDURE — 99233 SBSQ HOSP IP/OBS HIGH 50: CPT | Performed by: CLINICAL NURSE SPECIALIST

## 2024-01-22 PROCEDURE — 2500000001 HC RX 250 WO HCPCS SELF ADMINISTERED DRUGS (ALT 637 FOR MEDICARE OP)

## 2024-01-22 PROCEDURE — 85027 COMPLETE CBC AUTOMATED: CPT

## 2024-01-22 PROCEDURE — 97110 THERAPEUTIC EXERCISES: CPT | Mod: GP,CQ

## 2024-01-22 PROCEDURE — 2500000004 HC RX 250 GENERAL PHARMACY W/ HCPCS (ALT 636 FOR OP/ED)

## 2024-01-22 PROCEDURE — 2500000004 HC RX 250 GENERAL PHARMACY W/ HCPCS (ALT 636 FOR OP/ED): Performed by: STUDENT IN AN ORGANIZED HEALTH CARE EDUCATION/TRAINING PROGRAM

## 2024-01-22 PROCEDURE — 83735 ASSAY OF MAGNESIUM: CPT

## 2024-01-22 PROCEDURE — 97116 GAIT TRAINING THERAPY: CPT | Mod: GP,CQ

## 2024-01-22 RX ADMIN — THIAMINE HCL TAB 100 MG 100 MG: 100 TAB at 08:53

## 2024-01-22 RX ADMIN — CLOPIDOGREL BISULFATE 75 MG: 75 TABLET ORAL at 08:53

## 2024-01-22 RX ADMIN — ATENOLOL 50 MG: 50 TABLET ORAL at 21:20

## 2024-01-22 RX ADMIN — ACETAMINOPHEN 975 MG: 325 TABLET ORAL at 11:46

## 2024-01-22 RX ADMIN — MIRTAZAPINE 7.5 MG: 7.5 TABLET, FILM COATED ORAL at 21:22

## 2024-01-22 RX ADMIN — ACETAMINOPHEN 975 MG: 325 TABLET ORAL at 08:15

## 2024-01-22 RX ADMIN — SPIRONOLACTONE 12.5 MG: 25 TABLET, FILM COATED ORAL at 08:53

## 2024-01-22 RX ADMIN — ATENOLOL 50 MG: 50 TABLET ORAL at 08:53

## 2024-01-22 RX ADMIN — ACETAMINOPHEN 975 MG: 325 TABLET ORAL at 18:15

## 2024-01-22 RX ADMIN — ASPIRIN 81 MG: 81 TABLET, COATED ORAL at 08:53

## 2024-01-22 RX ADMIN — QUETIAPINE FUMARATE 12.5 MG: 25 TABLET ORAL at 21:22

## 2024-01-22 RX ADMIN — ATORVASTATIN CALCIUM 80 MG: 80 TABLET, FILM COATED ORAL at 21:20

## 2024-01-22 RX ADMIN — VALSARTAN 320 MG: 320 TABLET, FILM COATED ORAL at 08:53

## 2024-01-22 RX ADMIN — ESCITALOPRAM OXALATE 10 MG: 10 TABLET, FILM COATED ORAL at 08:53

## 2024-01-22 RX ADMIN — HYDROCHLOROTHIAZIDE 12.5 MG: 25 TABLET ORAL at 08:53

## 2024-01-22 ASSESSMENT — COGNITIVE AND FUNCTIONAL STATUS - GENERAL
WALKING IN HOSPITAL ROOM: A LITTLE
MOVING TO AND FROM BED TO CHAIR: A LITTLE
TURNING FROM BACK TO SIDE WHILE IN FLAT BAD: A LITTLE
MOBILITY SCORE: 17
MOVING TO AND FROM BED TO CHAIR: A LITTLE
CLIMB 3 TO 5 STEPS WITH RAILING: A LOT
TOILETING: A LITTLE
DRESSING REGULAR UPPER BODY CLOTHING: A LITTLE
MOBILITY SCORE: 17
HELP NEEDED FOR BATHING: A LITTLE
MOVING FROM LYING ON BACK TO SITTING ON SIDE OF FLAT BED WITH BEDRAILS: A LITTLE
MOVING FROM LYING ON BACK TO SITTING ON SIDE OF FLAT BED WITH BEDRAILS: A LITTLE
DRESSING REGULAR LOWER BODY CLOTHING: A LITTLE
TURNING FROM BACK TO SIDE WHILE IN FLAT BAD: A LITTLE
DAILY ACTIVITIY SCORE: 19
STANDING UP FROM CHAIR USING ARMS: A LITTLE
PERSONAL GROOMING: A LITTLE
WALKING IN HOSPITAL ROOM: A LITTLE
STANDING UP FROM CHAIR USING ARMS: A LITTLE
CLIMB 3 TO 5 STEPS WITH RAILING: A LOT

## 2024-01-22 ASSESSMENT — PAIN SCALES - GENERAL: PAINLEVEL_OUTOF10: 0 - NO PAIN

## 2024-01-22 ASSESSMENT — PAIN - FUNCTIONAL ASSESSMENT: PAIN_FUNCTIONAL_ASSESSMENT: 0-10

## 2024-01-22 NOTE — OP NOTE
Endarterectomy Lower Extremity (L), Angiogram Lower Extremity (L) Operative Note     Date: 2024  OR Location: [facility] OR    Name: Maggi Gordon YOB: 1935, Age: 89 y.o., MRN: [id number], Sex: female    Diagnosis  Pre-op Diagnosis     * Peripheral vascular disease (CMS/HCC) [I73.9] Post-op Diagnosis     * Peripheral vascular disease (CMS/HCC) [I73.9]     Procedures  Endarterectomy Lower Extremity  64733 - WA TEAEC W/WO PATCH GRAFT COMMON FEMORAL    Angiogram Lower Extremity  38368 - CHG ANGIOGRAPHY EXTREMITY UNILATERAL RS&I      Surgeons      * Pura Montes - Primary     * John Carl    Resident/Fellow/Other Assistant:  Surgeon(s) and Role:     * John Carl MD    Procedure Summary  Anesthesia: General  ASA: III  Anesthesia Staff: Anesthesiologist: Serafin Rizzo MD; Harris Jackson MD  C-AA: ALIX Perea  Anesthesia Resident: Eben Vizcaino DO  Estimated Blood Loss: 200 mL  Intra-op Medications:   Medication Name Total Dose   iodixanol (VISIPaque) 320 mg iodine/mL injection 47.5 mL   heparin 25,000 Units in dextrose 5% 250 mL (100 Units/mL) infusion (premix) Cannot be calculated   lactated Ringer's infusion 183.33 mL   oxygen (O2) therapy 135 L         Intraprocedure I/O Totals       None           Specimen:   ID Type Source Tests Collected by Time   1 : LEFT FEMORAL PLAQUE Tissue PLAQUE SURGICAL PATHOLOGY EXAM Pura Montes MD 2024 1432        Staff:   Circulator: Serafin Mascorro RN; Sadia Pop RN  Scrub Person: Yas Bernabe; Harris Pena; Cassy Peters         Drains and/or Catheters:   [REMOVED] Urethral Catheter Non-latex 16 Fr. (Removed)   Site Assessment Clean;Skin intact 24 1320   Collection Container Standard drainage bag 24 1726   Securement Method Securing device (Describe) 24 1726   Reason for Continuing Urinary Catheterization surgical procedures: urological/gynecological, pelvic oncology, anal, prolonged surgical procedure 24 2300    Output (mL) 150 mL 01/19/24 1400       Tourniquet Times:         Implants:  Implants       Type Name Action Serial No.      Implant GRAFT, XENOSURE, BIOLOGIC PATCH, 0.8CM X 8CM - [id number] Implanted      Other Cardiac Implant STENT, VIABAHN VBX BALLOON, 8 X 39, 7FR 135CM HEPARIN - [id number] Implanted [id number]     Stent STENT SYSTEM, EVERFLEX, 7 X 40 X 80CM - [id number] Implanted               Findings: Significant inflow disease    Indications: Maggi Gordon is an 89 y.o. female who is having surgery for Peripheral vascular disease (CMS/[facility]) [I73.9]. Patient has history of left EIA stent placed with worsening left foot pain and wounds. CTA significant for bilateral SFA occlusions, multilevel disease bilaterally. She was deemed RCRI 2 after a negative stress test by cardiology.     The patient was seen in the preoperative area. The risks, benefits, complications, treatment options, non-operative alternatives, expected recovery and outcomes were discussed with the patient. The possibilities of reaction to medication, pulmonary aspiration, injury to surrounding structures, bleeding, recurrent infection, the need for additional procedures, failure to diagnose a condition, and creating a complication requiring transfusion or operation were discussed with the patient. The patient concurred with the proposed plan, giving informed consent.  The site of surgery was properly noted/marked if necessary per policy. The patient has been actively warmed in preoperative area. Preoperative antibiotics have been ordered and given within 1 hours of incision. Venous thrombosis prophylaxis have been ordered including chemical prophylaxis    Procedure Details:   #15 blade used to create a left femoral scimitar incision. Electrocautery used to dissect through subcutaneous tissue until inguinal ligament visualized. The artery was palpated and femoral sheath incised. Dissection continued until the distal external iliac, common femoral  artery, superficial femoral artery, and profunda artery were exposed and controlled with vessel loops. Patient was systemically heparinized and serial ACT > 250 checked.    Arteries were clamped and #11 blade used to create an arteriotomy which was extended with Stovall scissors. Large plaque removed from the common femoral artery with freer elevator and carefully tapered to endpoints proximally and into the profunda. At this point I did check the inflow due to the heavy calcium burden that was palpable, and there was no blood noted. Though we were able to endarterectomize up to the point to distal EIA, there was clearly residual disease more proximal. Heparinized saline was used to ensure no lifting flaps. A 7-0 tacking suture was used to tack down the profunda posteriorly. A bovine patch was used for patch angioplasty using 6-0 prolene. Flushing maneuvers performed prior to completion of anastomosis.     The patch was directly accessed in retrograde fashion with a micropuncture access kit. A sheath was inserted and able to cross a significant distal EIA lesion that was clearly flow limiting. There was a second lesion in the proximal KAMI as well. This was treated with an 8x39 VBX with excellent resolution of stenosis. The EIA was treated with a 7x40 Everflex, though there was still some residual stenosis distally from a large calcific plaque. This was postdilated with a 7mm balloon with some improvement.    The pulse was bounding at this point. Sheath removed and arteriotomy closed with 6-0 prolene. Wound copiously irrigated with irrisept. Protamine given and hemostatic agents applied. Wound closed in multiple layers with 2-0 and 3-0 vicryl, 4-0 monocryl for the skin followed by dermabond    Complications:  None; patient tolerated the procedure well.    Disposition: PACU - hemodynamically stable.  Condition: stable         Additional Details: Multiphasic AT    Attending Attestation:     Pura Montes  Phone Number:  [phone]

## 2024-01-22 NOTE — PROGRESS NOTES
Physical Therapy    Physical Therapy Treatment    Patient Name: Maggi Gordon  MRN: [id number]  Today's Date: 1/22/2024  Time Calculation  Start Time: 1255  Stop Time: 1320  Time Calculation (min): 25 min       Assessment/Plan   PT Assessment  PT Assessment Results: Decreased strength, Decreased endurance, Impaired balance, Decreased mobility, Decreased coordination, Decreased cognition, Impaired judgement, Decreased safety awareness, Impaired hearing  Rehab Prognosis: Good  End of Session Communication: Bedside nurse  Assessment Comment: Patient remains appropriate for continued therapy upon discharge at a moderate intensity level to focus on improved functional mobility, balance and activity tolerance.  End of Session Patient Position: Up in chair, Alarm on (sitter present)     PT Plan  Treatment/Interventions: Bed mobility, Transfer training, Gait training, Balance training, Strengthening, Endurance training, Therapeutic exercise, Therapeutic activity, Home exercise program  PT Plan: Skilled PT  PT Frequency: 3 times per week  PT Discharge Recommendations: Moderate intensity level of continued care  Equipment Recommended upon Discharge:  (none)  PT Recommended Transfer Status: Contact guard  PT - OK to Discharge: Yes    General Visit Information:   PT  Visit  PT Received On: 01/22/24  General  Reason for Referral: Left acute lower limb ischemia s/p left endarterectomy and angiogram  Past Medical History Relevant to Rehab: CAD s/p CABG, [place] carotid  artery stenosis, HTN, renal artery stenosis, hypothyroidism, HLD, goiter, aortic stenosis s/p AVR, PAD, A-fib  Prior to Session Communication: Bedside nurse  Patient Position Received: Up in chair, Alarm on (sitter present)  Preferred Learning Style: verbal, visual, auditory  General Comment: RN cleared patient to work with therapy. Patient pleasant and agreeable to therapy session.    Subjective   Precautions:  Precautions  Hearing/Visual Limitations: [facility] has right  hearing aid; vision WFL with glasses  LE Weight Bearing Status: Weight Bearing as Tolerated (L LE)  Medical Precautions: Fall precautions  Precautions Comment: Pt in compliance with precaution throughout therapy session.    Objective   Pain:  Pain Assessment  Pain Assessment: 0-10  Pain Score: 0 - No pain  Cognition:  Cognition  Overall Cognitive Status: Impaired at baseline  Orientation Level: Disoriented to time    Activity Tolerance:  Activity Tolerance  Endurance: Tolerates 30 min exercise with multiple rests  Treatments:  Therapeutic Exercise  Therapeutic Exercise Performed: Yes  Therapeutic Exercise Activity 1: Seated Bilateral LE: ankle pumps, LAQ, marching, HS x10 each  Therapeutic Exercise Activity 2: Seated Bilateral UE: bicep curls, shoulder raises, forward chest press x10 each    Bed Mobility  Bed Mobility: No    Ambulation/Gait Training  Ambulation/Gait Training Performed: Yes  Ambulation/Gait Training 1  Surface 1: Level tile  Device 1: Rolling walker  Assistance 1: Contact guard  Quality of Gait 1: Decreased step length, Narrow base of support  Comments/Distance (ft) 1: 75 feet (Patient requires cuing to stay inside FWW at all times and widen DARLING during ambulation in order to increase safety.)  Transfers  Transfer: Yes  Transfer 1  Transfer From 1: Chair with arms to  Transfer to 1: Stand  Transfer Device 1: Walker  Transfer Level of Assistance 1: Close supervision, Minimal verbal cues  Transfers 2  Transfer From 2: Stand to  Transfer to 2: Chair with arms  Transfer Device 2: Walker  Transfer Level of Assistance 2: Moderate verbal cues, Moderate tactile cues, Moderate assistance  Trials/Comments 2:  (Required verbal/tactile cues to reach hands back onto armrests prior to descent to increase safety.)    Outcome Measures:  [facility] Basic Mobility  Turning from your back to your side while in a flat bed without using bedrails: A little  Moving from lying on your back to sitting on the side of a flat bed  without using bedrails: A little  Moving to and from bed to chair (including a wheelchair): A little  Standing up from a chair using your arms (e.g. wheelchair or bedside chair): A little  To walk in hospital room: A little  Climbing 3-5 steps with railing: A lot  Basic Mobility - Total Score: 17      Encounter Problems       Encounter Problems (Active)       Balance       STG - Maintains supervision assist dynamic standing balance with upper extremity support using a wheeled walker. (Progressing)       Start:  01/19/24    Expected End:  02/02/24       I         STG - Maintains supervision assist static standing balance with upper extremity support using a wheeled walker. (Progressing)       Start:  01/19/24    Expected End:  02/02/24               Mobility       STG - Patient will ambulate 300ft using a wheeled walker with supervision assistance. (Progressing)       Start:  01/19/24    Expected End:  02/02/24               Pain - Adult          Transfers       STG - Transfer from bed to chair using a wheeled walker with supervision assistance. (Progressing)       Start:  01/19/24    Expected End:  02/02/24            STG - Patient to transfer to and from sit to supine with supervision assistance. (Progressing)       Start:  01/19/24    Expected End:  02/02/24            STG - Patient will transfer sit to and from stand using a wheeled walker with supervision assistance. (Progressing)       Start:  01/19/24    Expected End:  02/02/24

## 2024-01-22 NOTE — PROGRESS NOTES
VASCULAR SURGERY PROGRESS NOTE  Subjective   No overnight events.  Continues to have waning and waxing delirium, but this has been improving.  Podiatry provided wound care recs for L foot yesterday.  Psych providing recommendations.  Denies pain.  No new concents.    Objective   Vitals:    01/22/24 0314   BP: 155/74   Pulse: 74   Resp: 17   Temp: 36.6 °C (97.9 °F)   SpO2: 97%      Exam:  GENERAL APPEARANCE:  Awake, not in distress. Somewhat confused.  HEART:  RRR  LUNGS:  Equal chest rise and fall, non-labored breathing  ABDOMEN:  Soft, nontender, nondistended  NEUROLOGICAL:  Grossly nonfocal. Alert, moving all 4 extremities.   Skin:  Warm and dry without any rash.  EXTREMITIES: bilateral lower extremities are warm to the touch. L DP and PT dopplerable    Relevant Results  Medications:  Scheduled Meds:  acetaminophen, 975 mg, oral, q6h  aspirin, 81 mg, oral, Daily  atenolol, 50 mg, oral, BID  atorvastatin, 80 mg, oral, Nightly  clopidogrel, 75 mg, oral, Daily  escitalopram, 10 mg, oral, Daily  hydroCHLOROthiazide, 12.5 mg, oral, Daily  mirtazapine, 7.5 mg, oral, Nightly  [Held by provider] polyethylene glycol, 17 g, oral, Daily  potassium chloride, 20 mEq, intravenous, Once  QUEtiapine, 12.5 mg, oral, Nightly  spironolactone, 12.5 mg, oral, Daily  thiamine, 100 mg, oral, Daily  valsartan, 320 mg, oral, Daily      Continuous Infusions:       PRN Meds:.  PRN medications: ondansetron ODT **OR** ondansetron, polyethylene glycol    Labs:  Results from last 7 days   Lab Units 01/21/24  0653 01/20/24  0901 01/19/24  0630   WBC AUTO x10*3/uL 8.0 9.8 7.9   HEMOGLOBIN g/dL 10.1* 10.4* 9.5*   PLATELETS AUTO x10*3/uL 250 257 184        Results from last 7 days   Lab Units 01/21/24  0653 01/20/24  0901 01/19/24  0630   SODIUM mmol/L 143 138 139   POTASSIUM mmol/L 3.5 4.1 3.9   CHLORIDE mmol/L 102 98 101   CO2 mmol/L 32 28 26   BUN mg/dL 33* 36* 28*   CREATININE mg/dL 1.23* 1.30* 1.25*   GLUCOSE mg/dL 93 86 65*   MAGNESIUM mg/dL  2.47* 3.19* 1.68   PHOSPHORUS mg/dL 3.7 1.9* 3.6        Results from last 7 days   Lab Units 01/15/24  0757   INR  1.1   PROTIME seconds 12.8       Assessment/Plan   89 y.o. female with significant PMHx as well as PAD s/p L EIA stent in September 2023 at [facility]. She presented to an OSH yesterday d/t concern for left lower extremity pain in conjunction with a new left heel ulcer. Imaging revealed bilateral SFA occlusion and additional occlusions of SMA, right internal iliac, right popliteal, right anterior tibial artery, left popliteal artery, and left anterior tibial artery. S/p L fem EA with bovine patch angioplasty, and stenting of L common and external iliac arteries on 1/18.    1/21: Psych on board - started Seroquel last night. Tolerating.  1/22: Continuing to manage delirium using delirium precautions and avoiding culprit medications.    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  - Continue tylenol for pain control  - D/C oxy d/t delirium   - Continue neurovascular checks every 2 hrs  - Continue home mirtazapine & lexapro  - Continue Seroquel     CV: HTN, HLD, PAD, CAD (s/p CABG 1995), afib, aortic stenosis, renal artery stenosis (s/p L renal stent)   - Continue home atenolol  - Home ana/hydrocholorathiazide and half-dose home valsartan  - Continue atorvastatin/ASA  - KEIRY scan 1/16, no ischemia    Pulm:   - Continue to encourage IS hourly while awake  - OOB to chair and increase ambulation as tolerated    FENGI: CKD3, hypoalbuminemia, Moderate malnutrition    - Continue regular diet with oral supplements  - Continue bowel regimen  - Strict I&O    Endo:    - no issues     Heme: supratherapeutic INR on admission, chronic anemia   - no s/sx of bleeding  - CBC as clinically indicated    ID: ischemic ulcers   - trend temp q4  - offload heels     Dispo:   - Continue care on floor  - Has been choiced for SNF    Discussed with attending surgeon.    John  MD Meseret  Vascular Surgery, PGY3  Team Pager: [phone]

## 2024-01-22 NOTE — CARE PLAN
The patient's goals for the shift include      The clinical goals for the shift include pt will remain HDS and sleep at least four hours by end of shift      Problem: Fall/Injury  Goal: Not fall by end of shift  Outcome: Progressing  Goal: Be free from injury by end of the shift  Outcome: Progressing  Goal: Verbalize understanding of personal risk factors for fall in the hospital  Outcome: Progressing  Goal: Verbalize understanding of risk factor reduction measures to prevent injury from fall in the home  Outcome: Progressing  Goal: Use assistive devices by end of the shift  Outcome: Progressing  Goal: Pace activities to prevent fatigue by end of the shift  Outcome: Progressing     Problem: Pain - Adult  Goal: Verbalizes/displays adequate comfort level or baseline comfort level  Outcome: Progressing     Problem: Safety - Adult  Goal: Free from fall injury  Outcome: Progressing     Problem: Discharge Planning  Goal: Discharge to home or other facility with appropriate resources  Outcome: Progressing     Problem: Chronic Conditions and Co-morbidities  Goal: Patient's chronic conditions and co-morbidity symptoms are monitored and maintained or improved  Outcome: Progressing     Problem: Skin  Goal: Prevent/manage excess moisture  Outcome: Progressing  Goal: Prevent/minimize sheer/friction injuries  Outcome: Progressing  Goal: Promote/optimize nutrition  Outcome: Progressing

## 2024-01-22 NOTE — PROGRESS NOTES
"Maggi Gordon/[id number] is a 89 y.o. female on day 10 of admission presenting with Acute lower limb ischemia.        Subjective     Pt awake, resting in bed, after noted to be standing at bedside with the help of nursing, before laying back down.     Pt reports her mood is, \"Ok\" today.     Pt denies c/o pain, although reports left foot pain/discomfort occurring each day. Pt reports that she ambulates with a walker, at baseline.    Pt denies SI/HI.    When she was asked about AVH, she replied, \"I was [before] but I'm better now\". Pt denies paranoid delusions, racing thoughts, and confusion.    Pt denies feelings of anxiety and she denies feelings of anger/frustration/irritability.    When she was asked about feelings of depression, she replied, \"I was, but it's gotten better\".     Pt reports her appetite is \"good\" clarifying, \"when I get  something that I like [to eat],\" and admits she won't eat if it's something she doesn't like to eat.     Pt denies issues with bladder and bowel movements and reports her last BM was yesterday, although reports \"very small\" BM earlier today.     Pt reports sleeping \"really good\" at bedtime.     Pt reports support from her family, primarily her sister-in-law, Vicenta, who comes by to visit her. Pt has a cell phone at bedside, although jokes, \"I'll be ready to answer it, if anyone calls, although it never rings!\"     Pt reports staff and treatment teams have been treating her \"fine\" during this admission.            --ADDITIONAL INFORMATION:      OARRS/PDMP Reviewed. Pt has been on PRN Clorazepate/Tranxene (benzodiazepines) 7.5mg by mouth every evening at bedtime since March 2022, thru internal medicine and family medicine providers. Pt was last prescribed meds on 11/27/2023 with no refills provided.         Pharmacy:  [facility] [phone] [place] [phone]             Objective     Pt is pleasant, cooperative, and engaging on psych assessment. "                 PSYCHIATRIC REVIEW OF SYSTEMS:  Depression: Pt denies    Anger: Pt denies Anger/Frustration/Irritability  Psychosis: Pt denies AVH; No Ideas of Reference  Delusions: Pt denies Paranoid Delusions   Leigh Ann: No reports or concerns for recent or remote hypomanic or manic episodes.  OLLIE: Pt denies   OCD: No complaints or symptoms  reported of OCD.  PTSD: No complaints or concerns for PTSD   Memory/Concentration: Limited  Delirium: Waxing/Waning mentation   Safety: Pt denies SI/HI. Pt denies PDW. No history of prior SA.        Physical Exam  Constitutional:       Appearance: Normal appearance.   Neurological:      Mental Status: She is alert. She is confused.      Motor: Weakness present.      Gait: Gait abnormal.      Comments: Mildly Confused   Psychiatric:         Attention and Perception: Attention and perception normal.         Mood and Affect: Mood normal.         Speech: Speech normal.         Behavior: Behavior normal. Behavior is cooperative.         Thought Content: Thought content normal. Thought content is not paranoid or delusional. Thought content does not include homicidal or suicidal ideation.       Left hand ecchymosis (dark purple) noted            MEDICATIONS:  Scheduled Medications:  acetaminophen, 975 mg, oral, q6h  aspirin, 81 mg, oral, Daily  atenolol, 50 mg, oral, BID  atorvastatin, 80 mg, oral, Nightly  clopidogrel, 75 mg, oral, Daily  escitalopram, 10 mg, oral, Daily  hydroCHLOROthiazide, 12.5 mg, oral, Daily  mirtazapine, 7.5 mg, oral, Nightly  [Held by provider] polyethylene glycol, 17 g, oral, Daily  potassium chloride, 20 mEq, intravenous, Once  QUEtiapine, 12.5 mg, oral, Nightly  spironolactone, 12.5 mg, oral, Daily  thiamine, 100 mg, oral, Daily  valsartan, 320 mg, oral, Daily    Continuous Medications:     PRN Medications:  PRN medications: ondansetron ODT **OR** ondansetron, polyethylene glycol          Last Recorded Vitals  Blood pressure 155/74, pulse 74, temperature 36.6  "°C (97.9 °F), resp. rate 17, height 1.6 m (5' 3\"), weight 46.1 kg (101 lb 9.6 oz), SpO2 97 %.    Intake/Output last 3 Shifts:  I/O last 3 completed shifts:  In: 380 (8.2 mL/kg) [IV Piggyback:380]  Out: - (0 mL/kg)   Weight: 46.1 kg       Relevant Results:  No results found for this or any previous visit (from the past 24 hour(s)).   Results for orders placed or performed during the hospital encounter of 01/11/24 (from the past 96 hour(s))   Heparin Assay, [facility]   Result Value Ref Range    Heparin Unfractionated 0.4 See Comment Below for Therapeutic Ranges IU/mL   ACTIVATED CLOTTING TIME LOW   Result Value Ref Range    POCT Activated Clotting Time Low Range 160 89 - 169 sec   ACTIVATED CLOTTING TIME LOW   Result Value Ref Range    POCT Activated Clotting Time Low Range 256 (H) 89 - 169 sec   CBC   Result Value Ref Range    WBC 7.9 4.4 - 11.3 x10*3/uL    nRBC 0.0 0.0 - 0.0 /100 WBCs    RBC 2.85 (L) 4.00 - 5.20 x10*6/uL    Hemoglobin 9.5 (L) 12.0 - 16.0 g/dL    Hematocrit 29.7 (L) 36.0 - 46.0 %     (H) 80 - 100 fL    MCH 33.3 26.0 - 34.0 pg    MCHC 32.0 32.0 - 36.0 g/dL    RDW 14.0 11.5 - 14.5 %    Platelets 184 150 - 450 x10*3/uL   Magnesium   Result Value Ref Range    Magnesium 1.68 1.60 - 2.40 mg/dL   Renal Function Panel   Result Value Ref Range    Glucose 65 (L) 74 - 99 mg/dL    Sodium 139 136 - 145 mmol/L    Potassium 3.9 3.5 - 5.3 mmol/L    Chloride 101 98 - 107 mmol/L    Bicarbonate 26 21 - 32 mmol/L    Anion Gap 16 10 - 20 mmol/L    Urea Nitrogen 28 (H) 6 - 23 mg/dL    Creatinine 1.25 (H) 0.50 - 1.05 mg/dL    eGFR 42 (L) >60 mL/min/1.73m*2    Calcium 8.8 8.6 - 10.6 mg/dL    Phosphorus 3.6 2.5 - 4.9 mg/dL    Albumin 3.4 3.4 - 5.0 g/dL   CBC   Result Value Ref Range    WBC 9.8 4.4 - 11.3 x10*3/uL    nRBC 0.0 0.0 - 0.0 /100 WBCs    RBC 3.24 (L) 4.00 - 5.20 x10*6/uL    Hemoglobin 10.4 (L) 12.0 - 16.0 g/dL    Hematocrit 33.7 (L) 36.0 - 46.0 %     (H) 80 - 100 fL    MCH 32.1 26.0 - 34.0 pg    MCHC 30.9 " (L) 32.0 - 36.0 g/dL    RDW 13.8 11.5 - 14.5 %    Platelets 257 150 - 450 x10*3/uL   Renal function panel   Result Value Ref Range    Glucose 86 74 - 99 mg/dL    Sodium 138 136 - 145 mmol/L    Potassium 4.1 3.5 - 5.3 mmol/L    Chloride 98 98 - 107 mmol/L    Bicarbonate 28 21 - 32 mmol/L    Anion Gap 16 10 - 20 mmol/L    Urea Nitrogen 36 (H) 6 - 23 mg/dL    Creatinine 1.30 (H) 0.50 - 1.05 mg/dL    eGFR 40 (L) >60 mL/min/1.73m*2    Calcium 9.7 8.6 - 10.6 mg/dL    Phosphorus 1.9 (L) 2.5 - 4.9 mg/dL    Albumin 3.8 3.4 - 5.0 g/dL   Magnesium   Result Value Ref Range    Magnesium 3.19 (H) 1.60 - 2.40 mg/dL   CBC   Result Value Ref Range    WBC 8.0 4.4 - 11.3 x10*3/uL    nRBC 0.0 0.0 - 0.0 /100 WBCs    RBC 3.09 (L) 4.00 - 5.20 x10*6/uL    Hemoglobin 10.1 (L) 12.0 - 16.0 g/dL    Hematocrit 32.6 (L) 36.0 - 46.0 %     (H) 80 - 100 fL    MCH 32.7 26.0 - 34.0 pg    MCHC 31.0 (L) 32.0 - 36.0 g/dL    RDW 13.9 11.5 - 14.5 %    Platelets 250 150 - 450 x10*3/uL   Magnesium   Result Value Ref Range    Magnesium 2.47 (H) 1.60 - 2.40 mg/dL   Renal Function Panel   Result Value Ref Range    Glucose 93 74 - 99 mg/dL    Sodium 143 136 - 145 mmol/L    Potassium 3.5 3.5 - 5.3 mmol/L    Chloride 102 98 - 107 mmol/L    Bicarbonate 32 21 - 32 mmol/L    Anion Gap 13 10 - 20 mmol/L    Urea Nitrogen 33 (H) 6 - 23 mg/dL    Creatinine 1.23 (H) 0.50 - 1.05 mg/dL    eGFR 43 (L) >60 mL/min/1.73m*2    Calcium 9.4 8.6 - 10.6 mg/dL    Phosphorus 3.7 2.5 - 4.9 mg/dL    Albumin 3.7 3.4 - 5.0 g/dL   Vitamin B12   Result Value Ref Range    Vitamin B12 1,851 (H) 211 - 911 pg/mL   TSH   Result Value Ref Range    Thyroid Stimulating Hormone 4.28 (H) 0.44 - 3.98 mIU/L   T4, free   Result Value Ref Range    Thyroxine, Free 1.14 0.78 - 1.48 ng/dL            MENTAL STATUS EXAM:  General/Appearance:  Thin, petite, feeble, appearing elderly white female, with full white, wavy hair, neatly combed, wearing gold-rimmed eye glasses, hospital attire, awake,  "initially standing in room with assistance of RN, then laying in bed, with nursing at bedside preparing for dressing/bandage change and IV access/lines noted and large mindy bear in bed, and smaller mindy bear seated at window bench; Good/fair hygiene     --Sitter initially seated at bedside, but then stepped out to allow for privacy  --RN at bedside      Attitude: Pleasant , Polite, and Cooperative abeit Limited d/t AMS with [place] (L-sided deficit) but engaged; Good Eye-Contact     Behavior: Calm, In-Control, and Non-Threatening     Motor Activity: No PMA, PMR, or TD/EPS or Tremors Noted; Gait is unsteady and she needs assistance; At baseline, the pt reports using a walker to help ambulate       Speech:  Mostly Clear, Moderately Spontaneous, Fluent, with Normal Rate, Rhythm, Tone, and Volume        ---Of Note, the pt has good dentition, and denies using dentures, but she reports wearing eye-glasses, and also reports L-sided hearing impairment. Pt is notably [place] but reports wearing Left-sided Hearing Aid only, although she reports that the battery recently  and unable to replace battery, so unable to use hearing aid during this admission      Mood: \"Ok\"     Affect: Pleasant     Thought Process: Goal Directed albeit Mild Confusion/Impairment Noted; Associations are mostly Logical     Thought Content: Denies Ideations; Denies Paranoid Delusions     Thought Perception:  Pt denies AVH; Pt doesn't appear Internally Stimulated         Cognition:  Pt is alert and oriented 2-3/4 (Pt knows/responds to full Name. Pt knows partial Date \"Monday, 2024\" and she knows Location: [facility] although later confused it to be \"connected with\" [facility]. Pt knows Situation, [I.e. Reason for Admission: \"For my surgery\"]) --Deficits noted. Limited Fund of Knowledge. Limited Recent and Remote Memory. Mild Deficits in language, attention, concentration.      Insight:  Limited Insight     Judgement:  Limited Judgement           PSYCHIATRIC " RISK ASSESSMENT:  Violence Risk Factors:  none  Acute Risk of Harm to Others is Considered: Low  Suicide Risk Factors: chronic medical illness and chronic pain  Protective Factors: [place] affiliation/spirituality, strong coping skills, fear of suicide or death, and sense of responsibility towards family  Acute Risk of Harm to Self is Considered: Low        Principal Problem:    Acute lower limb ischemia  Active Problems:    Peripheral vascular disease (CMS/[facility])          ASSESSMENT/PLAN:  Ms. Maggi Gordon/[id number] is a 89y/o F w/PMSHx of: Anxiety, chronic Insomnia, Hypothyroidism, Goiter, HTN, HLD, CKD, Vertebral Artery s/p L PICA coiling and stent placement (2006), Atherosclerosis of CABG w/Angina Pectoris (10/2021), CAD s/p CABG x3 (1995, 2020, 2021), A-FIB, Aortic Stenosis s/p AVR, Cataract Surgery (12/2018), CT Aorta and Bilateral Iliofemoral Runoff Angiogram w/and w/out I Contrast (7/2023), and PAD s/p L EIA stent (9/2023 at [facility]) who initially presented to [facility] on 1/11/2024 after she reportedly hit her left foot on her bed frame at home, a few days before OSH ED presentation. The pt reports noticing a wound form, over her left achilles, from where she hit her left foot and said she began having severe pain and having difficulty ambulating since the ulceration has developed when she decided to go to OS ED ([facility] ED) for help. During OSH ED visit, she underwent a CTA, which showed bilateral SFA occlusion as well as additional occlusions of SMA, right internal iliac, right popliteal, right anterior tibial artery, left popliteal artery, and left anterior tibial artery. She admits to having missed several doses of her home medications of Aspirin and Plavix, and was started on a Heparin gtt and transferred to [facility] on 1/11/2024, for Vascular Surgery Evaluation. However, on arrival she instead was transferred from OSH ED ([facility] ED) to [facility] ED for  further work-up and vascular surgery evaluation, for acute left lower limb ischemia but noted to have progressively worsening altered mentation on admission. Pt underwent surgery on 1/18, for LLE femoral angiogram and endarterectomy enterectomy with Dr. Pura Montes, from vascular Surgery.  Pt is now  s/p L fem EA with bovine patch angioplasty, and stenting of L common and external iliac arteries on 1/18/24, but she remains altered and confused. Psychiatry is consulted on 1/20, to assist with management of  AMS/Delirium.               1/20, On initial evaluation, she is cooperative but seems confused at times. Grossly oriented x4, but difficult to fully assess as patient is difficult to understand. There is notable deficits in attention and concentration, and she is easily distracted by external stimuli. Waxing and waning presentation is c/w delerium for which she hs nuemrous risk factors (recent surgery, prolonged hospitalization, recent iatrogenic risk factors including use of benzo and narcotics, older age). Infection and electrolyte imablance was considered but labs are grossly unremakrable, and there is no obvious source of infection. No history of significant alcohol use to suggest alcohol withdrawal induced hallucinations. She is faiurly malnoursihed on exam and has evidence of macryotic anemia, so reasonable to assess for nutritonal deficienciety which are certainly possible and may be exacerbating presention. Of note, patient has home medication of Clorazepate 7.5 mg which she takes PRN for anxiety/sleep. It was last filled on 11/27, dispensed 20 tabs. It was held for first 7 days of hospitalization and then restarted on 1/19. She has been receiving 3.75 mg BID PRN for anxiety. Based on timeline of when this medication was restarted and onset of delirium, suspect this may be significant contributor as well.      1/21, Saw patient for consult staffing today (1/21/24); please refer to complete Psychiatry  consultation note from 1/20/24 for details. In sum, agree with Delirium likely related to controlled substances on top of other risk factors (recent surgery, relative nutritional status, age).       As of 1/22, Pt is pleasant and cooperative, with mild to moderate confusion vs impairment noted, although pt is also [place] (without working left-hearing aid), thus making it harder to communicate. Pt reports improvement with mood, sleep, and confusion, as able to recall earlier in this admission. Pt noted to have sitter support at bedside, as well as stuffed animals and she was provided with coloring pages and activity worksheets to utilize on this admission. Pt denies SI/HI/AVH.           IMPRESSION:  Delirium, Multifactorial, Acute, with Hypoactivity --Resolving   Unspecified Depressive Disorder   Unspecified Anxiety Disorder   Insomnia, Chronic, Unspecified Type     R/O Neurocognitive Impairment, Unspecified Type   R/O Benzodiazepine Withdrawal (Clorazepate)--Low Suspicion             RECOMMENDATIONS  --No 1:1 Sitter, from a psych perspective; Defer to Nursing/Primary Team   --Pt doesn't meet criteria for inpatient psychiatry admission         Labs and Diagnostic Tests/Procedures:  --At next lab draw, please check: CBC with Diff, CMP, Vitamin B, Vitamin B12, Vitamin D, Folate, TSH, VDRL, and HIV, if not already done    --On 1/21, TSH= 4.28     --Please get U/A, if not already done      --On 1/16, EKG shows QTc= 442ms SR with First Degree AV Block and HR= 90bpm        Medications:  --START scheduled Thiamine 100mg IVP TID   --START scheduled MV and Folate by mouth daily  --DISCONTINUE scheduled Thiamine 100mg by mouth daily    --Continue scheduled Lexapro 10mg by mouth daily   --Continue scheduled Remeron 7.5mg by mouth every evening at bedtime  --Continue scheduled Seroquel 12.5mg by mouth every evening at bedtime     --START PRN Melatonin 3mg by mouth every evening at bedtime PRN Insomnia  --START PRN Seroquel 12.5mg  by mouth BID PRN for Acute Anxiety/Agitation and/or Psychosis         Special Considerations:  --Please assess and treat disruption in bowel and bladder functions  --Please assess and treat abnormalities in nutrition and hydration status   --Do NOT recommend using Trazodone for Insomnia, as histamine antagonism may worsen delirium.   --Recommend avoid all narcotics as she has history of delirium in past associated with these medications (particularly has been known to have adverse responses to PRN Tramadol and  PRN Oxycodone in past). Collateral also reports adverse responses to Toradol in past. To be safe, recommend Tylenol PO/IV for pain management.  --Recommend Holding PRN Clorazepate/Tranxene (home benzo) as likely to cause worsening delirium   --Avoid unnecessary benzos, antihistamines, and anticholinergics due to risk for further confusion  --Minimize narcotic pain medications as appropriate while still adequately controlling pain (uncontrolled pain is also risk factor for delirium)  --Please note co-administration of antipsychotic medications, benzodiazepines, and opioid analgesics given in combination and monitor respiratory status regularly        --Non-Pharmacological Management: Please try to minimize stimuli (unnecessary bright lights, sudden/loud noises, fast movements) to help minimize confusion and agitation. Please try to reinforce sleep hygiene protocol of: encouraging patient to try to stay awake as much as possible during the day to improve sleep hygiene. Also allow natural light during the day with curtains/blinds open during the day, frequent orientation and attempts to soothing music (from [employer]) etc... to encourage normal sleep/wake cycle. If patient wears glasses or hearing aids, patient should have access to them as sensory deprivation can cause/worsen delirium; minimize room/staff changes; encourage interaction with familiar objects and frequent orientation.    ---The pt wears  eye-glasses (gold rimmed glasses), at baseline  ---At baseline, the pt appears to have good dentition, and denies using dentures.  ---Pt is [place], at baseline, speak loud and clear; Of Note, the pt reports  wearing Left-sided Hearing Aid only, although states that the battery recently  and unable to replace it and unable to use it during this admission.  --The pt uses a walker to help ambulate, at baseline     --Minimize room and staff changes.  --Engage the patient in cognitively stimulating activities and provide frequent reorientation.   --At home, patient sleeps with her cats; Pt provided with mindy-bears on this admission (in bed and at window bench), as pt sleeps with cat at home, and family feels having a stuffed animal at bedside would be comforting.        --Minimize use of restraints to situations where necessary to keep patient and staff safe and to prevent patient from removing lines, tubes, medical devices, dressings, etc. Please check ROM every TWO hours whenever patient is actively restrained. Continue to regularly re-evaluate continued need for any ordered restraints.        Therapeutic Coping Skills:  --Family/Friend Support  --Sister-in-Law, Vicenta, is a strong support  --Pt has stuffed animals (mindy bears) at bedside, as she normally sleeps with her cats at home  --Pt provided with coloring pages and activity worksheets        -Psychiatry will continue to follow, but not daily; Page [id number] with questions .              --ANTHONY PA Student, Twyla Plaza, present on assessment         I spent 60 minutes in the professional and overall care of this patient.  Elke Aguilera, APRN-CNP, APRN-CNS

## 2024-01-22 NOTE — PROGRESS NOTES
SW spoke at length with patient's daughter in law from [place] and sister in law who is her local support. At this time, they are hoping to take her home with 24/7 supervision while she recovers from this acute delirium rather than pursuing SNF. They feel that being home in her familiar environment will help her bounce back more quickly especially since functionally, PT notes indicate that she is close to her baseline. Patient's daughter in law, Sia, from out of state is traveling to [place] on Wednesday Evening and will be able to assume care on Thursday. ADOD is Thursday if patient continues to improve.    Felicia Salas, BIMALW

## 2024-01-23 ENCOUNTER — HOME HEALTH ADMISSION (OUTPATIENT)
Dept: HOME HEALTH SERVICES | Facility: HOME HEALTH | Age: 89
End: 2024-01-23
Payer: MEDICARE

## 2024-01-23 LAB
ALBUMIN SERPL BCP-MCNC: 3.4 G/DL (ref 3.4–5)
ANION GAP SERPL CALC-SCNC: 14 MMOL/L (ref 10–20)
BUN SERPL-MCNC: 36 MG/DL (ref 6–23)
CALCIUM SERPL-MCNC: 9.7 MG/DL (ref 8.6–10.6)
CHLORIDE SERPL-SCNC: 104 MMOL/L (ref 98–107)
CO2 SERPL-SCNC: 27 MMOL/L (ref 21–32)
CREAT SERPL-MCNC: 1.28 MG/DL (ref 0.5–1.05)
EGFRCR SERPLBLD CKD-EPI 2021: 41 ML/MIN/1.73M*2
ERYTHROCYTE [DISTWIDTH] IN BLOOD BY AUTOMATED COUNT: 13.5 % (ref 11.5–14.5)
GLUCOSE SERPL-MCNC: 106 MG/DL (ref 74–99)
HCT VFR BLD AUTO: 32.8 % (ref 36–46)
HGB BLD-MCNC: 10 G/DL (ref 12–16)
LABORATORY COMMENT REPORT: NORMAL
MAGNESIUM SERPL-MCNC: 2.14 MG/DL (ref 1.6–2.4)
MCH RBC QN AUTO: 32.2 PG (ref 26–34)
MCHC RBC AUTO-ENTMCNC: 30.5 G/DL (ref 32–36)
MCV RBC AUTO: 106 FL (ref 80–100)
NRBC BLD-RTO: 0 /100 WBCS (ref 0–0)
PATH REPORT.FINAL DX SPEC: NORMAL
PATH REPORT.GROSS SPEC: NORMAL
PATH REPORT.RELEVANT HX SPEC: NORMAL
PATH REPORT.TOTAL CANCER: NORMAL
PHOSPHATE SERPL-MCNC: 3.1 MG/DL (ref 2.5–4.9)
PLATELET # BLD AUTO: 290 X10*3/UL (ref 150–450)
POTASSIUM SERPL-SCNC: 4.9 MMOL/L (ref 3.5–5.3)
RBC # BLD AUTO: 3.11 X10*6/UL (ref 4–5.2)
SODIUM SERPL-SCNC: 140 MMOL/L (ref 136–145)
WBC # BLD AUTO: 7.9 X10*3/UL (ref 4.4–11.3)

## 2024-01-23 PROCEDURE — 99233 SBSQ HOSP IP/OBS HIGH 50: CPT | Performed by: CLINICAL NURSE SPECIALIST

## 2024-01-23 PROCEDURE — 83735 ASSAY OF MAGNESIUM: CPT

## 2024-01-23 PROCEDURE — 2500000004 HC RX 250 GENERAL PHARMACY W/ HCPCS (ALT 636 FOR OP/ED): Performed by: STUDENT IN AN ORGANIZED HEALTH CARE EDUCATION/TRAINING PROGRAM

## 2024-01-23 PROCEDURE — 85027 COMPLETE CBC AUTOMATED: CPT

## 2024-01-23 PROCEDURE — 2500000001 HC RX 250 WO HCPCS SELF ADMINISTERED DRUGS (ALT 637 FOR MEDICARE OP)

## 2024-01-23 PROCEDURE — 2500000001 HC RX 250 WO HCPCS SELF ADMINISTERED DRUGS (ALT 637 FOR MEDICARE OP): Performed by: STUDENT IN AN ORGANIZED HEALTH CARE EDUCATION/TRAINING PROGRAM

## 2024-01-23 PROCEDURE — 80069 RENAL FUNCTION PANEL: CPT

## 2024-01-23 PROCEDURE — 2500000001 HC RX 250 WO HCPCS SELF ADMINISTERED DRUGS (ALT 637 FOR MEDICARE OP): Performed by: NURSE PRACTITIONER

## 2024-01-23 PROCEDURE — 99233 SBSQ HOSP IP/OBS HIGH 50: CPT | Performed by: NURSE PRACTITIONER

## 2024-01-23 PROCEDURE — 97530 THERAPEUTIC ACTIVITIES: CPT | Mod: GO

## 2024-01-23 PROCEDURE — 36415 COLL VENOUS BLD VENIPUNCTURE: CPT

## 2024-01-23 PROCEDURE — 97535 SELF CARE MNGMENT TRAINING: CPT | Mod: GO

## 2024-01-23 PROCEDURE — 2060000001 HC INTERMEDIATE ICU ROOM DAILY

## 2024-01-23 PROCEDURE — 2500000004 HC RX 250 GENERAL PHARMACY W/ HCPCS (ALT 636 FOR OP/ED)

## 2024-01-23 RX ORDER — THIAMINE HYDROCHLORIDE 100 MG/ML
100 INJECTION, SOLUTION INTRAMUSCULAR; INTRAVENOUS 3 TIMES DAILY
Status: DISCONTINUED | OUTPATIENT
Start: 2024-01-23 | End: 2024-01-23

## 2024-01-23 RX ORDER — FOLIC ACID 1 MG/1
1 TABLET ORAL DAILY
Status: DISCONTINUED | OUTPATIENT
Start: 2024-01-23 | End: 2024-01-25 | Stop reason: HOSPADM

## 2024-01-23 RX ORDER — LANOLIN ALCOHOL/MO/W.PET/CERES
100 CREAM (GRAM) TOPICAL DAILY
Status: DISCONTINUED | OUTPATIENT
Start: 2024-01-23 | End: 2024-01-25 | Stop reason: HOSPADM

## 2024-01-23 RX ORDER — ACETAMINOPHEN 325 MG/1
650 TABLET ORAL EVERY 6 HOURS PRN
Qty: 40 TABLET | Refills: 0 | Status: SHIPPED | OUTPATIENT
Start: 2024-01-23 | End: 2024-02-02

## 2024-01-23 RX ORDER — QUETIAPINE FUMARATE 25 MG/1
12.5 TABLET, FILM COATED ORAL EVERY 12 HOURS PRN
Status: DISCONTINUED | OUTPATIENT
Start: 2024-01-23 | End: 2024-01-25 | Stop reason: HOSPADM

## 2024-01-23 RX ORDER — TALC
3 POWDER (GRAM) TOPICAL NIGHTLY PRN
Status: DISCONTINUED | OUTPATIENT
Start: 2024-01-23 | End: 2024-01-24 | Stop reason: SDUPTHER

## 2024-01-23 RX ORDER — MULTIVIT-MIN/IRON FUM/FOLIC AC 7.5 MG-4
1 TABLET ORAL DAILY
Qty: 30 TABLET | Refills: 0 | Status: ON HOLD | OUTPATIENT
Start: 2024-01-24 | End: 2024-02-29

## 2024-01-23 RX ORDER — MULTIVIT-MIN/IRON FUM/FOLIC AC 7.5 MG-4
1 TABLET ORAL DAILY
Status: DISCONTINUED | OUTPATIENT
Start: 2024-01-23 | End: 2024-01-25 | Stop reason: HOSPADM

## 2024-01-23 RX ADMIN — ATENOLOL 50 MG: 50 TABLET ORAL at 20:40

## 2024-01-23 RX ADMIN — VALSARTAN 320 MG: 320 TABLET, FILM COATED ORAL at 08:33

## 2024-01-23 RX ADMIN — Medication 1 TABLET: at 12:38

## 2024-01-23 RX ADMIN — FOLIC ACID 1 MG: 1 TABLET ORAL at 12:38

## 2024-01-23 RX ADMIN — ATENOLOL 50 MG: 50 TABLET ORAL at 08:33

## 2024-01-23 RX ADMIN — MIRTAZAPINE 7.5 MG: 7.5 TABLET, FILM COATED ORAL at 20:40

## 2024-01-23 RX ADMIN — ACETAMINOPHEN 975 MG: 325 TABLET ORAL at 12:38

## 2024-01-23 RX ADMIN — THIAMINE HCL TAB 100 MG 100 MG: 100 TAB at 08:32

## 2024-01-23 RX ADMIN — CLOPIDOGREL BISULFATE 75 MG: 75 TABLET ORAL at 08:32

## 2024-01-23 RX ADMIN — SPIRONOLACTONE 12.5 MG: 25 TABLET, FILM COATED ORAL at 08:32

## 2024-01-23 RX ADMIN — ASPIRIN 81 MG: 81 TABLET, COATED ORAL at 08:33

## 2024-01-23 RX ADMIN — ATORVASTATIN CALCIUM 80 MG: 80 TABLET, FILM COATED ORAL at 20:40

## 2024-01-23 RX ADMIN — HYDROCHLOROTHIAZIDE 12.5 MG: 25 TABLET ORAL at 08:32

## 2024-01-23 RX ADMIN — ACETAMINOPHEN 975 MG: 325 TABLET ORAL at 00:45

## 2024-01-23 RX ADMIN — QUETIAPINE FUMARATE 12.5 MG: 25 TABLET ORAL at 20:40

## 2024-01-23 RX ADMIN — THIAMINE HCL TAB 100 MG 100 MG: 100 TAB at 15:45

## 2024-01-23 RX ADMIN — ESCITALOPRAM OXALATE 10 MG: 10 TABLET, FILM COATED ORAL at 08:32

## 2024-01-23 ASSESSMENT — COGNITIVE AND FUNCTIONAL STATUS - GENERAL
DAILY ACTIVITIY SCORE: 18
TOILETING: A LITTLE
WALKING IN HOSPITAL ROOM: A LITTLE
HELP NEEDED FOR BATHING: A LITTLE
DAILY ACTIVITIY SCORE: 18
DRESSING REGULAR UPPER BODY CLOTHING: A LITTLE
EATING MEALS: A LITTLE
MOBILITY SCORE: 18
HELP NEEDED FOR BATHING: A LITTLE
STANDING UP FROM CHAIR USING ARMS: A LITTLE
CLIMB 3 TO 5 STEPS WITH RAILING: A LOT
MOVING TO AND FROM BED TO CHAIR: A LITTLE
TOILETING: A LITTLE
TURNING FROM BACK TO SIDE WHILE IN FLAT BAD: A LITTLE
DRESSING REGULAR UPPER BODY CLOTHING: A LITTLE
PERSONAL GROOMING: A LITTLE
DRESSING REGULAR LOWER BODY CLOTHING: A LITTLE
DRESSING REGULAR LOWER BODY CLOTHING: A LITTLE
PERSONAL GROOMING: A LITTLE
EATING MEALS: A LITTLE

## 2024-01-23 ASSESSMENT — PAIN SCALES - GENERAL
PAINLEVEL_OUTOF10: 0 - NO PAIN

## 2024-01-23 ASSESSMENT — ACTIVITIES OF DAILY LIVING (ADL): HOME_MANAGEMENT_TIME_ENTRY: 15

## 2024-01-23 ASSESSMENT — PAIN - FUNCTIONAL ASSESSMENT: PAIN_FUNCTIONAL_ASSESSMENT: 0-10

## 2024-01-23 NOTE — PROGRESS NOTES
"Occupational Therapy    Occupational Therapy Treatment    Name: Maggi Gordon  MRN: [id number]  : 1935  Date: 24  Time Calculation  Start Time: 1443  Stop Time: 1506  Time Calculation (min): 23 min    Assessment:  OT Assessment: Pt presents with decreased endurance, balance and cognition impairing functional mobility and ADLs. Pt would benefit from continued skilled OT  End of Session Communication: Bedside nurse  End of Session Patient Position: Up in chair, Alarm off, caregiver present  Plan:  Treatment Interventions: ADL retraining, Functional transfer training, Cognitive reorientation, Compensatory technique education  OT Frequency: 3 times per week  OT Discharge Recommendations: Moderate intensity level of continued care  Equipment Recommended upon Discharge:  (shower chair, grab bars in shower)  OT - OK to Discharge: Yes (Indicates completed eval and discharge recommendation)    Subjective   General:  OT Last Visit  OT Received On: 24  Reason for Referral: Left acute lower limb ischemia s/p left endarterectomy and angiogram  Past Medical History Relevant to Rehab: CAD s/p CABG, Curtis carotid  artery stenosis, HTN, renal artery stenosis, hypothyroidism, HLD, goiter, aortic stenosis s/p AVR, PAD, A-fib  Prior to Session Communication: Bedside nurse  Patient Position Received: Up in chair, Alarm off, caregiver present  Family/Caregiver Present: Yes  Caregiver Feedback: sitter present  General Comment: pleasant and cooperative, agreeable to OT        Cognition:  Overall Cognitive Status: Impaired  Orientation Level:  (\"\",\"[facility]\" self, )    Pain Assessment:  Pain Assessment  Pain Assessment: 0-10  Pain Score: 0 - No pain (increased L foot pain, RN aware)     Objective   Activities of Daily Living:      Grooming  Grooming Comments: Pt performed hand washing standing sink side after toileting SBA    Toileting  Toileting Comments: Pt performed toileting in bathroom SBA, " pericare SBA seated min A for setup, transfers SBA       Bed Mobility/Transfers:        Transfers  Transfer: Yes  Transfer 1  Transfer From 1: Chair with arms to  Transfer to 1: Stand  Technique 1: Sit to stand  Transfer Device 1: Walker  Transfer Level of Assistance 1: Close supervision, Minimal verbal cues  Trials/Comments 1: 1x  Transfers 2  Transfer From 2: Stand to  Transfer to 2: Toilet  Technique 2: Stand to sit  Transfer Device 2: Walker  Transfer Level of Assistance 2: Close supervision, Minimal verbal cues  Trials/Comments 2: 1x  Transfers 3  Transfer From 3: Toilet to  Transfer to 3: Stand  Technique 3: Sit to stand  Transfer Device 3: Walker  Transfer Level of Assistance 3: Close supervision  Trials/Comments 3: 1x  Transfers 4  Transfer From 4: Stand to  Transfer to 4: Chair with arms  Technique 4: Stand to sit  Transfer Device 4: Walker  Transfer Level of Assistance 4: Close supervision, Minimal verbal cues  Trials/Comments 4: 1x     Therapy/Activity:      Therapeutic Activity  Therapeutic Activity Performed: Yes  Therapeutic Activity 1: Pt performed functional mobility max household distance from bed to 1 hallway length to bathroom in room to chair with FWW SBA, VC for AE positioning     tcome Measures:  [facility] Daily Activity  Putting on and taking off regular lower body clothing: A little  Bathing (including washing, rinsing, drying): A little  Putting on and taking off regular upper body clothing: A little  Toileting, which includes using toilet, bedpan or urinal: A little  Taking care of personal grooming such as brushing teeth: A little  Eating Meals: A little  Daily Activity - Total Score: 18     Education Documentation  Precautions, taught by Lyndsay Pham OT at 1/23/2024  3:42 PM.  Learner: Patient  Readiness: Acceptance  Method: Explanation  Response: Verbalizes Understanding    ADL Training, taught by Lyndsay Pham OT at 1/23/2024  3:42 PM.  Learner: Patient  Readiness:  Acceptance  Method: Explanation  Response: Verbalizes Understanding      Goals:  Encounter Problems       Encounter Problems (Active)       ADLs       Patient with complete lower body dressing with independent level of assistance donning and doffing all LE clothes   (Progressing)       Start:  01/19/24    Expected End:  02/02/24            Patient will complete toileting including hygiene clothing management/hygiene with modified independent level of assistance and DME prn. (Progressing)       Start:  01/19/24    Expected End:  02/02/24               BALANCE       Pt will maintain dynamic standing balance during ADL task with modified independent level of assistance in order to demonstrate decreased risk of falling and improved postural control. (Progressing)       Start:  01/19/24    Expected End:  02/02/24               COGNITION/SAFETY       Patient will demonstrated orientation x 4 with verbal cues. (Progressing)       Start:  01/19/24    Expected End:  02/02/24       ORIENTATION         Pt will complete all functional tasks with appropriate sequencing and time frame with no VC (Progressing)       Start:  01/19/24    Expected End:  02/02/24               MOBILITY       Patient will perform Functional mobility max Household distances/Community Distances with modified independent level of assistance and least restrictive device in order to improve safety and functional mobility. (Progressing)       Start:  01/19/24    Expected End:  02/02/24 01/23/24 at 3:42 PM   Lyndsay Pham, OT   [phone]

## 2024-01-23 NOTE — PROGRESS NOTES
"Maggi Gordon is a 89 y.o. female on day 11 of admission presenting with Acute lower limb ischemia.      Subjective   Pt soundly sleeping in bed and didn't awaken when her name was called, although the pt is [place], at baseline; Sitter standing at bedside.           --ADDITIONAL INFORMATION:        Bedside Sitter:  Sitter reports that the pt has been sleeping all morning, during her shift, as she didn't fall asleep until early this morning, until about 04:00, and encouraged psychiatry to speak directly with her nurse, in the hallway. Sitter reports the pt has been confused, although somnolent today, and she denies concerns for agitation or behavioral outbursts.         [facility] Floor Nurse, Capo \"Denys\" RN:  Denys reports having the pt both, yesterday (1/22) and again today (1/23), and said she has been pleasant and cooperative, although confused. Per Night Shift Nursing report, the pt was awake all night, talking, staying busy and moving around but she didn't fall asleep until about 04:00am this morning.     Denys reports the pt was awake, all day yesterday (1/22), and engaging with treatment. RN reports that yesterday (1/22) around 13:00, the pt was bearing-down, while trying to move her bowels when she had a presumed, albeit brief, vasovagal response, during her bowel movement. Denys reports that her vasospasm then transitioned her into a brief run of V-Tach, that lasted about seven seconds in length, per tele-monitoring, before she naturally converted back into normal sinus rhythm, on her own.     Denys RN admits that the pt seemed unaware of what happened to her, at the time of the event, and states that she was able to continue throughout the rest of her day, yesterday, without further event or consequence.     Denys RN also denies concerns for day napping, saying that the pt was awake the entire day and she was surprised that the pt didn't sleep at all last night.      Denys RN said that she told her this " morning, she will let her rest and sleep for some of the morning, but said that she's planning to wake her up, in about 30-minutes, so she can participate with activities today, and sleep tonight.     Denys RN, said the pt has been taking her medication and she's been eating and drinking well, despite remaining confused.     Denys RN said that the pt will likely be discharged home, as she's unable to go to a SNF, b/c of having a 1:1 sitter with her at bedside (per primary team), for confusion, as family is planning to take her home and help care for her at home.     Of Note, nurse Denys had referenced that this event from yesterday afternoon, was documented in the chart. However, nothing was found on chart review, when looking further into the event, as described by her bedside nurse today.            Objective     Pt jaundice appearing        PSYCHIATRIC REVIEW OF SYSTEMS:  Depression: Pt denies    Anger: Pt denies Anger/Frustration/Irritability  Psychosis: Pt denies AVH; No Ideas of Reference  Delusions: Pt denies Paranoid Delusions   Leigh Ann: No reports or concerns for recent or remote hypomanic or manic episodes.  OLLIE: Pt denies   OCD: No complaints or symptoms  reported of OCD.  PTSD: No complaints or concerns for PTSD   Memory/Concentration: Limited  Delirium: Waxing/Waning mentation   Safety: Pt denies SI/HI. Pt denies PDW. No history of prior SA.          Physical Exam    Left hand ecchymosis (dark purple) noted             MEDICATIONS:  Scheduled Medications:  acetaminophen, 975 mg, oral, q6h  aspirin, 81 mg, oral, Daily  atenolol, 50 mg, oral, BID  atorvastatin, 80 mg, oral, Nightly  clopidogrel, 75 mg, oral, Daily  escitalopram, 10 mg, oral, Daily  hydroCHLOROthiazide, 12.5 mg, oral, Daily  mirtazapine, 7.5 mg, oral, Nightly  [Held by provider] polyethylene glycol, 17 g, oral, Daily  potassium chloride, 20 mEq, intravenous, Once  QUEtiapine, 12.5 mg, oral, Nightly  spironolactone, 12.5 mg, oral,  "Daily  thiamine, 100 mg, oral, Daily  valsartan, 320 mg, oral, Daily    Continuous Medications     PRN Medications  PRN medications: ondansetron ODT **OR** ondansetron, polyethylene glycol            Last Recorded Vitals  Blood pressure 164/74, pulse 69, temperature 36.5 °C (97.7 °F), resp. rate 18, height 1.6 m (5' 3\"), weight 46.1 kg (101 lb 9.6 oz), SpO2 100 %.    Intake/Output last 3 Shifts:  No intake/output data recorded.        Relevant Results:  Results for orders placed or performed during the hospital encounter of 01/11/24 (from the past 24 hour(s))   CBC   Result Value Ref Range    WBC 7.9 4.4 - 11.3 x10*3/uL    nRBC 0.0 0.0 - 0.0 /100 WBCs    RBC 3.11 (L) 4.00 - 5.20 x10*6/uL    Hemoglobin 10.0 (L) 12.0 - 16.0 g/dL    Hematocrit 32.8 (L) 36.0 - 46.0 %     (H) 80 - 100 fL    MCH 32.2 26.0 - 34.0 pg    MCHC 30.5 (L) 32.0 - 36.0 g/dL    RDW 13.5 11.5 - 14.5 %    Platelets 290 150 - 450 x10*3/uL   Magnesium   Result Value Ref Range    Magnesium 2.14 1.60 - 2.40 mg/dL   Renal Function Panel   Result Value Ref Range    Glucose 106 (H) 74 - 99 mg/dL    Sodium 140 136 - 145 mmol/L    Potassium 4.9 3.5 - 5.3 mmol/L    Chloride 104 98 - 107 mmol/L    Bicarbonate 27 21 - 32 mmol/L    Anion Gap 14 10 - 20 mmol/L    Urea Nitrogen 36 (H) 6 - 23 mg/dL    Creatinine 1.28 (H) 0.50 - 1.05 mg/dL    eGFR 41 (L) >60 mL/min/1.73m*2    Calcium 9.7 8.6 - 10.6 mg/dL    Phosphorus 3.1 2.5 - 4.9 mg/dL    Albumin 3.4 3.4 - 5.0 g/dL          Principal Problem:    Acute lower limb ischemia  Active Problems:    Peripheral vascular disease (CMS/HCC)        MENTAL STATUS EXAM:  General/Appearance:  Thin, petite, feeble, appearing elderly white female, with full white, wavy hair, neatly combed, wearing hospital attire, covered with blankets, soundly sleeping, although pt's face appears appears almost jaundice-like at rest, as she is soundly sleeping in bed, with lights off but the blinds are partially opened/closed allowing some " natural light into the room, and she has both of her stuffed animals (2 mindy bears:1 large and 1 smaller) neatly seated on the window bench nearby, IV lines noted; Good/Fair Hygiene.     --Sitter at bedside       Attitude:   Calmly resting, unable to engage; No  Eye-Contact     On  Psych Assessment: Pleasant , Polite, and Cooperative abeit Limited d/t AMS with [place] (L-sided deficit) but engaged; Good Eye-Contact        Behavior:   Calm, In-Control, and Non-Threatening        Motor Activity:   PMR noted, as the pt is soundly resting; No PMA, TD/EPS or Tremors Noted; Gait not assessed.    On  Psych Assessment: No PMA, PMR, TD/EPS or Tremors noted and unsteady gait observed, thus requiring assistance; At baseline, the pt reports using a walker to help ambulate         Speech:  Pt is Soundly Sleeping, Unable to Assess     On  Psych Assessment: Mostly Clear, Moderately Spontaneous, Fluent, with Normal Rate, Rhythm, Tone, and Volume        ---Of Note, the pt has good dentition, and denies using dentures, but she reports wearing eye-glasses, and also reports L-sided hearing impairment. Pt is notably [place] but reports wearing Left-sided Hearing Aid only, although she reports that the battery recently  and unable to replace battery, so unable to use hearing aid during this admission      Mood:   Calm     Affect:   Flat      Thought Process:   Pt is Soundly Sleeping, Unable to Assess     On  Psych Assessment: Goal Directed albeit Mild Confusion/Impairment Noted; Associations are mostly Logical      Thought Content:   Pt is Soundly Sleeping, Unable to Assess     On  Psych Assessment: Denies Ideations; Denies Paranoid Delusions     Thought Perception:  Pt is Soundly Sleeping, Unable to Assess   On  Psych Assessment: Pt denies AVH; Pt doesn't appear Internally Stimulated         Cognition:  Pt is Soundly Sleeping, Unable to Assess     On  Psych Assessment: Pt is alert and oriented 2-3/4 (Pt  "knows/responds to full Name. Pt knows partial Date \"Monday, January 14th, 2024\" and she knows Location: [facility] although later confused it to be \"connected with\" [facility]. Pt knows Situation, [I.e. Reason for Admission: \"For my surgery\"]) --Deficits noted. Limited Fund of Knowledge. Limited Recent and Remote Memory. Mild Deficits in language, attention, concentration.        Insight:  Pt is Soundly Sleeping, Unable to Assess     On 1/22 Psych Assessment: Limited Insight       Judgement:  Pt is Soundly Sleeping, Unable to Assess     On 1/22 Psych Assessment: Limited Judgement            PSYCHIATRIC RISK ASSESSMENT:  Violence Risk Factors:  none  Acute Risk of Harm to Others is Considered: Low  Suicide Risk Factors: chronic medical illness and chronic pain  Protective Factors: Taoism affiliation/spirituality, strong coping skills, fear of suicide or death, and sense of responsibility towards family  Acute Risk of Harm to Self is Considered: Low           Principal Problem:    Acute lower limb ischemia  Active Problems:    Peripheral vascular disease (CMS/[facility])              ASSESSMENT/PLAN:  Ms. Maggi Gordon/[id number] is a 88y/o F w/PMSHx of: Anxiety, chronic Insomnia, Hypothyroidism, Goiter, HTN, HLD, CKD, Vertebral Artery s/p L PICA coiling and stent placement (2006), Atherosclerosis of CABG w/Angina Pectoris (10/2021), CAD s/p CABG x3 (1995, 2020, 2021), A-FIB, Aortic Stenosis s/p AVR, Cataract Surgery (12/2018), CT Aorta and Bilateral Iliofemoral Runoff Angiogram w/and w/out I Contrast (7/2023), and PAD s/p L EIA stent (9/2023 at [facility]) who initially presented to [facility] on 1/11/2024 after she reportedly hit her left foot on her bed frame at home, a few days before OSH ED presentation. The pt reports noticing a wound form, over her left achilles, from where she hit her left foot and said she began having severe pain and having difficulty ambulating since the ulceration has developed when she " decided to go to [facility] ED ([facility] ED) for help. During OSH ED visit, she underwent a CTA, which showed bilateral SFA occlusion as well as additional occlusions of SMA, right internal iliac, right popliteal, right anterior tibial artery, left popliteal artery, and left anterior tibial artery. She admits to having missed several doses of her home medications of Aspirin and Plavix, and was started on a Heparin gtt and transferred to [facility] on 1/11/2024, for Vascular Surgery Evaluation. However, on arrival she instead was transferred from OSH ED ([facility] ED) to [facility] ED for further work-up and vascular surgery evaluation, for acute left lower limb ischemia but noted to have progressively worsening altered mentation on admission. Pt underwent surgery on 1/18, for LLE femoral angiogram and endarterectomy enterectomy with Dr. Pura Montes, from vascular Surgery.  Pt is now  s/p L fem EA with bovine patch angioplasty, and stenting of L common and external iliac arteries on 1/18/24, but she remains altered and confused. Psychiatry is consulted on 1/20, to assist with management of  AMS/Delirium.                  1/20, On initial evaluation, she is cooperative but seems confused at times. Grossly oriented x4, but difficult to fully assess as patient is difficult to understand. There is notable deficits in attention and concentration, and she is easily distracted by external stimuli. Waxing and waning presentation is c/w delerium for which she hs nuemrous risk factors (recent surgery, prolonged hospitalization, recent iatrogenic risk factors including use of benzo and narcotics, older age). Infection and electrolyte imablance was considered but labs are grossly unremakrable, and there is no obvious source of infection. No history of significant alcohol use to suggest alcohol withdrawal induced hallucinations. She is faiurly malnoursihed on exam and has evidence of macryotic anemia, so reasonable to  assess for nutritonal deficienciety which are certainly possible and may be exacerbating presention. Of note, patient has home medication of Clorazepate 7.5 mg which she takes PRN for anxiety/sleep. It was last filled on 11/27, dispensed 20 tabs. It was held for first 7 days of hospitalization and then restarted on 1/19. She has been receiving 3.75 mg BID PRN for anxiety. Based on timeline of when this medication was restarted and onset of delirium, suspect this may be significant contributor as well.        1/21, Saw patient for consult staffing today (1/21/24); please refer to complete Psychiatry consultation note from 1/20/24 for details. In sum, agree with Delirium likely related to controlled substances on top of other risk factors (recent surgery, relative nutritional status, age).       1/22, Pt is pleasant and cooperative, with mild to moderate confusion vs impairment noted, although pt is also [place] (without working left-hearing aid), thus making it harder to communicate. Pt reports improvement with mood, sleep, and confusion, as able to recall earlier in this admission. Pt noted to have sitter support at bedside, as well as stuffed animals and she was provided with coloring pages and activity worksheets to utilize on this admission. Pt denies SI/HI/AVH.         1/23, Pt soundly sleeping, and did not awaken when her name was called, although the pt is [place], and psych writer, let her rest. Nursing staff reports the pt had a presumed vasovagal response during a BM yesterday (1/22) afternoon, in which she had a short run (~7 sec) of V-Tach, before cardioverting back into normal sinus rhythm. Staff reports the pt was awake, although pleasantly confused until about 04:00 this morning. Pt is to be discharged home, with family, instead of transferring to SNF, as originally planned.                   IMPRESSION:  Delirium, Multifactorial, Acute, with Hypoactivity --Resolving   Unspecified Depressive Disorder    Unspecified Anxiety Disorder   Insomnia, Chronic, Unspecified Type      R/O Neurocognitive Impairment, Unspecified Type   R/O Benzodiazepine Withdrawal (Clorazepate)--Low Suspicion        Other:  Hypothyroidism                RECOMMENDATIONS  --No 1:1 Sitter, from a psych perspective; Defer to Nursing/Primary Team   --Pt doesn't meet criteria for inpatient psychiatry admission            Labs and Diagnostic Tests/Procedures:  --At next lab draw, please check: CBC with Diff, CMP, Vitamin B, Vitamin B12, Vitamin D, Folate, TSH, VDRL, and HIV, if not already done     --On 1/21, TSH= 4.28      --Please get U/A, if not already done        --On 1/16, EKG shows QTc= 442ms SR with First Degree AV Block and HR= 90bpm         Medications:  --START scheduled Thiamine 100mg IVP TID   --START scheduled MV and Folate by mouth daily  --DISCONTINUE scheduled Thiamine 100mg by mouth daily     --Continue scheduled Lexapro 10mg by mouth daily   --Continue scheduled Remeron 7.5mg by mouth every evening at bedtime  --Continue scheduled Seroquel 12.5mg by mouth every evening at bedtime      --START PRN Melatonin 3mg by mouth every evening at bedtime PRN Insomnia  --START PRN Seroquel 12.5mg by mouth BID PRN for Acute Anxiety/Agitation and/or Psychosis            Special Considerations:  --Please assess and treat disruption in bowel and bladder functions  --Please assess and treat abnormalities in nutrition and hydration status   --Do NOT recommend using Trazodone for Insomnia, as histamine antagonism may worsen delirium.   --Recommend avoid all narcotics as she has history of delirium in past associated with these medications (particularly has been known to have adverse responses to PRN Tramadol and  PRN Oxycodone in past). Collateral also reports adverse responses to Toradol in past. To be safe, recommend Tylenol PO/IV for pain management.  --Recommend Holding PRN Clorazepate/Tranxene (home benzo) as likely to cause worsening delirium    --Avoid unnecessary benzos, antihistamines, and anticholinergics due to risk for further confusion  --Minimize narcotic pain medications as appropriate while still adequately controlling pain (uncontrolled pain is also risk factor for delirium)  --Please note co-administration of antipsychotic medications, benzodiazepines, and opioid analgesics given in combination and monitor respiratory status regularly           --Non-Pharmacological Management: Please try to minimize stimuli (unnecessary bright lights, sudden/loud noises, fast movements) to help minimize confusion and agitation. Please try to reinforce sleep hygiene protocol of: encouraging patient to try to stay awake as much as possible during the day to improve sleep hygiene. Also allow natural light during the day with curtains/blinds open during the day, frequent orientation and attempts to soothing music (from [employer]) etc... to encourage normal sleep/wake cycle. If patient wears glasses or hearing aids, patient should have access to them as sensory deprivation can cause/worsen delirium; minimize room/staff changes; encourage interaction with familiar objects and frequent orientation.     ---The pt wears eye-glasses (gold rimmed glasses), at baseline  ---At baseline, the pt appears to have good dentition, and denies using dentures.  ---Pt is [place], at baseline, speak loud and clear; Of Note, the pt reports  wearing Left-sided Hearing Aid only, although states that the battery recently  and unable to replace it and unable to use it during this admission.  --The pt uses a walker to help ambulate, at baseline      --Minimize room and staff changes.  --Engage the patient in cognitively stimulating activities and provide frequent reorientation.   --At home, patient sleeps with her cats; Pt provided with mindy-bears on this admission (in bed and at window bench), as pt sleeps with cat at home, and family feels having a stuffed animal at bedside would  be comforting.        --Minimize use of restraints to situations where necessary to keep patient and staff safe and to prevent patient from removing lines, tubes, medical devices, dressings, etc. Please check ROM every TWO hours whenever patient is actively restrained. Continue to regularly re-evaluate continued need for any ordered restraints.         Therapeutic Coping Skills:  --Family/Friend Support  --Sister-in-Law, Vicenta, is a strong support  --Pt has stuffed animals (mindy bears) at bedside, as she normally sleeps with her cats at home  --Pt provided with coloring pages and activity worksheets        -Psychiatry will continue to follow, but not daily; Page [id number] with questions .                I spent 60 minutes in the professional and overall care of this patient.    Elke Aguilera, APRN-CNP, APRN-CNS

## 2024-01-23 NOTE — CARE PLAN
Problem: Fall/Injury  Goal: Not fall by end of shift  Outcome: Progressing  Goal: Be free from injury by end of the shift  Outcome: Progressing  Goal: Verbalize understanding of personal risk factors for fall in the hospital  Outcome: Progressing  Goal: Verbalize understanding of risk factor reduction measures to prevent injury from fall in the home  Outcome: Progressing  Goal: Use assistive devices by end of the shift  Outcome: Progressing  Goal: Pace activities to prevent fatigue by end of the shift  Outcome: Progressing     Problem: Pain - Adult  Goal: Verbalizes/displays adequate comfort level or baseline comfort level  Outcome: Progressing     Problem: Safety - Adult  Goal: Free from fall injury  Outcome: Progressing     Problem: Discharge Planning  Goal: Discharge to home or other facility with appropriate resources  Outcome: Progressing     Problem: Skin  Goal: Prevent/manage excess moisture  Outcome: Progressing  Goal: Prevent/minimize sheer/friction injuries  Outcome: Progressing  Goal: Promote/optimize nutrition  Outcome: Progressing   The patient's goals for the shift include patient will remain awake during the day.     The clinical goals for the shift include patient will alternate periods of activity with periods of rest.     Patient is making progress toward her goals.

## 2024-01-23 NOTE — NURSING NOTE
0800- Assumed care of patient at this time.  A&Ox3, no complaint of pain.  Sitter present at bedside.  Rounding needs met.  Call light in reach with safety measures in place.      1542- Order received for IV Thiamine.  Patient has been without IV access and declines to have an IV placed for Thiamine.  Request sent to change Thiamine to PO.   Order received.

## 2024-01-23 NOTE — PROGRESS NOTES
VASCULAR SURGERY PROGRESS NOTE  Subjective   No issues overnight. Mild confusion - waxing and waning.     Objective   Vitals:    01/23/24 1118   BP: (!) 187/81   Pulse: 72   Resp: 19   Temp: 36.5 °C (97.7 °F)   SpO2: 97%      Exam:  GENERAL APPEARANCE:  Awake, not in distress. Mild confusion   HEART:  RRR  LUNGS:  Equal chest rise and fall, non-labored breathing  ABDOMEN:  Soft, nontender, nondistended  NEUROLOGICAL:  Grossly nonfocal. Alert, moving all 4 extremities.   Skin:  Warm and dry without any rash.  EXTREMITIES: bilateral lower extremities are warm to the touch. L DP and PT dopplerable    Relevant Results  Medications:  Scheduled Meds:  acetaminophen, 975 mg, oral, q6h  aspirin, 81 mg, oral, Daily  atenolol, 50 mg, oral, BID  atorvastatin, 80 mg, oral, Nightly  clopidogrel, 75 mg, oral, Daily  escitalopram, 10 mg, oral, Daily  hydroCHLOROthiazide, 12.5 mg, oral, Daily  mirtazapine, 7.5 mg, oral, Nightly  [Held by provider] polyethylene glycol, 17 g, oral, Daily  potassium chloride, 20 mEq, intravenous, Once  QUEtiapine, 12.5 mg, oral, Nightly  spironolactone, 12.5 mg, oral, Daily  thiamine, 100 mg, oral, Daily  valsartan, 320 mg, oral, Daily      Continuous Infusions:   PRN Meds:.  PRN medications: ondansetron ODT **OR** ondansetron, polyethylene glycol    Labs:  Results from last 7 days   Lab Units 01/23/24  0903 01/22/24  0841 01/21/24  0653   WBC AUTO x10*3/uL 7.9 9.3 8.0   HEMOGLOBIN g/dL 10.0* 11.0* 10.1*   PLATELETS AUTO x10*3/uL 290 301 250      Results from last 7 days   Lab Units 01/23/24  0903 01/22/24  0841 01/21/24  0653   SODIUM mmol/L 140 139 143   POTASSIUM mmol/L 4.9 5.1 3.5   CHLORIDE mmol/L 104 104 102   CO2 mmol/L 27 27 32   BUN mg/dL 36* 31* 33*   CREATININE mg/dL 1.28* 1.12* 1.23*   GLUCOSE mg/dL 106* 107* 93   MAGNESIUM mg/dL 2.14 2.35 2.47*   PHOSPHORUS mg/dL 3.1 2.4* 3.7          Assessment/Plan   89 y.o. female with significant PMHx as well as PAD s/p L EIA stent in September 2023  at [facility]. She presented to an OSH yesterday d/t concern for left lower extremity pain in conjunction with a new left heel ulcer. Imaging revealed bilateral SFA occlusion and additional occlusions of SMA, right internal iliac, right popliteal, right anterior tibial artery, left popliteal artery, and left anterior tibial artery. S/p L fem EA with bovine patch angioplasty, and stenting of L common and external iliac arteries on 1/18.     1/21: Psych on board - started Seroquel last night. Tolerating.  1/22: Continuing to manage delirium using delirium precautions and avoiding culprit medications.     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  - Continue tylenol for pain control  - D/C oxy d/t delirium   - Continue neurovascular checks every 4 hrs  - Continue home mirtazapine, lexapro & seroquel      CV: HTN, HLD, PAD, CAD (s/p CABG 1995), afib, aortic stenosis, renal artery stenosis (s/p L renal stent)   - Continue home atenolol, spironolactone, hydrocholorathiazide and half-dose home valsartan  - Continue atorvastatin/ASA/Plavix   - KEIRY scan 1/16, no ischemia     Pulm:   - Continue to encourage IS hourly while awake  - OOB to chair and increase ambulation as tolerated     FENGI: CKD3, hypoalbuminemia, moderate malnutrition    - Continue regular diet with oral supplements  - Continue bowel regimen  - Strict I&O     Endo:    - no issues      Heme: supratherapeutic INR on admission, anemia of chronic disease   - no s/sx of bleeding  - CBC as clinically indicated     ID: ischemic ulcers   - trend temp q4  - offload heels   - wound care: betadine paint toes      Dispo:   - Continue care on floor  - Plan for home with PT and 24/7 care by family on Thursday     ALEXIS Gaytan-CNP

## 2024-01-24 LAB
ALBUMIN SERPL BCP-MCNC: 3 G/DL (ref 3.4–5)
ANION GAP SERPL CALC-SCNC: 11 MMOL/L (ref 10–20)
BUN SERPL-MCNC: 40 MG/DL (ref 6–23)
CALCIUM SERPL-MCNC: 9.1 MG/DL (ref 8.6–10.6)
CHLORIDE SERPL-SCNC: 106 MMOL/L (ref 98–107)
CO2 SERPL-SCNC: 29 MMOL/L (ref 21–32)
CREAT SERPL-MCNC: 1.22 MG/DL (ref 0.5–1.05)
EGFRCR SERPLBLD CKD-EPI 2021: 43 ML/MIN/1.73M*2
ERYTHROCYTE [DISTWIDTH] IN BLOOD BY AUTOMATED COUNT: 13.7 % (ref 11.5–14.5)
GLUCOSE SERPL-MCNC: 90 MG/DL (ref 74–99)
HCT VFR BLD AUTO: 28.9 % (ref 36–46)
HGB BLD-MCNC: 8.9 G/DL (ref 12–16)
MAGNESIUM SERPL-MCNC: 1.92 MG/DL (ref 1.6–2.4)
MCH RBC QN AUTO: 32.4 PG (ref 26–34)
MCHC RBC AUTO-ENTMCNC: 30.8 G/DL (ref 32–36)
MCV RBC AUTO: 105 FL (ref 80–100)
NRBC BLD-RTO: 0 /100 WBCS (ref 0–0)
PHOSPHATE SERPL-MCNC: 3.4 MG/DL (ref 2.5–4.9)
PLATELET # BLD AUTO: 293 X10*3/UL (ref 150–450)
POTASSIUM SERPL-SCNC: 4.4 MMOL/L (ref 3.5–5.3)
RBC # BLD AUTO: 2.75 X10*6/UL (ref 4–5.2)
SODIUM SERPL-SCNC: 142 MMOL/L (ref 136–145)
VIT B1 PYROPHOSHATE BLD-SCNC: 132 NMOL/L (ref 70–180)
WBC # BLD AUTO: 7.8 X10*3/UL (ref 4.4–11.3)

## 2024-01-24 PROCEDURE — 2500000001 HC RX 250 WO HCPCS SELF ADMINISTERED DRUGS (ALT 637 FOR MEDICARE OP): Performed by: NURSE PRACTITIONER

## 2024-01-24 PROCEDURE — 2500000004 HC RX 250 GENERAL PHARMACY W/ HCPCS (ALT 636 FOR OP/ED): Performed by: STUDENT IN AN ORGANIZED HEALTH CARE EDUCATION/TRAINING PROGRAM

## 2024-01-24 PROCEDURE — 2500000001 HC RX 250 WO HCPCS SELF ADMINISTERED DRUGS (ALT 637 FOR MEDICARE OP): Performed by: STUDENT IN AN ORGANIZED HEALTH CARE EDUCATION/TRAINING PROGRAM

## 2024-01-24 PROCEDURE — 97116 GAIT TRAINING THERAPY: CPT | Mod: GP,CQ

## 2024-01-24 PROCEDURE — 36415 COLL VENOUS BLD VENIPUNCTURE: CPT

## 2024-01-24 PROCEDURE — 84100 ASSAY OF PHOSPHORUS: CPT

## 2024-01-24 PROCEDURE — 2500000004 HC RX 250 GENERAL PHARMACY W/ HCPCS (ALT 636 FOR OP/ED)

## 2024-01-24 PROCEDURE — 83735 ASSAY OF MAGNESIUM: CPT

## 2024-01-24 PROCEDURE — 85027 COMPLETE CBC AUTOMATED: CPT

## 2024-01-24 PROCEDURE — 2500000001 HC RX 250 WO HCPCS SELF ADMINISTERED DRUGS (ALT 637 FOR MEDICARE OP)

## 2024-01-24 PROCEDURE — 2060000001 HC INTERMEDIATE ICU ROOM DAILY

## 2024-01-24 RX ORDER — TALC
3 POWDER (GRAM) TOPICAL NIGHTLY
Status: DISCONTINUED | OUTPATIENT
Start: 2024-01-24 | End: 2024-01-25 | Stop reason: HOSPADM

## 2024-01-24 RX ADMIN — FOLIC ACID 1 MG: 1 TABLET ORAL at 10:06

## 2024-01-24 RX ADMIN — ACETAMINOPHEN 975 MG: 325 TABLET ORAL at 18:20

## 2024-01-24 RX ADMIN — ACETAMINOPHEN 975 MG: 325 TABLET ORAL at 06:09

## 2024-01-24 RX ADMIN — QUETIAPINE FUMARATE 12.5 MG: 25 TABLET ORAL at 21:28

## 2024-01-24 RX ADMIN — VALSARTAN 320 MG: 320 TABLET, FILM COATED ORAL at 10:06

## 2024-01-24 RX ADMIN — ACETAMINOPHEN 975 MG: 325 TABLET ORAL at 00:11

## 2024-01-24 RX ADMIN — ESCITALOPRAM OXALATE 10 MG: 10 TABLET, FILM COATED ORAL at 10:07

## 2024-01-24 RX ADMIN — ATENOLOL 50 MG: 50 TABLET ORAL at 21:28

## 2024-01-24 RX ADMIN — Medication 3 MG: at 21:28

## 2024-01-24 RX ADMIN — CLOPIDOGREL BISULFATE 75 MG: 75 TABLET ORAL at 10:07

## 2024-01-24 RX ADMIN — MIRTAZAPINE 7.5 MG: 7.5 TABLET, FILM COATED ORAL at 21:28

## 2024-01-24 RX ADMIN — HYDROCHLOROTHIAZIDE 12.5 MG: 25 TABLET ORAL at 10:07

## 2024-01-24 RX ADMIN — ASPIRIN 81 MG: 81 TABLET, COATED ORAL at 10:07

## 2024-01-24 RX ADMIN — SPIRONOLACTONE 12.5 MG: 25 TABLET, FILM COATED ORAL at 10:06

## 2024-01-24 RX ADMIN — ATORVASTATIN CALCIUM 80 MG: 80 TABLET, FILM COATED ORAL at 21:28

## 2024-01-24 RX ADMIN — Medication 1 TABLET: at 10:06

## 2024-01-24 RX ADMIN — THIAMINE HCL TAB 100 MG 100 MG: 100 TAB at 10:06

## 2024-01-24 RX ADMIN — ATENOLOL 50 MG: 50 TABLET ORAL at 10:06

## 2024-01-24 ASSESSMENT — PAIN DESCRIPTION - LOCATION: LOCATION: FOOT

## 2024-01-24 ASSESSMENT — COGNITIVE AND FUNCTIONAL STATUS - GENERAL
DRESSING REGULAR LOWER BODY CLOTHING: A LITTLE
MOBILITY SCORE: 17
HELP NEEDED FOR BATHING: A LITTLE
STANDING UP FROM CHAIR USING ARMS: A LITTLE
TURNING FROM BACK TO SIDE WHILE IN FLAT BAD: A LITTLE
TOILETING: A LITTLE
TOILETING: A LITTLE
CLIMB 3 TO 5 STEPS WITH RAILING: A LOT
DRESSING REGULAR UPPER BODY CLOTHING: A LITTLE
PERSONAL GROOMING: A LITTLE
DAILY ACTIVITIY SCORE: 19
TURNING FROM BACK TO SIDE WHILE IN FLAT BAD: A LITTLE
CLIMB 3 TO 5 STEPS WITH RAILING: A LOT
MOVING TO AND FROM BED TO CHAIR: A LITTLE
CLIMB 3 TO 5 STEPS WITH RAILING: A LOT
DAILY ACTIVITIY SCORE: 19
MOVING TO AND FROM BED TO CHAIR: A LITTLE
WALKING IN HOSPITAL ROOM: A LITTLE
MOVING TO AND FROM BED TO CHAIR: A LITTLE
PERSONAL GROOMING: A LITTLE
WALKING IN HOSPITAL ROOM: A LITTLE
WALKING IN HOSPITAL ROOM: A LITTLE
MOBILITY SCORE: 17
STANDING UP FROM CHAIR USING ARMS: A LITTLE
DRESSING REGULAR UPPER BODY CLOTHING: A LITTLE
MOVING FROM LYING ON BACK TO SITTING ON SIDE OF FLAT BED WITH BEDRAILS: A LITTLE
HELP NEEDED FOR BATHING: A LITTLE
MOVING FROM LYING ON BACK TO SITTING ON SIDE OF FLAT BED WITH BEDRAILS: A LITTLE
MOBILITY SCORE: 18
TURNING FROM BACK TO SIDE WHILE IN FLAT BAD: A LITTLE
DRESSING REGULAR LOWER BODY CLOTHING: A LITTLE
STANDING UP FROM CHAIR USING ARMS: A LITTLE

## 2024-01-24 ASSESSMENT — PAIN SCALES - GENERAL
PAINLEVEL_OUTOF10: 5 - MODERATE PAIN
PAINLEVEL_OUTOF10: 7
PAINLEVEL_OUTOF10: 3

## 2024-01-24 ASSESSMENT — PAIN DESCRIPTION - ORIENTATION: ORIENTATION: LEFT

## 2024-01-24 ASSESSMENT — PAIN - FUNCTIONAL ASSESSMENT
PAIN_FUNCTIONAL_ASSESSMENT: 0-10
PAIN_FUNCTIONAL_ASSESSMENT: 0-10

## 2024-01-24 NOTE — PROGRESS NOTES
VASCULAR SURGERY PROGRESS NOTE  Subjective   No overnight issues.  Continues to wax and wane in terms of delirium.  Pain controlled.  No new complaints.    Objective   Vitals:    01/24/24 1111   BP: 160/72   Pulse: 76   Resp: 19   Temp: 36.6 °C (97.9 °F)   SpO2: 98%      Exam:  GENERAL APPEARANCE:  Sleepy, just waking up.   HEART:  RRR  LUNGS:  Equal chest rise and fall, non-labored breathing  ABDOMEN:  Soft, nontender, nondistended  NEUROLOGICAL:  Grossly nonfocal. Alert, moving all 4 extremities.   Skin:  Warm and dry without any rash.  EXTREMITIES: bilateral lower extremities are warm to the touch. L DP and PT dopplerable    Relevant Results  Medications:  Scheduled Meds:  acetaminophen, 975 mg, oral, q6h  aspirin, 81 mg, oral, Daily  atenolol, 50 mg, oral, BID  atorvastatin, 80 mg, oral, Nightly  clopidogrel, 75 mg, oral, Daily  escitalopram, 10 mg, oral, Daily  folic acid, 1 mg, oral, Daily  hydroCHLOROthiazide, 12.5 mg, oral, Daily  melatonin, 3 mg, oral, Nightly  mirtazapine, 7.5 mg, oral, Nightly  multivitamin with minerals, 1 tablet, oral, Daily  [Held by provider] polyethylene glycol, 17 g, oral, Daily  potassium chloride, 20 mEq, intravenous, Once  QUEtiapine, 12.5 mg, oral, Nightly  spironolactone, 12.5 mg, oral, Daily  thiamine, 100 mg, oral, Daily  valsartan, 320 mg, oral, Daily      Continuous Infusions:   PRN Meds:.  PRN medications: ondansetron ODT **OR** ondansetron, polyethylene glycol, QUEtiapine    Labs:  Results from last 7 days   Lab Units 01/24/24  0551 01/23/24  0903 01/22/24  0841   WBC AUTO x10*3/uL 7.8 7.9 9.3   HEMOGLOBIN g/dL 8.9* 10.0* 11.0*   PLATELETS AUTO x10*3/uL 293 290 301        Results from last 7 days   Lab Units 01/24/24  0551 01/23/24  0903 01/22/24  0841   SODIUM mmol/L 142 140 139   POTASSIUM mmol/L 4.4 4.9 5.1   CHLORIDE mmol/L 106 104 104   CO2 mmol/L 29 27 27   BUN mg/dL 40* 36* 31*   CREATININE mg/dL 1.22* 1.28* 1.12*   GLUCOSE mg/dL 90 106* 107*   MAGNESIUM mg/dL 1.92  2.14 2.35   PHOSPHORUS mg/dL 3.4 3.1 2.4*            Assessment/Plan   89 y.o. female with significant PMHx as well as PAD s/p L EIA stent in September 2023 at [facility]. She presented to an OSH yesterday d/t concern for left lower extremity pain in conjunction with a new left heel ulcer. Imaging revealed bilateral SFA occlusion and additional occlusions of SMA, right internal iliac, right popliteal, right anterior tibial artery, left popliteal artery, and left anterior tibial artery. S/p L fem EA with bovine patch angioplasty, and stenting of L common and external iliac arteries on 1/18.     1/21: Psych on board - started Seroquel last night. Tolerating.  1/22: Continuing to manage delirium using delirium precautions and avoiding culprit medications.  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  - Continue tylenol for pain control  - D/C oxy d/t delirium   - Continue neurovascular checks every 4 hrs  - Continue home mirtazapine, lexapro & seroquel      CV: HTN, HLD, PAD, CAD (s/p CABG 1995), afib, aortic stenosis, renal artery stenosis (s/p L renal stent)   - Continue home atenolol, spironolactone, hydrocholorathiazide and half-dose home valsartan  - Continue atorvastatin/ASA/Plavix      Pulm:   - Continue to encourage IS hourly while awake  - OOB to chair and increase ambulation as tolerated     FENGI: CKD3, hypoalbuminemia, moderate malnutrition    - Continue regular diet with oral supplements  - Continue bowel regimen  - Strict I&O     Endo:    - no issues      Heme: supratherapeutic INR on admission, anemia of chronic disease   - no s/sx of bleeding  - CBC as clinically indicated     ID: ischemic ulcers   - offload heels   - wound care: betadine paint toes      Dispo:   - Continue care on floor  - Plan for home with PT and 24/7 care by family on Thursday  (1/25)    John Carl MD  Vascular Surgery, PGY3  Team  Pager: [phone]

## 2024-01-24 NOTE — PROGRESS NOTES
Physical Therapy    Physical Therapy Treatment    Patient Name: Maggi Gordon  MRN: [id number]  Today's Date: 1/24/2024  Time Calculation  Start Time: 1151  Stop Time: 1226  Time Calculation (min): 35 min       Assessment/Plan   PT Assessment  PT Assessment Results: Decreased strength, Decreased endurance, Impaired balance, Decreased mobility, Decreased safety awareness, Decreased cognition  End of Session Communication: Bedside nurse  End of Session Patient Position: Bed, 2 rail up, Alarm off, caregiver present     PT Plan  Treatment/Interventions: Bed mobility, Transfer training, Gait training, Balance training, Strengthening, Endurance training, Therapeutic exercise, Therapeutic activity, Home exercise program  PT Plan: Skilled PT  PT Frequency: 3 times per week  PT Discharge Recommendations: Moderate intensity level of continued care  Equipment Recommended upon Discharge:  (none)  PT Recommended Transfer Status: Contact guard  PT - OK to Discharge: Yes      General Visit Information:      General  Family/Caregiver Present: Yes  Caregiver Feedback: sitter present  Prior to Session Communication: Bedside nurse  Patient Position Received: Bed, 2 rail up, Alarm off, caregiver present  General Comment: Pt asleep upon arrival. Pt woke easily, pleasant, and agreeable to therapy.    Subjective   Precautions:  Precautions  LE Weight Bearing Status: Weight Bearing as Tolerated (L LE)  Medical Precautions: Fall precautions  Vital Signs:       Objective   Pain:  Pain Assessment  Pain Assessment: 0-10  Pain Score: 3 (8 when ambulating.)  Pain Location: Foot  Pain Orientation: Left  Clinical Progression: Gradually improving  Cognition:     Postural Control:  Postural Control  Postural Control: Within Functional Limits    Activity Tolerance:  Activity Tolerance  Endurance: Tolerates 30 min exercise with multiple rests  Treatments:  Therapeutic Activity  Therapeutic Activity Performed: Yes  Therapeutic Activity 1: Pt completed  "transfer and gait training    Bed Mobility  Bed Mobility: Yes  Bed Mobility 1  Bed Mobility 1: Supine to sitting  Level of Assistance 1: Close supervision  Bed Mobility Comments 1: HOB elevated.  Bed Mobility 2  Bed Mobility  2: Sitting to supine  Level of Assistance 2: Close supervision    Ambulation/Gait Training  Ambulation/Gait Training Performed: Yes  Ambulation/Gait Training 1  Surface 1: Level tile  Device 1: Rolling walker  Assistance 1: Close supervision  Quality of Gait 1: Decreased step length  Comments/Distance (ft) 1: 100' x2  Transfers  Transfer: Yes  Transfer 1  Transfer From 1: Sit to  Transfer to 1: Stand  Technique 1: Sit to stand  Transfer Device 1: Walker  Transfer Level of Assistance 1: Close supervision, Minimum assistance  Trials/Comments 1: Pt initially Min A but progressed to SBA.  Transfers 2  Transfer From 2: Stand to  Transfer to 2: Sit  Technique 2: Stand to sit  Transfer Device 2: Walker  Transfer Level of Assistance 2: Close supervision  Trials/Comments 2: Pt requires cues to reach back to control descent. (Pt stated \"I always plop\" pt educated on the importance of a controlled descent when sitting.)    Outcome Measures:  [facility] Basic Mobility  Turning from your back to your side while in a flat bed without using bedrails: A little  Moving from lying on your back to sitting on the side of a flat bed without using bedrails: A little  Moving to and from bed to chair (including a wheelchair): A little  Standing up from a chair using your arms (e.g. wheelchair or bedside chair): A little  To walk in hospital room: A little  Climbing 3-5 steps with railing: A lot  Basic Mobility - Total Score: 17    Education Documentation  Body Mechanics, taught by Carola Moore PTA at 1/24/2024  1:56 PM.  Learner: Patient  Readiness: Acceptance  Method: Explanation  Response: Verbalizes Understanding    Home Exercise Program, taught by Carola Moore PTA at 1/24/2024  1:56 PM.  Learner: " Patient  Readiness: Acceptance  Method: Explanation  Response: Verbalizes Understanding    Mobility Training, taught by Carola Moore PTA at 1/24/2024  1:56 PM.  Learner: Patient  Readiness: Acceptance  Method: Explanation  Response: Verbalizes Understanding    Education Comments  No comments found.        OP EDUCATION:       Encounter Problems       Encounter Problems (Active)       Balance       STG - Maintains supervision assist dynamic standing balance with upper extremity support using a wheeled walker. (Progressing)       Start:  01/19/24    Expected End:  02/02/24       I         STG - Maintains supervision assist static standing balance with upper extremity support using a wheeled walker. (Progressing)       Start:  01/19/24    Expected End:  02/02/24               Mobility       STG - Patient will ambulate 300ft using a wheeled walker with supervision assistance. (Progressing)       Start:  01/19/24    Expected End:  02/02/24               Pain - Adult          Transfers       STG - Transfer from bed to chair using a wheeled walker with supervision assistance. (Progressing)       Start:  01/19/24    Expected End:  02/02/24            STG - Patient to transfer to and from sit to supine with supervision assistance. (Progressing)       Start:  01/19/24    Expected End:  02/02/24            STG - Patient will transfer sit to and from stand using a wheeled walker with supervision assistance. (Progressing)       Start:  01/19/24    Expected End:  02/02/24

## 2024-01-24 NOTE — CARE PLAN
The clinical goals for the shift include pt will be free from falls and injury through end of shift    Over the shift, the patient did  make progress toward the following goals. Recommendations to address these barriers include pt will use call bell for any assistance and purposeful rounding.

## 2024-01-25 ENCOUNTER — DOCUMENTATION (OUTPATIENT)
Dept: HOME HEALTH SERVICES | Facility: HOME HEALTH | Age: 89
End: 2024-01-25

## 2024-01-25 ENCOUNTER — DOCUMENTATION (OUTPATIENT)
Dept: BEHAVIORAL HEALTH | Facility: CLINIC | Age: 89
End: 2024-01-25

## 2024-01-25 ENCOUNTER — TELEPHONE (OUTPATIENT)
Dept: HOME HEALTH SERVICES | Facility: HOME HEALTH | Age: 89
End: 2024-01-25

## 2024-01-25 ENCOUNTER — PHARMACY VISIT (OUTPATIENT)
Dept: PHARMACY | Facility: CLINIC | Age: 89
End: 2024-01-25

## 2024-01-25 VITALS
BODY MASS INDEX: 18 KG/M2 | TEMPERATURE: 97.7 F | DIASTOLIC BLOOD PRESSURE: 76 MMHG | RESPIRATION RATE: 20 BRPM | HEIGHT: 63 IN | OXYGEN SATURATION: 100 % | SYSTOLIC BLOOD PRESSURE: 128 MMHG | WEIGHT: 101.6 LBS | HEART RATE: 77 BPM

## 2024-01-25 LAB
ALBUMIN SERPL BCP-MCNC: 3.4 G/DL (ref 3.4–5)
ANION GAP SERPL CALC-SCNC: 14 MMOL/L (ref 10–20)
ATRIAL RATE: 90 BPM
BUN SERPL-MCNC: 33 MG/DL (ref 6–23)
CALCIUM SERPL-MCNC: 9.7 MG/DL (ref 8.6–10.6)
CHLORIDE SERPL-SCNC: 104 MMOL/L (ref 98–107)
CO2 SERPL-SCNC: 27 MMOL/L (ref 21–32)
CREAT SERPL-MCNC: 1.24 MG/DL (ref 0.5–1.05)
EGFRCR SERPLBLD CKD-EPI 2021: 42 ML/MIN/1.73M*2
ERYTHROCYTE [DISTWIDTH] IN BLOOD BY AUTOMATED COUNT: 13.8 % (ref 11.5–14.5)
GLUCOSE SERPL-MCNC: 95 MG/DL (ref 74–99)
HCT VFR BLD AUTO: 32.9 % (ref 36–46)
HGB BLD-MCNC: 10.1 G/DL (ref 12–16)
MAGNESIUM SERPL-MCNC: 2.09 MG/DL (ref 1.6–2.4)
MCH RBC QN AUTO: 32.5 PG (ref 26–34)
MCHC RBC AUTO-ENTMCNC: 30.7 G/DL (ref 32–36)
MCV RBC AUTO: 106 FL (ref 80–100)
NRBC BLD-RTO: 0 /100 WBCS (ref 0–0)
P AXIS: 41 DEGREES
P OFFSET: 187 MS
P ONSET: 115 MS
PHOSPHATE SERPL-MCNC: 3.5 MG/DL (ref 2.5–4.9)
PLATELET # BLD AUTO: 368 X10*3/UL (ref 150–450)
POTASSIUM SERPL-SCNC: 5.2 MMOL/L (ref 3.5–5.3)
PR INTERVAL: 214 MS
Q ONSET: 222 MS
QRS COUNT: 15 BEATS
QRS DURATION: 72 MS
QT INTERVAL: 362 MS
QTC CALCULATION(BAZETT): 442 MS
QTC FREDERICIA: 414 MS
R AXIS: 40 DEGREES
RBC # BLD AUTO: 3.11 X10*6/UL (ref 4–5.2)
SODIUM SERPL-SCNC: 140 MMOL/L (ref 136–145)
T AXIS: 47 DEGREES
T OFFSET: 403 MS
VENTRICULAR RATE: 90 BPM
WBC # BLD AUTO: 8.7 X10*3/UL (ref 4.4–11.3)

## 2024-01-25 PROCEDURE — 2500000001 HC RX 250 WO HCPCS SELF ADMINISTERED DRUGS (ALT 637 FOR MEDICARE OP): Performed by: STUDENT IN AN ORGANIZED HEALTH CARE EDUCATION/TRAINING PROGRAM

## 2024-01-25 PROCEDURE — 36415 COLL VENOUS BLD VENIPUNCTURE: CPT | Performed by: STUDENT IN AN ORGANIZED HEALTH CARE EDUCATION/TRAINING PROGRAM

## 2024-01-25 PROCEDURE — 99232 SBSQ HOSP IP/OBS MODERATE 35: CPT | Performed by: CLINICAL NURSE SPECIALIST

## 2024-01-25 PROCEDURE — 2500000001 HC RX 250 WO HCPCS SELF ADMINISTERED DRUGS (ALT 637 FOR MEDICARE OP): Performed by: NURSE PRACTITIONER

## 2024-01-25 PROCEDURE — 80069 RENAL FUNCTION PANEL: CPT | Performed by: STUDENT IN AN ORGANIZED HEALTH CARE EDUCATION/TRAINING PROGRAM

## 2024-01-25 PROCEDURE — 2500000001 HC RX 250 WO HCPCS SELF ADMINISTERED DRUGS (ALT 637 FOR MEDICARE OP)

## 2024-01-25 PROCEDURE — RXMED WILLOW AMBULATORY MEDICATION CHARGE

## 2024-01-25 PROCEDURE — 85027 COMPLETE CBC AUTOMATED: CPT | Performed by: STUDENT IN AN ORGANIZED HEALTH CARE EDUCATION/TRAINING PROGRAM

## 2024-01-25 PROCEDURE — 99239 HOSP IP/OBS DSCHRG MGMT >30: CPT | Performed by: NURSE PRACTITIONER

## 2024-01-25 PROCEDURE — 2500000004 HC RX 250 GENERAL PHARMACY W/ HCPCS (ALT 636 FOR OP/ED): Performed by: STUDENT IN AN ORGANIZED HEALTH CARE EDUCATION/TRAINING PROGRAM

## 2024-01-25 PROCEDURE — 83735 ASSAY OF MAGNESIUM: CPT | Performed by: STUDENT IN AN ORGANIZED HEALTH CARE EDUCATION/TRAINING PROGRAM

## 2024-01-25 RX ADMIN — ASPIRIN 81 MG: 81 TABLET, COATED ORAL at 08:02

## 2024-01-25 RX ADMIN — CLOPIDOGREL BISULFATE 75 MG: 75 TABLET ORAL at 08:02

## 2024-01-25 RX ADMIN — VALSARTAN 320 MG: 320 TABLET, FILM COATED ORAL at 08:02

## 2024-01-25 RX ADMIN — THIAMINE HCL TAB 100 MG 100 MG: 100 TAB at 08:03

## 2024-01-25 RX ADMIN — ACETAMINOPHEN 975 MG: 325 TABLET ORAL at 08:03

## 2024-01-25 RX ADMIN — ATENOLOL 50 MG: 50 TABLET ORAL at 08:03

## 2024-01-25 RX ADMIN — FOLIC ACID 1 MG: 1 TABLET ORAL at 08:03

## 2024-01-25 RX ADMIN — ESCITALOPRAM OXALATE 10 MG: 10 TABLET, FILM COATED ORAL at 08:02

## 2024-01-25 RX ADMIN — SPIRONOLACTONE 12.5 MG: 25 TABLET, FILM COATED ORAL at 08:03

## 2024-01-25 RX ADMIN — Medication 1 TABLET: at 08:02

## 2024-01-25 RX ADMIN — HYDROCHLOROTHIAZIDE 12.5 MG: 25 TABLET ORAL at 08:03

## 2024-01-25 ASSESSMENT — COGNITIVE AND FUNCTIONAL STATUS - GENERAL
TURNING FROM BACK TO SIDE WHILE IN FLAT BAD: A LITTLE
HELP NEEDED FOR BATHING: A LITTLE
WALKING IN HOSPITAL ROOM: A LITTLE
DRESSING REGULAR LOWER BODY CLOTHING: A LITTLE
TOILETING: A LITTLE
MOVING FROM LYING ON BACK TO SITTING ON SIDE OF FLAT BED WITH BEDRAILS: A LITTLE
STANDING UP FROM CHAIR USING ARMS: A LITTLE
MOVING TO AND FROM BED TO CHAIR: A LITTLE
CLIMB 3 TO 5 STEPS WITH RAILING: A LOT
MOBILITY SCORE: 17
DRESSING REGULAR UPPER BODY CLOTHING: A LITTLE
DAILY ACTIVITIY SCORE: 19
PERSONAL GROOMING: A LITTLE

## 2024-01-25 ASSESSMENT — PAIN SCALES - GENERAL: PAINLEVEL_OUTOF10: 0 - NO PAIN

## 2024-01-25 ASSESSMENT — PAIN - FUNCTIONAL ASSESSMENT: PAIN_FUNCTIONAL_ASSESSMENT: 0-10

## 2024-01-25 NOTE — HH CARE COORDINATION
Home Care received a referral for Nursing, Physical Therapy, and Occupational Therapy. Unfortunately, we are unable to accept and process the referral at this time.    Patients, please reach out to the referring provider or your PCP to assist in obtaining an alternative home care agency and/or guidance to meet your needs.    Providers, please reach out to  Home Care with any questions regarding the declined referral.

## 2024-01-25 NOTE — PROGRESS NOTES
"Maggi Gordon is a 89 y.o. female on day 13 of admission presenting with Acute lower limb ischemia.        Subjective   On arrival to room, pt  was using the restroom and her sitter was seated in a chair near bedside.     When she was finished, she was assisted back into her room by nursing staff. She ambulated on her own, with here walker, to a chair in her room where she promptly sat down and then up her left foot  on the automan/stool.    When she was asked how she was feeling today, she responded, \"the same as I was yesterday.\" When asked how she was feeling yesterday, she replied, \"Ok\".    She endorses \"8/10\" pain in her left foot that is still very present and painful, whenever she puts her foot on the floor and attempts to walk.     She reports receiving PRN Tylenol for pain, although she admits that Tylenol only provides minimal relief, but usually she has \"no relief\" from her pain.  However, she states that she's unable to take anything stronger, because of adverse side-effects she has with stronger pain pills or narcotics, stating that it causes her to become confused and to hallucinate.      Pt knows her full name. Pt knows today's date and her current location. When she was asked about the reason for this admission, she replied, \"I came in for a couple of stents to be put in my legs\".     She denies current mental health concerns, stating, \"I'm fine really.\"     Pt denies SI/HI/AVH and she denies paranoid delusions, racing thoughts and confusion.     Pt also denies feelings of anxiety, depression, and she also denies feelings of anger, frustration, and/or irritability.     She reports eating well and she denies issues with moving her bladder and bowel movements.     When she was asked about her sleep, she replied, \"Well, sometimes I sleep good, and sometimes I don't. Last night I slept good.\"     She reports that she's being discharged home today and said that her daughter-in-law, \"Sia\", is flying in " "from [place] to help her at home, on discharge.     The pt also reports having a sister-in-law with a similar name, who is named \"Vicenta,\" and jokes that it can get \"confusing at times\".  However, she reports that her sister-in-law, Vicenta, lives locally, in the [place], [place] area and said that she is regular communication with when she's at home.          Objective       PSYCHIATRIC REVIEW OF SYMPTOMS:  Depression:  Patient denies  Anger: Patient denies  [place]:  Patient denies  Psychosis:  Patient denies AVH  Delusion: Patient denies paranoid delusions  OLLIE:  Patient denies  OCD: No complaints or concerns for PTSD   PTSD:  No complaints or concerns for PTSD  Concentration/Memory: Fair  Delirium:  Patient denies confusion    Safety: Patient denies SI/HI. No history of prior SA        Physical Exam  Physical Exam  Neurological:      Mental Status: She is alert.   Psychiatric:         Attention and Perception: Attention normal.         Mood and Affect: Mood and affect normal. Mood is not anxious or depressed. Affect is not flat or tearful.         Speech: Speech normal. Speech is not slurred or tangential.         Behavior: Behavior normal. Behavior is not agitated or withdrawn. Behavior is cooperative.         Thought Content: Thought content normal. Thought content is not paranoid. Thought content does not include homicidal or suicidal ideation.         Cognition and Memory: Cognition and memory normal.         Judgment: Judgment normal.      Dark-purple ecchymosis noted on left hand. 8/10 left foot pain.         MEDICATIONS:  Scheduled Medications:  acetaminophen, 975 mg, oral, q6h  aspirin, 81 mg, oral, Daily  atenolol, 50 mg, oral, BID  atorvastatin, 80 mg, oral, Nightly  clopidogrel, 75 mg, oral, Daily  escitalopram, 10 mg, oral, Daily  folic acid, 1 mg, oral, Daily  hydroCHLOROthiazide, 12.5 mg, oral, Daily  melatonin, 3 mg, oral, Nightly  mirtazapine, 7.5 mg, oral, " "Nightly  multivitamin with minerals, 1 tablet, oral, Daily  [Held by provider] polyethylene glycol, 17 g, oral, Daily  potassium chloride, 20 mEq, intravenous, Once  QUEtiapine, 12.5 mg, oral, Nightly  spironolactone, 12.5 mg, oral, Daily  thiamine, 100 mg, oral, Daily  valsartan, 320 mg, oral, Daily    Continuous Medications:       PRN Medications:  PRN medications: ondansetron ODT **OR** ondansetron, polyethylene glycol, QUEtiapine        Last Recorded Vitals  Blood pressure 151/75, pulse 64, temperature 36.5 °C (97.7 °F), temperature source Temporal, resp. rate 16, height 1.6 m (5' 3\"), weight 46.1 kg (101 lb 9.6 oz), SpO2 99 %.    Intake/Output last 3 Shifts:  No intake/output data recorded.      RELEVANT RESULTS:    Labs:  Results for orders placed or performed during the hospital encounter of 01/11/24 (from the past 24 hour(s))   CBC   Result Value Ref Range    WBC 8.7 4.4 - 11.3 x10*3/uL    nRBC 0.0 0.0 - 0.0 /100 WBCs    RBC 3.11 (L) 4.00 - 5.20 x10*6/uL    Hemoglobin 10.1 (L) 12.0 - 16.0 g/dL    Hematocrit 32.9 (L) 36.0 - 46.0 %     (H) 80 - 100 fL    MCH 32.5 26.0 - 34.0 pg    MCHC 30.7 (L) 32.0 - 36.0 g/dL    RDW 13.8 11.5 - 14.5 %    Platelets 368 150 - 450 x10*3/uL   Magnesium   Result Value Ref Range    Magnesium 2.09 1.60 - 2.40 mg/dL   Renal Function Panel   Result Value Ref Range    Glucose 95 74 - 99 mg/dL    Sodium 140 136 - 145 mmol/L    Potassium 5.2 3.5 - 5.3 mmol/L    Chloride 104 98 - 107 mmol/L    Bicarbonate 27 21 - 32 mmol/L    Anion Gap 14 10 - 20 mmol/L    Urea Nitrogen 33 (H) 6 - 23 mg/dL    Creatinine 1.24 (H) 0.50 - 1.05 mg/dL    eGFR 42 (L) >60 mL/min/1.73m*2    Calcium 9.7 8.6 - 10.6 mg/dL    Phosphorus 3.5 2.5 - 4.9 mg/dL    Albumin 3.4 3.4 - 5.0 g/dL        Principal Problem:    Acute lower limb ischemia  Active Problems:    Peripheral vascular disease (CMS/HCC)         MENTAL STATUS EXAM:  General/Appearance: Thin, petite, feeble appearing elderly white female with full " "white, wavy hair, neatly combed, wearing hospital gown and gray hospital socks on both feet, seated upright in chair near hospital bed with left foot resting on a stool. Good/fair hygiene. Room is bright with blinds open and lights on. Patient has 2 stuffed animal mindy bears (1 large, 1 small) neatly seated on the window bench.     -- Sitter at bedside     Attitude: Calm, pleasant, polite, cooperative. Good eye-contact.     Behavior: Calm, in-control, non-threatening     Motor Activity: No PMA, PMR, TD/EPS or Tremors noted. Ambulatory with aid of a walker with hands-off assistance by nursing staff.     Speech: Clear, normal tone, rhythm, and volume     --Of note, patient states she has hearing aids but is unable to use them because the batteries are dead.     Mood: Calm    Affect: Pleasant     Thought Process: Organized and Linear, with Mild Confusion vs Impairment; Associations are mostly logical     Thought Content: Denies ideations; Denies Paranoid delusions    Thought Perception: Denies AVH; Pt does not appear internally stimulated      Cognition: Alert and Oriented x 4/4 (Patient knows her full Name. Pt knows today's Date, and she knows her current Location, and she knows her Situation, [I.e. Reason for Admission: \"I came in for a couple of stents to be put in my legs for circulation.\"]) --No Deficits noted. Fair/Adequate Fund of Knowledge. Fair/Limited Recent and Remote Memory. Minimal Deficits in Language, Attention, and Concentration.      Insight: Fair insight     Judgement: Fair Judgement            PSYCHIATRIC RISK ASSESSMENT:  Violence Risk Factors:  none  Acute Risk of Harm to Others is Considered: Low  Suicide Risk Factors: ; /[place] native, age > 65 years old , chronic medical illness, and chronic pain  Protective Factors: [employer] affiliation/spirituality, strong coping skills, fear of suicide or death, and sense of responsibility towards family  Acute Risk of Harm to Self " is Considered: Low      PRINCIPAL PROBLEM:  Principal Problem:    Acute lower limb ischemia  Active Problems:    Peripheral vascular disease (CMS/[facility])          PSYCH ASSESSMENT/PLAN:   Ms. Maggi Gordon/[id number] is a 88y/o F w/PMSHx of: Anxiety, chronic Insomnia, Hypothyroidism, Goiter, HTN, HLD, CKD, Vertebral Artery s/p L PICA coiling and stent placement (2006), Atherosclerosis of CABG w/Angina Pectoris (10/2021), CAD s/p CABG x3 (1995, 2020, 2021), A-FIB, Aortic Stenosis s/p AVR, Cataract Surgery (12/2018), CT Aorta and Bilateral Iliofemoral Runoff Angiogram w/and w/out I Contrast (7/2023), and PAD s/p L EIA stent (9/2023 at [facility]) who initially presented to [facility] ([facility] [place], operated by [facility]), on 1/11/2024 after she reportedly hit her left foot on her bed frame at home, a few days before OSH ED presentation. The pt reports noticing a wound form, over her left achilles, from where she hit her left foot and said she began having severe pain and having difficulty ambulating since the ulceration has developed when she decided to go to OS ED ([facility], [place], operated by [facility] ED) for help. During OSH ED visit, she underwent a CTA, which showed bilateral SFA occlusion as well as additional occlusions of SMA, right internal iliac, right popliteal, right anterior tibial artery, left popliteal artery, and left anterior tibial artery. She admits to having missed several doses of her home medications of Aspirin and Plavix, and was started on a Heparin gtt and transferred to [facility] on 1/11/2024, for Vascular Surgery Evaluation. However, on arrival she instead was transferred from OSH ED ([facility] ED) to [facility] ED for further work-up and vascular surgery evaluation, for acute left lower limb ischemia but noted to have progressively worsening altered mentation on admission. Pt underwent surgery on 1/18, for LLE femoral angiogram and endarterectomy enterectomy with Dr. Pura Montes, from vascular Surgery.  Pt is now  s/p L fem EA  with bovine patch angioplasty, and stenting of L common and external iliac arteries on 1/18/24, but she remains altered and confused. Psychiatry is consulted on 1/20, to assist with management of  AMS/Delirium.                  1/20, On initial evaluation, she is cooperative but seems confused at times. Grossly oriented x4, but difficult to fully assess as patient is difficult to understand. There is notable deficits in attention and concentration, and she is easily distracted by external stimuli. Waxing and waning presentation is c/w delerium for which she hs nuemrous risk factors (recent surgery, prolonged hospitalization, recent iatrogenic risk factors including use of benzo and narcotics, older age). Infection and electrolyte imablance was considered but labs are grossly unremakrable, and there is no obvious source of infection. No history of significant alcohol use to suggest alcohol withdrawal induced hallucinations. She is fairly malnoursihed on exam and has evidence of macryotic anemia, so reasonable to assess for nutritonal deficienciety which are certainly possible and may be exacerbating presention. Of note, patient has home medication of Clorazepate 7.5 mg which she takes PRN for anxiety/sleep. It was last filled on 11/27, dispensed 20 tabs. It was held for first 7 days of hospitalization and then restarted on 1/19. She has been receiving 3.75 mg BID PRN for anxiety. Based on timeline of when this medication was restarted and onset of delirium, suspect this may be significant contributor as well.         1/21, Saw patient for consult staffing today (1/21/24); please refer to complete Psychiatry consultation note from 1/20/24 for details. In sum, agree with Delirium likely related to controlled substances on top of other risk factors (recent surgery, relative nutritional status, age).       1/22, Pt is pleasant and cooperative, with mild to moderate confusion vs impairment noted, although pt is also [place]  (without working left-hearing aid), thus making it harder to communicate. Pt reports improvement with mood, sleep, and confusion, as able to recall earlier in this admission. Pt noted to have sitter support at bedside, as well as stuffed animals and she was provided with coloring pages and activity worksheets to utilize on this admission. Pt denies SI/HI/AVH.         1/23, Pt soundly sleeping, and did not awaken when her name was called, although the pt is [place], and psych writer, let her rest. Nursing staff reports the pt had a presumed vasovagal response during a BM yesterday (1/22) afternoon, in which she had a short run (~7 sec) of V-Tach, before cardioverting back into normal sinus rhythm. Staff reports the pt was awake, although pleasantly confused until about 04:00 this morning. Pt is to be discharged home, with family, instead of transferring to SNF, as originally planned.           As of 1/25, Pt is awake, alert and pleasant and is noted to be ambulating on her own from the bathroom using her walker. Pt denies mental health signs and symptoms. Pt reports pain, associated with her foot although denies relief with OTC Tylenol, although cannot tolerate stronger pain meds d/t adverse drug effects. Pt reports being discharged home later today. Pt denies SI/HI/AVH.             IMPRESSION:  Delirium, Multifactorial, Acute, with Hypoactivity --Resolving   Unspecified Depressive Disorder   Unspecified Anxiety Disorder   Insomnia, Chronic, Unspecified Type      R/O Neurocognitive Impairment, Unspecified Type   R/O Benzodiazepine Withdrawal (Clorazepate)--Low Suspicion         Other:  Hypothyroidism               RECS:  Safety:  --No 1:1 Sitter, from a psych perspective; Defer to Nursing/Primary Team   --Pt doesn't meet criteria for inpatient psychiatry admission         Labs and Diagnostic Tests/Procedures:  --At next lab draw, please check: CBC with Diff, CMP, Vitamin B, Vitamin B12, Vitamin D, Folate, TSH, VDRL, and  HIV, if not already done     --On 1/21, TSH= 4.28      --Please get U/A, if not already done     --On 1/16, EKG shows QTc= 442ms SR with First Degree AV Block and HR= 90bpm      Medications:  --CONTINUE scheduled Thiamine 100mg IVP TID   --CONTINUE scheduled MV and Folate by mouth daily     --Continue scheduled Lexapro 10mg by mouth daily   --Continue scheduled Remeron 7.5mg by mouth every evening at bedtime  --Continue scheduled Seroquel 12.5mg by mouth every evening at bedtime      --CONTINUE PRN Melatonin 3mg by mouth every evening at bedtime PRN Insomnia  --CONTINUE PRN Seroquel 12.5mg by mouth BID PRN for Acute Anxiety/Agitation and/or Psychosis     --Avoid unnecessary benzos, antihistamines, and anticholinergics due to risk for further confusion  --Minimize narcotic pain medications as appropriate while still adequately controlling pain (uncontrolled pain is also risk factor for delirium)  --Please note co-administration of antipsychotic medications, benzodiazepines, and opioid analgesics given in combination and monitor respiratory status regularly    Special Considerations:  --Please assess and treat disruption in bowel and bladder functions  --Please assess and treat abnormalities in nutrition and hydration status   --Do NOT recommend using Trazodone for Insomnia, as histamine antagonism may worsen delirium.   --Recommend avoid all narcotics as she has history of delirium in past associated with these medications (particularly has been known to have adverse responses to PRN Tramadol and  PRN Oxycodone in past). Collateral also reports adverse responses to Toradol in past. To be safe, recommend Tylenol PO/IV for pain management.  --Recommend Holding PRN Clorazepate/Tranxene (home benzo) as likely to cause worsening delirium   --Avoid unnecessary benzos, antihistamines, and anticholinergics due to risk for further confusion  --Minimize narcotic pain medications as appropriate while still adequately  controlling pain (uncontrolled pain is also risk factor for delirium)  --Please note co-administration of antipsychotic medications, benzodiazepines, and opioid analgesics given in combination and monitor respiratory status regularly         --Non-Pharmacological Management: Please try to minimize stimuli (unnecessary bright lights, sudden/loud noises, fast movements) to help minimize confusion and agitation. Please try to reinforce sleep hygiene protocol of: encouraging patient to try to stay awake as much as possible during the day to improve sleep hygiene. Also allow natural light during the day with curtains/blinds open during the day, frequent orientation and attempts to soothing music (from [employer]) etc... to encourage normal sleep/wake cycle. If patient wears glasses or hearing aids, patient should have access to them as sensory deprivation can cause/worsen delirium; minimize room/staff changes; encourage interaction with familiar objects and frequent orientation.     ---The pt wears eye-glasses (gold rimmed glasses), at baseline  ---At baseline, the pt appears to have good dentition, and denies using dentures.  ---Pt is [place], at baseline, speak loud and clear; Of Note, the pt reports wearing Left-sided Hearing Aid only, although states that the battery recently  and unable to replace it and unable to use it during this admission.  --The pt uses a walker to help ambulate, at baseline      --Minimize room and staff changes.  --Engage the patient in cognitively stimulating activities and provide frequent reorientation.   --At home, patient sleeps with her cats; Pt provided with mindy-bears on this admission (in bed and at window bench), as pt sleeps with cat at home, and family feels having a stuffed animal at bedside would be comforting.        --Minimize use of restraints to situations where necessary to keep patient and staff safe and to prevent patient from removing lines, tubes, medical devices,  dressings, etc. Please check ROM every TWO hours whenever patient is actively restrained. Continue to regularly re-evaluate continued need for any ordered restraints.         Therapeutic Coping Skills:  --Family/Friend Support  --Sister-in-Law, Vicenta, is a strong support  --Daughter-in-Law, Sia, is coming to visit today  --Pt has stuffed animals (mindy bears) at bedside, as she normally sleeps with her cats at home  --Pt provided with coloring pages and activity worksheets        --Psychiatry will sign off, as pt is being discharged home; Page [phone] with questions      Thank you for allowing us to participate in the care of this patient. Please page [phone] with any questions or concerns.    RU PA Student, [place],             I spent 30 minutes in the professional and overall care of this patient.  Elke Aguilera, APRN-CNP, APRN-CNS

## 2024-01-25 NOTE — DISCHARGE SUMMARY
Discharge Diagnosis  Acute lower limb ischemia    Issues Requiring Follow-Up  Wound care, PT     Test Results Pending At Discharge  Pending Labs       Order Current Status    CBC Collected (01/25/24 0856)    Magnesium Collected (01/25/24 0856)    Renal Function Panel Collected (01/25/24 0856)            Hospital Course  89 year old female with past medical history significant for HTN, HLD, CKD, vertebral artery syndrome (s/p L PICA coiling and stent placement in 2006), CAD (s/p CABG x 3 in 1995), afib, aortic stenosis, PAD (s/p L EIA stent in 9/2023 at OSH), who presented to [facility] ED with complaints of left lower extremity pain and new heel ulcer. OSH imaging revealed bilateral SFA occlusion and additional occlusions of SMA, right internal iliac, right popliteal, right anterior tibial artery, left popliteal artery, and left anterior tibial artery. She was placed on a heparin drip and transferred to [facility] for further management. She was admitted to vascular surgery and cardiology was consulted for preoperative optimization and risk stratification in preparation for operative intervention. 1/18, she went to the OR and underwent left femoral endarterectomy with bovine patch angioplasty, angiogram, left common iliac and external iliac artery stenting. She was transferred to the step down unit post operatively after routine PACU stay. She was noted to have postoperative delirium for which psychiatry was consulted. She otherwise progressed as expected post operatively to tolerate home diet, pain well controlled, and voiding on her own. She was evaluated by physical therapy and recommendations made for skilled nursing facility at time of hospital discharge. She was discharged on DAPT with Plavix 75mg and Aspirin 81mg daily and will follow up in the outpatient vascular surgery clinic in 1 month with ABIs as scheduled. She will resume care at her home wound care center as previously scheduled.     Pertinent Physical Exam At  Time of Discharge  Physical Exam  GENERAL APPEARANCE:  Sleepy, just waking up.   HEART:  RRR  LUNGS:  Equal chest rise and fall, non-labored breathing  ABDOMEN:  Soft, nontender, nondistended  NEUROLOGICAL:  Grossly nonfocal. Alert, moving all 4 extremities.   Skin:  left toe wounds dressed with ace   EXTREMITIES: bilateral lower extremities are warm to the touch. Left groin incision c/d/i    Home Medications     Medication List      START taking these medications     acetaminophen 325 mg tablet; Commonly known as: Tylenol; Take 2 tablets   (650 mg) by mouth every 6 hours if needed for mild pain (1 - 3) for up to   10 days.   multivitamin with minerals tablet; Take 1 tablet by mouth once daily. Do   not start before January 24, 2024.     CHANGE how you take these medications     aspirin 81 mg EC tablet; What changed: Another medication with the same   name was removed. Continue taking this medication, and follow the   directions you see here.   valsartan 320 mg tablet; Commonly known as: Diovan; What changed:   Another medication with the same name was removed. Continue taking this   medication, and follow the directions you see here.     CONTINUE taking these medications     alendronate 70 mg tablet; Commonly known as: Fosamax   amoxicillin 500 mg capsule; Commonly known as: Amoxil   atenolol 50 mg tablet; Commonly known as: Tenormin   atorvastatin 80 mg tablet; Commonly known as: Lipitor   azelastine 137 mcg (0.1 %) nasal spray; Commonly known as: Astelin   biotin 2,500 mcg capsule   cetirizine 10 mg tablet; Commonly known as: ZyrTEC   chlorpheniramine 4 mg tablet; Commonly known as: Chlor-Trimeton   cholecalciferol 25 MCG (1000 UT) capsule; Commonly known as: Vitamin D-3   clopidogrel 75 mg tablet; Commonly known as: Plavix; TAKE 1 TABLET BY   MOUTH EVERY DAY   escitalopram 10 mg tablet; Commonly known as: Lexapro   ferrous sulfate (325 mg ferrous sulfate) tablet   folic acid 1 mg tablet; Commonly known as:  Folvite   ipratropium 21 mcg (0.03 %) nasal spray; Commonly known as: Atrovent   Klor-Con 8 8 mEq ER tablet; Generic drug: potassium chloride CR   mirtazapine 15 mg tablet; Commonly known as: Remeron   Praluent Pen 150 mg/mL pen injector; Generic drug: alirocumab; INJECT 1   MILLILITER INTO SKIN EVERY 2 WEEKS IN ABDOMEN, THIGH, OR UPPER ARM   ROTATING INJECTION SITES   rosuvastatin 40 mg tablet; Commonly known as: Crestor   spironolacton-hydrochlorothiaz 25-25 mg tablet; Commonly known as:   Aldactazide     STOP taking these medications     BACILLUS COAGULANS-INULIN ORAL   cephalexin 500 mg capsule; Commonly known as: Keflex   cilostazol 100 mg tablet; Commonly known as: Pletal   clorazepate 7.5 mg tablet; Commonly known as: Tranxene   CLORAZEPATE DIPOTASSIUM ORAL   gentamicin 0.1 % cream; Commonly known as: Garamycin   PRESERVISION AREDS 2 PLUS MV ORAL   PRESERVISION AREDS ORAL   SantyL 250 unit/gram ointment; Generic drug: collagenase   vitamin D3-vitamin K2 1,250-200 mcg capsule   WesTab Max 2.5-25-2 mg tablet; Generic drug: folic acid-vit B6-vit B12       Outpatient Follow-Up  Future Appointments   Date Time Provider Department Center   1/29/2024  3:30 PM [address] [facility] WOUND, WOUNDCARE RESOURCE [id number] [facility]   2/5/2024  3:30 PM [address] [facility] WOUND, WOUNDCARE RESOURCE [id number] [facility]   2/19/2024  1:00 PM [facility] VASC 4 [id number] [facility]   2/19/2024  2:40 PM Pura Montes MD [id number] [facility]   5/21/2024  3:30 PM Zheng Hood DO [id number] [facility]   5/29/2024 10:00 AM JACK Thorne [id number] [facility]   10/22/2024  2:00 PM [facility] ROOM 1 [id number]   10/22/2024  3:00 PM Jose Weathers MD [address]     >30 minutes spent on day of discharge assessment and plan including time spent in discharge education and coordination    ALEXIS Gaytan-CNP

## 2024-01-25 NOTE — HOSPITAL COURSE
89 year old female with past medical history significant for HTN, HLD, CKD, vertebral artery syndrome (s/p L PICA coiling and stent placement in 2006), CAD (s/p CABG x 3 in 1995), afib, aortic stenosis, PAD (s/p L EIA stent in 9/2023 at OSH), who presented to [facility] ED with complaints of left lower extremity pain and new heel ulcer. OSH imaging revealed bilateral SFA occlusion and additional occlusions of SMA, right internal iliac, right popliteal, right anterior tibial artery, left popliteal artery, and left anterior tibial artery. She was placed on a heparin drip and transferred to [facility] for further management. She was admitted to vascular surgery and cardiology was consulted for preoperative optimization and risk stratification in preparation for operative intervention. 1/18, she went to the OR and underwent left femoral endarterectomy with bovine patch angioplasty, angiogram, left common iliac and external iliac artery stenting. She was transferred to the step down unit post operatively after routine PACU stay. She was noted to have postoperative delirium for which psychiatry was consulted. She otherwise progressed as expected post operatively to tolerate home diet, pain well controlled, and voiding on her own. She was evaluated by physical therapy and recommendations made for skilled nursing facility at time of hospital discharge. She was discharged on DAPT with Plavix 75mg and Aspirin 81mg daily and will follow up in the outpatient vascular surgery clinic in 1 month with ABIs as scheduled. She will resume care at her home wound care center as previously scheduled.

## 2024-01-25 NOTE — CARE PLAN
Problem: Fall/Injury  Goal: Not fall by end of shift  Outcome: Met  Goal: Be free from injury by end of the shift  Outcome: Met  Goal: Verbalize understanding of personal risk factors for fall in the hospital  Outcome: Met  Goal: Verbalize understanding of risk factor reduction measures to prevent injury from fall in the home  Outcome: Met  Goal: Use assistive devices by end of the shift  Outcome: Met  Goal: Pace activities to prevent fatigue by end of the shift  Outcome: Met     Problem: Pain - Adult  Goal: Verbalizes/displays adequate comfort level or baseline comfort level  Outcome: Met     Problem: Safety - Adult  Goal: Free from fall injury  Outcome: Met     Problem: Discharge Planning  Goal: Discharge to home or other facility with appropriate resources  1/25/2024 1321 by Rylie Chanel RN  Outcome: Met  1/25/2024 0901 by Rylie Chanel RN  Outcome: Progressing     Problem: Chronic Conditions and Co-morbidities  Goal: Patient's chronic conditions and co-morbidity symptoms are monitored and maintained or improved  Outcome: Met     Problem: Skin  Goal: Prevent/manage excess moisture  Outcome: Met  Goal: Prevent/minimize sheer/friction injuries  Outcome: Met  Goal: Promote/optimize nutrition  Outcome: Met    The clinical goals for the shift include patient will be discharged home with home PT services ordered by end of shift today

## 2024-01-25 NOTE — PROGRESS NOTES
DC Planning:  Pt dc'ing home today with family. [facility] to cover home care needs. SOC: 1/29.     Maggi Gordon is a 89 y.o. female on day 13 of admission presenting with Acute lower limb ischemia.      FREDA ABAD

## 2024-01-25 NOTE — TELEPHONE ENCOUNTER
This referral has been made a Non Admit with  Home Care due to patient is active with another agency. If you have further questions, feel free to reach out to our office at [phone]. Thank you, [facility] Intake.

## 2024-01-29 ENCOUNTER — OFFICE VISIT (OUTPATIENT)
Dept: WOUND CARE | Facility: CLINIC | Age: 89
End: 2024-01-29
Payer: MEDICARE

## 2024-01-29 PROCEDURE — 11045 DBRDMT SUBQ TISS EACH ADDL: CPT

## 2024-01-29 PROCEDURE — 11042 DBRDMT SUBQ TIS 1ST 20SQCM/<: CPT

## 2024-01-31 ENCOUNTER — TELEPHONE (OUTPATIENT)
Dept: CARDIOLOGY | Facility: CLINIC | Age: 89
End: 2024-01-31

## 2024-01-31 NOTE — TELEPHONE ENCOUNTER
Pt's daughter-in-law Sia called to inform us that Ms. Gordon had an emergency vascular surgery in [facility] about 2 wks. ago. She had a s/p surg. f/u visit with her PCP Dr. Jennifer Shipman yesterday and was advised to find out if you would like to move up her appt.   Ms. Gordon is scheduled for Echo & OV with you on 10-.  Please advise. Thank you.

## 2024-02-05 ENCOUNTER — OFFICE VISIT (OUTPATIENT)
Dept: WOUND CARE | Facility: CLINIC | Age: 89
End: 2024-02-05
Payer: MEDICARE

## 2024-02-05 PROCEDURE — 11042 DBRDMT SUBQ TIS 1ST 20SQCM/<: CPT

## 2024-02-05 PROCEDURE — 11045 DBRDMT SUBQ TISS EACH ADDL: CPT

## 2024-02-12 ENCOUNTER — OFFICE VISIT (OUTPATIENT)
Dept: WOUND CARE | Facility: CLINIC | Age: 89
End: 2024-02-12
Payer: MEDICARE

## 2024-02-12 PROCEDURE — 11045 DBRDMT SUBQ TISS EACH ADDL: CPT

## 2024-02-12 PROCEDURE — 11042 DBRDMT SUBQ TIS 1ST 20SQCM/<: CPT

## 2024-02-15 PROBLEM — E28.319 PREMATURE MENOPAUSE: Status: ACTIVE | Noted: 2024-02-15

## 2024-02-15 PROBLEM — Z86.39 HISTORY OF ELEVATED LIPIDS: Status: ACTIVE | Noted: 2024-02-15
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
## 2024-02-15 PROBLEM — M17.0 PRIMARY OSTEOARTHRITIS OF BOTH KNEES: Status: ACTIVE | Noted: 2018-04-20

## 2024-02-15 PROBLEM — R55 SYNCOPE: Status: ACTIVE | Noted: 2023-09-15

## 2024-02-15 PROBLEM — Z86.79 HISTORY OF HYPERTENSION: Status: ACTIVE | Noted: 2024-02-15

## 2024-02-15 PROBLEM — Z86.39 HISTORY OF HYPOTHYROIDISM: Status: ACTIVE | Noted: 2024-02-15

## 2024-02-16 ENCOUNTER — APPOINTMENT (OUTPATIENT)
Dept: RADIOLOGY | Facility: HOSPITAL | Age: 89
End: 2024-02-16
Payer: MEDICARE

## 2024-02-16 ENCOUNTER — HOSPITAL ENCOUNTER (EMERGENCY)
Facility: HOSPITAL | Age: 89
Discharge: ANOTHER HOSPITAL | End: 2024-02-17
Attending: EMERGENCY MEDICINE
Payer: MEDICARE

## 2024-02-16 DIAGNOSIS — I70.245 ATHEROSCLEROSIS OF NATIVE ARTERY OF LEFT LOWER EXTREMITY WITH ULCERATION OF OTHER PART OF FOOT (MULTI): Primary | ICD-10-CM

## 2024-02-16 PROBLEM — S09.8XXA BLUNT HEAD TRAUMA: Status: ACTIVE | Noted: 2024-02-16
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
## 2024-02-16 PROBLEM — S91.302A OPEN WOUND OF LEFT FOOT: Status: ACTIVE | Noted: 2024-02-16

## 2024-02-16 PROBLEM — Z79.01 CHRONIC ANTICOAGULATION: Status: ACTIVE | Noted: 2024-02-16

## 2024-02-16 LAB
ALBUMIN SERPL BCP-MCNC: 3.6 G/DL (ref 3.4–5)
ALP SERPL-CCNC: 86 U/L (ref 33–136)
ALT SERPL W P-5'-P-CCNC: 19 U/L (ref 7–45)
ANION GAP SERPL CALC-SCNC: 11 MMOL/L (ref 10–20)
APTT PPP: 30 SECONDS (ref 27–38)
AST SERPL W P-5'-P-CCNC: 25 U/L (ref 9–39)
BASOPHILS # BLD AUTO: 0.02 X10*3/UL (ref 0–0.1)
BASOPHILS NFR BLD AUTO: 0.2 %
BILIRUB SERPL-MCNC: 0.4 MG/DL (ref 0–1.2)
BUN SERPL-MCNC: 46 MG/DL (ref 6–23)
CALCIUM SERPL-MCNC: 9.4 MG/DL (ref 8.6–10.3)
CHLORIDE SERPL-SCNC: 101 MMOL/L (ref 98–107)
CO2 SERPL-SCNC: 31 MMOL/L (ref 21–32)
CREAT SERPL-MCNC: 1.41 MG/DL (ref 0.5–1.05)
CRP SERPL-MCNC: 3.15 MG/DL
EGFRCR SERPLBLD CKD-EPI 2021: 36 ML/MIN/1.73M*2
EOSINOPHIL # BLD AUTO: 0.1 X10*3/UL (ref 0–0.4)
EOSINOPHIL NFR BLD AUTO: 1 %
ERYTHROCYTE [DISTWIDTH] IN BLOOD BY AUTOMATED COUNT: 14.8 % (ref 11.5–14.5)
ERYTHROCYTE [SEDIMENTATION RATE] IN BLOOD BY WESTERGREN METHOD: 68 MM/H (ref 0–30)
GLUCOSE SERPL-MCNC: 101 MG/DL (ref 74–99)
HCT VFR BLD AUTO: 33 % (ref 36–46)
HGB BLD-MCNC: 10.5 G/DL (ref 12–16)
IMM GRANULOCYTES # BLD AUTO: 0.05 X10*3/UL (ref 0–0.5)
IMM GRANULOCYTES NFR BLD AUTO: 0.5 % (ref 0–0.9)
INR PPP: 1.2 (ref 0.9–1.1)
LACTATE SERPL-SCNC: 1.1 MMOL/L (ref 0.4–2)
LYMPHOCYTES # BLD AUTO: 1.59 X10*3/UL (ref 0.8–3)
LYMPHOCYTES NFR BLD AUTO: 16.6 %
MCH RBC QN AUTO: 32.5 PG (ref 26–34)
MCHC RBC AUTO-ENTMCNC: 31.8 G/DL (ref 32–36)
MCV RBC AUTO: 102 FL (ref 80–100)
MONOCYTES # BLD AUTO: 0.81 X10*3/UL (ref 0.05–0.8)
MONOCYTES NFR BLD AUTO: 8.5 %
NEUTROPHILS # BLD AUTO: 6.98 X10*3/UL (ref 1.6–5.5)
NEUTROPHILS NFR BLD AUTO: 73.2 %
NRBC BLD-RTO: 0 /100 WBCS (ref 0–0)
PLATELET # BLD AUTO: 266 X10*3/UL (ref 150–450)
POTASSIUM SERPL-SCNC: 4.2 MMOL/L (ref 3.5–5.3)
PROT SERPL-MCNC: 6.8 G/DL (ref 6.4–8.2)
PROTHROMBIN TIME: 13.2 SECONDS (ref 9.8–12.8)
RBC # BLD AUTO: 3.23 X10*6/UL (ref 4–5.2)
SODIUM SERPL-SCNC: 139 MMOL/L (ref 136–145)
WBC # BLD AUTO: 9.6 X10*3/UL (ref 4.4–11.3)

## 2024-02-16 PROCEDURE — 83605 ASSAY OF LACTIC ACID: CPT | Performed by: EMERGENCY MEDICINE

## 2024-02-16 PROCEDURE — 85730 THROMBOPLASTIN TIME PARTIAL: CPT | Performed by: EMERGENCY MEDICINE

## 2024-02-16 PROCEDURE — 96366 THER/PROPH/DIAG IV INF ADDON: CPT

## 2024-02-16 PROCEDURE — 84075 ASSAY ALKALINE PHOSPHATASE: CPT | Performed by: EMERGENCY MEDICINE

## 2024-02-16 PROCEDURE — G0390 TRAUMA RESPONS W/HOSP CRITI: HCPCS

## 2024-02-16 PROCEDURE — 73630 X-RAY EXAM OF FOOT: CPT | Mod: LT

## 2024-02-16 PROCEDURE — 70450 CT HEAD/BRAIN W/O DYE: CPT | Performed by: RADIOLOGY

## 2024-02-16 PROCEDURE — 36415 COLL VENOUS BLD VENIPUNCTURE: CPT | Performed by: EMERGENCY MEDICINE

## 2024-02-16 PROCEDURE — 96365 THER/PROPH/DIAG IV INF INIT: CPT

## 2024-02-16 PROCEDURE — 85610 PROTHROMBIN TIME: CPT | Performed by: EMERGENCY MEDICINE

## 2024-02-16 PROCEDURE — 73630 X-RAY EXAM OF FOOT: CPT | Mod: LEFT SIDE | Performed by: RADIOLOGY

## 2024-02-16 PROCEDURE — 85025 COMPLETE CBC W/AUTO DIFF WBC: CPT | Performed by: EMERGENCY MEDICINE

## 2024-02-16 PROCEDURE — 85652 RBC SED RATE AUTOMATED: CPT | Performed by: EMERGENCY MEDICINE

## 2024-02-16 PROCEDURE — 96375 TX/PRO/DX INJ NEW DRUG ADDON: CPT

## 2024-02-16 PROCEDURE — 82607 VITAMIN B-12: CPT

## 2024-02-16 PROCEDURE — 72125 CT NECK SPINE W/O DYE: CPT | Performed by: RADIOLOGY

## 2024-02-16 PROCEDURE — 82746 ASSAY OF FOLIC ACID SERUM: CPT

## 2024-02-16 PROCEDURE — 85045 AUTOMATED RETICULOCYTE COUNT: CPT

## 2024-02-16 PROCEDURE — 86140 C-REACTIVE PROTEIN: CPT | Performed by: EMERGENCY MEDICINE

## 2024-02-16 PROCEDURE — 2500000004 HC RX 250 GENERAL PHARMACY W/ HCPCS (ALT 636 FOR OP/ED): Performed by: EMERGENCY MEDICINE

## 2024-02-16 PROCEDURE — 84443 ASSAY THYROID STIM HORMONE: CPT

## 2024-02-16 PROCEDURE — 70450 CT HEAD/BRAIN W/O DYE: CPT

## 2024-02-16 PROCEDURE — 87040 BLOOD CULTURE FOR BACTERIA: CPT | Mod: PORLAB | Performed by: EMERGENCY MEDICINE

## 2024-02-16 PROCEDURE — 99285 EMERGENCY DEPT VISIT HI MDM: CPT | Mod: 25

## 2024-02-16 PROCEDURE — 83540 ASSAY OF IRON: CPT

## 2024-02-16 PROCEDURE — 72125 CT NECK SPINE W/O DYE: CPT

## 2024-02-16 PROCEDURE — 99284 EMERGENCY DEPT VISIT MOD MDM: CPT | Performed by: SURGERY

## 2024-02-16 RX ORDER — VANCOMYCIN HYDROCHLORIDE 1 G/200ML
1 INJECTION, SOLUTION INTRAVENOUS ONCE
Status: COMPLETED | OUTPATIENT
Start: 2024-02-16 | End: 2024-02-16

## 2024-02-16 RX ORDER — HEPARIN SODIUM 5000 [USP'U]/ML
60 INJECTION, SOLUTION INTRAVENOUS; SUBCUTANEOUS ONCE
Status: COMPLETED | OUTPATIENT
Start: 2024-02-16 | End: 2024-02-16

## 2024-02-16 RX ORDER — CYANOCOBALAMIN/FOLIC AC/VIT B6 2-2.5-25MG
1 TABLET ORAL DAILY
COMMUNITY

## 2024-02-16 RX ORDER — HEPARIN SODIUM 5000 [USP'U]/ML
INJECTION, SOLUTION INTRAVENOUS; SUBCUTANEOUS EVERY 4 HOURS PRN
Status: DISCONTINUED | OUTPATIENT
Start: 2024-02-16 | End: 2024-02-17 | Stop reason: HOSPADM

## 2024-02-16 RX ORDER — HEPARIN SODIUM 10000 [USP'U]/100ML
0-4000 INJECTION, SOLUTION INTRAVENOUS CONTINUOUS
Status: DISCONTINUED | OUTPATIENT
Start: 2024-02-16 | End: 2024-02-17 | Stop reason: HOSPADM

## 2024-02-16 RX ADMIN — HEPARIN SODIUM AND DEXTROSE 500 UNITS/HR: 10000; 5 INJECTION INTRAVENOUS at 20:40

## 2024-02-16 RX ADMIN — HEPARIN SODIUM 2650 UNITS: 5000 INJECTION INTRAVENOUS; SUBCUTANEOUS at 20:40

## 2024-02-16 RX ADMIN — PIPERACILLIN SODIUM AND TAZOBACTAM SODIUM 4.5 G: 4; .5 INJECTION, SOLUTION INTRAVENOUS at 17:57

## 2024-02-16 RX ADMIN — VANCOMYCIN HYDROCHLORIDE 1 G: 1 INJECTION, SOLUTION INTRAVENOUS at 18:28

## 2024-02-16 ASSESSMENT — LIFESTYLE VARIABLES
EVER FELT BAD OR GUILTY ABOUT YOUR DRINKING: NO
EVER HAD A DRINK FIRST THING IN THE MORNING TO STEADY YOUR NERVES TO GET RID OF A HANGOVER: NO
HAVE PEOPLE ANNOYED YOU BY CRITICIZING YOUR DRINKING: NO
HAVE YOU EVER FELT YOU SHOULD CUT DOWN ON YOUR DRINKING: NO

## 2024-02-16 ASSESSMENT — ENCOUNTER SYMPTOMS
CHILLS: 0
FEVER: 0
WEAKNESS: 1
WOUND: 1
UNEXPECTED WEIGHT CHANGE: 1
APPETITE CHANGE: 1
FATIGUE: 1
DIARRHEA: 0
SHORTNESS OF BREATH: 0
COUGH: 0
VOMITING: 0
POLYPHAGIA: 0
EYE REDNESS: 0
SPEECH DIFFICULTY: 0
EYE PAIN: 0
BRUISES/BLEEDS EASILY: 1
FLANK PAIN: 0
NAUSEA: 0
CONFUSION: 1
ABDOMINAL PAIN: 0
FREQUENCY: 0
HEMATURIA: 0
DYSURIA: 0
MYALGIAS: 1
HEADACHES: 0
AGITATION: 0
CONSTIPATION: 0
ARTHRALGIAS: 1

## 2024-02-16 ASSESSMENT — PAIN - FUNCTIONAL ASSESSMENT: PAIN_FUNCTIONAL_ASSESSMENT: 0-10

## 2024-02-16 ASSESSMENT — COLUMBIA-SUICIDE SEVERITY RATING SCALE - C-SSRS
1. IN THE PAST MONTH, HAVE YOU WISHED YOU WERE DEAD OR WISHED YOU COULD GO TO SLEEP AND NOT WAKE UP?: NO
6. HAVE YOU EVER DONE ANYTHING, STARTED TO DO ANYTHING, OR PREPARED TO DO ANYTHING TO END YOUR LIFE?: NO
2. HAVE YOU ACTUALLY HAD ANY THOUGHTS OF KILLING YOURSELF?: NO

## 2024-02-16 ASSESSMENT — PAIN SCALES - GENERAL
PAINLEVEL_OUTOF10: 10 - WORST POSSIBLE PAIN
PAINLEVEL_OUTOF10: 5 - MODERATE PAIN

## 2024-02-16 NOTE — ED PROVIDER NOTES
HPI   Chief Complaint   Patient presents with    Fall     On thinners, fall last night hit her head     pain     Pain in left foot with wounds, has been going on for months        Patient presents to the emergency department as a limited trauma.  Patient was a fall at home.  She is on Plavix.  She did hit her head.  Because of this she is a limited trauma.  Patient denies head or neck pain.  No chest pain or shortness of breath.  No cough or congestion.  No fever chills or sweats.  No abdominal pain.  No nausea or vomiting.  No change in bowel movements.  Patient was referred here from the wound center because of the fall and also because of worsening wounds on her left foot.  She has wounds present on the left heel over the Achilles and on the lateral left foot.  She has a history of peripheral vascular disease.  She has had stenting placed in her leg before.  When she was sent to the last time she states that the pulse in her foot was palpable.  She was sent here because there was concern for vascular compromise to the leg causing worsening wounds.  There is an odor coming from the wound which is new.                          [place] Coma Scale Score: 15         NIH Stroke Scale: 0          Patient History   Past Medical History:   Diagnosis Date    Asymptomatic premature menopause     Premature menopause    Atherosclerosis of coronary artery bypass graft(s) without angina pectoris 10/11/2021    Atherosclerosis of CABG w/o angina pectoris    Occlusion and stenosis of bilateral carotid arteries 01/09/2020    Occlusion and stenosis of bilateral carotid arteries    Personal history of other diseases of the circulatory system     History of coronary artery disease    Personal history of other diseases of the circulatory system     History of hypertension    Personal history of other diseases of the circulatory system     History of stenosis of renal artery    Personal history of other endocrine, nutritional and  metabolic disease     History of hypothyroidism    Personal history of other endocrine, nutritional and metabolic disease     History of hyperlipidemia    Personal history of other endocrine, nutritional and metabolic disease     History of goiter    S/P AVR (aortic valve replacement) 10/18/2023    #21 CE AVR     Past Surgical History:   Procedure Laterality Date    CT AORTA AND BILATERAL ILIOFEMORAL RUNOFF ANGIOGRAM W AND/OR WO IV CONTRAST  7/13/2023    CT AORTA AND BILATERAL ILIOFEMORAL RUNOFF ANGIOGRAM W AND/OR WO IV CONTRAST 7/13/2023 P[facility] CT    OTHER SURGICAL HISTORY  12/03/2018    Cataract surgery    OTHER SURGICAL HISTORY  12/03/2018    Coronary artery bypass graft    OTHER SURGICAL HISTORY  12/03/2018    Aortic valve replacement    OTHER SURGICAL HISTORY  12/03/2018    Eye surgery    OTHER SURGICAL HISTORY  12/03/2018    Surgery     No family history on file.  Social History     Tobacco Use    Smoking status: Never    Smokeless tobacco: Never   Vaping Use    Vaping Use: Never used   Substance Use Topics    Alcohol use: Not on file    Drug use: Not on file       Physical Exam   ED Triage Vitals [02/16/24 1701]   Temperature Heart Rate Respirations BP   36.3 °C (97.4 °F) 73 18 115/65      Pulse Ox Temp Source Heart Rate Source Patient Position   98 % Temporal Monitor --      BP Location FiO2 (%)     -- --       Physical Exam  Vitals and nursing note reviewed.   Constitutional:       General: She is not in acute distress.     Appearance: Normal appearance. She is well-developed. She is not ill-appearing.   HENT:      Head: Normocephalic and atraumatic.      Right Ear: Tympanic membrane normal.      Left Ear: Tympanic membrane normal.      Nose: Nose normal.      Mouth/Throat:      Mouth: Mucous membranes are moist.   Eyes:      Conjunctiva/sclera: Conjunctivae normal.      Pupils: Pupils are equal, round, and reactive to light.   Cardiovascular:      Rate and Rhythm: Normal rate and regular rhythm.      Heart  sounds: No murmur heard.  Pulmonary:      Effort: Pulmonary effort is normal. No respiratory distress.      Breath sounds: Normal breath sounds.   Abdominal:      Palpations: Abdomen is soft.      Tenderness: There is no abdominal tenderness.   Musculoskeletal:         General: No swelling.      Cervical back: Normal range of motion and neck supple.   Skin:     General: Skin is warm.      Capillary Refill: Capillary refill takes less than 2 seconds.      Comments: I do not feel a pulse in the left foot with palpation.  Doppler was used and there was dorsalis pedis pulses obtained with Doppler.  It is relatively weak.  Patient has erythema around the left lateral foot wound with odor.  There is mild erythema around the heel wound.  There is a black eschar at the base of the wound.   Neurological:      General: No focal deficit present.      Mental Status: She is alert.   Psychiatric:         Mood and Affect: Mood normal.         Behavior: Behavior normal.       Labs Reviewed   SEDIMENTATION RATE, AUTOMATED - Abnormal       Result Value    Sedimentation Rate 68 (*)    C-REACTIVE PROTEIN - Abnormal    C-Reactive Protein 3.15 (*)    CBC WITH AUTO DIFFERENTIAL - Abnormal    WBC 9.6      nRBC 0.0      RBC 3.23 (*)     Hemoglobin 10.5 (*)     Hematocrit 33.0 (*)      (*)     MCH 32.5      MCHC 31.8 (*)     RDW 14.8 (*)     Platelets 266      Neutrophils % 73.2      Immature Granulocytes %, Automated 0.5      Lymphocytes % 16.6      Monocytes % 8.5      Eosinophils % 1.0      Basophils % 0.2      Neutrophils Absolute 6.98 (*)     Immature Granulocytes Absolute, Automated 0.05      Lymphocytes Absolute 1.59      Monocytes Absolute 0.81 (*)     Eosinophils Absolute 0.10      Basophils Absolute 0.02     COMPREHENSIVE METABOLIC PANEL - Abnormal    Glucose 101 (*)     Sodium 139      Potassium 4.2      Chloride 101      Bicarbonate 31      Anion Gap 11      Urea Nitrogen 46 (*)     Creatinine 1.41 (*)     eGFR 36 (*)      Calcium 9.4      Albumin 3.6      Alkaline Phosphatase 86      Total Protein 6.8      AST 25      Bilirubin, Total 0.4      ALT 19     PROTIME-INR - Abnormal    Protime 13.2 (*)     INR 1.2 (*)    LACTATE - Normal    Lactate 1.1      Narrative:     Venipuncture immediately after or during the administration of Metamizole may lead to falsely low results. Testing should be performed immediately  prior to Metamizole dosing.   APTT - Normal    aPTT 30      Narrative:     The APTT is no longer used for monitoring Unfractionated Heparin Therapy. For monitoring Heparin Therapy, use the Heparin Assay.   BLOOD CULTURE   BLOOD CULTURE   URINALYSIS WITH REFLEX CULTURE AND MICROSCOPIC    Narrative:     The following orders were created for panel order Urinalysis with Reflex Culture and Microscopic.  Procedure                               Abnormality         Status                     ---------                               -----------         ------                     Urinalysis with Reflex C...[[id number]]                                                 Extra Urine Gray Tube[[id number]]                                                         Please view results for these tests on the individual orders.   URINALYSIS WITH REFLEX CULTURE AND MICROSCOPIC   EXTRA URINE GRAY TUBE     Pain Management Panel           No data to display              XR foot left 3+ views   Final Result   Minimal periosteal reaction adjacent to the base of the fifth   metatarsal.  This could be related to inflammatory change or trauma.    Follow-up is suggested if there is clinical symptomatology referable   to this area.   Signed by Joslyn Feliciano DO      CT head W O contrast trauma protocol   Final Result   No CT evidence of acute intracranial injury.                  MACRO:   None             Signed by: Con Mcknight 2/16/2024 6:15 PM   Dictation workstation:   [id number]      CT cervical spine wo IV contrast   Final Result   Multilevel spondylosis  without acute osseous abnormality of the   cervical spine.        MACRO:   None        Signed by: Con Mcknight 2/16/2024 6:19 PM   Dictation workstation:   [id number]        ED Course & MDM   Diagnoses as of 02/16/24 2201   Atherosclerosis of native artery of left lower extremity with ulceration of other part of foot (CMS/[facility])       Medical Decision Making  Patient presents as a limited trauma secondary to fall on antiplatelet medication.  Patient is evaluated in the emergency department CT head and cervical spine.  Patient is also evaluated for sepsis using CBC, CMP and blood cultures.  Patient was started on broad-spectrum antibiotics including vancomycin and Zosyn to cover for soft tissue skin disease.  CT head and cervical spine is negative.  X-ray of the foot shows periosteal reaction which could indicate beginning of bone infection.  Patient has elevated CRP and ESR but normal white count.  Patient was discussed with her vascular surgeon who was at [facility].  She was just there for an intervention in January.  At this time they would request the patient be transferred to the emergency department at [facility] for evaluation and treatment by her surgical team.  Patient is agreeable to this.  They did request starting IV heparin bolus and drip at low intensity level.  This was ordered per their request.  Patient is stable for transfer at this time.        Procedure  Procedures     Maribeth Cuevas MD  02/16/24 2201

## 2024-02-17 ENCOUNTER — HOSPITAL ENCOUNTER (INPATIENT)
Facility: HOSPITAL | Age: 89
LOS: 13 days | Discharge: NURSING, REHAB OR LONG-TERM CARE | DRG: 240 | End: 2024-03-01
Attending: EMERGENCY MEDICINE | Admitting: INTERNAL MEDICINE
Payer: MEDICARE

## 2024-02-17 VITALS
BODY MASS INDEX: 17.19 KG/M2 | OXYGEN SATURATION: 96 % | HEART RATE: 75 BPM | HEIGHT: 63 IN | RESPIRATION RATE: 16 BRPM | DIASTOLIC BLOOD PRESSURE: 74 MMHG | SYSTOLIC BLOOD PRESSURE: 161 MMHG | TEMPERATURE: 98 F | WEIGHT: 97 LBS

## 2024-02-17 DIAGNOSIS — I48.0 PAROXYSMAL ATRIAL FIBRILLATION (MULTI): ICD-10-CM

## 2024-02-17 DIAGNOSIS — I73.9 PERIPHERAL VASCULAR DISEASE (CMS-HCC): ICD-10-CM

## 2024-02-17 DIAGNOSIS — S91.302S OPEN WOUND OF LEFT FOOT, SEQUELA: ICD-10-CM

## 2024-02-17 DIAGNOSIS — M86.672: ICD-10-CM

## 2024-02-17 DIAGNOSIS — E11.59 TYPE 2 DIABETES MELLITUS WITH OTHER CIRCULATORY COMPLICATIONS (MULTI): ICD-10-CM

## 2024-02-17 DIAGNOSIS — I73.9 PERIPHERAL VASCULAR DISEASE, UNSPECIFIED (CMS-HCC): ICD-10-CM

## 2024-02-17 DIAGNOSIS — M86.9 OSTEOMYELITIS OF LEFT ANKLE, UNSPECIFIED TYPE (MULTI): Primary | ICD-10-CM

## 2024-02-17 LAB
ABO GROUP (TYPE) IN BLOOD: NORMAL
ANTIBODY SCREEN: NORMAL
APPEARANCE UR: CLEAR
BILIRUB UR STRIP.AUTO-MCNC: NEGATIVE MG/DL
COLOR UR: YELLOW
FOLATE SERPL-MCNC: >22.3 NG/ML
GLUCOSE UR STRIP.AUTO-MCNC: NEGATIVE MG/DL
HGB RETIC QN: 35 PG (ref 28–38)
HOLD SPECIMEN: NORMAL
IMMATURE RETIC FRACTION: 25.6 %
IRON SATN MFR SERPL: 16 % (ref 25–45)
IRON SERPL-MCNC: 55 UG/DL (ref 35–150)
KETONES UR STRIP.AUTO-MCNC: NEGATIVE MG/DL
LEUKOCYTE ESTERASE UR QL STRIP.AUTO: NEGATIVE
NITRITE UR QL STRIP.AUTO: NEGATIVE
PH UR STRIP.AUTO: 6 [PH]
PROT UR STRIP.AUTO-MCNC: NEGATIVE MG/DL
RBC # UR STRIP.AUTO: NEGATIVE /UL
RETICS #: 0.09 X10*6/UL (ref 0.02–0.11)
RETICS/RBC NFR AUTO: 2.7 % (ref 0.5–2)
RH FACTOR (ANTIGEN D): NORMAL
SP GR UR STRIP.AUTO: 1.01
TIBC SERPL-MCNC: 351 UG/DL (ref 240–445)
TSH SERPL-ACNC: 4.06 MIU/L (ref 0.44–3.98)
UFH PPP CHRO-ACNC: 0.3 IU/ML
UFH PPP CHRO-ACNC: 0.4 IU/ML
UFH PPP CHRO-ACNC: 0.7 IU/ML
UIBC SERPL-MCNC: 296 UG/DL (ref 110–370)
UROBILINOGEN UR STRIP.AUTO-MCNC: <2 MG/DL
VIT B12 SERPL-MCNC: 4971 PG/ML (ref 211–911)

## 2024-02-17 PROCEDURE — 36415 COLL VENOUS BLD VENIPUNCTURE: CPT | Performed by: EMERGENCY MEDICINE

## 2024-02-17 PROCEDURE — 86900 BLOOD TYPING SEROLOGIC ABO: CPT | Performed by: STUDENT IN AN ORGANIZED HEALTH CARE EDUCATION/TRAINING PROGRAM

## 2024-02-17 PROCEDURE — 99222 1ST HOSP IP/OBS MODERATE 55: CPT

## 2024-02-17 PROCEDURE — 2500000005 HC RX 250 GENERAL PHARMACY W/O HCPCS

## 2024-02-17 PROCEDURE — 1100000001 HC PRIVATE ROOM DAILY

## 2024-02-17 PROCEDURE — 85520 HEPARIN ASSAY: CPT | Performed by: EMERGENCY MEDICINE

## 2024-02-17 PROCEDURE — 2500000001 HC RX 250 WO HCPCS SELF ADMINISTERED DRUGS (ALT 637 FOR MEDICARE OP)

## 2024-02-17 PROCEDURE — 2500000004 HC RX 250 GENERAL PHARMACY W/ HCPCS (ALT 636 FOR OP/ED): Performed by: PHARMACIST

## 2024-02-17 PROCEDURE — 99285 EMERGENCY DEPT VISIT HI MDM: CPT | Mod: 25 | Performed by: EMERGENCY MEDICINE

## 2024-02-17 PROCEDURE — 81003 URINALYSIS AUTO W/O SCOPE: CPT | Performed by: EMERGENCY MEDICINE

## 2024-02-17 PROCEDURE — 96365 THER/PROPH/DIAG IV INF INIT: CPT

## 2024-02-17 PROCEDURE — 2500000004 HC RX 250 GENERAL PHARMACY W/ HCPCS (ALT 636 FOR OP/ED)

## 2024-02-17 PROCEDURE — 99285 EMERGENCY DEPT VISIT HI MDM: CPT | Performed by: EMERGENCY MEDICINE

## 2024-02-17 RX ORDER — SPIRONOLACTONE AND HYDROCHLOROTHIAZIDE 25; 25 MG/1; MG/1
12.5 TABLET ORAL DAILY
Status: DISCONTINUED | OUTPATIENT
Start: 2024-02-17 | End: 2024-02-17

## 2024-02-17 RX ORDER — HYDROCHLOROTHIAZIDE 25 MG/1
12.5 TABLET ORAL DAILY
Status: DISCONTINUED | OUTPATIENT
Start: 2024-02-17 | End: 2024-03-01 | Stop reason: HOSPADM

## 2024-02-17 RX ORDER — AZELASTINE 1 MG/ML
2 SPRAY, METERED NASAL 2 TIMES DAILY
Status: DISCONTINUED | OUTPATIENT
Start: 2024-02-17 | End: 2024-03-01 | Stop reason: HOSPADM

## 2024-02-17 RX ORDER — SPIRONOLACTONE 25 MG/1
12.5 TABLET ORAL
Status: DISCONTINUED | OUTPATIENT
Start: 2024-02-17 | End: 2024-03-01 | Stop reason: HOSPADM

## 2024-02-17 RX ORDER — HEPARIN SODIUM 10000 [USP'U]/100ML
0-4500 INJECTION, SOLUTION INTRAVENOUS CONTINUOUS
Status: DISCONTINUED | OUTPATIENT
Start: 2024-02-17 | End: 2024-02-17

## 2024-02-17 RX ORDER — VALSARTAN 160 MG/1
160 TABLET ORAL DAILY
Status: DISCONTINUED | OUTPATIENT
Start: 2024-02-17 | End: 2024-03-01 | Stop reason: HOSPADM

## 2024-02-17 RX ORDER — ESCITALOPRAM OXALATE 10 MG/1
10 TABLET ORAL DAILY
Status: DISCONTINUED | OUTPATIENT
Start: 2024-02-17 | End: 2024-03-01 | Stop reason: HOSPADM

## 2024-02-17 RX ORDER — ATENOLOL 50 MG/1
50 TABLET ORAL 2 TIMES DAILY
Status: DISCONTINUED | OUTPATIENT
Start: 2024-02-17 | End: 2024-03-01 | Stop reason: HOSPADM

## 2024-02-17 RX ORDER — FERROUS SULFATE 325(65) MG
65 TABLET ORAL DAILY
Status: DISCONTINUED | OUTPATIENT
Start: 2024-02-17 | End: 2024-02-21

## 2024-02-17 RX ORDER — ROSUVASTATIN CALCIUM 40 MG/1
40 TABLET, COATED ORAL NIGHTLY
Status: DISCONTINUED | OUTPATIENT
Start: 2024-02-17 | End: 2024-02-17

## 2024-02-17 RX ORDER — IPRATROPIUM BROMIDE 21 UG/1
2 SPRAY, METERED NASAL 2 TIMES DAILY
Status: DISCONTINUED | OUTPATIENT
Start: 2024-02-17 | End: 2024-02-17 | Stop reason: RX

## 2024-02-17 RX ORDER — CLOPIDOGREL BISULFATE 75 MG/1
75 TABLET ORAL DAILY
Status: DISCONTINUED | OUTPATIENT
Start: 2024-02-17 | End: 2024-02-29

## 2024-02-17 RX ORDER — ACETAMINOPHEN 325 MG/1
975 TABLET ORAL EVERY 6 HOURS PRN
Status: DISCONTINUED | OUTPATIENT
Start: 2024-02-17 | End: 2024-02-26

## 2024-02-17 RX ORDER — HEPARIN SODIUM 5000 [USP'U]/ML
5000 INJECTION, SOLUTION INTRAVENOUS; SUBCUTANEOUS EVERY 8 HOURS SCHEDULED
Status: DISCONTINUED | OUTPATIENT
Start: 2024-02-17 | End: 2024-02-21

## 2024-02-17 RX ORDER — VANCOMYCIN HYDROCHLORIDE 750 MG/150ML
750 INJECTION, SOLUTION INTRAVENOUS ONCE
Status: COMPLETED | OUTPATIENT
Start: 2024-02-17 | End: 2024-02-17

## 2024-02-17 RX ORDER — MULTIVIT-MIN/IRON FUM/FOLIC AC 7.5 MG-4
1 TABLET ORAL DAILY
Status: DISCONTINUED | OUTPATIENT
Start: 2024-02-17 | End: 2024-03-01 | Stop reason: HOSPADM

## 2024-02-17 RX ORDER — ROSUVASTATIN CALCIUM 10 MG/1
10 TABLET, COATED ORAL NIGHTLY
Status: DISCONTINUED | OUTPATIENT
Start: 2024-02-18 | End: 2024-03-01 | Stop reason: HOSPADM

## 2024-02-17 RX ORDER — FOLIC ACID 1 MG/1
5 TABLET ORAL DAILY
Status: DISCONTINUED | OUTPATIENT
Start: 2024-02-17 | End: 2024-03-01 | Stop reason: HOSPADM

## 2024-02-17 RX ORDER — HEPARIN SODIUM 5000 [USP'U]/ML
80 INJECTION, SOLUTION INTRAVENOUS; SUBCUTANEOUS ONCE
Status: DISCONTINUED | OUTPATIENT
Start: 2024-02-17 | End: 2024-02-17

## 2024-02-17 RX ORDER — POLYETHYLENE GLYCOL 3350 17 G/17G
17 POWDER, FOR SOLUTION ORAL DAILY
Status: DISCONTINUED | OUTPATIENT
Start: 2024-02-17 | End: 2024-03-01 | Stop reason: HOSPADM

## 2024-02-17 RX ORDER — ASPIRIN 81 MG/1
81 TABLET ORAL DAILY
Status: DISCONTINUED | OUTPATIENT
Start: 2024-02-17 | End: 2024-03-01 | Stop reason: HOSPADM

## 2024-02-17 RX ORDER — CLORAZEPATE DIPOTASSIUM 3.75 MG/1
3.75 TABLET ORAL 2 TIMES DAILY PRN
Status: DISCONTINUED | OUTPATIENT
Start: 2024-02-17 | End: 2024-02-17

## 2024-02-17 RX ORDER — IPRATROPIUM BROMIDE 21 UG/1
2 SPRAY, METERED NASAL 2 TIMES DAILY
Status: DISCONTINUED | OUTPATIENT
Start: 2024-02-17 | End: 2024-03-01 | Stop reason: HOSPADM

## 2024-02-17 RX ORDER — CHOLECALCIFEROL (VITAMIN D3) 25 MCG
2000 TABLET ORAL DAILY
Status: DISCONTINUED | OUTPATIENT
Start: 2024-02-17 | End: 2024-03-01 | Stop reason: HOSPADM

## 2024-02-17 RX ORDER — ELECTROLYTES/DEXTROSE
10 SOLUTION, ORAL ORAL DAILY
Status: DISCONTINUED | OUTPATIENT
Start: 2024-02-17 | End: 2024-03-01 | Stop reason: HOSPADM

## 2024-02-17 RX ADMIN — VANCOMYCIN HYDROCHLORIDE 750 MG: 750 INJECTION, SOLUTION INTRAVENOUS at 22:08

## 2024-02-17 RX ADMIN — PIPERACILLIN SODIUM AND TAZOBACTAM SODIUM 2.25 G: 2; .25 INJECTION, SOLUTION INTRAVENOUS at 13:47

## 2024-02-17 RX ADMIN — CLOPIDOGREL BISULFATE 75 MG: 75 TABLET ORAL at 15:48

## 2024-02-17 RX ADMIN — HEPARIN SODIUM 5000 UNITS: 5000 INJECTION INTRAVENOUS; SUBCUTANEOUS at 22:08

## 2024-02-17 RX ADMIN — ASPIRIN 81 MG: 81 TABLET, COATED ORAL at 15:48

## 2024-02-17 RX ADMIN — FOLIC ACID 5 MG: 1 TABLET ORAL at 15:48

## 2024-02-17 RX ADMIN — ACETAMINOPHEN 975 MG: 325 TABLET ORAL at 15:48

## 2024-02-17 RX ADMIN — AZELASTINE HYDROCHLORIDE 2 SPRAY: 137 SPRAY, METERED NASAL at 20:12

## 2024-02-17 RX ADMIN — ATENOLOL 50 MG: 50 TABLET ORAL at 15:48

## 2024-02-17 RX ADMIN — Medication 2000 UNITS: at 15:48

## 2024-02-17 RX ADMIN — SODIUM CHLORIDE, POTASSIUM CHLORIDE, SODIUM LACTATE AND CALCIUM CHLORIDE 1000 ML: 600; 310; 30; 20 INJECTION, SOLUTION INTRAVENOUS at 13:48

## 2024-02-17 RX ADMIN — Medication 10 MG: at 20:12

## 2024-02-17 RX ADMIN — IPRATROPIUM BROMIDE 2 SPRAY: 21 SPRAY, METERED NASAL at 20:13

## 2024-02-17 RX ADMIN — POLYETHYLENE GLYCOL 3350 17 G: 17 POWDER, FOR SOLUTION ORAL at 15:47

## 2024-02-17 RX ADMIN — PIPERACILLIN SODIUM AND TAZOBACTAM SODIUM 2.25 G: 2; .25 INJECTION, SOLUTION INTRAVENOUS at 20:12

## 2024-02-17 RX ADMIN — FERROUS SULFATE TAB 325 MG (65 MG ELEMENTAL FE) 1 TABLET: 325 (65 FE) TAB at 15:48

## 2024-02-17 RX ADMIN — ESCITALOPRAM OXALATE 10 MG: 10 TABLET ORAL at 15:48

## 2024-02-17 RX ADMIN — Medication 1 TABLET: at 15:48

## 2024-02-17 RX ADMIN — ATENOLOL 50 MG: 50 TABLET ORAL at 22:04

## 2024-02-17 ASSESSMENT — COGNITIVE AND FUNCTIONAL STATUS - GENERAL
CLIMB 3 TO 5 STEPS WITH RAILING: A LOT
DRESSING REGULAR LOWER BODY CLOTHING: A LITTLE
HELP NEEDED FOR BATHING: A LITTLE
MOVING TO AND FROM BED TO CHAIR: A LITTLE
TOILETING: A LITTLE
STANDING UP FROM CHAIR USING ARMS: A LITTLE
PERSONAL GROOMING: A LITTLE
DAILY ACTIVITIY SCORE: 20
WALKING IN HOSPITAL ROOM: A LITTLE
MOBILITY SCORE: 19

## 2024-02-17 ASSESSMENT — PAIN - FUNCTIONAL ASSESSMENT: PAIN_FUNCTIONAL_ASSESSMENT: 0-10

## 2024-02-17 ASSESSMENT — PAIN SCALES - GENERAL
PAINLEVEL_OUTOF10: 2
PAINLEVEL_OUTOF10: 0 - NO PAIN
PAINLEVEL_OUTOF10: 0 - NO PAIN

## 2024-02-17 ASSESSMENT — COLUMBIA-SUICIDE SEVERITY RATING SCALE - C-SSRS
6. HAVE YOU EVER DONE ANYTHING, STARTED TO DO ANYTHING, OR PREPARED TO DO ANYTHING TO END YOUR LIFE?: NO
2. HAVE YOU ACTUALLY HAD ANY THOUGHTS OF KILLING YOURSELF?: NO
1. IN THE PAST MONTH, HAVE YOU WISHED YOU WERE DEAD OR WISHED YOU COULD GO TO SLEEP AND NOT WAKE UP?: NO

## 2024-02-17 NOTE — ED PROVIDER NOTES
HPI  Patient is an 89-year-old female with PMH of HTN, HLD, CKD, vertebral artery syndrome s/p L PICA coiling and stent, CAD s/p CABG x 3, A-fib (not on anticoagulation), aortic stenosis, PAD s/p left EIA stent, and bilateral SFA as well as SMA occlusion presenting to the emergency department from OSH for vascular consultation.  Reportedly has a wound on the left heel that has been getting worse for some time now.  Does follow-up with wound care, however it does appear that the wound has been getting more erythematous prompting the initial presentation.  OSH imaging revealed concerns for possible osteomyelitis given some periosteal reactions therefore was placed on vancomycin and Zosyn.  Reportedly had diminished pulses in the left lower extremity at the , therefore [facility] vascular surgery was consulted given her significant vasculopath history and that they were recommended to place the patient on a heparin drip which was performed.  Upon arrival, patient was reporting pain in the left ankle, however denies any significant numbness/tingling/weakness in the left lower extremity.  She denies any significant discoloration at this time.    Physical Exam  VITALS: Vital signs reviewed in nursing triage note, EMR flow sheets, and at patient's bedside  CONSTITUTIONAL: Well-appearing, in no apparent distress  HEAD: NCAT  EYES: PERRL, EOMI  NECK: Supple, non-tender, full ROM  CARD: RRR, no m/r/g, no JVD  RESP: LCTAB, speaking full sentences, no increased work of breathing, no use of accessory respiratory muscles  ABD: Bowel sounds present, non-distended, soft and non-tender, no palpable organomegaly, no masses, no guarding or rebound tenderness  BACK: No vertebral point or CVA tenderness, no obvious bony deformities, no spinal step-offs   EXT: Breakdown of the heel ranging from the calcaneal insertion proximally towards insertion at the gastrocnemius, granulation tissue without significant evidence of purulence or active  bleeding, DP/PT pulses 2+, cap refill approximately 3 seconds, foot does appear to be warm  SKIN: Dry with appropriate turgor, no apparent rashes, suspicious lesions, or masses   NEURO: CNs II-XII grossly intact, AAOx4, sensation intact bilaterally, strength 5/5 on UEs and LEs bilaterally, speech is fluent and comprehensible  PSYCH: Appropriate mood and affect, no HI/SI, not responding to internal stimuli    Vitals:    02/17/24 1100   BP: 104/55   Pulse: 88   Resp: 16   Temp:    SpO2: 97%       Assessment/Plan/MDM  Patient clinically stable with normal vital signs upon presentation to the emergency department he does appear that the foot has revascularized, although of note there was no imaging performed of the left lower extremity patient was placed empirically on a heparin drip based off of physical exam and her medical history.  She does state that the foot symptoms have improved since being transported to [facility].  Is denying any significant pain at the moment, and does not feel that she requires any analgesia.  Vascular surgery consulted for evaluation given her recent of transfer.  Ultimate disposition contingent on their recommendations, however if they are to sign off, will likely require admission for osteomyelitis workup.    Results  *See section(s) entitled ``Lab Results´´, ``Diagnostic Imaging Results Review´´ for entirety.  Notable results listed below  - Labs: I independently interpreted as laboratory work obtained at OSH  - Images: I independently interpreted as imaging obtained at OSH    ED Course as of 02/17/24 1142   Sat Feb 17, 2024   1141 Vascular surgery stated that there is no acute surgical indication at this time.  Okay to cease the heparin from their perspective as it was only placed on for the leg.  Will admit to medicine for osteomyelitis workup. [JV]      ED Course User Index  [JV] Omar Pa MD         Diagnoses as of 02/17/24 1142   Osteomyelitis of left ankle, unspecified type  (CMS/[facility])      Clinical Impression  Osteomyelitis    Dispo  Admit to medicine    Patient seen and discussed with attending physician Dr. Carney.    Omar Pa MD  Emergency Medicine, PGY-3    Was dictated using Dragon dictation. Please excuse any errors found in the note.     Omar Pa MD  Resident  02/17/24 7833

## 2024-02-17 NOTE — H&P
Subjective   Chief concern:   Chief Complaint   Patient presents with    Consult     Transfer in for Vascular Surgery Consult       History of present illness:  Maggi Gordon is a 89 y.o. female with PMHx of HTN, HLD, CKD, vertebral artery syndrome (s/p L PICA coiling and stent placement in 2006), CAD (s/p CABG x 3 in 1995), afib (not on anticoagulants), aortic stenosis (s/p Enrico bovine aortic valve bioprosthesis), PAD (s/p L EIA stent in 9/2023 at OSH and S/P L femoral endarterectomy with angioplasty with L common iliac and external iliac artery stenting on 1/18/24), [facility] and arterial occlusion of the L foot.    The patient was referred from wound are nurse following a fall at home and wounds over left heels with exposed tendons and on the lateral left foot. The pt states that she was in her kitchen, bent over to pick off some paper, and fell over and hir her R head. Denies any LOC, dizziness but endorse pain when walking. Work up CT head and cervical spine negative but X-ray of the L foot showed periosteal reaction adjunt to the base of the 5th metatarsal c/f osteomyelitis with elevated ESR, CRP, . Bcx taken and patient was started on vanc/zosyn. [place] [facility] [place] foot with exposed tendon and diminished pulses at DP, requested the pt be transferred with IV heparin bolus and gtt for further evaluation.    Pt endorses having L lateral foot wound since October and the wound at the back of her foot in the past 1-2 months, that was worsening despite revascularization. The wound was painful with some yellow drainage. She said that she could not weight bear on her L heel and walked with her toes. Denies fevers, chills, night sweats, dizziness, syncope, CP, SOB, palpitations, abdominal pain, hematuria, blood in stool.     ED course:     - Vital Signs: 36.8 °C, /98, , RR 16, 100 % on RA    - Labs:  CBC:  Results from last 7  days   Lab Units 02/16/24  1728   WBC AUTO x10*3/uL 9.6   HEMOGLOBIN g/dL 10.5*   HEMATOCRIT % 33.0*   PLATELETS AUTO x10*3/uL 266     RFP:  Results from last 7 days   Lab Units 02/16/24  1728   SODIUM mmol/L 139   POTASSIUM mmol/L 4.2   CHLORIDE mmol/L 101   CO2 mmol/L 31   BUN mg/dL 46*   CREATININE mg/dL 1.41*   EGFR mL/min/1.73m*2 36*   GLUCOSE mg/dL 101*   CALCIUM mg/dL 9.4     LFT:  Results from last 7 days   Lab Units 02/16/24  1728   AST U/L 25   ALT U/L 19   ALK PHOS U/L 86   BILIRUBIN TOTAL mg/dL 0.4   PROTEIN TOTAL g/dL 6.8     Coags:  Results from last 7 days   Lab Units 02/16/24 2050 02/16/24  1741   APTT seconds 30  --    INR   --  1.2*     Other labs:  Results from last 7 days   Lab Units 02/16/24  1741 02/16/24  1728   LACTATE mmol/L 1.1  --    SED RATE mm/h  --  68*   CRP mg/dL  --  3.15*     UA: WNL  Bcx x 2 (2/16): pending    - Imaging:  No orders to display     XR foot left 3+ views    Result Date: 2/16/2024  STUDY: Foot Radiographs; 02/16/2024 6:24 PM INDICATION: Left foot wound. COMPARISON: None available. ACCESSION NUMBER(S): [id number] ORDERING CLINICIAN: ALESIA MCCARTY TECHNIQUE:  Three view(s) of the left foot. FINDINGS:  There is no displaced fracture.  The alignment is anatomic.  No bony erosions are identified.  There is minimal periosteal reaction adjacent to the base of the fifth of metatarsal.  The rest of the visualized bony structures appear intact.  A small calcific density adjacent to the DIP joint of the second toe may be related to degenerative change or old trauma.  There is a rudimentary plantar calcaneal spur.    Minimal periosteal reaction adjacent to the base of the fifth metatarsal.  This could be related to inflammatory change or trauma. Follow-up is suggested if there is clinical symptomatology referable to this area. Signed by Joslyn Feliciano DO    CT cervical spine wo IV contrast    Result Date: 2/16/2024  Interpreted By:  Con Mcknight, STUDY: CT CERVICAL  SPINE WO IV CONTRAST;  2/16/2024 5:40 pm   INDICATION: Signs/Symptoms:fall.   COMPARISON: None.   ACCESSION NUMBER(S): [id number]   ORDERING CLINICIAN: ALESIA MCCARTY   TECHNIQUE: Axial CT images of the cervical spine are obtained. Axial, coronal and sagittal reconstructions are provided for review.   FINDINGS:     Fractures: There is no evidence for an acute fracture of the cervical spine.   Vertebral Alignment: No posttraumatic malalignment. Grade 1 retrolisthesis of C3 on C4, unchanged and likely degenerative. There is overall straightening of the normal cervical lordosis, presumed secondary to patient positioning or spasm.   Craniocervical Junction: The odontoid process and craniocervical junction are intact.   Vertebrae/Disc Spaces:  Severe multilevel spondylosis. Multilevel disc space narrowing. Multilevel facet arthropathy Multilevel uncovertebral hypertrophy.Multilevel osteophyte formation.   Prevertebral/Paraspinal Soft Tissues: The prevertebral and paraspinal soft tissues are unremarkable.         Multilevel spondylosis without acute osseous abnormality of the cervical spine.   MACRO: None   Signed by: Con Mcknight 2/16/2024 6:19 PM Dictation workstation:   [id number]    CT head W O contrast trauma protocol    Result Date: 2/16/2024  Interpreted By:  Con Mcknight, STUDY: CT HEAD W/O CONTRAST TRAUMA PROTOCOL;  2/16/2024 5:40 pm   INDICATION: Signs/Symptoms:fall.   COMPARISON: Head CT 08/01/2022   ACCESSION NUMBER(S): [id number]   ORDERING CLINICIAN: ALESIA MCCARTY   TECHNIQUE: Noncontrast axial CT scan of head was performed. Angled reformats in brain and bone windows were generated. The images were reviewed in bone, brain, blood and soft tissue windows.   FINDINGS: CSF Spaces: Ventricles appear stable with respect to size and configuration. There is no extraaxial fluid collection.   Parenchyma: Presumed old lacunar type ischemia of the left cerebellum. Confluent periventricular and  subcortical white matter hypoattenuation, nonspecific, however likely reflecting chronic microvascular ischemic changes. The grey-white differentiation is intact. There is no mass effect or midline shift.  There is no intracranial hemorrhage.   Calvarium: The calvarium is unremarkable.   Paranasal sinuses and mastoids: Visualized paranasal sinuses and mastoids are clear.       No CT evidence of acute intracranial injury.       MACRO: None     Signed by: Con Mcknight 2/16/2024 6:15 PM Dictation workstation:   [id number]       In the ED:    ED Medication Administration from 02/17/2024 0922 to 02/17/2024 1226         Date/Time Order Dose Route Action Action by     02/17/2024 1000 EST heparin 25,000 Units in dextrose 5% 250 mL (100 Units/mL) infusion (premix) 400 Units/hr intravenous Rate/Dose Change Tadeo, V     02/17/2024 1025 EST heparin (porcine) injection 3,500 Units 3,500 Units intravenous Not Given Tadeo, V     02/17/2024 1025 EST heparin 25,000 Units in dextrose 5% 250 mL (100 Units/mL) infusion (premix) 400 Units/hr intravenous Rate/Dose Verify Tadeo, V     02/17/2024 1125 EST heparin 25,000 Units in dextrose 5% 250 mL (100 Units/mL) infusion (premix) 0 Units/hr intravenous Stopped Tadeo, V            Micro/culture data:  No results found for the last 90 days.      Past Medical History:  She has a past medical history of Asymptomatic premature menopause, Atherosclerosis of coronary artery bypass graft(s) without angina pectoris (10/11/2021), Occlusion and stenosis of bilateral carotid arteries (01/09/2020), Personal history of other diseases of the circulatory system, Personal history of other diseases of the circulatory system, Personal history of other diseases of the circulatory system, Personal history of other endocrine, nutritional and metabolic disease, Personal history of other endocrine, nutritional and metabolic disease, Personal history of other endocrine, nutritional and metabolic disease,  and S/P AVR (aortic valve replacement) (10/18/2023).    Past Surgical History:  She has a past surgical history that includes Other surgical history (12/03/2018); Other surgical history (12/03/2018); Other surgical history (12/03/2018); Other surgical history (12/03/2018); Other surgical history (12/03/2018); and CT angio aorta and bilateral iliofemoral runoff w and or wo IV contrast (7/13/2023).     Social history:  Living Situation: Lives alone with her cat. Able to do ADLS with walker.  Alcohol: Denies  Alcohol Use: Not At Risk (1/12/2024)    AUDIT-C     Frequency of Alcohol Consumption: Never     Average Number of Drinks: Patient does not drink     Frequency of Binge Drinking: Never     Tobacco: Denies  Tobacco Use: Low Risk  (1/12/2024)    Patient History     Smoking Tobacco Use: Never     Smokeless Tobacco Use: Never     Passive Exposure: Not on file      Illicit Drugs: Denies   Social History     Substance and Sexual Activity   Drug Use Not on file       She reports that she has never smoked. She has never used smokeless tobacco. No history on file for alcohol use and drug use.    Family history:  No family history on file.     Allergies:  Ezetimibe and Hydrocodone    Home medications:  Current Facility-Administered Medications on File Prior to Encounter   Medication Dose Route Frequency Provider Last Rate Last Admin    [COMPLETED] heparin (porcine) injection 2,650 Units  60 Units/kg intravenous Once Maribeth Cuevas MD   2,650 Units at 02/16/24 2040    [COMPLETED] piperacillin-tazobactam-dextrose (Zosyn) IV 4.5 g  4.5 g intravenous Once Maribeth Cuevas MD   Stopped at 02/16/24 1825    [COMPLETED] vancomycin (Vancocin) 1 g in dextrose 5% water 200 mL  1 g intravenous Once Maribeth Cuevas MD   Stopped at 02/16/24 1914    [DISCONTINUED] heparin (porcine) injection 1,500-3,000 Units  1,500-3,000 Units intravenous q4h PRN Maribeth Cuevas MD        [DISCONTINUED] heparin 25,000 Units in  dextrose 5% 250 mL (100 Units/mL) infusion (premix)  0-4,000 Units/hr intravenous Continuous Maribeth Cuevas MD 4 mL/hr at 02/17/24 0809 400 Units/hr at 02/17/24 0809     Current Outpatient Medications on File Prior to Encounter   Medication Sig Dispense Refill    alendronate (Fosamax) 70 mg tablet Take 1 tablet (70 mg) by mouth every 7 days. On Mondays      aspirin 81 mg EC tablet Take 1 tablet (81 mg) by mouth once daily.      atenolol (Tenormin) 50 mg tablet Take 1 tablet (50 mg) by mouth 2 times a day.      azelastine (Astelin) 137 mcg (0.1 %) nasal spray Administer 2 sprays into each nostril 2 times a day.      biotin 10,000 mcg capsule Take 1 capsule (10 mg) by mouth once daily.      cholecalciferol (Vitamin D-3) 50 MCG (2000 UT) tablet Take 1 tablet (50 mcg) by mouth once daily.      clopidogrel (Plavix) 75 mg tablet TAKE 1 TABLET BY MOUTH EVERY DAY 90 tablet 0    escitalopram (Lexapro) 10 mg tablet 1 tablet (10 mg) once daily.      ferrous sulfate 325 (65 Fe) MG tablet Take 1 tablet by mouth once daily.      folic acid (Folvite) 1 mg tablet Take 5 tablets (5 mg) by mouth once daily.      folic acid-vit B6-vit B12 (WesTab Max) 2.5-25-2 mg tablet Take 1 tablet by mouth once daily.      ipratropium (Atrovent) 21 mcg (0.03 %) nasal spray Administer 2 sprays into each nostril 2 times a day.      multivitamin with minerals tablet Take 1 tablet by mouth once daily. Do not start before January 24, 2024. 30 tablet 0    Praluent Pen 150 mg/mL pen injector INJECT 1 MILLILITER INTO SKIN EVERY 2 WEEKS IN ABDOMEN, THIGH, OR UPPER ARM ROTATING INJECTION SITES 6 Pen 3    rosuvastatin (Crestor) 40 mg tablet Take 1 tablet (40 mg) by mouth once daily at bedtime.      spironolacton-hydrochlorothiaz (Aldactazide) 25-25 mg tablet Take 0.5 tablets (12.5 mg) by mouth once daily.      valsartan (Diovan) 160 mg tablet Take 1 tablet (160 mg) by mouth once daily.      [DISCONTINUED] amoxicillin (Amoxil) 500 mg capsule Take 4  capsules (2,000 mg) by mouth see administration instructions. Take 1 hour before dental appt      [DISCONTINUED] atorvastatin (Lipitor) 80 mg tablet Take 1 tablet (80 mg) by mouth once daily.      [DISCONTINUED] cetirizine (ZyrTEC) 10 mg tablet Take 1 tablet (10 mg) by mouth once daily.      [DISCONTINUED] chlorpheniramine (Chlor-Trimeton) 4 mg tablet Take by mouth.      [DISCONTINUED] mirtazapine (Remeron) 15 mg tablet Take 0.5 tablets (7.5 mg) by mouth once daily at bedtime.      [DISCONTINUED] potassium chloride CR (Klor-Con 8) 8 mEq ER tablet Take 1 tablet (8 mEq) by mouth 3 times a day with meals.         Review of Systems : See above       Objective   Vital signs:  Blood pressure 102/59, pulse 72, temperature 36.8 °C (98.2 °F), temperature source Oral, resp. rate 16, SpO2 99 %.    Physical Exam  Constitutional:       Appearance: Normal appearance.   HENT:      Head: Normocephalic and atraumatic.      Nose:      Right Nostril: Epistaxis present.   Eyes:      Extraocular Movements: Extraocular movements intact.      Conjunctiva/sclera: Conjunctivae normal.      Pupils: Pupils are equal, round, and reactive to light.   Cardiovascular:      Rate and Rhythm: Normal rate. Rhythm irregular.      Pulses: Normal pulses.           Dorsalis pedis pulses are 2+ on the left side.      Heart sounds: Normal heart sounds. No murmur heard.     No friction rub. No gallop.   Pulmonary:      Effort: Pulmonary effort is normal.      Breath sounds: No wheezing, rhonchi or rales.   Chest:      Chest wall: No tenderness.   Abdominal:      General: Abdomen is flat. Bowel sounds are normal. There is no distension.      Palpations: Abdomen is soft.      Tenderness: There is no abdominal tenderness. There is no guarding or rebound.   Musculoskeletal:         General: No swelling or tenderness.        Feet:    Feet:      Left foot:      Skin integrity: Skin breakdown present.      Toenail Condition: Left toenails are abnormally thick.       Comments: Wound at the heel of L foot with ranging from the calcaneal insertion proximally towards insertion at the gastrocnemius, granulation tissue without significant evidence of purulence or active bleeding.  Skin:     General: Skin is warm and dry.      Capillary Refill: Capillary refill takes less than 2 seconds.   Neurological:      Mental Status: She is alert.          Assessment/Plan   Principal Problem:    Osteomyelitis of left ankle, unspecified type (CMS/HCC)  Active Problems:    Osteomyelitis of left ankle (CMS/HCC)    Assessment/Plan:   Maggi Gordon is a 89 y.o. female with HTN, HLD, CKD, vertebral artery syndrome (s/p L PICA coiling and stent placement in 2006), CAD (s/p CABG x 3 in 1995), afib (not on anticoagulants), aortic stenosis (s/p Enrico bovine aortic valve bioprosthesis), PAD (s/p L EIA stent in 9/2023 at OSH and S/P L femoral endarterectomy with angioplasty with L common iliac and external iliac artery stenting on 1/18/24) who [facility] mechanical fall at home found on imaging to have c/f L foot OM with diminished pulses of LLE DP on exam transferred to [facility] for further evaluation.     Given the elevated ESR, CRP and XR showing c/f osteomyelitis, will further work up with MRI foot and continue on empirical ATB (vanc/zosyn). For chronic nonhealing wound I/s/o significant PAD, will follow with vascular surgery.  ------  To follow up  - MRI foot  - Bcx, wound Cx,   - Vasc Sx recs  - Code status (pt need time to think about that)  - Add anti hypertensive regimen  - PT/OT  - On heparin sc for DVT prophylaxis for now I/s/o LUCRETIA  -----     #Chronic L foot wound c/f OM  #PAD s/p L femoral endarectomy on 1/18/24   :: X-ray foot (2/16): periosteal reaction adjacent to the base of the fifth metatarsal.  This could be related to inflammatory change or trauma.   :: ESR 68, CRP 3.15   :: Biphasic L PT, absent DP/PT signals on L. R biphasic DP and monophasic R PT   - c/w  vanc/zosyn 2/16-   - Bcx (2/16) pending  - wound cx  - wound care   - MRI L foot to evaluate for OM   - vascular surgery following: rec admission to medicine, ATB, hydration for LUCRETIA, no vascular surgery need for heparin, wound care for L heel, possible OR later this week  - c/w ASA 81mg daily, Plavix 75mg daily, rosuvastatin 40mg qhs     #LUCRETIA  #CKD  :: Likely pre-renal i/s/o poor PO intake  :: admission Cr 1.41, baseline Cr ~1.2   - 1L IVF     #Chronic macrocytic anemia  :: Hgb ~10   - Peripheral smear, retic, iron studies, folate, B12   - c/w home iron    #HTN  - hold home valsartan 160mg, hold spironolactone-hydrochlorothiazide I/s/o soft BP    #Vertebral artery syndrome (s/p L PICA coiling and stent placement in 2006)  - m/o    #CAD (s/p CABG x 3 in 1995)  #Afib (not on anticoagulants)  #Aortic stenosis (s/p Enrico bovine aortic valve bioprosthesis)  :: Pt endorsing only 1 time of AF at night, spontaneously resolved  - Keep K>4, Mg>2  - c/w ASA 81mg daily, Plavix 75mg daily, rosuvastatin 40mg qhs   - c/w atenolol 50mg BID    F: Replete PRN  E: Replete PRN   N: Cardiac diet  DVT Ppx: Heparin sc  GI Ppx: -  Access/Lines: PIV   Abx: Vanc/Zosyn  O2: None    Pain regimen: Tylenol prn  GI Laxative: Miralax   PT/OT: pending    Code status: Full Code  Emergency contact:  Vicenta Gordon (sister-in-law) [phone]       Mary Vanessa MD

## 2024-02-17 NOTE — SIGNIFICANT EVENT
Expected patient note:    The patient is an 89-year-old woman with a past history of recent common femoral endarterectomy by Dr. Montes presenting due to decreased perfusion in her foot.  Was excepted by Dr. Montes for transfer.    Kiarra Kirkland MD  [facility]   PGY-3

## 2024-02-17 NOTE — PROGRESS NOTES
"Vancomycin Dosing by Pharmacy- Initial    Maggi Gordon is a 89 y.o. year old female who Pharmacy has been consulted for vancomycin dosing for osteomyelitis/septic arthritis. Based on the patient's indication and renal status this patient is being dosed based on a goal AUC of 500-600.     Renal function can be described as Acute Kidney Injury    Renal function is decreased from baseline. Current sCr is 1.4, baseline is around 1.2.       Visit Vitals  /58   Pulse 66   Temp 36.8 °C (98.2 °F) (Oral)   Resp 16        Lab Results   Component Value Date    CREATININE 1.41 (H) 02/16/2024    CREATININE 1.24 (H) 01/25/2024    CREATININE 1.22 (H) 01/24/2024    CREATININE 1.28 (H) 01/23/2024        Patient weight is No results found for: \"PTWEIGHT\"    No results found for: \"VANCORANDOM\", \"CULTURE\"     No intake/output data recorded.    No results found for: \"PATIENTTEMP\"       Assessment/Plan    Patient has already been given a loading dose of 1000 mg.  Will initiate vancomycin maintenance, a one time dose of 750 mg, 26 hours after her previous dose of 1g. Patient has CKD, but is not on dialysis. All [employer] predictions gave a sub-500 AUC for most reasonable doses, so I will give her another 750mg to get her AUC higher, but then will see if her sCr improves/stabilizes.     Follow-up level will be ordered on 2/18 at 1800 unless clinically indicated sooner.  Will continue to monitor renal function daily while on vancomycin and order serum creatinine at least every 48 hours if not already ordered.  Follow for continued vancomycin needs, clinical response, and signs/symptoms of toxicity.     Thank you for allowing me to participate in the care of this patient.     Janessa Muhammad, PharmD             "

## 2024-02-17 NOTE — PROGRESS NOTES
Maggi Gordon is a 89 y.o. female admitted for No Principal Problem: There is no principal problem currently on the Problem List. Please update the Problem List and refresh.. Pharmacy reviewed the patient's [id number] medications and allergies for accuracy.    The list below reflects the PTA list prior to pharmacy medication history. A summary a changes to the PTA medication list has been listed below. Please review each medication in order reconciliation for additional clarification and justification.    Source of information:  T2P    Medications added:  Westab Max 2.5/25/2mg every day     Medications modified:  Azelastine 0.1% --> 2 sprays bid  Biotin 2500mcg --> 10,000mcg every day   Chloecalciferol 1000 units every day --> 2000 units every day   Folic Acid 1mg 2t every day --> 1mg 5t every day  Valsartan 320mg --> 160mg every day     Medications to be removed:  Amox 500mg  Atorvastatin 80mg  Mirtazapine 15mg   Potassium Chlor 8meq  Cetirizine 10mg   Chlorpheniramine 4mg    Medications of concern:      Prior to Admission Medications   Prescriptions Last Dose Informant Patient Reported? Taking?   Praluent Pen 150 mg/mL pen injector   No    Sig: INJECT 1 MILLILITER INTO SKIN EVERY 2 WEEKS IN ABDOMEN, THIGH, OR UPPER ARM ROTATING INJECTION SITES   alendronate (Fosamax) 70 mg tablet   Yes    Sig: Take 1 tablet (70 mg) by mouth every 7 days.   amoxicillin (Amoxil) 500 mg capsule   Yes    Sig: Take 4 capsules (2,000 mg) by mouth see administration instructions. Take 1 hour before dental appt   aspirin 81 mg EC tablet   Yes    Sig: Take 1 tablet (81 mg) by mouth once daily.   atenolol (Tenormin) 50 mg tablet   Yes    Sig: Take 1 tablet (50 mg) by mouth 2 times a day.   atorvastatin (Lipitor) 80 mg tablet   Yes    Sig: Take 1 tablet (80 mg) by mouth once daily.   azelastine (Astelin) 137 mcg (0.1 %) nasal spray   Yes    Sig: Administer into affected nostril(s).   biotin 2,500 mcg capsule   Yes    Sig: Take by  mouth.   cetirizine (ZyrTEC) 10 mg tablet   Yes    Sig: Take 1 tablet (10 mg) by mouth once daily.   chlorpheniramine (Chlor-Trimeton) 4 mg tablet   Yes    Sig: Take by mouth.   cholecalciferol (Vitamin D-3) 25 MCG (1000 UT) capsule   Yes    Sig: Take by mouth.   clopidogrel (Plavix) 75 mg tablet   No    Sig: TAKE 1 TABLET BY MOUTH EVERY DAY   clorazepate (Tranxene) 7.5 mg tablet   Yes    Si/2 TAB ORALLY TWICE A DAY AS NEEDED 30 DAYS   escitalopram (Lexapro) 10 mg tablet   Yes    Si tablet (10 mg) once daily.   ferrous sulfate 325 (65 Fe) MG tablet   Yes    Sig: Take 1 tablet by mouth once daily.   folic acid (Folvite) 1 mg tablet   Yes    Sig: Take 2 tablets (2 mg) by mouth once daily.   ipratropium (Atrovent) 21 mcg (0.03 %) nasal spray   Yes    Sig: Administer 2 sprays into each nostril 2 times a day.   mirtazapine (Remeron) 15 mg tablet   Yes    Sig: Take 0.5 tablets (7.5 mg) by mouth once daily at bedtime.   multivitamin with minerals tablet   No    Sig: Take 1 tablet by mouth once daily. Do not start before 2024.   potassium chloride CR (Klor-Con 8) 8 mEq ER tablet   Yes    Sig: Take 1 tablet (8 mEq) by mouth 3 times a day with meals.   rosuvastatin (Crestor) 40 mg tablet   Yes    Sig: Take 1 tablet (40 mg) by mouth once daily at bedtime.   spironolacton-hydrochlorothiaz (Aldactazide) 25-25 mg tablet   Yes    Sig: Take 0.5 tablets (12.5 mg) by mouth once daily.   valsartan (Diovan) 320 mg tablet   Yes    Sig: Take by mouth.      Facility-Administered Medications: None       Lorna Tom CPhT

## 2024-02-17 NOTE — SIGNIFICANT EVENT
Ms. 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   Pt presented to [facility] yesterday after a fall. Pt states that yesterday she was having significant pain in her L foot and difficulty ambulating. Ambulates with a walker. She lives alone with her cat, home care comes daily to assist with dressing changes and sister in law helps as well. Yesterday afternoon, she was in the kitchen with her walker, bent over to pick something off the floor, fell over and hither R head on the floor. Got up immediately to chair. She fell again that night and hit the side of her head. Denies LOC. The morning of presentation to [facility], her home health nurse came for her wound care. Due to visible tendon of her posterior L foot wound, they contacted the wound clinic in [place] who recommended the patient go to the ER. At the [place] ED, CTH w/o and CT cervical spine w/o without acute changes. XR L foot showed minimal periosteal reaction adjust to the base of the fifth metatarsal. At [place] pt was seen by general surgery who cleared her from a trauma standpoint. Started on vanc/zosyn due to c/f possible OM, as well as heparin bolus and gtt after PE c/f diminished pulses LLL DP and transferred to [facility] for further evaluation.   At bedside, pt states that she has had L lateral foot wound since October and posterior foot wound for the past 1-2 months that has been getting progressively worse despite recent revascularization in January. Denies fevers, chills, night sweats,  dizziness, syncope, CP, SOB, palpitations, abdominal pain, hematuria, blood in stool.     Vitals:    02/17/24 1230   BP: 121/58   Pulse: 66   Resp: 16   Temp:    SpO2: 98%       Physical Exam:  General: thin F in NAD  CV: rrr, no m/r/g  Pulm: CTAB  GI: soft, NT, ND  Extremities: Breakdown of the heel ranging from the calcaneal insertion proximally towards insertion at the gastrocnemius, granulation tissue without significant evidence of purulence or active bleeding with well-defined margins, NT to palpation, unable to palpate L DP, able to wiggle toes b/l     Assessment and Plan   Ms. 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 Given chronic nonhealing wound i/s/o significant PAD with exposed tendon on exam, elevated inflammatory markers and XR showing periosteal reaction adjacent to the base of the fifth metatarsal, concern for OM. Will order MRI for further evaluation. Continue broad spectrum abx pending further infectious work-up. Currently afebrile, HDS on RA. Per vascular surgery, will discuss possible OR intervention later this week, no vascular need for heparin gtt.     #Chronic L foot wound c/f OM  #PAD s/p L femoral endarectomy on 1/18/24   :: ESR 68, CRP 3.15   :: Biphasic L PT, absent DP/PT signals on L. R biphasic DP and monophasic R PT   c/w vanc/zosyn 2/16-   blood cx 2/16 pending  wound cx  wound care   MRI L foot to evaluate for OM   appreciate vascular surgery recs  c/w ASA 81mg daily, Plavix 75mg daily, rosuvastatin 40mg qhs   okay to dc  heparin gtt per vascular surgery    #LUCRETIA, likely pre-renal i/s/o poor PO intake  :: bl Cr ~1.2   1L IVF  No CTM    #Chronic macrocytic anemia  :: Hgb ~10   Peripheral smear, retic, iron studies, folate, B12   CTM, no s/s bleeding    #Misc  Holding home HCTZ 12.5mg daily, ana 12.5mg daily, valsartan 160mg daily given soft BP and potential OR   c/w atenolol 50mg bid, azelastin, ipatropium nasal spray, biotin 10mg daily, vitD3 2000U daily, escitalopram 10mg daily, ferrous sulfate 65mg daily, folic acid 5mg daily, MVI, Miralax daily  F: PRN  E: PRN  N: regular  A: PIV  Full Code  NOK: sister-in-law Vicenta [phone]

## 2024-02-17 NOTE — H&P (VIEW-ONLY)
Reason For Consult  Transfer, on hep gtt for diminshed LLE pulses, h/o stent, c/f new osteo     History Of Present Illness  89F HTN, HLD, CKD, CAD s/p CABG, A-fib, aortic stenosis, and PAD, s/p multiple interventions, the most recent of which was a L femoral endart on 1/18/2024, who initially presented to [place] yesterday after a fall with headstrike without LOC, and is also with worsening L foot wounds, for which vascular surgery consulted.    CT head performed at [place] was negative for bleed.  Reports L heel wound worsening and is painful.  No issues from L groin surgical site, no drainage.  Denies fevers, chills, nausea.  No other concerns.    Past Medical History  She has a past medical history of Asymptomatic premature menopause, Atherosclerosis of coronary artery bypass graft(s) without angina pectoris (10/11/2021), Occlusion and stenosis of bilateral carotid arteries (01/09/2020), Personal history of other diseases of the circulatory system, Personal history of other diseases of the circulatory system, Personal history of other diseases of the circulatory system, Personal history of other endocrine, nutritional and metabolic disease, Personal history of other endocrine, nutritional and metabolic disease, Personal history of other endocrine, nutritional and metabolic disease, and S/P AVR (aortic valve replacement) (10/18/2023).    Surgical History  She has a past surgical history that includes Other surgical history (12/03/2018); Other surgical history (12/03/2018); Other surgical history (12/03/2018); Other surgical history (12/03/2018); Other surgical history (12/03/2018); and CT angio aorta and bilateral iliofemoral runoff w and or wo IV contrast (7/13/2023).     Social History  She reports that she has never smoked. She has never used smokeless tobacco. No history on file for alcohol use and drug use.    Family History  No family history on file.     Allergies  Ezetimibe and Hydrocodone    Review of  Systems  Negative, except for what is noted in HPI     Physical Exam  Gen: NAD. Nontoxic.  HEENT: Atraumatic. MMM. EOMI.  Card: RRR  Pulm: Nonlabored breathing on room air.  Extremity: LLE with dressed on foot and ankle with Xerform covering posterior wound. Feet are warm. Biphasic L PT, absent DP/PT signals on L. R biphasic DP and monophasic R PT. L surgical incision intact.  Motor and sensory intact.     Last Recorded Vitals  Blood pressure (!) 109/98, pulse 85, temperature 36.8 °C (98.2 °F), temperature source Oral, resp. rate 16, SpO2 100 %.    Relevant Results  Results for orders placed or performed during the hospital encounter of 02/17/24 (from the past 24 hour(s))   Heparin Assay, UFH   Result Value Ref Range    Heparin Unfractionated 0.3 See Comment Below for Therapeutic Ranges IU/mL     Assessment/Plan   89F HTN, HLD, CKD, CAD s/p CABG, A-fib, aortic stenosis, and PAD, s/p multiple interventions, the most recent of which was a L femoral endart on 1/18/2024, who initially presented to [place] yesterday after a fall with headstrike without LOC, and is also with worsening L foot wounds, for which vascular surgery consulted.    Recommendations:  - Admission to medicine.  - Antibiotics.  - Hydration given slight LUCRETIA.  - No vascular surgery need for heparin, but can continue.  - Wound care for L heel  - Possible OR for intervention later this week.    Seen and discussed with attending, Dr. Montes.    John Carl MD  Vascular Surgery, PGY3  Team Pager: [phone]

## 2024-02-17 NOTE — CONSULTS
Reason For Consult  Transfer, on hep gtt for diminshed LLE pulses, h/o stent, c/f new osteo     History Of Present Illness  89F HTN, HLD, CKD, CAD s/p CABG, A-fib, aortic stenosis, and PAD, s/p multiple interventions, the most recent of which was a L femoral endart on 1/18/2024, who initially presented to [place] yesterday after a fall with headstrike without LOC, and is also with worsening L foot wounds, for which vascular surgery consulted.    CT head performed at [place] was negative for bleed.  Reports L heel wound worsening and is painful.  No issues from L groin surgical site, no drainage.  Denies fevers, chills, nausea.  No other concerns.    Past Medical History  She has a past medical history of Asymptomatic premature menopause, Atherosclerosis of coronary artery bypass graft(s) without angina pectoris (10/11/2021), Occlusion and stenosis of bilateral carotid arteries (01/09/2020), Personal history of other diseases of the circulatory system, Personal history of other diseases of the circulatory system, Personal history of other diseases of the circulatory system, Personal history of other endocrine, nutritional and metabolic disease, Personal history of other endocrine, nutritional and metabolic disease, Personal history of other endocrine, nutritional and metabolic disease, and S/P AVR (aortic valve replacement) (10/18/2023).    Surgical History  She has a past surgical history that includes Other surgical history (12/03/2018); Other surgical history (12/03/2018); Other surgical history (12/03/2018); Other surgical history (12/03/2018); Other surgical history (12/03/2018); and CT angio aorta and bilateral iliofemoral runoff w and or wo IV contrast (7/13/2023).     Social History  She reports that she has never smoked. She has never used smokeless tobacco. No history on file for alcohol use and drug use.    Family History  No family history on file.     Allergies  Ezetimibe and Hydrocodone    Review of  Systems  Negative, except for what is noted in HPI     Physical Exam  Gen: NAD. Nontoxic.  HEENT: Atraumatic. MMM. EOMI.  Card: RRR  Pulm: Nonlabored breathing on room air.  Extremity: LLE with dressed on foot and ankle with Xerform covering posterior wound. Feet are warm. Biphasic L PT, absent DP/PT signals on L. R biphasic DP and monophasic R PT. L surgical incision intact.  Motor and sensory intact.     Last Recorded Vitals  Blood pressure (!) 109/98, pulse 85, temperature 36.8 °C (98.2 °F), temperature source Oral, resp. rate 16, SpO2 100 %.    Relevant Results  Results for orders placed or performed during the hospital encounter of 02/17/24 (from the past 24 hour(s))   Heparin Assay, UFH   Result Value Ref Range    Heparin Unfractionated 0.3 See Comment Below for Therapeutic Ranges IU/mL     Assessment/Plan   89F HTN, HLD, CKD, CAD s/p CABG, A-fib, aortic stenosis, and PAD, s/p multiple interventions, the most recent of which was a L femoral endart on 1/18/2024, who initially presented to [place] yesterday after a fall with headstrike without LOC, and is also with worsening L foot wounds, for which vascular surgery consulted.    Recommendations:  - Admission to medicine.  - Antibiotics.  - Hydration given slight LUCRETIA.  - No vascular surgery need for heparin, but can continue.  - Wound care for L heel  - Possible OR for intervention later this week.    Seen and discussed with attending, Dr. Montes.    John Carl MD  Vascular Surgery, PGY3  Team Pager: [phone]

## 2024-02-18 LAB
ALBUMIN SERPL BCP-MCNC: 3 G/DL (ref 3.4–5)
ANION GAP SERPL CALC-SCNC: 12 MMOL/L (ref 10–20)
BASOPHILS # BLD AUTO: 0.02 X10*3/UL (ref 0–0.1)
BASOPHILS NFR BLD AUTO: 0.2 %
BUN SERPL-MCNC: 32 MG/DL (ref 6–23)
CALCIUM SERPL-MCNC: 9.5 MG/DL (ref 8.6–10.6)
CHLORIDE SERPL-SCNC: 104 MMOL/L (ref 98–107)
CO2 SERPL-SCNC: 29 MMOL/L (ref 21–32)
CREAT SERPL-MCNC: 1.51 MG/DL (ref 0.5–1.05)
EGFRCR SERPLBLD CKD-EPI 2021: 33 ML/MIN/1.73M*2
EOSINOPHIL # BLD AUTO: 0.09 X10*3/UL (ref 0–0.4)
EOSINOPHIL NFR BLD AUTO: 1 %
ERYTHROCYTE [DISTWIDTH] IN BLOOD BY AUTOMATED COUNT: 14.5 % (ref 11.5–14.5)
GLUCOSE SERPL-MCNC: 86 MG/DL (ref 74–99)
HCT VFR BLD AUTO: 28.4 % (ref 36–46)
HGB BLD-MCNC: 8.7 G/DL (ref 12–16)
IMM GRANULOCYTES # BLD AUTO: 0.02 X10*3/UL (ref 0–0.5)
IMM GRANULOCYTES NFR BLD AUTO: 0.2 % (ref 0–0.9)
LYMPHOCYTES # BLD AUTO: 2.12 X10*3/UL (ref 0.8–3)
LYMPHOCYTES NFR BLD AUTO: 24.5 %
MAGNESIUM SERPL-MCNC: 1.87 MG/DL (ref 1.6–2.4)
MCH RBC QN AUTO: 31.9 PG (ref 26–34)
MCHC RBC AUTO-ENTMCNC: 30.6 G/DL (ref 32–36)
MCV RBC AUTO: 104 FL (ref 80–100)
MONOCYTES # BLD AUTO: 0.66 X10*3/UL (ref 0.05–0.8)
MONOCYTES NFR BLD AUTO: 7.6 %
NEUTROPHILS # BLD AUTO: 5.76 X10*3/UL (ref 1.6–5.5)
NEUTROPHILS NFR BLD AUTO: 66.5 %
NRBC BLD-RTO: 0 /100 WBCS (ref 0–0)
PHOSPHATE SERPL-MCNC: 3.6 MG/DL (ref 2.5–4.9)
PLATELET # BLD AUTO: 238 X10*3/UL (ref 150–450)
POTASSIUM SERPL-SCNC: 4.3 MMOL/L (ref 3.5–5.3)
RBC # BLD AUTO: 2.73 X10*6/UL (ref 4–5.2)
SODIUM SERPL-SCNC: 141 MMOL/L (ref 136–145)
VANCOMYCIN SERPL-MCNC: 13.2 UG/ML (ref 5–20)
WBC # BLD AUTO: 8.7 X10*3/UL (ref 4.4–11.3)

## 2024-02-18 PROCEDURE — 2500000004 HC RX 250 GENERAL PHARMACY W/ HCPCS (ALT 636 FOR OP/ED)

## 2024-02-18 PROCEDURE — 36415 COLL VENOUS BLD VENIPUNCTURE: CPT | Performed by: PHARMACIST

## 2024-02-18 PROCEDURE — 2500000004 HC RX 250 GENERAL PHARMACY W/ HCPCS (ALT 636 FOR OP/ED): Performed by: PATHOLOGY

## 2024-02-18 PROCEDURE — 80202 ASSAY OF VANCOMYCIN: CPT | Performed by: PHARMACIST

## 2024-02-18 PROCEDURE — 83735 ASSAY OF MAGNESIUM: CPT

## 2024-02-18 PROCEDURE — 2500000004 HC RX 250 GENERAL PHARMACY W/ HCPCS (ALT 636 FOR OP/ED): Performed by: STUDENT IN AN ORGANIZED HEALTH CARE EDUCATION/TRAINING PROGRAM

## 2024-02-18 PROCEDURE — 2500000002 HC RX 250 W HCPCS SELF ADMINISTERED DRUGS (ALT 637 FOR MEDICARE OP, ALT 636 FOR OP/ED)

## 2024-02-18 PROCEDURE — 84100 ASSAY OF PHOSPHORUS: CPT

## 2024-02-18 PROCEDURE — 2500000001 HC RX 250 WO HCPCS SELF ADMINISTERED DRUGS (ALT 637 FOR MEDICARE OP)

## 2024-02-18 PROCEDURE — 97530 THERAPEUTIC ACTIVITIES: CPT | Mod: GP

## 2024-02-18 PROCEDURE — 97116 GAIT TRAINING THERAPY: CPT | Mod: GP

## 2024-02-18 PROCEDURE — 1100000001 HC PRIVATE ROOM DAILY

## 2024-02-18 PROCEDURE — 36415 COLL VENOUS BLD VENIPUNCTURE: CPT

## 2024-02-18 PROCEDURE — 85025 COMPLETE CBC W/AUTO DIFF WBC: CPT

## 2024-02-18 PROCEDURE — 97161 PT EVAL LOW COMPLEX 20 MIN: CPT | Mod: GP

## 2024-02-18 PROCEDURE — 2500000005 HC RX 250 GENERAL PHARMACY W/O HCPCS

## 2024-02-18 RX ORDER — VANCOMYCIN HYDROCHLORIDE 500 MG/100ML
500 INJECTION, SOLUTION INTRAVENOUS ONCE
Status: COMPLETED | OUTPATIENT
Start: 2024-02-18 | End: 2024-02-18

## 2024-02-18 RX ADMIN — VANCOMYCIN HYDROCHLORIDE 500 MG: 500 INJECTION, SOLUTION INTRAVENOUS at 20:29

## 2024-02-18 RX ADMIN — ROSUVASTATIN CALCIUM 10 MG: 10 TABLET, FILM COATED ORAL at 20:29

## 2024-02-18 RX ADMIN — ATENOLOL 50 MG: 50 TABLET ORAL at 20:29

## 2024-02-18 RX ADMIN — Medication 2000 UNITS: at 10:17

## 2024-02-18 RX ADMIN — ACETAMINOPHEN 975 MG: 325 TABLET ORAL at 21:51

## 2024-02-18 RX ADMIN — HEPARIN SODIUM 5000 UNITS: 5000 INJECTION INTRAVENOUS; SUBCUTANEOUS at 05:24

## 2024-02-18 RX ADMIN — ESCITALOPRAM OXALATE 10 MG: 10 TABLET ORAL at 10:17

## 2024-02-18 RX ADMIN — Medication 1 TABLET: at 10:17

## 2024-02-18 RX ADMIN — HEPARIN SODIUM 5000 UNITS: 5000 INJECTION INTRAVENOUS; SUBCUTANEOUS at 21:33

## 2024-02-18 RX ADMIN — IPRATROPIUM BROMIDE 2 SPRAY: 21 SPRAY, METERED NASAL at 10:19

## 2024-02-18 RX ADMIN — ACETAMINOPHEN 975 MG: 325 TABLET ORAL at 10:16

## 2024-02-18 RX ADMIN — SODIUM CHLORIDE, POTASSIUM CHLORIDE, SODIUM LACTATE AND CALCIUM CHLORIDE 500 ML: 600; 310; 30; 20 INJECTION, SOLUTION INTRAVENOUS at 12:39

## 2024-02-18 RX ADMIN — AZELASTINE HYDROCHLORIDE 2 SPRAY: 137 SPRAY, METERED NASAL at 20:30

## 2024-02-18 RX ADMIN — PIPERACILLIN SODIUM AND TAZOBACTAM SODIUM 2.25 G: 2; .25 INJECTION, SOLUTION INTRAVENOUS at 01:02

## 2024-02-18 RX ADMIN — PIPERACILLIN SODIUM AND TAZOBACTAM SODIUM 2.25 G: 2; .25 INJECTION, SOLUTION INTRAVENOUS at 16:40

## 2024-02-18 RX ADMIN — PIPERACILLIN SODIUM AND TAZOBACTAM SODIUM 2.25 G: 2; .25 INJECTION, SOLUTION INTRAVENOUS at 10:17

## 2024-02-18 RX ADMIN — HEPARIN SODIUM 5000 UNITS: 5000 INJECTION INTRAVENOUS; SUBCUTANEOUS at 14:58

## 2024-02-18 RX ADMIN — IPRATROPIUM BROMIDE 2 SPRAY: 21 SPRAY, METERED NASAL at 20:30

## 2024-02-18 RX ADMIN — PIPERACILLIN SODIUM AND TAZOBACTAM SODIUM 2.25 G: 2; .25 INJECTION, SOLUTION INTRAVENOUS at 21:34

## 2024-02-18 RX ADMIN — AZELASTINE HYDROCHLORIDE 2 SPRAY: 137 SPRAY, METERED NASAL at 10:22

## 2024-02-18 RX ADMIN — CLOPIDOGREL BISULFATE 75 MG: 75 TABLET ORAL at 10:17

## 2024-02-18 RX ADMIN — FOLIC ACID 5 MG: 1 TABLET ORAL at 10:17

## 2024-02-18 RX ADMIN — FERROUS SULFATE TAB 325 MG (65 MG ELEMENTAL FE) 1 TABLET: 325 (65 FE) TAB at 10:17

## 2024-02-18 RX ADMIN — ATENOLOL 50 MG: 50 TABLET ORAL at 10:17

## 2024-02-18 RX ADMIN — Medication 10 MG: at 12:39

## 2024-02-18 RX ADMIN — ASPIRIN 81 MG: 81 TABLET, COATED ORAL at 10:17

## 2024-02-18 ASSESSMENT — COGNITIVE AND FUNCTIONAL STATUS - GENERAL
HELP NEEDED FOR BATHING: A LITTLE
CLIMB 3 TO 5 STEPS WITH RAILING: A LOT
MOVING TO AND FROM BED TO CHAIR: A LITTLE
TOILETING: A LITTLE
HELP NEEDED FOR BATHING: A LITTLE
MOVING FROM LYING ON BACK TO SITTING ON SIDE OF FLAT BED WITH BEDRAILS: A LITTLE
CLIMB 3 TO 5 STEPS WITH RAILING: TOTAL
MOBILITY SCORE: 16
WALKING IN HOSPITAL ROOM: A LITTLE
PERSONAL GROOMING: A LITTLE
DAILY ACTIVITIY SCORE: 20
STANDING UP FROM CHAIR USING ARMS: A LITTLE
MOVING TO AND FROM BED TO CHAIR: A LITTLE
DAILY ACTIVITIY SCORE: 20
WALKING IN HOSPITAL ROOM: A LITTLE
DRESSING REGULAR LOWER BODY CLOTHING: A LITTLE
PERSONAL GROOMING: A LITTLE
TOILETING: A LITTLE
STANDING UP FROM CHAIR USING ARMS: A LITTLE
STANDING UP FROM CHAIR USING ARMS: A LITTLE
MOVING TO AND FROM BED TO CHAIR: A LITTLE
CLIMB 3 TO 5 STEPS WITH RAILING: A LOT
TURNING FROM BACK TO SIDE WHILE IN FLAT BAD: A LITTLE
MOBILITY SCORE: 19
MOBILITY SCORE: 19
DRESSING REGULAR LOWER BODY CLOTHING: A LITTLE
WALKING IN HOSPITAL ROOM: A LITTLE

## 2024-02-18 ASSESSMENT — ACTIVITIES OF DAILY LIVING (ADL): ADL_ASSISTANCE: INDEPENDENT

## 2024-02-18 ASSESSMENT — ENCOUNTER SYMPTOMS
ENDOCRINE NEGATIVE: 1
GASTROINTESTINAL NEGATIVE: 1
HEMATOLOGIC/LYMPHATIC NEGATIVE: 1
EYES NEGATIVE: 1
CONSTITUTIONAL NEGATIVE: 1
PSYCHIATRIC NEGATIVE: 1
CARDIOVASCULAR NEGATIVE: 1
ALLERGIC/IMMUNOLOGIC NEGATIVE: 1
RESPIRATORY NEGATIVE: 1
WOUND: 1

## 2024-02-18 ASSESSMENT — PAIN SCALES - GENERAL
PAINLEVEL_OUTOF10: 0 - NO PAIN
PAINLEVEL_OUTOF10: 4
PAINLEVEL_OUTOF10: 3
PAINLEVEL_OUTOF10: 5 - MODERATE PAIN

## 2024-02-18 ASSESSMENT — PAIN - FUNCTIONAL ASSESSMENT
PAIN_FUNCTIONAL_ASSESSMENT: 0-10

## 2024-02-18 NOTE — CONSULTS
PODIATRIC MEDICINE & SURGERY - CONSULT NOTE    Subjective   Maggi Gordon is a 89 y.o. female who is on day 1 of admission for Osteomyelitis of left ankle, unspecified type (CMS/HCC). PMHx of HTN, HLD, CKD, CAD s/p CABG, A-fib, aortic stenosis, and PAD, s/p multiple interventions. Podiatry consulted for worsening L foot wounds.    HPI: Patient resting comfortably in bed. Reports pain is well-controlled. No further complaints.     Review Of Systems:  Review of Systems   Constitutional: Negative.    HENT: Negative.     Eyes: Negative.    Respiratory: Negative.     Cardiovascular: Negative.    Gastrointestinal: Negative.    Endocrine: Negative.    Genitourinary: Negative.    Musculoskeletal:  Positive for gait problem.   Skin:  Positive for wound.   Allergic/Immunologic: Negative.    Hematological: Negative.    Psychiatric/Behavioral: Negative.       Past Medical History  Past Medical History:   Diagnosis Date    Asymptomatic premature menopause     Premature menopause    Atherosclerosis of coronary artery bypass graft(s) without angina pectoris 10/11/2021    Atherosclerosis of CABG w/o angina pectoris    Occlusion and stenosis of bilateral carotid arteries 01/09/2020    Occlusion and stenosis of bilateral carotid arteries    Personal history of other diseases of the circulatory system     History of coronary artery disease    Personal history of other diseases of the circulatory system     History of hypertension    Personal history of other diseases of the circulatory system     History of stenosis of renal artery    Personal history of other endocrine, nutritional and metabolic disease     History of hypothyroidism    Personal history of other endocrine, nutritional and metabolic disease     History of hyperlipidemia    Personal history of other endocrine, nutritional and metabolic disease     History of goiter    S/P AVR (aortic valve replacement) 10/18/2023    #21 CE AVR     Allergies  Allergies   Allergen  Reactions    Ezetimibe Unknown    Hydrocodone Hallucinations     Medications  Scheduled medications  aspirin, 81 mg, oral, Daily  atenolol, 50 mg, oral, BID  azelastine, 2 spray, Each Nostril, BID  biotin, 10 mg, oral, Daily  cholecalciferol, 2,000 Units, oral, Daily  clopidogrel, 75 mg, oral, Daily  escitalopram, 10 mg, oral, Daily  ferrous sulfate (325 mg ferrous sulfate), 65 mg of iron, oral, Daily  folic acid, 5 mg, oral, Daily  heparin (porcine), 5,000 Units, subcutaneous, q8h MUMTAZ  [Held by provider] spironolactone, 12.5 mg, oral, q24h MUMTAZ   And  [Held by provider] hydroCHLOROthiazide, 12.5 mg, oral, Daily  ipratropium, 2 spray, Each Nostril, BID  multivitamin with minerals, 1 tablet, oral, Daily  piperacillin-tazobactam, 2.25 g, intravenous, q6h  polyethylene glycol, 17 g, oral, Daily  rosuvastatin, 10 mg, oral, Nightly  [Held by provider] valsartan, 160 mg, oral, Daily    Continuous medications       PRN medications: acetaminophen    Objective   Visit Vitals  /63   Pulse 78   Temp 36.7 °C (98.1 °F)   Resp 18   SpO2 97%   OB Status Postmenopausal   Smoking Status Never     Physical Exam  Constitutional: NAD and AAOx3.   HEENT: Head is normocephalic and atraumatic. External ear is normal B/L. Conjunctivae normal B/L. Nose is normal. Oropharynx is clear. Mouth is moist.   Cardiovascular: Regular rate.  Pulmonary: No increased work of breathing.  Psychological: Appropriate mood and behavior.     Vascular: DP and PT pulses are faintly palpable bilateral. CFT is less than 5 seconds bilateral.  Skin temperature is warm to cool proximal to distal bilateral.       Neurologic:  Light touch is intact to the foot bilateral.      Musculoskeletal: Moves all extremities spontaneously.     Dermatologic: Nails 1-5 bilateral are intact.  Skin is supple with normal texture and turgor noted.  Webspaces are clean, dry and intact bilateral.     WOUND #1: L posterior ankle  Wound has mixed necrotic and fibrotic base, with  "exposed tendon  Wound measures approximately  8.5 cm x 8 cm x 0.3 cm.   Mild periwound erythema and edema. Foul odor.     WOUND #2: L lateral foot, 5th metatarsal base  Wound has a fibrotic base, with fat layer exposed  Wound measures approximately 1.5 cm x 1.5 cm x 0.2 cm   Mild serous drainage with no odor.  No tunneling or undermining or probe to bone. No ascending cellulitis or lymphangitis.    Labs  Lab Results   Component Value Date    WBC 8.7 02/18/2024    HGB 8.7 (L) 02/18/2024    HCT 28.4 (L) 02/18/2024     (H) 02/18/2024     02/18/2024     Lab Results   Component Value Date    GLUCOSE 86 02/18/2024    CALCIUM 9.5 02/18/2024     02/18/2024    K 4.3 02/18/2024    CO2 29 02/18/2024     02/18/2024    BUN 32 (H) 02/18/2024    CREATININE 1.51 (H) 02/18/2024     No results found for: \"HGBA1C\"  Cultures  No results found for the last 90 days.    Imaging  XR foot left 3+ views  Result Date: 2/16/2024  Minimal periosteal reaction adjacent to the base of the fifth metatarsal.  This could be related to inflammatory change or trauma. Follow-up is suggested if there is clinical symptomatology referable to this area.     Vascular US ankle brachial index (JOHN) without exercise  Result Date: 1/19/2024    Right Lower PVR: Evidence of moderate arterial occlusive disease in the right lower extremity at rest. Decreased digital perfusion noted. Monophasic flow is noted in the right dorsalis pedis artery. Biphasic flow is noted in the right common femoral artery and right posterior tibial artery.            Right Posterior Tibial (Ankle)  54 mmHg   0.48   Right Dorsalis Pedis (Ankle)    59 mmHg   0.52   Right Digit (Great Toe)         32 mmHg   0.28       Left Lower PVR: Evidence of moderate arterial occlusive disease in the left lower extremity at rest. Decreased digital perfusion noted. Biphasic flow is noted in the left common femoral artery, left posterior tibial artery and left dorsalis pedis " artery.           Left Posterior Tibial (Ankle)  55 mmHg   0.49   Left Dorsalis Pedis (Ankle)    46 mmHg   0.41   Left Digit (Great Toe)         21 mmHg   0.19      Comparison: Compared with study from 6/23/2023, no significant change.                         Assessment/Plan   Maggi Gordon is a 89 y.o. female who is on day 1 of admission for Osteomyelitis of left ankle, unspecified type (CMS/HCC). PMHx of HTN, HLD, CKD, CAD s/p CABG, A-fib, aortic stenosis, and PAD, s/p multiple interventions. Podiatry consulted for worsening L foot wounds.    #Chronic non-pressure ulceration with tendon exposed, left posterior ankle  #Chronic non-pressure ulceration with fat layer exposed, left lateral foot  #PAD - s/p L femoral endarectomy on 1/18/24     Reviewed labs, imaging and notes:  - No leukocytosis. Afebrile and not tachycardic. ESR 68. CRP 3.15. Blood cultures NTD.   - Vascular surgery following with plan for possible surgical intervention this week, and recommended podiatry consult as well.   - XR left foot with concern for OM at L 5th metatarsal base.   - PVRs 1/19/24  with left TBI 0.19, right TBI 0.28.   - On exam, pedal pulses are faintly palpable B/L. Full-thickness ulceration noted to L posterior ankle with Achilles tendon exposed. Mixed fibrotic and necrotic base. Foul odor. A second wound is noted to the left lateral foot with fat layer exposed and fibrotic base.     Plan/Recommendations:  - Dressing change today with xeroform, 4x4 gauze, ABD, and Kerlix.  - Recommend daily dressing as above.   - Limited WBAT. Recommend offloading L heel with pillow or heel offloading boot while in bed.  - Recommend obtain wound culture x2 (posterior ankle wound, lateral foot wound)  - Vascular surgery following. Clinically, the Achilles tendon appears to be non-viable. MRI L foot/ankle pending to evaluate the full extent of wounds. If sufficient blood flow can be re-established to the left ankle, it is my opinion that patient  would benefit from debridement of non-viable tissue, including removal of the left Achilles tendon. Post-operatively, patient could eventually remain ambulatory with an AFO. Surgical planning pending MRI and discussion with vascular surgery.   - Continue IV abx per primary team/infectious disease. Pain control per primary team  - Thank you for the consult. Podiatry will continue to follow.     Patient examined and evaluated with attending physician, Dr. Eugenia Moody DPM.     Tejal Murray DPM PGY-1  Podiatric Medicine & Surgery

## 2024-02-18 NOTE — CARE PLAN
The patient's goals for the shift include  being comfortable.     The clinical goals for the shift include Pt will remain free from falls or injury by the end of this shift.    Over the shift, the patient did make progress toward the following goals. Pt awaiting decision on surgery or not from MD.

## 2024-02-18 NOTE — PROGRESS NOTES
Physical Therapy    Physical Therapy Evaluation & Treatment    Patient Name: Maggi Gordon  MRN: [id number]  Today's Date: 2/18/2024   Time Calculation  Start Time: 1026  Stop Time: 1119  Time Calculation (min): 53 min    Assessment/Plan   PT Assessment  PT Assessment Results: Decreased strength, Decreased range of motion, Decreased endurance, Impaired balance, Decreased mobility, Orthopedic restrictions, Pain  Rehab Prognosis: Good  Barriers to Discharge: none  Evaluation/Treatment Tolerance: Patient limited by fatigue  Medical Staff Made Aware: Yes  Strengths: Attitude of self, Coping skills  Barriers to Participation:  (none)  End of Session Communication: Bedside nurse, Care Coordinator  Assessment Comment: The pt performed gait assessment with L LE maintained non-weight bearing using a wheeled walker with SBA.  End of Session Patient Position: Bed, 3 rail up, Alarm on   IP OR SWING BED PT PLAN  Inpatient or Swing Bed: Inpatient  PT Plan  Treatment/Interventions: Bed mobility, Transfer training, Gait training, Balance training, Strengthening, Endurance training, Therapeutic exercise, Therapeutic activity, Home exercise program  PT Plan: Skilled PT  PT Frequency: 5 times per week  PT Discharge Recommendations: High intensity level of continued care  Equipment Recommended upon Discharge:  (none)  PT Recommended Transfer Status: Assist x1  PT - OK to Discharge: Yes      Subjective     General Visit Information:  General  Reason for Referral: Left ankle osteomyelitis, mechanical fall and possible surgery to address left ankle wound.  Past Medical History Relevant to Rehab: HTN, HLD, CKD, CAD s/p CABG, A-fib, aortic stenosis, PAD, vertebral artery syndrome  Prior to Session Communication: Bedside nurse  Patient Position Received: Bed, 3 rail up, Alarm on  Preferred Learning Style: verbal, visual, written  General Comment: The pt was pleasant, cooperative and willing to participate in therapy.  Home Living:  Home  Living  Type of Home: Apartment  Lives With: Alone  Home Adaptive Equipment: Walker rolling or standard  Home Layout: One level  Home Access: Level entry  Bathroom Shower/Tub: Tub/shower unit  Bathroom Toilet: Standard  Bathroom Equipment: None  Prior Level of Function:  Prior Function Per Pt/Caregiver Report  Level of [place]: Independent with ADLs and functional transfers, Independent with homemaking with ambulation  Receives Help From: Family  ADL Assistance: Independent  Homemaking Assistance: Independent  Ambulatory Assistance: Independent  Vocational: Retired  Leisure: socialize with family and friends  Hand Dominance: Right  Prior Function Comments: The pt was independent using a wheeled walker with all mobility in the household and in the community.  Precautions:  Precautions  Hearing/Visual Limitations: Hearing and vision WFL with glasses.  LE Weight Bearing Status: Left Non-Weight Bearing (until weight bearing status is established)  Medical Precautions: Fall precautions  Precautions Comment: The pt in compliance with precaution throughout PT evaluation.     Objective   Pain:  Pain Assessment  Pain Assessment: 0-10  Pain Score: 5 - Moderate pain  Pain Type: Acute pain  Pain Location: Foot  Pain Orientation: Posterior  Cognition:  Cognition  Overall Cognitive Status: Within Functional Limits  Attention: Within Functional Limits  Memory: Within Funtional Limits  Problem Solving: Within Functional Limits  Numeric Reasoning: Within Functional Limits  Abstract Reasoning: Within Functional Limits  Safety/Judgement: Within Functional Limits  Insight: Within function limits  Impulsive: Within functional limits  Processing Speed: Within funtional limits    General Assessments:  General Observation  General Observation: The pt performed gait assessment using a wheeled walker with L LE non-weight bearing 2/2 to weight bearing status unknown and a possibility of left ankle surgery.     Activity  Tolerance  Endurance: Decreased tolerance for upright activites    Strength  Strength Comments: distal L LE decreased strength  Coordination  Movements are Fluid and Coordinated: Yes    Postural Control  Postural Control: Within Functional Limits  Posture Comment: The pt presented with good sitting and standing posture using a wheeled walker.    Static Sitting Balance  Static Sitting-Balance Support: Bilateral upper extremity supported, Feet supported  Static Sitting-Level of Assistance: Independent  Dynamic Sitting Balance  Dynamic Sitting-Balance Support: Bilateral upper extremity supported, Feet supported  Dynamic Sitting-Comments: independent    Static Standing Balance  Static Standing-Balance Support: Bilateral upper extremity supported  Static Standing-Level of Assistance: Close supervision  Static Standing-Comment/Number of Minutes: using a wheeled walker  Dynamic Standing Balance  Dynamic Standing-Balance Support: Bilateral upper extremity supported  Dynamic Standing-Comments: SBA using a wheeled walker.  Functional Assessments:  Bed Mobility  Bed Mobility: Yes  Bed Mobility 1  Bed Mobility 1: Supine to sitting  Level of Assistance 1: Distant supervision  Bed Mobility Comments 1: HOB slightly elevated  Bed Mobility 2  Bed Mobility  2: Sitting to supine  Level of Assistance 2: Distant supervision  Bed Mobility Comments 2: HOB slightly elevated    Transfers  Transfer: Yes  Transfer 1  Transfer From 1: Sit to  Transfer to 1: Stand  Transfer Device 1: Walker  Transfer Level of Assistance 1: Minimum assistance  Transfers 2  Transfer From 2: Stand to  Transfer to 2: Sit  Transfer Device 2: Walker  Transfer Level of Assistance 2: Contact guard    Ambulation/Gait Training  Ambulation/Gait Training Performed: Yes  Ambulation/Gait Training 1  Surface 1: Level tile  Device 1: Rolling walker  Assistance 1: Close supervision  Quality of Gait 1: Decreased step length, Antalgic (unsteady, L LE non-weight bearing, decreased  olive, decreased endurance)  Comments/Distance (ft) 1: 6ft  Extremity/Trunk Assessments:  Cervical Spine   Cervical Spine: Within Functional Limits  Lumbar Spine   Lumbar Spine : Within Functional Limits    RUE   RUE : Within Functional Limits  LUE   LUE: Within Functional Limits  RLE   RLE : Within Functional Limits  LLE   LLE : Exceptions to WFL  AROM LLE (degrees)  LLE AROM Comment: decreased ankle ROM  Strength LLE  L Hip Flexion: 4+/5  L Knee Flexion: 4+/5  L Knee Extension: 4+/5  L Ankle Dorsiflexion: 3-/5  L Ankle Plantar Flexion: 3-/5  Treatments:     Bed Mobility  Bed Mobility: Yes  Bed Mobility 1  Bed Mobility 1: Supine to sitting  Level of Assistance 1: Distant supervision  Bed Mobility Comments 1: HOB slightly elevated  Bed Mobility 2  Bed Mobility  2: Sitting to supine  Level of Assistance 2: Distant supervision  Bed Mobility Comments 2: HOB slightly elevated    Ambulation/Gait Training  Ambulation/Gait Training Performed: Yes  Ambulation/Gait Training 1  Surface 1: Level tile  Device 1: Rolling walker  Assistance 1: Close supervision  Quality of Gait 1: Decreased step length, Antalgic (unsteady, L LE non-weight bearing, decreased olive, decreased endurance)  Comments/Distance (ft) 1: 6ft  Transfers  Transfer: Yes  Transfer 1  Transfer From 1: Sit to  Transfer to 1: Stand  Transfer Device 1: Walker  Transfer Level of Assistance 1: Minimum assistance  Transfers 2  Transfer From 2: Stand to  Transfer to 2: Sit  Transfer Device 2: Walker  Transfer Level of Assistance 2: Contact guard  Outcome Measures:  [facility] Basic Mobility  Turning from your back to your side while in a flat bed without using bedrails: A little  Moving from lying on your back to sitting on the side of a flat bed without using bedrails: A little  Moving to and from bed to chair (including a wheelchair): A little  Standing up from a chair using your arms (e.g. wheelchair or bedside chair): A little  To walk in hospital room: A  little  Climbing 3-5 steps with railing: Total  Basic Mobility - Total Score: 16    Encounter Problems       Encounter Problems (Active)       Balance       STG - Maintains supervision assistance dynamic standing balance with upper extremity support using a wheeled walker. (Progressing)       Start:  02/18/24    Expected End:  03/03/24            STG - Maintains supervision assistance static standing balance with upper extremity support using a wheeled walker. (Progressing)       Start:  02/18/24    Expected End:  03/03/24               Mobility       STG - Patient will ambulate 125ft using a wheeled walker with supervision assistance. (Progressing)       Start:  02/18/24    Expected End:  03/03/24               Transfers       STG - Transfer from bed to chair using a wheeled walker with supervision assistance. (Progressing)       Start:  02/18/24    Expected End:  03/03/24            STG - Patient to transfer to and from sit to supine with supervision assistance. (Progressing)       Start:  02/18/24    Expected End:  03/03/24            STG - Patient will transfer sit to and from stand using a wheeled walker with supervision assistance. (Progressing)       Start:  02/18/24    Expected End:  03/03/24                   Education Documentation  Handouts, taught by Andrew Ahuja PT at 2/18/2024 11:47 AM.  Learner: Patient  Readiness: Acceptance  Method: Explanation, Demonstration  Response: Verbalizes Understanding, Demonstrated Understanding    Precautions, taught by Andrew Ahuja PT at 2/18/2024 11:47 AM.  Learner: Patient  Readiness: Acceptance  Method: Explanation, Demonstration  Response: Verbalizes Understanding, Demonstrated Understanding    Body Mechanics, taught by Andrew Ahuja PT at 2/18/2024 11:47 AM.  Learner: Patient  Readiness: Acceptance  Method: Explanation, Demonstration  Response: Verbalizes Understanding, Demonstrated Understanding    Home Exercise Program, taught by Andrew VARELA  Seymour, PT at 2/18/2024 11:47 AM.  Learner: Patient  Readiness: Acceptance  Method: Explanation, Demonstration  Response: Verbalizes Understanding, Demonstrated Understanding    Mobility Training, taught by Andrew Ahuja, PT at 2/18/2024 11:47 AM.  Learner: Patient  Readiness: Acceptance  Method: Explanation, Demonstration  Response: Verbalizes Understanding, Demonstrated Understanding    Education Comments  No comments found.

## 2024-02-18 NOTE — PROGRESS NOTES
Subjective   NAEON - no changes in her sxs since admission, no pain       Objective   Patient Vitals for the past 24 hrs:   BP Temp Pulse Resp SpO2   02/18/24 0522 141/63 36.7 °C (98.1 °F) 78 18 97 %   02/17/24 2009 105/50 -- 70 -- 96 %   02/17/24 1630 113/54 -- 68 16 99 %   02/17/24 1505 105/54 -- 75 16 98 %   02/17/24 1230 121/58 -- 66 16 98 %   02/17/24 1200 102/59 -- 72 16 99 %   02/17/24 1100 104/55 -- 88 16 97 %   02/17/24 1030 106/60 -- -- -- 96 %     Gen: well-appearing, NAD, thin  Head and neck: NCAT, neck supple without LAD  HEENT: MMM, normal nose without congestion  Pulm: normal WOB on RA  Ext: feet warm, but absent PT pulse on L with doppler  -LLE: ulcerative lesion on the posterior aspect of the left ankle  Neuro: alert and oriented x3, normal tone, face symmetric, moves all extremities spontaneously         Assessment/Plan   88yo with PMH HTN, HLD, CKD, CAD s/p CABG x3 1995, vertebral artery syndrome s/p L PICA coiling and stent, Afib (not on AC), aortic stenosis s/p AVR 2011, PAD s/p multiple interventions, the most recent of which was a L femoral endartecomy on 1/18/24 at [facility], chronic L foot wound (follows with wound care) who initially presented to [place] after mechanical fall at home found on imaging to have c/f L foot OM with diminished pulses of LLE DP on exam transferred to [facility] for further evaluation. Currently treating for OM with vanc/zosyn.  Vascular surgery and podiatry following, will need to determine the salvageability of the foot before proceeding with any surgical interventions most likely.  She appears well and is hemodynamically stable, no signs of sepsis.    #Chronic L foot wound c/f OM  #PAD s/p L femoral endarectomy on 1/18/24   #likely OM of L foot/ankle  -vasc surgery following: no need for heparin gtt at this time, planning surgical intervention for later in the week  -podiatry c/s to help determine if foot is salvageable   -c/w ASA 81mg daily, Plavix 75mg daily,  rosuvastatin   -MRI L foot and ankle pending    #LUCRETIA  -prerenal most likely   -continue fluid resuscitation   -hold home antihypertensives (hydrochlorothiazide, ana, valsartan) for now for soft BP    #Afib  -CHADSVASc 5  -continue home atenolol  -reportedly not on AC at home, but dc summary from Sept notes warfarin. No warfarin on fill history on EMR or on dc summary from Jan admission. She did have high INR on arrival in Jan.    #Depression - c/w home meds    N: Cardiac diet  DVT Ppx: Heparin sc  GI Ppx: -  Access/Lines: PIV   Abx: Vanc/Zosyn  O2: None    Pain regimen: Tylenol prn  GI Laxative: Miralax   PT/OT: pending     Code status: Full Code  Emergency contact: Vicenta Gordon (sister-in-law) [phone]    Seen with MD Koko Kern MD  Med-Peds PGY-3
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
## 2024-02-18 NOTE — CARE PLAN
The patient's goals for the shift include      The clinical goals for the shift include safety    Over the shift, the patient did   Problem: Fall/Injury  Goal: Not fall by end of shift  Outcome: Progressing  Goal: Be free from injury by end of the shift  Outcome: Progressing  Goal: Verbalize understanding of personal risk factors for fall in the hospital  Outcome: Progressing   make progress toward the following goals.

## 2024-02-19 ENCOUNTER — APPOINTMENT (OUTPATIENT)
Dept: WOUND CARE | Facility: CLINIC | Age: 89
End: 2024-02-19
Payer: MEDICARE

## 2024-02-19 ENCOUNTER — ANESTHESIA EVENT (OUTPATIENT)
Dept: OPERATING ROOM | Facility: HOSPITAL | Age: 89
DRG: 240 | End: 2024-02-19
Payer: MEDICARE

## 2024-02-19 ENCOUNTER — APPOINTMENT (OUTPATIENT)
Dept: RADIOLOGY | Facility: HOSPITAL | Age: 89
DRG: 240 | End: 2024-02-19
Payer: MEDICARE

## 2024-02-19 LAB
ABO GROUP (TYPE) IN BLOOD: NORMAL
ALBUMIN SERPL BCP-MCNC: 3.3 G/DL (ref 3.4–5)
ANION GAP SERPL CALC-SCNC: 13 MMOL/L (ref 10–20)
ANTIBODY SCREEN: NORMAL
APTT PPP: 34 SECONDS (ref 27–38)
BASOPHILS # BLD AUTO: 0.02 X10*3/UL (ref 0–0.1)
BASOPHILS NFR BLD AUTO: 0.2 %
BUN SERPL-MCNC: 26 MG/DL (ref 6–23)
CALCIUM SERPL-MCNC: 9.5 MG/DL (ref 8.6–10.6)
CHLORIDE SERPL-SCNC: 102 MMOL/L (ref 98–107)
CO2 SERPL-SCNC: 30 MMOL/L (ref 21–32)
CREAT SERPL-MCNC: 1.4 MG/DL (ref 0.5–1.05)
EGFRCR SERPLBLD CKD-EPI 2021: 36 ML/MIN/1.73M*2
EOSINOPHIL # BLD AUTO: 0.17 X10*3/UL (ref 0–0.4)
EOSINOPHIL NFR BLD AUTO: 1.9 %
ERYTHROCYTE [DISTWIDTH] IN BLOOD BY AUTOMATED COUNT: 14.6 % (ref 11.5–14.5)
GLUCOSE SERPL-MCNC: 115 MG/DL (ref 74–99)
HCT VFR BLD AUTO: 31.5 % (ref 36–46)
HGB BLD-MCNC: 9.6 G/DL (ref 12–16)
IMM GRANULOCYTES # BLD AUTO: 0.04 X10*3/UL (ref 0–0.5)
IMM GRANULOCYTES NFR BLD AUTO: 0.4 % (ref 0–0.9)
INR PPP: 1.1 (ref 0.9–1.1)
LYMPHOCYTES # BLD AUTO: 1.65 X10*3/UL (ref 0.8–3)
LYMPHOCYTES NFR BLD AUTO: 18.6 %
MAGNESIUM SERPL-MCNC: 1.91 MG/DL (ref 1.6–2.4)
MCH RBC QN AUTO: 31.8 PG (ref 26–34)
MCHC RBC AUTO-ENTMCNC: 30.5 G/DL (ref 32–36)
MCV RBC AUTO: 104 FL (ref 80–100)
MONOCYTES # BLD AUTO: 0.66 X10*3/UL (ref 0.05–0.8)
MONOCYTES NFR BLD AUTO: 7.4 %
NEUTROPHILS # BLD AUTO: 6.35 X10*3/UL (ref 1.6–5.5)
NEUTROPHILS NFR BLD AUTO: 71.5 %
NRBC BLD-RTO: 0 /100 WBCS (ref 0–0)
PHOSPHATE SERPL-MCNC: 3 MG/DL (ref 2.5–4.9)
PLATELET # BLD AUTO: 255 X10*3/UL (ref 150–450)
POTASSIUM SERPL-SCNC: 3.5 MMOL/L (ref 3.5–5.3)
PROTHROMBIN TIME: 12.6 SECONDS (ref 9.8–12.8)
RBC # BLD AUTO: 3.02 X10*6/UL (ref 4–5.2)
RH FACTOR (ANTIGEN D): NORMAL
SODIUM SERPL-SCNC: 141 MMOL/L (ref 136–145)
VANCOMYCIN SERPL-MCNC: 13.6 UG/ML (ref 5–20)
WBC # BLD AUTO: 8.9 X10*3/UL (ref 4.4–11.3)

## 2024-02-19 PROCEDURE — 73723 MRI JOINT LWR EXTR W/O&W/DYE: CPT | Mod: LEFT SIDE | Performed by: RADIOLOGY

## 2024-02-19 PROCEDURE — 36415 COLL VENOUS BLD VENIPUNCTURE: CPT | Performed by: STUDENT IN AN ORGANIZED HEALTH CARE EDUCATION/TRAINING PROGRAM

## 2024-02-19 PROCEDURE — 2550000001 HC RX 255 CONTRASTS: Performed by: INTERNAL MEDICINE

## 2024-02-19 PROCEDURE — 36415 COLL VENOUS BLD VENIPUNCTURE: CPT

## 2024-02-19 PROCEDURE — 80202 ASSAY OF VANCOMYCIN: CPT | Performed by: PATHOLOGY

## 2024-02-19 PROCEDURE — 2500000004 HC RX 250 GENERAL PHARMACY W/ HCPCS (ALT 636 FOR OP/ED)

## 2024-02-19 PROCEDURE — 85610 PROTHROMBIN TIME: CPT | Performed by: STUDENT IN AN ORGANIZED HEALTH CARE EDUCATION/TRAINING PROGRAM

## 2024-02-19 PROCEDURE — 2500000001 HC RX 250 WO HCPCS SELF ADMINISTERED DRUGS (ALT 637 FOR MEDICARE OP)

## 2024-02-19 PROCEDURE — 97165 OT EVAL LOW COMPLEX 30 MIN: CPT | Mod: GO

## 2024-02-19 PROCEDURE — 97535 SELF CARE MNGMENT TRAINING: CPT | Mod: GO

## 2024-02-19 PROCEDURE — 73720 MRI LWR EXTREMITY W/O&W/DYE: CPT | Mod: LEFT SIDE | Performed by: RADIOLOGY

## 2024-02-19 PROCEDURE — 2500000002 HC RX 250 W HCPCS SELF ADMINISTERED DRUGS (ALT 637 FOR MEDICARE OP, ALT 636 FOR OP/ED)

## 2024-02-19 PROCEDURE — 73723 MRI JOINT LWR EXTR W/O&W/DYE: CPT | Mod: LT

## 2024-02-19 PROCEDURE — 83735 ASSAY OF MAGNESIUM: CPT

## 2024-02-19 PROCEDURE — 85025 COMPLETE CBC W/AUTO DIFF WBC: CPT

## 2024-02-19 PROCEDURE — 2500000005 HC RX 250 GENERAL PHARMACY W/O HCPCS

## 2024-02-19 PROCEDURE — A9575 INJ GADOTERATE MEGLUMI 0.1ML: HCPCS | Performed by: INTERNAL MEDICINE

## 2024-02-19 PROCEDURE — 73720 MRI LWR EXTREMITY W/O&W/DYE: CPT | Mod: LT

## 2024-02-19 PROCEDURE — 86901 BLOOD TYPING SEROLOGIC RH(D): CPT

## 2024-02-19 PROCEDURE — 99232 SBSQ HOSP IP/OBS MODERATE 35: CPT

## 2024-02-19 PROCEDURE — 86920 COMPATIBILITY TEST SPIN: CPT

## 2024-02-19 PROCEDURE — 1100000001 HC PRIVATE ROOM DAILY

## 2024-02-19 PROCEDURE — 80069 RENAL FUNCTION PANEL: CPT

## 2024-02-19 RX ORDER — VANCOMYCIN HYDROCHLORIDE 750 MG/150ML
750 INJECTION, SOLUTION INTRAVENOUS ONCE
Status: CANCELLED | OUTPATIENT
Start: 2024-02-19

## 2024-02-19 RX ORDER — GADOTERATE MEGLUMINE 376.9 MG/ML
8 INJECTION INTRAVENOUS
Status: COMPLETED | OUTPATIENT
Start: 2024-02-19 | End: 2024-02-19

## 2024-02-19 RX ORDER — VANCOMYCIN HYDROCHLORIDE 750 MG/150ML
750 INJECTION, SOLUTION INTRAVENOUS ONCE
Status: COMPLETED | OUTPATIENT
Start: 2024-02-19 | End: 2024-02-20

## 2024-02-19 RX ADMIN — FOLIC ACID 5 MG: 1 TABLET ORAL at 08:53

## 2024-02-19 RX ADMIN — FERROUS SULFATE TAB 325 MG (65 MG ELEMENTAL FE) 1 TABLET: 325 (65 FE) TAB at 08:54

## 2024-02-19 RX ADMIN — CLOPIDOGREL BISULFATE 75 MG: 75 TABLET ORAL at 08:54

## 2024-02-19 RX ADMIN — PIPERACILLIN SODIUM AND TAZOBACTAM SODIUM 2.25 G: 2; .25 INJECTION, SOLUTION INTRAVENOUS at 17:32

## 2024-02-19 RX ADMIN — ESCITALOPRAM OXALATE 10 MG: 10 TABLET ORAL at 08:53

## 2024-02-19 RX ADMIN — ASPIRIN 81 MG: 81 TABLET, COATED ORAL at 08:53

## 2024-02-19 RX ADMIN — HEPARIN SODIUM 5000 UNITS: 5000 INJECTION INTRAVENOUS; SUBCUTANEOUS at 05:08

## 2024-02-19 RX ADMIN — ACETAMINOPHEN 975 MG: 325 TABLET ORAL at 08:53

## 2024-02-19 RX ADMIN — Medication 2000 UNITS: at 08:53

## 2024-02-19 RX ADMIN — ROSUVASTATIN CALCIUM 10 MG: 10 TABLET, FILM COATED ORAL at 20:36

## 2024-02-19 RX ADMIN — PIPERACILLIN SODIUM AND TAZOBACTAM SODIUM 2.25 G: 2; .25 INJECTION, SOLUTION INTRAVENOUS at 22:27

## 2024-02-19 RX ADMIN — AZELASTINE HYDROCHLORIDE 2 SPRAY: 137 SPRAY, METERED NASAL at 08:54

## 2024-02-19 RX ADMIN — IPRATROPIUM BROMIDE 2 SPRAY: 21 SPRAY, METERED NASAL at 09:08

## 2024-02-19 RX ADMIN — GADOTERATE MEGLUMINE 8 ML: 376.9 INJECTION INTRAVENOUS at 03:28

## 2024-02-19 RX ADMIN — Medication 1 TABLET: at 08:53

## 2024-02-19 RX ADMIN — POLYETHYLENE GLYCOL 3350 17 G: 17 POWDER, FOR SOLUTION ORAL at 08:53

## 2024-02-19 RX ADMIN — HEPARIN SODIUM 5000 UNITS: 5000 INJECTION INTRAVENOUS; SUBCUTANEOUS at 13:50

## 2024-02-19 RX ADMIN — AZELASTINE HYDROCHLORIDE 2 SPRAY: 137 SPRAY, METERED NASAL at 20:37

## 2024-02-19 RX ADMIN — PIPERACILLIN SODIUM AND TAZOBACTAM SODIUM 2.25 G: 2; .25 INJECTION, SOLUTION INTRAVENOUS at 03:31

## 2024-02-19 RX ADMIN — Medication 10 MG: at 08:53

## 2024-02-19 RX ADMIN — IPRATROPIUM BROMIDE 2 SPRAY: 21 SPRAY, METERED NASAL at 20:36

## 2024-02-19 RX ADMIN — ATENOLOL 50 MG: 50 TABLET ORAL at 20:36

## 2024-02-19 RX ADMIN — ATENOLOL 50 MG: 50 TABLET ORAL at 08:53

## 2024-02-19 RX ADMIN — PIPERACILLIN SODIUM AND TAZOBACTAM SODIUM 2.25 G: 2; .25 INJECTION, SOLUTION INTRAVENOUS at 09:08

## 2024-02-19 RX ADMIN — HEPARIN SODIUM 5000 UNITS: 5000 INJECTION INTRAVENOUS; SUBCUTANEOUS at 22:28

## 2024-02-19 ASSESSMENT — PAIN - FUNCTIONAL ASSESSMENT
PAIN_FUNCTIONAL_ASSESSMENT: 0-10

## 2024-02-19 ASSESSMENT — PAIN SCALES - GENERAL
PAINLEVEL_OUTOF10: 0 - NO PAIN
PAINLEVEL_OUTOF10: 3
PAINLEVEL_OUTOF10: 6
PAINLEVEL_OUTOF10: 0 - NO PAIN
PAINLEVEL_OUTOF10: 10 - WORST POSSIBLE PAIN

## 2024-02-19 ASSESSMENT — COGNITIVE AND FUNCTIONAL STATUS - GENERAL
HELP NEEDED FOR BATHING: A LITTLE
MOVING TO AND FROM BED TO CHAIR: A LITTLE
CLIMB 3 TO 5 STEPS WITH RAILING: A LOT
DRESSING REGULAR LOWER BODY CLOTHING: A LITTLE
DRESSING REGULAR UPPER BODY CLOTHING: A LITTLE
STANDING UP FROM CHAIR USING ARMS: A LITTLE
TOILETING: A LITTLE
DAILY ACTIVITIY SCORE: 20
DAILY ACTIVITIY SCORE: 18
PERSONAL GROOMING: A LITTLE
MOBILITY SCORE: 19
HELP NEEDED FOR BATHING: A LOT
DRESSING REGULAR LOWER BODY CLOTHING: A LITTLE
WALKING IN HOSPITAL ROOM: A LITTLE
PERSONAL GROOMING: A LITTLE
TOILETING: A LITTLE

## 2024-02-19 ASSESSMENT — ACTIVITIES OF DAILY LIVING (ADL)
HOME_MANAGEMENT_TIME_ENTRY: 27
BATHING_ASSISTANCE: MODERATE
ADL_ASSISTANCE: INDEPENDENT

## 2024-02-19 NOTE — CARE PLAN
Patient is calm and cooperative with care, complaint of left foot pain, tylenol administered, pain relief achieved. Tolerated IV vancomycin and Zosyn without issue, Pt can be forgetful as well as confused at times, bed alarm active and audible. Dressing to left lower extremity dry and intact.     The patient's goals for the shift include  safety     The clinical goals for the shift include pt will remain free from falls or injuries throughout the shift    Over the shift, the patient did  make progress toward the following goals.  Problem: Fall/Injury  Goal: Not fall by end of shift  Outcome: Progressing  Goal: Be free from injury by end of the shift  Outcome: Progressing  Goal: Verbalize understanding of personal risk factors for fall in the hospital  Outcome: Progressing

## 2024-02-19 NOTE — PROGRESS NOTES
"Vancomycin Dosing by Pharmacy- FOLLOW UP    Maggi Gordon is a 89 y.o. year old female who Pharmacy has been consulted for vancomycin dosing for osteomyelitis/septic arthritis. Based on the patient's indication and renal status this patient is being dosed based on a goal trough/random level of 10-20.     Renal function is currently declining. Will continue to Dose By Levels.     Current vancomycin dose: Dose by level. Last dose = 750 mg.  was given 2/17 2200 PM     Most recent random level: 13.2 mcg/mL    Visit Vitals  BP (!) 105/45   Pulse 72   Temp 37.1 °C (98.8 °F) (Temporal)   Resp 18        Lab Results   Component Value Date    CREATININE 1.51 (H) 02/18/2024    CREATININE 1.41 (H) 02/16/2024    CREATININE 1.24 (H) 01/25/2024    CREATININE 1.22 (H) 01/24/2024        Patient weight is No results found for: \"PTWEIGHT\"    No results found for: \"CULTURE\"     I/O last 3 completed shifts:  In: 705.7 [I.V.:5.7; IV Piggyback:700]  Out: -   [unfilled]    No results found for: \"PATIENTTEMP\"     Assessment/Plan    Within goal random/trough level but renal fxn is continuing to decline. Will dose with 500mg x 1 and Obtain a 24hr level on 2/19 PM  The next level will be obtained on 2/19 PM  at 1900. May be obtained sooner if clinically indicated.   Will continue to monitor renal function daily while on vancomycin and order serum creatinine at least every 48 hours if not already ordered.  Follow for continued vancomycin needs, clinical response, and signs/symptoms of toxicity.       Bryn Alvarez, PharmD           "

## 2024-02-19 NOTE — PROGRESS NOTES
PODIATRIC MEDICINE & SURGERY - PROGRESS NOTE    Subjective   Maggi Gordon is a 89 y.o. female who is on day 2 of admission for Osteomyelitis of left ankle, unspecified type (CMS/HCC). PMHx of HTN, HLD, CKD, CAD s/p CABG, A-fib, aortic stenosis, and PAD, s/p multiple interventions. Podiatry following for worsening L foot wounds.     Interval HPI: Patient resting comfortably in bed. Reports pain is well-controlled. No further complaints.     Review Of Systems:  A 10+ point ROS was completed and otherwise negative except as noted above and per HPI.    Past Medical History  Past Medical History:   Diagnosis Date    Asymptomatic premature menopause     Premature menopause    Atherosclerosis of coronary artery bypass graft(s) without angina pectoris 10/11/2021    Atherosclerosis of CABG w/o angina pectoris    Occlusion and stenosis of bilateral carotid arteries 01/09/2020    Occlusion and stenosis of bilateral carotid arteries    Personal history of other diseases of the circulatory system     History of coronary artery disease    Personal history of other diseases of the circulatory system     History of hypertension    Personal history of other diseases of the circulatory system     History of stenosis of renal artery    Personal history of other endocrine, nutritional and metabolic disease     History of hypothyroidism    Personal history of other endocrine, nutritional and metabolic disease     History of hyperlipidemia    Personal history of other endocrine, nutritional and metabolic disease     History of goiter    S/P AVR (aortic valve replacement) 10/18/2023    #21 CE AVR     Allergies  Allergies   Allergen Reactions    Ezetimibe Unknown    Hydrocodone Hallucinations     Medications  Scheduled medications  aspirin, 81 mg, oral, Daily  atenolol, 50 mg, oral, BID  azelastine, 2 spray, Each Nostril, BID  biotin, 10 mg, oral, Daily  cholecalciferol, 2,000 Units, oral, Daily  clopidogrel, 75 mg, oral,  Daily  escitalopram, 10 mg, oral, Daily  ferrous sulfate (325 mg ferrous sulfate), 65 mg of iron, oral, Daily  folic acid, 5 mg, oral, Daily  heparin (porcine), 5,000 Units, subcutaneous, q8h MUMTAZ  [Held by provider] spironolactone, 12.5 mg, oral, q24h MUMTAZ   And  [Held by provider] hydroCHLOROthiazide, 12.5 mg, oral, Daily  ipratropium, 2 spray, Each Nostril, BID  multivitamin with minerals, 1 tablet, oral, Daily  piperacillin-tazobactam, 2.25 g, intravenous, q6h  polyethylene glycol, 17 g, oral, Daily  rosuvastatin, 10 mg, oral, Nightly  [Held by provider] valsartan, 160 mg, oral, Daily    Continuous medications       PRN medications: acetaminophen    Objective   Visit Vitals  /66 (BP Location: Left arm, Patient Position: Lying)   Pulse 74   Temp 36.3 °C (97.3 °F) (Temporal)   Resp 18   SpO2 96%   OB Status Postmenopausal   Smoking Status Never     Physical Exam  Constitutional: NAD and AAOx3.   HEENT: Head is normocephalic and atraumatic. External ear is normal B/L. Conjunctivae normal B/L. Nose is normal. Oropharynx is clear. Mouth is moist.   Cardiovascular: Regular rate.  Pulmonary: No increased work of breathing.  Psychological: Appropriate mood and behavior.     Vascular: DP and PT pulses are faintly palpable bilateral. CFT is less than 5 seconds bilateral.  Skin temperature is warm to cool proximal to distal bilateral.       Neurologic:  Light touch is intact to the foot bilateral.      Musculoskeletal: Moves all extremities spontaneously.      Dermatologic: Nails 1-5 bilateral are intact.  Skin is supple with normal texture and turgor noted.  Webspaces are clean, dry and intact bilateral.      WOUND #1: L posterior ankle  Wound has mixed necrotic and fibrotic base, with exposed tendon  Wound measures approximately  8.5 cm x 8 cm x 0.3 cm.   Mild periwound erythema and edema. Foul odor.      WOUND #2: L lateral foot, 5th metatarsal base  Wound has a fibrotic base, with fat layer exposed  Wound measures  "approximately 1.5 cm x 1.5 cm x 0.2 cm   Mild serous drainage with no odor.  No tunneling or undermining or probe to bone. No ascending cellulitis or lymphangitis.    Lab Results   Component Value Date    WBC 8.7 02/18/2024    HGB 8.7 (L) 02/18/2024    HCT 28.4 (L) 02/18/2024     (H) 02/18/2024     02/18/2024     Lab Results   Component Value Date    GLUCOSE 86 02/18/2024    CALCIUM 9.5 02/18/2024     02/18/2024    K 4.3 02/18/2024    CO2 29 02/18/2024     02/18/2024    BUN 32 (H) 02/18/2024    CREATININE 1.51 (H) 02/18/2024     No results found for: \"HGBA1C\"  No results found for the last 90 days.    Imaging  XR foot left 3+ views  Result Date: 2/16/2024  Minimal periosteal reaction adjacent to the base of the fifth metatarsal.  This could be related to inflammatory change or trauma. Follow-up is suggested if there is clinical symptomatology referable to this area.      Vascular US ankle brachial index (JOHN) without exercise  Result Date: 1/19/2024     Right Lower PVR: Evidence of moderate arterial occlusive disease in the right lower extremity at rest. Decreased digital perfusion noted. Monophasic flow is noted in the right dorsalis pedis artery. Biphasic flow is noted in the right common femoral artery and right posterior tibial artery.             Right Posterior Tibial (Ankle)   54 mmHg   0.48   Right Dorsalis Pedis (Ankle)    59 mmHg   0.52   Right Digit (Great Toe)            32 mmHg   0.28        Left Lower PVR: Evidence of moderate arterial occlusive disease in the left lower extremity at rest. Decreased digital perfusion noted. Biphasic flow is noted in the left common femoral artery, left posterior tibial artery and left dorsalis pedis artery.           Left Posterior Tibial (Ankle)     55 mmHg   0.49   Left Dorsalis Pedis (Ankle)       46 mmHg   0.41   Left Digit (Great Toe)               21 mmHg   0.19       Comparison: Compared with study from 6/23/2023, no significant change. "           MR ankle left w and wo IV contrast and MR foot left w and wo IV contrast  Result Date: 2/19/2024  1. Findings suggestive of osteomyelitis involving the base of the 4th and 5th metatarsal and the calcaneal tuberosity.   2. Edema and atrophy of the distal soleus musculature compatible with myositis.   3. Soft tissue ulceration of the left lateral foot and the distal posterior ankle without surrounding osteoedema. No rim enhancing fluid collection.       Assessment/Plan   Maggi Gordon is a 89 y.o. female who is on day 2 of admission for Osteomyelitis of left ankle, unspecified type (CMS/[facility]).PMHx of HTN, HLD, CKD, CAD s/p CABG, A-fib, aortic stenosis, and PAD, s/p multiple interventions. Podiatry following for worsening L foot/ankle wounds.     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 Afebrile and not tachycardic. ESR 68. CRP 3.15. Blood cultures NTD.   - Vascular surgery following with plan for possible surgical intervention this week.  - XR left foot with concern for OM at L 5th metatarsal base.   - PVRs 1/19/24  with left TBI 0.19, right TBI 0.28.   - MRI left foot with osteomyelitis involving the base of the 4th and 5th metatarsal and the calcaneal tuberosity.   - MRI left ankle with edema and atrophy of the distal soleus musculature compatible with myositis. Soft tissue ulceration of the left lateral foot and the distal posterior ankle without surrounding osteoedema. No rim enhancing fluid collection.      Plan/Recommendations:  - Dressing change today with xeroform, 4x4 gauze, ABD, and Kerlix.  - Recommend daily dressing as above.   - Limited WBAT. Recommend offloading L heel with pillow or heel offloading boot while in bed.  - Recommend obtain wound culture x2 (posterior ankle wound, lateral  foot wound)  - Clinically, the Achilles tendon appears to be non-viable. MRI L foot/ankle with osteomyelitis of left 4th and 5th metatarsal bases and calcaneal tuberosity. Edema and atrophy of the distal soleus musculature compatible with myositis. No evidence of abscess.   - Surgical planning pending discussion with vascular surgery.   - Continue IV abx per primary team/infectious disease. Pain control per primary team  - Podiatry will continue to follow.      Patient examined and evaluated with attending physician, Dr. Eugenia Moody DPM.     Tejal Murray DPM PGY-1  Podiatric Medicine & Surgery

## 2024-02-19 NOTE — PROGRESS NOTES
Occupational Therapy    Evaluation/Treatment    Patient Name: Maggi Gordon  MRN: [id number]  : 1935  Today's Date: 24  Time Calculation  Start Time: 1230  Stop Time: 2  Time Calculation (min): 42 min       Assessment:  End of Session Communication: Bedside nurse  End of Session Patient Position: Up in chair, Alarm on  OT Assessment Results: Decreased ADL status, Decreased upper extremity strength, Decreased endurance, Decreased functional mobility, Decreased IADLs    Plan:  Treatment Interventions: ADL retraining, Functional transfer training, UE strengthening/ROM, Endurance training, Patient/family training, Equipment evaluation/education, Compensatory technique education  OT Frequency: 4 times per week  OT Discharge Recommendations: High intensity level of continued care    Subjective   General:   OT Received On: 24  General  Reason for Referral: (L) ankle osteomyelitis, mechanical fall and possible surgery to address left ankle wound  Past Medical History Relevant to Rehab: HTN, HLD, CKD, CAD s/p CABG, A-fib, aortic stenosis, PAD, vertebral artery syndrome  Prior to Session Communication: Bedside nurse  Patient Position Received: Bed, 2 rail up, Alarm off, not on at start of session  General Comment: Pt with HOB elevated upon arrival, eating lunch; willing to participate in therapy and transfer to chair to finish eating. Pt pleasant and cooperative throughout.    Precautions:  Hearing/Visual Limitations: [place] (L) ear (w/o hearing aide at hospital); (R) ear WFL  LE Weight Bearing Status:  ((L)LE NWB until further determined)    Pain:  Pain Assessment  Pain Assessment: 0-10  Pain Score: 3  Pain Type: Acute pain  Pain Location: Foot  Pain Orientation: Left  Pain Interventions: Repositioned, Relaxation technique, Emotional support    Objective   Cognition:  Overall Cognitive Status: Within Functional Limits  Arousal/Alertness: Appropriate responses to stimuli  Orientation Level:  (vc's for date- pt  reports 21st)  Following Commands: Follows multistep commands consistently (intermittent repetition; likely d/t [place])  Safety Judgment: Good awareness of safety precautions    Home Living:  Type of Home: Apartment  Lives With: Alone (cat)  Home Adaptive Equipment: Walker rolling or standard, Wheelchair-manual  Home Layout: One level  Home Access: Level entry  Bathroom Shower/Tub: Walk-in shower  Bathroom Toilet: Standard    Prior Function:  ADL Assistance: Independent  Homemaking Assistance: Independent  Ambulatory Assistance: Independent (intermittent use of walker)  Hand Dominance: Right    IADL History:  Current License: Yes  Mode of Transportation: Car (short distances; family to assist otherwise)  Leisure and Hobbies: Enjoys spending time with family, shopping, reading mysteries on [employer]    ADL:  Eating Assistance: Independent (pt INDEP eating lunch upon arrival)  Grooming Assistance: Minimal (assist for thoroughness with facial /back grooming while seated EOB)  Bathing Assistance: Moderate (anticipate)  UE Dressing Assistance: Minimal (anticipate)  LE Dressing Assistance: Minimal (assist to don /doff underwear and socks while seated /standing)  Toileting Assistance with Device: Minimal (pt with BM on BSC; assist for thoroughness with pericare /hygiene)    Bed Mobility/Transfers:   Bed Mobility 1  Bed Mobility 1: Supine to sitting  Level of Assistance 1: Close supervision  Bed Mobility Comments 1: HOB slightly elevated; vc's for body mechanics    Bed Mobility 2  Bed Mobility  2: Scooting  Level of Assistance 2: Minimum assistance  Bed Mobility Comments 2: assist at B hips; BUE support on bed    Transfer 1  Transfer From 1: Sit to, Stand to  Transfer to 1: Stand, Sit  Transfer Device 1: Walker  Transfer Level of Assistance 1: Minimum assistance  Trials/Comments 1: 3x throughout session; vc's for body mechanics and hand placement    Transfers 2  Transfer From 2: Bed to  Transfer to 2: Chair with arms  Transfer  Device 2: Walker  Transfer Level of Assistance 2: Minimum assistance  Trials/Comments 2: vc's for body mechanics, sequencing, and NWB    Toilet Transfer  Toilet Transfer Comments: chair <> BSC with walker and MIN A; vc's for body mechanics, sequencing, and (L)LE NWB     Vision:  Vision - Basic Assessment  Current Vision: Wears glasses all the time  Patient Visual Report:  (Denies acute visual deficits)    Sensation:  Sensation Comment: Denies N/T; no apparent deficits    Strength:  Strength Comments: BUE WFL as grossly observed during functional mobility and ADLs    Outcome Measures:   [facility] Daily Activity  Putting on and taking off regular lower body clothing: A little  Bathing (including washing, rinsing, drying): A lot  Putting on and taking off regular upper body clothing: A little  Toileting, which includes using toilet, bedpan or urinal: A little  Taking care of personal grooming such as brushing teeth: A little  Eating Meals: None  Daily Activity - Total Score: 18    OT Adult Other Outcome Measures  4AT: Negative    Education Documentation  Handouts, taught by Esme Nguyen OT at 2/19/2024  1:22 PM.  Learner: Patient  Readiness: Acceptance  Method: Explanation  Response: Verbalizes Understanding    Body Mechanics, taught by Esme Nguyen OT at 2/19/2024  1:22 PM.  Learner: Patient  Readiness: Acceptance  Method: Explanation  Response: Verbalizes Understanding    Precautions, taught by Esme Nguyen OT at 2/19/2024  1:22 PM.  Learner: Patient  Readiness: Acceptance  Method: Explanation  Response: Verbalizes Understanding    ADL Training, taught by Esme Nguyen OT at 2/19/2024  1:22 PM.  Learner: Patient  Readiness: Acceptance  Method: Explanation  Response: Verbalizes Understanding    Education Comments  No comments found.      Goals:  Encounter Problems       Encounter Problems (Active)       ADLs       Pt will complete UB /LB bathing tasks with supervision level of assistance  while seated and /or standing.  (Progressing)       Start:  02/19/24    Expected End:  03/04/24            Pt will complete LB dressing with supervision level of assistance while seated and/or standing.   (Progressing)       Start:  02/19/24    Expected End:  03/04/24            Pt will complete grooming tasks while standing with supervision level of assistance.   (Progressing)       Start:  02/19/24    Expected End:  03/04/24            Pt will complete toilet hygiene while seated /standing with supervision level of assistance.   (Progressing)       Start:  02/19/24    Expected End:  03/04/24               BALANCE       Pt will demo improved dynamic standing balance while engaging in I/ADL task with standby level of assistance and no LOB.  (Progressing)       Start:  02/19/24    Expected End:  03/04/24               COGNITION/SAFETY       Patient will recall and adhere to weight bearing restrictions with all ADL and functional mobility in order to promote healing and safety with functional tasks (Progressing)       Start:  02/19/24    Expected End:  03/04/24               TRANSFERS       Pt will complete functional transfers with standby level of assistance and LRAD.   (Progressing)       Start:  02/19/24    Expected End:  03/04/24                   ---------------  Esme Nguyen (OTR/L, OTD)  Inpatient Occupational Therapist   Rehab Office: [phone]

## 2024-02-19 NOTE — CONSULTS
"Nutrition Initial Assessment:   Nutrition Assessment    Reason for Assessment: Provider consult order (Consulted for nutrition assessment/recommendation)    Patient is a 89 y.o. female presenting with: presented to [place] after mechanical fall at home found on imaging to have c/f L foot OM with diminished pulses of LLE DP on exam transferred to [facility] for further evaluation. Currently treating for OM with vanc/zosyn.      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  [place] and reports hearing aid broke before hospital admit. Tolerates diet.  Missed 2 meals thus far due to timing per patient. Feels her weight has been stable since recent admit and voices that she can't afford to lose anymore weight.  Has been drinking 2-3 Boost shakes at home and willing to resume while in the hospital.  Denies chewing or swallowing difficulty.  Vitamin/Herbal Supplement Use: Biotin, D, iron, folic acid, multivitamin    Anthropometrics:    63\"  44kg on 2/16/24    BMI 17.18     IBW/kg (Dietitian Calculated): 52.3 kg  Percent of IBW: 84 %       Weight History:   Wt Readings from Last 10 Encounters:   02/16/24 (!) 44 kg (97 lb)   01/22/24 46.1 kg (101 lb 9.6 oz)   11/29/23 (!) 44.5 kg (98 lb 3.2 oz)   10/27/23 (!) 44.9 kg (99 lb)   10/18/23 (!) 44.4 kg (97 lb 14.4 oz)   08/01/23 45.4 kg (100 lb)   05/04/23 47.6 kg (105 lb)   08/10/22 47.9 kg (105 lb 9.6 oz)   03/31/22 50.5 kg (111 lb 6.4 oz)   11/10/21 50.8 kg (112 lb)       Weight Change %:  Weight History / % Weight Change: Weight about stable x 6months at ~100# but less than usual hx 105-112#  Significant Weight Loss: Yes (hx " of)    Nutrition Focused Physical Exam Findings:  Subcutaneous Fat Loss:   Orbital Fat Pads: Mild-Moderate (slight dark circles and slight hollowing)  Buccal Fat Pads: Mild-Moderate (flat cheeks, minimal bounce)  Triceps: Severe (negligible fat tissue)  Ribs: Defer  Muscle Wasting:  Pectoralis (Clavicular Region): Mild-Moderate (some protrusion of clavicle)  Deltoid/Trapezius: Severe (squared shoulders, acromion process prominent)  Interosseous: Mild-Moderate (slightly depressed area between thumb and forefinger)  Trapezius/Infraspinatus/Supraspinatus (Scapular Region): Severe (prominent visual scapula, depression between ribs, scapula or shoulder)  Quadriceps: Severe (depressions on inner and outer thigh)  Gastrocnemius: Severe (minimal muscle definition)    Physical Findings:  Skin: Positive (L ankle wound)    Nutrition Significant Labs:  CBC Trend:   Results from last 7 days   Lab Units 02/19/24 0943 02/18/24 0528 02/16/24  1728   WBC AUTO x10*3/uL 8.9 8.7 9.6   RBC AUTO x10*6/uL 3.02* 2.73* 3.23*   HEMOGLOBIN g/dL 9.6* 8.7* 10.5*   HEMATOCRIT % 31.5* 28.4* 33.0*   MCV fL 104* 104* 102*   PLATELETS AUTO x10*3/uL 255 238 266    , BMP Trend:   Results from last 7 days   Lab Units 02/19/24 0943 02/18/24 0528 02/16/24  1728   GLUCOSE mg/dL 115* 86 101*   CALCIUM mg/dL 9.5 9.5 9.4   SODIUM mmol/L 141 141 139   POTASSIUM mmol/L 3.5 4.3 4.2   CO2 mmol/L 30 29 31   CHLORIDE mmol/L 102 104 101   BUN mg/dL 26* 32* 46*   CREATININE mg/dL 1.40* 1.51* 1.41*    , Renal Lab Trend:   Results from last 7 days   Lab Units 02/19/24 0943 02/18/24 0528 02/16/24  1728 02/16/24  1728   POTASSIUM mmol/L 3.5 4.3  --  4.2   PHOSPHORUS mg/dL 3.0 3.6   < >  --    SODIUM mmol/L 141 141  --  139   MAGNESIUM mg/dL 1.91 1.87   < >  --    EGFR mL/min/1.73m*2 36* 33*  --  36*   BUN mg/dL 26* 32*  --  46*   CREATININE mg/dL 1.40* 1.51*  --  1.41*    < > = values in this interval not displayed.    , Vit D:   Lab Results   Component Value Date     VITD25 60 11/03/2020    , Vit B12:   Lab Results   Component Value Date    KEQHJWHS65 4,971 (H) 02/16/2024    , Iron Panel:   Lab Results   Component Value Date    IRON 55 02/16/2024    TIBC 351 02/16/2024    , Folate:   Lab Results   Component Value Date    FOLATE >22.3 02/16/2024    , CRP:   Lab Results   Component Value Date    CRP 3.15 (H) 02/16/2024       Nutrition Specific Medications:  Scheduled medications  aspirin, 81 mg, oral, Daily  atenolol, 50 mg, oral, BID  azelastine, 2 spray, Each Nostril, BID  biotin, 10 mg, oral, Daily  cholecalciferol, 2,000 Units, oral, Daily  clopidogrel, 75 mg, oral, Daily  escitalopram, 10 mg, oral, Daily  ferrous sulfate (325 mg ferrous sulfate), 65 mg of iron, oral, Daily  folic acid, 5 mg, oral, Daily  heparin (porcine), 5,000 Units, subcutaneous, q8h MUMTAZ  [Held by provider] spironolactone, 12.5 mg, oral, q24h MUMTAZ   And  [Held by provider] hydroCHLOROthiazide, 12.5 mg, oral, Daily  ipratropium, 2 spray, Each Nostril, BID  multivitamin with minerals, 1 tablet, oral, Daily  piperacillin-tazobactam, 2.25 g, intravenous, q6h  polyethylene glycol, 17 g, oral, Daily  rosuvastatin, 10 mg, oral, Nightly  [Held by provider] valsartan, 160 mg, oral, Daily      Dietary Orders (From admission, onward)       Start     Ordered    02/20/24 0001  NPO Diet; Effective midnight  Diet effective midnight         02/19/24 0956    02/19/24 1416  Oral nutritional supplements  Until discontinued        Question Answer Comment   Deliver with All meals    Select supplement: Boost [facility]        02/19/24 1416    02/17/24 1527  Adult diet Cardiac; 70 gm fat; 2 - 3 grams Sodium  Diet effective now        Question Answer Comment   Diet type Cardiac    Fat restriction: 70 gm fat    Sodium restriction: 2 - 3 grams Sodium        02/17/24 1526                     Estimated Needs:   Total Energy Estimated Needs (kCal): 1500 kCal  Method for Estimating Needs: 30kcal/kg  Total Protein Estimated Needs (g): 55  g  Method for Estimating Needs: 1g/kg              Nutrition Diagnosis   Malnutrition Diagnosis  Patient has Malnutrition Diagnosis: Yes  Diagnosis Status: New  Malnutrition Diagnosis: Moderate malnutrition related to chronic disease or condition  As Evidenced by: suboptimal intake to promote weight gain, hx of weight loss/underweight (84% IBW/BMI 17), moderate to severe muscle atrophy and wound            Nutrition Interventions/Recommendations         Nutrition Prescription:  Individualized Nutrition Prescription Provided for : 1) Regular diet 2) Continue daily MVI with minerals        Nutrition Interventions:   Interventions: Medical food supplement  Medical Food Supplement: Commercial beverage  Goal: Boost VHC 2-3 x day (530kcal and 22g protein per each)         Nutrition Education:      Encouraged meals and Boost shakes     Nutrition Monitoring and Evaluation   Food/Nutrient Related History Monitoring  Monitoring and Evaluation Plan: Energy intake  Energy Intake: Estimated energy intake  Criteria: Eats at least 50% of meals TID and drinks 2-3Boost shakes per day    Body Composition/Growth/Weight History  Monitoring and Evaluation Plan: Weight  Criteria: Gradual gain .5# week         Nutrition Focused Physical Findings  Monitoring and Evaluation Plan: Skin  Criteria: wound healing       Time Spent/Follow-up Reminder:   Time Spent (min): 60 minutes  Last Date of Nutrition Visit: 02/19/24  Nutrition Follow-Up Needed?: Dietitian to reassess per policy  Follow up Comment: Diet + ONS

## 2024-02-19 NOTE — PROGRESS NOTES
Subjective   NAEON - no changes in her sxs since admission, no pain, no f/c/n/v/sob/cp. Bms daily, eating/drinking well, no urinary difficulty. No other concerns this morning.       Objective   Patient Vitals for the past 24 hrs:   BP Temp Temp src Pulse Resp SpO2   02/19/24 0402 (!) 131/106 35.8 °C (96.4 °F) -- 78 17 98 %   02/18/24 2140 153/65 37 °C (98.6 °F) Temporal 82 18 98 %   02/18/24 1501 (!) 105/45 37.1 °C (98.8 °F) Temporal 72 18 --     Gen: well-appearing, NAD, thin  Head and neck: NCAT, neck supple without LAD  HEENT: MMM, normal nose without congestion  Pulm: normal WOB on RA  Ext: feet warm, but absent PT pulse on L with doppler  -LLE: ulcerative lesion on the posterior aspect of the left ankle  Neuro: alert and oriented x3, normal tone, face symmetric, moves all extremities spontaneously    Assessment/Plan   88yo with PMH HTN, HLD, CKD, CAD s/p CABG x3 1995, 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 Currently treating for OM with vanc/zosyn.  Vascular surgery and podiatry following, will need to determine the salvageability of the foot before proceeding with any surgical interventions most likely.  She appears well and is hemodynamically stable, no signs of sepsis.    Updates 2/19:  -Vascular surgery plan for angiogram 2/20, NPO @[place]  -Podiatry and vascular surgery discussing surgical plan following angiogram, tentative plan for procedure Friday 2/23  -Wound cultures pending  -MRI with evidence c/w OM    #Chronic L foot wound c/f OM  #PAD s/p L femoral endarectomy on 1/18/24   #likely OM of L  foot/ankle  -vasc surgery following: no need for heparin gtt at this time, planning surgical intervention for later in the week  -podiatry c/s to help determine if foot is salvageable   -Achilles tendon is not salvageable, recommend debridement with removal of tendon, discussing possible procedure with vascular surgery  -c/w ASA 81mg daily, Plavix 75mg daily, rosuvastatin   -MRI L foot and ankle: Evidence c/w osteomyelitis of 4th/5th metatarsal and calcaneal tuberosity, edema of distal soleus c/w myositis    #LUCRETIA  -prerenal most likely, improving  -continue fluid resuscitation   -hold home antihypertensives (hydrochlorothiazide, ana, valsartan) for now for soft BP    #Afib  -CHADSVASc 5  -continue home atenolol  -reportedly not on AC at home, but dc summary from Sept notes warfarin. No warfarin on fill history on EMR or on dc summary from Jan admission. She did have high INR on arrival in Jan.    #Depression - c/w home meds    N: Cardiac diet  DVT Ppx: Heparin sc  GI Ppx: -  Access/Lines: PIV   Abx: Vanc/Zosyn  O2: None    Pain regimen: Tylenol prn  GI Laxative: Miralax   PT/OT: pending     Code status: Full Code  Emergency contact: Vicenta Gordon (sister-in-law) [phone]    Seen with MD Arnlodo Kern, DO  Neurology PGY-1

## 2024-02-19 NOTE — PROGRESS NOTES
Vascular Surgery Progress Note    Maggi Gordon is a 89 y.o. female on day 2 of admission presenting with Osteomyelitis of left ankle, unspecified type (CMS/HCC).    Subjective   No overnight events.  L foot feeling a bit better.  Eating breakfast at time of exam.    Objective     Physical Exam  Gen: NAD. Nontoxic.  HEENT: Atraumatic. MMM. EOMI.  Card: RRR  Pulm: Nonlabored breathing on room air.  Extremity: LLE with dressed with kerlix on foot and ankle with Xerform covering posterior wound. Feet are warm. Biphasic L PT, absent DP/PT signals on L. R biphasic DP and monophasic R PT. L surgical incision intact.  Motor and sensory intact.    Last Recorded Vitals  Blood pressure (!) 131/106, pulse 78, temperature 35.8 °C (96.4 °F), resp. rate 17, SpO2 98 %.  Intake/Output last 3 Shifts:  I/O last 3 completed shifts:  In: 705.7 [I.V.:5.7; IV Piggyback:700]  Out: -     Relevant Results  Results for orders placed or performed during the hospital encounter of 02/17/24 (from the past 24 hour(s))   Vancomycin   Result Value Ref Range    Vancomycin 13.2 5.0 - 20.0 ug/mL     Assessment/Plan   89F HTN, HLD, CKD, CAD s/p CABG, A-fib, aortic stenosis, and PAD, s/p multiple interventions, the most recent of which was a L femoral endart on 1/18/2024, who initially presented to [place] yesterday after a fall with headstrike without LOC, and is also with worsening L foot wounds, for which vascular surgery consulted.     2/18:  - Admitted to medicine.  - On vanc/zosyn.  - On ASA/Plavix  - L foot feeling a bit better.    Recommendations:  - Continue wound care.  - Continue antibiotics.  - Podiatry consult.  - JOHN/PVRs  - OR 2/20 for LLE angiogram with Dr. Montes  -Npo @ midnight  -okay to continue asa and plavix   -updated T&S needed    Vascular will continue to follow.    Discussed with Dr. Simon Alfred MD  PGY1 Vascular Surgery  [id number]

## 2024-02-19 NOTE — PROGRESS NOTES
Wound Care Progress Note     Visit Date: 2/19/2024      Patient Name: Maggi Gordon         MRN: [id number]                Reason for Visit: Wound assessment Left lateral foot and left posterior ankle extending to lateral left ankle       Wound Assessment:  Wound 02/17/24 Ankle Dorsal;Left (Active)   Date First Assessed: 02/17/24   Present on Original Admission: Yes  Hand Hygiene Completed: Yes  Location: Ankle  Wound Location Orientation: Dorsal;Left      Assessments 2/19/2024  4:26 PM   Wound Image     Wound Length (cm) 11 cm   Wound Width (cm) 5 cm   Wound Surface Area (cm^2) 55 cm^2   State of Healing Non-healing;Slough;Eschar   Margins Epibole (Rolled edges)       No associated orders.       Wound 02/19/24 Venous Ulcer Foot Dorsal foot;Left;Lateral (Active)   Date First Assessed/Time First Assessed: 02/19/24 1600   Present on Original Admission: Yes  Primary Wound Type: Venous Ulcer  Location: Foot  Wound Location Orientation: Dorsal foot;Left;Lateral      Assessments 2/19/2024  4:27 PM   Wound Image     Site Assessment Denuded;Sloughing   Marily-Wound Assessment Dry   Non-staged Wound Description Full thickness   Shape circular   Wound Length (cm) 1.5 cm   Wound Width (cm) 1.5 cm   Wound Surface Area (cm^2) 2.25 cm^2   Wound Depth (cm) 0.3 cm   Wound Volume (cm^3) 0.675 cm^3   State of Healing Slough   Drainage Description Serous   Drainage Amount Scant   Dressing Other (Comment);Abdominal dressing;Kerlix/rolled gauze   Dressing Changed New   Dressing Status Clean;Dry       No associated orders.     Wound 02/17/24 Ankle Dorsal;Left (Active)   Date First Assessed: 02/17/24   Present on Original Admission: Yes  Hand Hygiene Completed: Yes  Location: Ankle  Wound Location Orientation: Dorsal;Left   Number of days: 2       Wound 02/19/24 Venous Ulcer Foot Dorsal foot;Left;Lateral (Active)   Date First Assessed/Time First Assessed: 02/19/24 1600   Present on Original Admission: Yes  Primary Wound Type: Venous Ulcer   Location: Foot  Wound Location Orientation: Dorsal foot;Left;Lateral   Number of days: 0     Wound 02/17/24 Ankle Dorsal;Left (Active)   Wound Image   02/19/24 1626   Shape undefined 02/19/24 1100   Wound Length (cm) 11 cm 02/19/24 1626   Wound Width (cm) 5 cm 02/19/24 1626   Wound Surface Area (cm^2) 55 cm^2 02/19/24 1626   State of Healing Non-healing;Slough;Eschar 02/19/24 1626   Margins Epibole (Rolled edges) 02/19/24 1626       Wound 02/19/24 Venous Ulcer Foot Dorsal foot;Left;Lateral (Active)   Wound Image   02/19/24 1627   Site Assessment Denuded;Sloughing 02/19/24 1627   Marily-Wound Assessment Dry 02/19/24 1627   Non-staged Wound Description Full thickness 02/19/24 1627   Shape circular 02/19/24 1627   Wound Length (cm) 1.5 cm 02/19/24 1627   Wound Width (cm) 1.5 cm 02/19/24 1627   Wound Surface Area (cm^2) 2.25 cm^2 02/19/24 1627   Wound Depth (cm) 0.3 cm 02/19/24 1627   Wound Volume (cm^3) 0.675 cm^3 02/19/24 1627   State of Healing Slough 02/19/24 1627   Drainage Description Serous 02/19/24 1627   Drainage Amount Scant 02/19/24 1627   Dressing Other (Comment);ABD;Kerlix/rolled gauze 02/19/24 1627   Dressing Changed New 02/19/24 1627   Dressing Status Clean;Dry 02/19/24 1627     Wound care recommendations.    For Left ankle and left foot: EVERY OTHER DAY      Irrigate with normal saline or vashe wound cleanser       Coat wound bed with Medihoney ([employer] order #[id number])        Cover with Aquacel Ag ([employer] order #[id number])        Pad with ABD      Secure with Kerlix gauze.       Elevate heels off the bed surface at all times.      Wound Team Plan: Primary provider, or podiatry,  please review recommendation. If you agree with recommendation please enter as wound orders in EMR. Thank you.      While inpatient, Secure chat with questions or if condition changes. For urgent communications please [place] the [place] care [place] at [phone].       Shonda Sadler RN, CWON  2/19/2024  4:30 PM

## 2024-02-20 ENCOUNTER — APPOINTMENT (OUTPATIENT)
Dept: VASCULAR MEDICINE | Facility: HOSPITAL | Age: 89
DRG: 240 | End: 2024-02-20
Payer: MEDICARE

## 2024-02-20 ENCOUNTER — ANESTHESIA (OUTPATIENT)
Dept: OPERATING ROOM | Facility: HOSPITAL | Age: 89
DRG: 240 | End: 2024-02-20
Payer: MEDICARE

## 2024-02-20 LAB
ALBUMIN SERPL BCP-MCNC: 3.3 G/DL (ref 3.4–5)
ANION GAP SERPL CALC-SCNC: 15 MMOL/L (ref 10–20)
BASOPHILS # BLD AUTO: 0.02 X10*3/UL (ref 0–0.1)
BASOPHILS NFR BLD AUTO: 0.2 %
BUN SERPL-MCNC: 25 MG/DL (ref 6–23)
CALCIUM SERPL-MCNC: 9.7 MG/DL (ref 8.6–10.6)
CHLORIDE SERPL-SCNC: 105 MMOL/L (ref 98–107)
CO2 SERPL-SCNC: 24 MMOL/L (ref 21–32)
CREAT SERPL-MCNC: 1.23 MG/DL (ref 0.5–1.05)
EGFRCR SERPLBLD CKD-EPI 2021: 43 ML/MIN/1.73M*2
EOSINOPHIL # BLD AUTO: 0.19 X10*3/UL (ref 0–0.4)
EOSINOPHIL NFR BLD AUTO: 1.9 %
ERYTHROCYTE [DISTWIDTH] IN BLOOD BY AUTOMATED COUNT: 14.7 % (ref 11.5–14.5)
GLUCOSE SERPL-MCNC: 84 MG/DL (ref 74–99)
HCT VFR BLD AUTO: 31.9 % (ref 36–46)
HGB BLD-MCNC: 9.6 G/DL (ref 12–16)
IMM GRANULOCYTES # BLD AUTO: 0.04 X10*3/UL (ref 0–0.5)
IMM GRANULOCYTES NFR BLD AUTO: 0.4 % (ref 0–0.9)
LYMPHOCYTES # BLD AUTO: 1.92 X10*3/UL (ref 0.8–3)
LYMPHOCYTES NFR BLD AUTO: 19.5 %
MAGNESIUM SERPL-MCNC: 2.03 MG/DL (ref 1.6–2.4)
MCH RBC QN AUTO: 32.4 PG (ref 26–34)
MCHC RBC AUTO-ENTMCNC: 30.1 G/DL (ref 32–36)
MCV RBC AUTO: 108 FL (ref 80–100)
MONOCYTES # BLD AUTO: 0.8 X10*3/UL (ref 0.05–0.8)
MONOCYTES NFR BLD AUTO: 8.1 %
NEUTROPHILS # BLD AUTO: 6.87 X10*3/UL (ref 1.6–5.5)
NEUTROPHILS NFR BLD AUTO: 69.9 %
NRBC BLD-RTO: 0.7 /100 WBCS (ref 0–0)
PHOSPHATE SERPL-MCNC: 2.6 MG/DL (ref 2.5–4.9)
PLATELET # BLD AUTO: 244 X10*3/UL (ref 150–450)
POTASSIUM SERPL-SCNC: 4 MMOL/L (ref 3.5–5.3)
RBC # BLD AUTO: 2.96 X10*6/UL (ref 4–5.2)
SODIUM SERPL-SCNC: 140 MMOL/L (ref 136–145)
WBC # BLD AUTO: 9.8 X10*3/UL (ref 4.4–11.3)

## 2024-02-20 PROCEDURE — 2500000005 HC RX 250 GENERAL PHARMACY W/O HCPCS

## 2024-02-20 PROCEDURE — 93922 UPR/L XTREMITY ART 2 LEVELS: CPT | Performed by: SURGERY

## 2024-02-20 PROCEDURE — 80069 RENAL FUNCTION PANEL: CPT

## 2024-02-20 PROCEDURE — 2500000004 HC RX 250 GENERAL PHARMACY W/ HCPCS (ALT 636 FOR OP/ED)

## 2024-02-20 PROCEDURE — 2500000004 HC RX 250 GENERAL PHARMACY W/ HCPCS (ALT 636 FOR OP/ED): Performed by: PHARMACIST

## 2024-02-20 PROCEDURE — 83735 ASSAY OF MAGNESIUM: CPT

## 2024-02-20 PROCEDURE — 97110 THERAPEUTIC EXERCISES: CPT | Mod: GP,CQ

## 2024-02-20 PROCEDURE — 1100000001 HC PRIVATE ROOM DAILY

## 2024-02-20 PROCEDURE — 36415 COLL VENOUS BLD VENIPUNCTURE: CPT

## 2024-02-20 PROCEDURE — 2500000001 HC RX 250 WO HCPCS SELF ADMINISTERED DRUGS (ALT 637 FOR MEDICARE OP)

## 2024-02-20 PROCEDURE — 99233 SBSQ HOSP IP/OBS HIGH 50: CPT

## 2024-02-20 PROCEDURE — 85025 COMPLETE CBC W/AUTO DIFF WBC: CPT

## 2024-02-20 PROCEDURE — 93922 UPR/L XTREMITY ART 2 LEVELS: CPT

## 2024-02-20 PROCEDURE — 2500000002 HC RX 250 W HCPCS SELF ADMINISTERED DRUGS (ALT 637 FOR MEDICARE OP, ALT 636 FOR OP/ED)

## 2024-02-20 RX ADMIN — CLOPIDOGREL BISULFATE 75 MG: 75 TABLET ORAL at 08:53

## 2024-02-20 RX ADMIN — HEPARIN SODIUM 5000 UNITS: 5000 INJECTION INTRAVENOUS; SUBCUTANEOUS at 06:16

## 2024-02-20 RX ADMIN — HEPARIN SODIUM 5000 UNITS: 5000 INJECTION INTRAVENOUS; SUBCUTANEOUS at 14:06

## 2024-02-20 RX ADMIN — IPRATROPIUM BROMIDE 2 SPRAY: 21 SPRAY, METERED NASAL at 22:43

## 2024-02-20 RX ADMIN — ROSUVASTATIN CALCIUM 10 MG: 10 TABLET, FILM COATED ORAL at 20:46

## 2024-02-20 RX ADMIN — ATENOLOL 50 MG: 50 TABLET ORAL at 08:53

## 2024-02-20 RX ADMIN — PIPERACILLIN SODIUM AND TAZOBACTAM SODIUM 2.25 G: 2; .25 INJECTION, SOLUTION INTRAVENOUS at 22:39

## 2024-02-20 RX ADMIN — PIPERACILLIN SODIUM AND TAZOBACTAM SODIUM 2.25 G: 2; .25 INJECTION, SOLUTION INTRAVENOUS at 11:45

## 2024-02-20 RX ADMIN — Medication 2000 UNITS: at 08:53

## 2024-02-20 RX ADMIN — VANCOMYCIN HYDROCHLORIDE 750 MG: 750 INJECTION, SOLUTION INTRAVENOUS at 01:37

## 2024-02-20 RX ADMIN — FOLIC ACID 5 MG: 1 TABLET ORAL at 08:53

## 2024-02-20 RX ADMIN — AZELASTINE HYDROCHLORIDE 2 SPRAY: 137 SPRAY, METERED NASAL at 21:00

## 2024-02-20 RX ADMIN — HEPARIN SODIUM 5000 UNITS: 5000 INJECTION INTRAVENOUS; SUBCUTANEOUS at 22:40

## 2024-02-20 RX ADMIN — PIPERACILLIN SODIUM AND TAZOBACTAM SODIUM 2.25 G: 2; .25 INJECTION, SOLUTION INTRAVENOUS at 04:32

## 2024-02-20 RX ADMIN — FERROUS SULFATE TAB 325 MG (65 MG ELEMENTAL FE) 1 TABLET: 325 (65 FE) TAB at 08:53

## 2024-02-20 RX ADMIN — ACETAMINOPHEN 975 MG: 325 TABLET ORAL at 22:47

## 2024-02-20 RX ADMIN — AZELASTINE HYDROCHLORIDE 2 SPRAY: 137 SPRAY, METERED NASAL at 08:54

## 2024-02-20 RX ADMIN — PIPERACILLIN SODIUM AND TAZOBACTAM SODIUM 2.25 G: 2; .25 INJECTION, SOLUTION INTRAVENOUS at 17:45

## 2024-02-20 RX ADMIN — ATENOLOL 50 MG: 50 TABLET ORAL at 20:46

## 2024-02-20 RX ADMIN — Medication 10 MG: at 08:53

## 2024-02-20 RX ADMIN — ASPIRIN 81 MG: 81 TABLET, COATED ORAL at 08:53

## 2024-02-20 RX ADMIN — Medication 1 TABLET: at 08:53

## 2024-02-20 RX ADMIN — ESCITALOPRAM OXALATE 10 MG: 10 TABLET ORAL at 08:53

## 2024-02-20 RX ADMIN — IPRATROPIUM BROMIDE 2 SPRAY: 21 SPRAY, METERED NASAL at 08:54

## 2024-02-20 ASSESSMENT — COGNITIVE AND FUNCTIONAL STATUS - GENERAL
WALKING IN HOSPITAL ROOM: A LITTLE
MOVING TO AND FROM BED TO CHAIR: A LOT
CLIMB 3 TO 5 STEPS WITH RAILING: TOTAL
MOVING TO AND FROM BED TO CHAIR: A LITTLE
DRESSING REGULAR UPPER BODY CLOTHING: A LITTLE
STANDING UP FROM CHAIR USING ARMS: A LOT
DRESSING REGULAR LOWER BODY CLOTHING: A LITTLE
MOBILITY SCORE: 13
HELP NEEDED FOR BATHING: A LOT
MOBILITY SCORE: 19
DRESSING REGULAR LOWER BODY CLOTHING: A LITTLE
PERSONAL GROOMING: A LITTLE
TOILETING: A LITTLE
TURNING FROM BACK TO SIDE WHILE IN FLAT BAD: A LITTLE
STANDING UP FROM CHAIR USING ARMS: A LITTLE
MOVING FROM LYING ON BACK TO SITTING ON SIDE OF FLAT BED WITH BEDRAILS: A LITTLE
DAILY ACTIVITIY SCORE: 18
HELP NEEDED FOR BATHING: A LOT
PERSONAL GROOMING: A LITTLE
CLIMB 3 TO 5 STEPS WITH RAILING: A LOT
STANDING UP FROM CHAIR USING ARMS: A LITTLE
CLIMB 3 TO 5 STEPS WITH RAILING: A LOT
DAILY ACTIVITIY SCORE: 18
MOVING TO AND FROM BED TO CHAIR: A LITTLE
WALKING IN HOSPITAL ROOM: A LITTLE
MOBILITY SCORE: 19
TOILETING: A LITTLE
DRESSING REGULAR UPPER BODY CLOTHING: A LITTLE
WALKING IN HOSPITAL ROOM: A LOT

## 2024-02-20 ASSESSMENT — PAIN DESCRIPTION - LOCATION: LOCATION: OTHER (COMMENT)

## 2024-02-20 ASSESSMENT — PAIN SCALES - GENERAL
PAINLEVEL_OUTOF10: 8
PAINLEVEL_OUTOF10: 5 - MODERATE PAIN
PAINLEVEL_OUTOF10: 0 - NO PAIN
PAINLEVEL_OUTOF10: 5 - MODERATE PAIN

## 2024-02-20 ASSESSMENT — PAIN - FUNCTIONAL ASSESSMENT
PAIN_FUNCTIONAL_ASSESSMENT: 0-10
PAIN_FUNCTIONAL_ASSESSMENT: 0-10

## 2024-02-20 ASSESSMENT — ACTIVITIES OF DAILY LIVING (ADL): LACK_OF_TRANSPORTATION: NO

## 2024-02-20 NOTE — CARE PLAN
Problem: Fall/Injury  Goal: Not fall by end of shift  Outcome: Progressing  Goal: Be free from injury by end of the shift  Outcome: Progressing  Goal: Verbalize understanding of personal risk factors for fall in the hospital  Outcome: Progressing

## 2024-02-20 NOTE — SIGNIFICANT EVENT
Vascular Surgery Plan of Care update:    Patient case cancelled for 2/20. It will be moved to 2/21 Wednesday. OR desk notified that today's case has been cancelled. Patient notified that this case will be moved to tomorrow.    D/w attending Dr. Montes.    Alta Alfred MD  PGY1 Vascular Surgery  [id number]

## 2024-02-20 NOTE — PROGRESS NOTES
02/20/24 1402   Discharge Planning   Living Arrangements Alone   Support Systems Family members   Assistance Needed Pt will need acute rehab at discharge for therapy.   Type of Residence Private residence   Do you have animals or pets at home? Yes   Type of Animals or Pets Cat   Who is requesting discharge planning? Patient   Home or Post Acute Services Post acute facilities (Rehab/SNF/etc)   Type of Post Acute Facility Services Rehab   Patient expects to be discharged to: Acute rehab.   Does the patient need discharge transport arranged? Yes   RoundTrip coordination needed? Yes   Has discharge transport been arranged? No   Financial Resource Strain   How hard is it for you to pay for the very basics like food, housing, medical care, and heating? Not very   Housing Stability   In the last 12 months, was there a time when you were not able to pay the mortgage or rent on time? N   In the last 12 months, was there a time when you did not have a steady place to sleep or slept in a shelter (including now)? N   Transportation Needs   In the past 12 months, has lack of transportation kept you from medical appointments or from getting medications? no   In the past 12 months, has lack of transportation kept you from meetings, work, or from getting things needed for daily living? No   Patient Choice   Provider Choice list and CMS website (https://medicare.gov/care-compare#search) for post-acute Quality and Resource Measure Data were provided and reviewed with: Patient     Assessment Note:  Met with pt and introduced myself as care coordinator and member of the Care Transitions team for discharge planning.   Pt feels safe at home.  Pt states she was independent and driving prior to her foot wound.   Pt's family currently provides transport to drs appts.  Pt's address, phone number and contact information was verified.  Discussed PT/OT recommendations for acute rehab.  Pt states she would like to think about it and ok with  receiving a acute rehab list (YAMILKA Oreilly will provide).  Pt does not have any other questions/concerns at this time.     Previous Home Care: Pt is active with [facility] ([phone]) for RN (2x per wk for wound care).  Pt's family assist with the dressing changes on other days.   DME: Wheeled walker (borrowed), left hearing aid is not working (family will get it fixed), and glasses.  Pharmacy: [facility] on [address] [facility] in [place].  Falls: Pt recently fell back out of her chair and hit her head on the floor.   PCP:  Nasra Shipman (last seen in 1/2024).    María Baugh MSN, RN-BC  Transitional Care Coordinator (TCC)  [phone]

## 2024-02-20 NOTE — PROGRESS NOTES
"Vancomycin Dosing by Pharmacy- FOLLOW UP    Maggi Gordon is a 89 y.o. year old female who Pharmacy has been consulted for vancomycin dosing for osteomyelitis/septic arthritis. Based on the patient's indication and renal status this patient is being dosed based on a goal trough/random level of 15-20.     Renal function is currently improving. Will continue to Dose By Levels.     Current vancomycin dose: 500 mg given 2/18 2030    Most recent random level: 13.6 mcg/mL    Visit Vitals  /65 (BP Location: Left arm)   Pulse 80   Temp 36.7 °C (98.1 °F) (Temporal)   Resp 18        Lab Results   Component Value Date    CREATININE 1.40 (H) 02/19/2024    CREATININE 1.51 (H) 02/18/2024    CREATININE 1.41 (H) 02/16/2024    CREATININE 1.24 (H) 01/25/2024        Patient weight is No results found for: \"PTWEIGHT\"    No results found for: \"CULTURE\"     I/O last 3 completed shifts:  In: 705.7 [I.V.:5.7; IV Piggyback:700]  Out: -   [unfilled]    No results found for: \"PATIENTTEMP\"     Assessment/Plan    Below goal random/trough level but renal function improving. Will dose with 750mg x 1 and obtain a 24hr level on 2/20 @ 2300  The next level will be obtained on 2/20 at 2300. May be obtained sooner if clinically indicated.   Will continue to monitor renal function daily while on vancomycin and order serum creatinine at least every 48 hours if not already ordered.  Follow for continued vancomycin needs, clinical response, and signs/symptoms of toxicity.       Shayy Chan, PharmD           "

## 2024-02-20 NOTE — PROGRESS NOTES
Subjective   NAEON - no changes in her sxs since admission, no pain, no f/c/n/v/sob/cp. Bms daily, eating/drinking well, no urinary difficulty. No other concerns this morning.       Objective   Patient Vitals for the past 24 hrs:   BP Temp Temp src Pulse Resp SpO2   02/20/24 0524 143/65 36.3 °C (97.3 °F) -- 73 -- 97 %   02/19/24 2007 137/65 36.7 °C (98.1 °F) Temporal 80 18 --   02/19/24 1426 118/70 36.4 °C (97.5 °F) -- 73 18 98 %   02/19/24 0700 135/66 36.3 °C (97.3 °F) Temporal 74 18 96 %     Gen: well-appearing, NAD, thin  Head and neck: NCAT, neck supple without LAD  HEENT: MMM, normal nose without congestion  Pulm: normal WOB on RA  Ext: feet warm, but absent PT pulse on L with doppler  -LLE: ulcerative lesion on the posterior aspect of the left ankle  Neuro: alert and oriented x3, normal tone, face symmetric, moves all extremities spontaneously    Assessment/Plan   88yo with PMH HTN, HLD, CKD, CAD s/p CABG x3 1995, 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 Currently treating for OM with vanc/zosyn.  Vascular surgery and podiatry following, will need to determine the salvageability of the foot before proceeding with any surgical interventions most likely.  She appears well and is hemodynamically stable, no signs of sepsis.    Updates 2/20:  -Angiogram pushed back to 2/21  -Podiatry planning for debridement 2/23    Updates 2/19:  -Vascular surgery plan for angiogram 2/20, NPO @[place]  -Podiatry and vascular surgery discussing surgical plan following angiogram, tentative plan for procedure Friday 2/23  -Wound cultures  pending  -MRI with evidence c/w OM    #Chronic L foot wound c/f OM  #PAD s/p L femoral endarectomy on 1/18/24   #likely OM of L foot/ankle  -vasc surgery following: no need for heparin gtt at this time, planning surgical intervention for later in the week  -podiatry c/s to help determine if foot is salvageable   -Achilles tendon is not salvageable, recommend debridement with removal of tendon, discussing possible procedure with vascular surgery  -c/w ASA 81mg daily, Plavix 75mg daily, rosuvastatin   -MRI L foot and ankle: Evidence c/w osteomyelitis of 4th/5th metatarsal and calcaneal tuberosity, edema of distal soleus c/w myositis  -Angiogram pushed back to 2/21  -Podiatry planning for debridement 2/21    #LUCRETIA  -prerenal most likely, improving  -continue fluid resuscitation   -hold home antihypertensives (hydrochlorothiazide, ana, valsartan) for now for soft BP    #Afib  -CHADSVASc 5  -continue home atenolol  -reportedly not on AC at home, but dc summary from Sept notes warfarin. No warfarin on fill history on EMR or on dc summary from Jan admission. She did have high INR on arrival in Jan.    #Depression - c/w home meds    N: Cardiac diet  DVT Ppx: Heparin sc  GI Ppx: -  Access/Lines: PIV   Abx: Vanc/Zosyn  O2: None    Pain regimen: Tylenol prn  GI Laxative: Miralax   PT/OT: pending     Code status: Full Code  Emergency contact: Vicenta Gordon (sister-in-law) [phone]    Arnoldo David DO  Neurology PGY-1

## 2024-02-20 NOTE — HOSPITAL COURSE
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 Treated for OM with vanc/zosyn.  Vascular surgery and podiatry following, will need to determine the salvageability of the foot before proceeding with any surgical interventions most likely.  She appears well and is hemodynamically stable, no signs of sepsis.     MRI L foot and ankle with Evidence c/w osteomyelitis of 4th/5th metatarsal and calcaneal tuberosity, edema of distal soleus c/w myositis. Underwent angiogram with vascular surgery 2/21, with 2 stents placed in L EIA and SFA. Initially planned to undergo lithotripsy the following days later with ultimate debridement around achilles tendon with podiatry. Podiatry and vascular surgery discussed her case, determined patient will need to have achilles tendon removed given it is unsalvageable. Risks and benefits discussed, recommended pt gets BKA of L foot, given she would not be able to walk or use her foot without achilles. She will additionally be able to achieve source control with amputation. After discussion with family, patient elected to pursue amputation, which was successfully done on 2/26.  Antibiotics subsequently discontinued.    Course c/b anemia, 2/2 blood loss from OR and lab draws. Received total of 4u PRBCs with resolution of Hg, no overt signs of bleeding noted. Pt with remote history of atrial fibrillation but warfarin recently dc'd at prior [facility] admission for unknown reason. Given her prosthetic aortic valve is bovine and not mechanical, she is able to be  on a DOAC. Started Eliquis while admitted, continuing ASA, dc Plavix per vascular sx recs.      Surgical site wound care/activity:  *NWB NICHOLE MUÑOZ, okay to work w/ PT otherwise  *wound can be left to open air, can also do kerlex/ACE daily  *to be left in knee immobilizer AT ALL TIMES until vascular f/u

## 2024-02-20 NOTE — PROGRESS NOTES
Physical Therapy    Physical Therapy Treatment    Patient Name: Maggi Gordon  MRN: [id number]  Today's Date: 2/20/2024  Time Calculation  Start Time: 1003  Stop Time: 1027  Time Calculation (min): 24 min       Assessment/Plan   PT Assessment  PT Assessment Results: Decreased strength, Decreased range of motion, Decreased endurance, Impaired balance, Decreased mobility, Orthopedic restrictions, Pain  End of Session Communication: Bedside nurse  Assessment Comment: Unable to provide thorough assessment due to session ending early for transportation.  PT Plan  Inpatient/Swing Bed or Outpatient: Inpatient  PT Plan  Treatment/Interventions: Bed mobility, Transfer training, Gait training, Balance training, Strengthening, Endurance training, Therapeutic exercise, Therapeutic activity, Home exercise program  PT Plan: Skilled PT  PT Frequency: 5 times per week  PT Discharge Recommendations: High intensity level of continued care  Equipment Recommended upon Discharge:  (none)  PT Recommended Transfer Status: Assist x1  PT - OK to Discharge: Yes      General Visit Information:   PT  Visit  PT Received On: 02/20/24  Response to Previous Treatment: Patient with no complaints from previous session.  General  Reason for Referral: (L) ankle osteomyelitis, mechanical fall and possible surgery to address left ankle wound  Past Medical History Relevant to Rehab: HTN, HLD, CKD, CAD s/p CABG, A-fib, aortic stenosis, PAD, vertebral artery syndrome  Prior to Session Communication: Bedside nurse  Patient Position Received: Bed, 2 rail up, Alarm off, not on at start of session  General Comment: Pt w/ HOB elevated upon arrival with IV line in place. Willing & cooperative to participate in PT. However, pt required extended time due to wound change from Podiatry. Interventions had to end early because pt is being transported for an angiogram for L ankle.    Subjective   Precautions:  Precautions  Hearing/Visual Limitations: [place] (L) ear (w/o  hearing aide at hospital); (R) ear WFL  Medical Precautions: Fall precautions  Vital Signs:       Objective   Pain:  Pain Assessment  Pain Assessment: 0-10  Pain Score: 0 - No pain (Pt reported no pain when asked.)  Cognition:  Cognition  Overall Cognitive Status: Within Functional Limits  Orientation Level: Oriented X4  Postural Control:     Extremity/Trunk Assessments:    Activity Tolerance:  Activity Tolerance  Endurance: Decreased tolerance for upright activites  Activity Tolerance Comments: Pt demos fatigue after the 8th rep.  Treatments:  Therapeutic Exercise  Therapeutic Exercise Performed: Yes  Therapeutic Exercise Activity 1: Pt performed RLE AP, QS, SAQ x 15. (Pt required visual demonstration due to impaired hearing.)  Therapeutic Exercise Activity 2: Pt performed LLE QS x 10.    Bed Mobility  Bed Mobility: Yes  Bed Mobility 1  Bed Mobility 1: Scooting  Level of Assistance 1: Close supervision  Bed Mobility Comments 1: Pt instructed to scoot to HOB to reposition for therex.    Outcome Measures:  [facility] Basic Mobility  Turning from your back to your side while in a flat bed without using bedrails: A little  Moving from lying on your back to sitting on the side of a flat bed without using bedrails: A little  Moving to and from bed to chair (including a wheelchair): A lot  Standing up from a chair using your arms (e.g. wheelchair or bedside chair): A lot  To walk in hospital room: A lot  Climbing 3-5 steps with railing: Total  Basic Mobility - Total Score: 13    Education Documentation  Body Mechanics, taught by LAURI Antony at 2/20/2024 10:52 AM.  Learner: Patient  Readiness: Acceptance  Method: Explanation  Response: Needs Reinforcement    Home Exercise Program, taught by LAURI Antony at 2/20/2024 10:52 AM.  Learner: Patient  Readiness: Acceptance  Method: Explanation  Response: Needs Reinforcement    Education Comments  No comments found.        OP EDUCATION:       Encounter Problems        Encounter Problems (Active)       Balance       STG - Maintains supervision assistance dynamic standing balance with upper extremity support using a wheeled walker. (Progressing)       Start:  02/18/24    Expected End:  03/03/24            STG - Maintains supervision assistance static standing balance with upper extremity support using a wheeled walker. (Progressing)       Start:  02/18/24    Expected End:  03/03/24               Mobility       STG - Patient will ambulate 125ft using a wheeled walker with supervision assistance. (Progressing)       Start:  02/18/24    Expected End:  03/03/24               Transfers       STG - Transfer from bed to chair using a wheeled walker with supervision assistance. (Progressing)       Start:  02/18/24    Expected End:  03/03/24            STG - Patient to transfer to and from sit to supine with supervision assistance. (Progressing)       Start:  02/18/24    Expected End:  03/03/24            STG - Patient will transfer sit to and from stand using a wheeled walker with supervision assistance. (Progressing)       Start:  02/18/24    Expected End:  03/03/24                 AJ LYMANPTA

## 2024-02-20 NOTE — PROGRESS NOTES
PODIATRIC MEDICINE & SURGERY - PROGRESS NOTE    Subjective   Maggi Gordon is a 89 y.o. female who is on day 3 of admission for Osteomyelitis of left ankle, unspecified type (CMS/HCC). PMHx of HTN, HLD, CKD, CAD s/p CABG, A-fib, aortic stenosis, and PAD, s/p multiple interventions. Podiatry following for worsening L foot wounds.     Interval HPI: Patient resting comfortably in bed. Reports pain is well-controlled. Pt states that the heel offloading boot provided yesterday was not comfortable. No further complaints. Physical therapist at beside.     Review Of Systems:  A 10+ point ROS was completed and otherwise negative except as noted above and per HPI.    Past Medical History  Past Medical History:   Diagnosis Date    Asymptomatic premature menopause     Premature menopause    Atherosclerosis of coronary artery bypass graft(s) without angina pectoris 10/11/2021    Atherosclerosis of CABG w/o angina pectoris    Occlusion and stenosis of bilateral carotid arteries 01/09/2020    Occlusion and stenosis of bilateral carotid arteries    Personal history of other diseases of the circulatory system     History of coronary artery disease    Personal history of other diseases of the circulatory system     History of hypertension    Personal history of other diseases of the circulatory system     History of stenosis of renal artery    Personal history of other endocrine, nutritional and metabolic disease     History of hypothyroidism    Personal history of other endocrine, nutritional and metabolic disease     History of hyperlipidemia    Personal history of other endocrine, nutritional and metabolic disease     History of goiter    S/P AVR (aortic valve replacement) 10/18/2023    #21 CE AVR     Allergies  Allergies   Allergen Reactions    Ezetimibe Unknown    Hydrocodone Hallucinations     Medications  Scheduled medications  aspirin, 81 mg, oral, Daily  atenolol, 50 mg, oral, BID  azelastine, 2 spray, Each Nostril,  BID  biotin, 10 mg, oral, Daily  cholecalciferol, 2,000 Units, oral, Daily  clopidogrel, 75 mg, oral, Daily  escitalopram, 10 mg, oral, Daily  ferrous sulfate (325 mg ferrous sulfate), 65 mg of iron, oral, Daily  folic acid, 5 mg, oral, Daily  heparin (porcine), 5,000 Units, subcutaneous, q8h MUMTAZ  [Held by provider] spironolactone, 12.5 mg, oral, q24h MUMTAZ   And  [Held by provider] hydroCHLOROthiazide, 12.5 mg, oral, Daily  ipratropium, 2 spray, Each Nostril, BID  multivitamin with minerals, 1 tablet, oral, Daily  piperacillin-tazobactam, 2.25 g, intravenous, q6h  polyethylene glycol, 17 g, oral, Daily  rosuvastatin, 10 mg, oral, Nightly  [Held by provider] valsartan, 160 mg, oral, Daily    Continuous medications       PRN medications: acetaminophen    Objective   Visit Vitals  /62 (BP Location: Left arm, Patient Position: Lying)   Pulse 70   Temp 36.6 °C (97.9 °F) (Temporal)   Resp 17   SpO2 98%   OB Status Postmenopausal   Smoking Status Never     Physical Exam  Constitutional: NAD and AAOx3.   HEENT: Head is normocephalic and atraumatic. External ear is normal B/L. Conjunctivae normal B/L. Nose is normal. Oropharynx is clear. Mouth is moist.   Cardiovascular: Regular rate.  Pulmonary: No increased work of breathing.  Psychological: Appropriate mood and behavior.     Vascular: DP and PT pulses are faintly palpable bilateral. CFT is less than 5 seconds bilateral.  Skin temperature is warm to cool proximal to distal bilateral.       Neurologic:  Light touch is intact to the foot bilateral.      Musculoskeletal: Moves all extremities spontaneously.      Dermatologic: Nails 1-5 bilateral are intact.  Skin is supple with normal texture and turgor noted.  Webspaces are clean, dry and intact bilateral.      WOUND #1: L posterior ankle  Wound has mixed necrotic and fibrotic base, with exposed tendon  Wound measures approximately  8.5 cm x 8 cm x 0.3 cm.   Mild periwound erythema and edema. Foul odor.      WOUND #2: L  "lateral foot, 5th metatarsal base  Wound has a fibrotic base, with fat layer exposed  Wound measures approximately 1.5 cm x 1.5 cm x 0.2 cm   Mild serous drainage with no odor.  No tunneling or undermining or probe to bone. No ascending cellulitis or lymphangitis.    Lab Results   Component Value Date    WBC 9.8 02/20/2024    HGB 9.6 (L) 02/20/2024    HCT 31.9 (L) 02/20/2024     (H) 02/20/2024     02/20/2024     Lab Results   Component Value Date    GLUCOSE 115 (H) 02/19/2024    CALCIUM 9.5 02/19/2024     02/19/2024    K 3.5 02/19/2024    CO2 30 02/19/2024     02/19/2024    BUN 26 (H) 02/19/2024    CREATININE 1.40 (H) 02/19/2024     No results found for: \"HGBA1C\"  No results found for the last 90 days.    Imaging  XR foot left 3+ views  Result Date: 2/16/2024  Minimal periosteal reaction adjacent to the base of the fifth metatarsal.  This could be related to inflammatory change or trauma. Follow-up is suggested if there is clinical symptomatology referable to this area.      Vascular US ankle brachial index (JOHN) without exercise  Result Date: 1/19/2024     Right Lower PVR: Evidence of moderate arterial occlusive disease in the right lower extremity at rest. Decreased digital perfusion noted. Monophasic flow is noted in the right dorsalis pedis artery. Biphasic flow is noted in the right common femoral artery and right posterior tibial artery.             Right Posterior Tibial (Ankle)   54 mmHg   0.48   Right Dorsalis Pedis (Ankle)    59 mmHg   0.52   Right Digit (Great Toe)            32 mmHg   0.28        Left Lower PVR: Evidence of moderate arterial occlusive disease in the left lower extremity at rest. Decreased digital perfusion noted. Biphasic flow is noted in the left common femoral artery, left posterior tibial artery and left dorsalis pedis artery.           Left Posterior Tibial (Ankle)     55 mmHg   0.49   Left Dorsalis Pedis (Ankle)       46 mmHg   0.41   Left Digit (Great Toe) "               21 mmHg   0.19       Comparison: Compared with study from 6/23/2023, no significant change.           MR ankle left w and wo IV contrast and MR foot left w and wo IV contrast  Result Date: 2/19/2024  1. Findings suggestive of osteomyelitis involving the base of the 4th and 5th metatarsal and the calcaneal tuberosity.   2. Edema and atrophy of the distal soleus musculature compatible with myositis.   3. Soft tissue ulceration of the left lateral foot and the distal posterior ankle without surrounding osteoedema. No rim enhancing fluid collection.       Assessment/Plan   Maggi Gordon is a 89 y.o. female who is on day 3 of admission for Osteomyelitis of left ankle, unspecified type (CMS/HCC).PMHx of HTN, HLD, CKD, CAD s/p CABG, A-fib, aortic stenosis, and PAD, s/p multiple interventions. Podiatry following for worsening L foot/ankle wounds.     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 Afebrile and not tachycardic. ESR 68. CRP 3.15. Blood cultures NTD.   - Vascular surgery following with plan for possible surgical intervention this week.  - XR left foot with concern for OM at L 5th metatarsal base.   - PVRs 1/19/24  with left TBI 0.19, right TBI 0.28. Repeat PVR/JOHN pending.   - MRI left foot with osteomyelitis involving the base of the 4th and 5th metatarsal and the calcaneal tuberosity.   - MRI left ankle with edema and atrophy of the distal soleus musculature compatible with myositis. Soft tissue ulceration of the left lateral foot and the distal posterior ankle without surrounding osteoedema. No rim enhancing fluid collection.      Plan/Recommendations:  - Dressing change today with xeroform, 4x4 gauze, ABD, and Kerlix.  - Recommend daily dressing as above.   - NWB LLE. Offload L heel with  pillow while in bed.  - Clinically, the Achilles tendon appears to be non-viable. MRI L foot/ankle with osteomyelitis of left 4th and 5th metatarsal bases and calcaneal tuberosity. Edema and atrophy of the distal soleus musculature compatible with myositis. No evidence of abscess.Patient to undergo angio possible intervention today with vascular surgery Dr. Montes.  Plan for OR tomorrow for podiatric surgical intervention.   - Continue IV abx per primary team/infectious disease. Pain control per primary team  - Podiatry will continue to follow.      Patient examined and evaluated with attending physician, Dr. Bhakti Santana, FAUSTOM.     Tejal Murray DPM PGY-1  Podiatric Medicine & Surgery

## 2024-02-21 ENCOUNTER — ANESTHESIA EVENT (OUTPATIENT)
Dept: OPERATING ROOM | Facility: HOSPITAL | Age: 89
DRG: 240 | End: 2024-02-21
Payer: MEDICARE

## 2024-02-21 ENCOUNTER — ANESTHESIA (OUTPATIENT)
Dept: OPERATING ROOM | Facility: HOSPITAL | Age: 89
DRG: 240 | End: 2024-02-21
Payer: MEDICARE

## 2024-02-21 LAB
ALBUMIN SERPL BCP-MCNC: 3.4 G/DL (ref 3.4–5)
ANION GAP SERPL CALC-SCNC: 15 MMOL/L (ref 10–20)
BACTERIA BLD CULT: NORMAL
BACTERIA BLD CULT: NORMAL
BASOPHILS # BLD AUTO: 0.02 X10*3/UL (ref 0–0.1)
BASOPHILS NFR BLD AUTO: 0.3 %
BUN SERPL-MCNC: 33 MG/DL (ref 6–23)
CALCIUM SERPL-MCNC: 9.7 MG/DL (ref 8.6–10.6)
CHLORIDE SERPL-SCNC: 104 MMOL/L (ref 98–107)
CO2 SERPL-SCNC: 27 MMOL/L (ref 21–32)
CREAT SERPL-MCNC: 1.57 MG/DL (ref 0.5–1.05)
EGFRCR SERPLBLD CKD-EPI 2021: 32 ML/MIN/1.73M*2
EOSINOPHIL # BLD AUTO: 0.2 X10*3/UL (ref 0–0.4)
EOSINOPHIL NFR BLD AUTO: 2.8 %
ERYTHROCYTE [DISTWIDTH] IN BLOOD BY AUTOMATED COUNT: 14.7 % (ref 11.5–14.5)
GLUCOSE SERPL-MCNC: 89 MG/DL (ref 74–99)
HCT VFR BLD AUTO: 34.3 % (ref 36–46)
HGB BLD-MCNC: 10.4 G/DL (ref 12–16)
IMM GRANULOCYTES # BLD AUTO: 0.03 X10*3/UL (ref 0–0.5)
IMM GRANULOCYTES NFR BLD AUTO: 0.4 % (ref 0–0.9)
LYMPHOCYTES # BLD AUTO: 2.6 X10*3/UL (ref 0.8–3)
LYMPHOCYTES NFR BLD AUTO: 36.5 %
MAGNESIUM SERPL-MCNC: 2.06 MG/DL (ref 1.6–2.4)
MCH RBC QN AUTO: 32.7 PG (ref 26–34)
MCHC RBC AUTO-ENTMCNC: 30.3 G/DL (ref 32–36)
MCV RBC AUTO: 108 FL (ref 80–100)
MONOCYTES # BLD AUTO: 0.68 X10*3/UL (ref 0.05–0.8)
MONOCYTES NFR BLD AUTO: 9.5 %
NEUTROPHILS # BLD AUTO: 3.6 X10*3/UL (ref 1.6–5.5)
NEUTROPHILS NFR BLD AUTO: 50.5 %
NRBC BLD-RTO: 0 /100 WBCS (ref 0–0)
PHOSPHATE SERPL-MCNC: 3.2 MG/DL (ref 2.5–4.9)
PLATELET # BLD AUTO: 264 X10*3/UL (ref 150–450)
POTASSIUM SERPL-SCNC: 4.5 MMOL/L (ref 3.5–5.3)
RBC # BLD AUTO: 3.18 X10*6/UL (ref 4–5.2)
SODIUM SERPL-SCNC: 141 MMOL/L (ref 136–145)
VANCOMYCIN SERPL-MCNC: 11.6 UG/ML (ref 5–20)
WBC # BLD AUTO: 7.1 X10*3/UL (ref 4.4–11.3)

## 2024-02-21 PROCEDURE — C1876 STENT, NON-COA/NON-COV W/DEL: HCPCS | Performed by: SURGERY

## 2024-02-21 PROCEDURE — C1894 INTRO/SHEATH, NON-LASER: HCPCS | Performed by: SURGERY

## 2024-02-21 PROCEDURE — 2500000001 HC RX 250 WO HCPCS SELF ADMINISTERED DRUGS (ALT 637 FOR MEDICARE OP)

## 2024-02-21 PROCEDURE — 2500000005 HC RX 250 GENERAL PHARMACY W/O HCPCS

## 2024-02-21 PROCEDURE — 2500000005 HC RX 250 GENERAL PHARMACY W/O HCPCS: Performed by: ANESTHESIOLOGY

## 2024-02-21 PROCEDURE — 36415 COLL VENOUS BLD VENIPUNCTURE: CPT

## 2024-02-21 PROCEDURE — 2500000004 HC RX 250 GENERAL PHARMACY W/ HCPCS (ALT 636 FOR OP/ED)

## 2024-02-21 PROCEDURE — 85520 HEPARIN ASSAY: CPT | Performed by: STUDENT IN AN ORGANIZED HEALTH CARE EDUCATION/TRAINING PROGRAM

## 2024-02-21 PROCEDURE — 2720000007 HC OR 272 NO HCPCS: Performed by: SURGERY

## 2024-02-21 PROCEDURE — 75710 ARTERY X-RAYS ARM/LEG: CPT | Performed by: SURGERY

## 2024-02-21 PROCEDURE — 7100000001 HC RECOVERY ROOM TIME - INITIAL BASE CHARGE: Performed by: SURGERY

## 2024-02-21 PROCEDURE — 2500000004 HC RX 250 GENERAL PHARMACY W/ HCPCS (ALT 636 FOR OP/ED): Performed by: NURSE ANESTHETIST, CERTIFIED REGISTERED

## 2024-02-21 PROCEDURE — C1874 STENT, COATED/COV W/DEL SYS: HCPCS | Performed by: SURGERY

## 2024-02-21 PROCEDURE — 1100000001 HC PRIVATE ROOM DAILY

## 2024-02-21 PROCEDURE — 2500000004 HC RX 250 GENERAL PHARMACY W/ HCPCS (ALT 636 FOR OP/ED): Performed by: SURGERY

## 2024-02-21 PROCEDURE — C1760 CLOSURE DEV, VASC: HCPCS | Performed by: SURGERY

## 2024-02-21 PROCEDURE — 97530 THERAPEUTIC ACTIVITIES: CPT | Mod: GP,CQ

## 2024-02-21 PROCEDURE — A37221 PR REVASCULARIZE ILIAC ARTERY,ANGIOPLASTY/STENT, INITIAL VESSEL: Performed by: ANESTHESIOLOGY

## 2024-02-21 PROCEDURE — A4217 STERILE WATER/SALINE, 500 ML: HCPCS | Performed by: SURGERY

## 2024-02-21 PROCEDURE — 2500000004 HC RX 250 GENERAL PHARMACY W/ HCPCS (ALT 636 FOR OP/ED): Performed by: INTERNAL MEDICINE

## 2024-02-21 PROCEDURE — 85347 COAGULATION TIME ACTIVATED: CPT

## 2024-02-21 PROCEDURE — 85027 COMPLETE CBC AUTOMATED: CPT | Performed by: STUDENT IN AN ORGANIZED HEALTH CARE EDUCATION/TRAINING PROGRAM

## 2024-02-21 PROCEDURE — 37221 PR REVSC OPN/PRQ ILIAC ART W/STNT PLMT & ANGIOPLSTY: CPT | Performed by: SURGERY

## 2024-02-21 PROCEDURE — C1769 GUIDE WIRE: HCPCS | Performed by: SURGERY

## 2024-02-21 PROCEDURE — 3700000002 HC GENERAL ANESTHESIA TIME - EACH INCREMENTAL 1 MINUTE: Performed by: SURGERY

## 2024-02-21 PROCEDURE — 3600000003 HC OR TIME - INITIAL BASE CHARGE - PROCEDURE LEVEL THREE: Performed by: SURGERY

## 2024-02-21 PROCEDURE — 047L3DZ DILATION OF LEFT FEMORAL ARTERY WITH INTRALUMINAL DEVICE, PERCUTANEOUS APPROACH: ICD-10-PCS | Performed by: SURGERY

## 2024-02-21 PROCEDURE — 85025 COMPLETE CBC W/AUTO DIFF WBC: CPT

## 2024-02-21 PROCEDURE — 3600000008 HC OR TIME - EACH INCREMENTAL 1 MINUTE - PROCEDURE LEVEL THREE: Performed by: SURGERY

## 2024-02-21 PROCEDURE — 3700000001 HC GENERAL ANESTHESIA TIME - INITIAL BASE CHARGE: Performed by: SURGERY

## 2024-02-21 PROCEDURE — 97116 GAIT TRAINING THERAPY: CPT | Mod: GP,CQ

## 2024-02-21 PROCEDURE — C1887 CATHETER, GUIDING: HCPCS | Performed by: SURGERY

## 2024-02-21 PROCEDURE — C1725 CATH, TRANSLUMIN NON-LASER: HCPCS | Performed by: SURGERY

## 2024-02-21 PROCEDURE — 7100000002 HC RECOVERY ROOM TIME - EACH INCREMENTAL 1 MINUTE: Performed by: SURGERY

## 2024-02-21 PROCEDURE — 2500000004 HC RX 250 GENERAL PHARMACY W/ HCPCS (ALT 636 FOR OP/ED): Performed by: STUDENT IN AN ORGANIZED HEALTH CARE EDUCATION/TRAINING PROGRAM

## 2024-02-21 PROCEDURE — 99100 ANES PT EXTEME AGE<1 YR&>70: CPT | Performed by: ANESTHESIOLOGY

## 2024-02-21 PROCEDURE — 047N3DZ DILATION OF LEFT POPLITEAL ARTERY WITH INTRALUMINAL DEVICE, PERCUTANEOUS APPROACH: ICD-10-PCS | Performed by: SURGERY

## 2024-02-21 PROCEDURE — 80069 RENAL FUNCTION PANEL: CPT

## 2024-02-21 PROCEDURE — 2500000005 HC RX 250 GENERAL PHARMACY W/O HCPCS: Performed by: NURSE ANESTHETIST, CERTIFIED REGISTERED

## 2024-02-21 PROCEDURE — 047J3DZ DILATION OF LEFT EXTERNAL ILIAC ARTERY WITH INTRALUMINAL DEVICE, PERCUTANEOUS APPROACH: ICD-10-PCS | Performed by: SURGERY

## 2024-02-21 PROCEDURE — 2780000003 HC OR 278 NO HCPCS: Performed by: SURGERY

## 2024-02-21 PROCEDURE — 80202 ASSAY OF VANCOMYCIN: CPT

## 2024-02-21 PROCEDURE — 99233 SBSQ HOSP IP/OBS HIGH 50: CPT

## 2024-02-21 PROCEDURE — 2500000005 HC RX 250 GENERAL PHARMACY W/O HCPCS: Performed by: SURGERY

## 2024-02-21 PROCEDURE — 2500000005 HC RX 250 GENERAL PHARMACY W/O HCPCS: Performed by: STUDENT IN AN ORGANIZED HEALTH CARE EDUCATION/TRAINING PROGRAM

## 2024-02-21 PROCEDURE — B41G1ZZ FLUOROSCOPY OF LEFT LOWER EXTREMITY ARTERIES USING LOW OSMOLAR CONTRAST: ICD-10-PCS | Performed by: SURGERY

## 2024-02-21 PROCEDURE — 37226 PR REVSC OPN/PRQ FEM/POP W/STNT/ANGIOP SM VSL: CPT | Performed by: SURGERY

## 2024-02-21 PROCEDURE — 97110 THERAPEUTIC EXERCISES: CPT | Mod: GP,CQ

## 2024-02-21 PROCEDURE — 83735 ASSAY OF MAGNESIUM: CPT

## 2024-02-21 PROCEDURE — 2550000001 HC RX 255 CONTRASTS: Performed by: SURGERY

## 2024-02-21 DEVICE — STENT EVD35-06-120-120 EF ENTRUST V01
Type: IMPLANTABLE DEVICE | Site: ARTERIAL | Status: FUNCTIONAL
Brand: EVERFLEX™

## 2024-02-21 DEVICE — VIABAHN SX ENDO HEPARIN 18 RO 5MMX5CM 6FR 120CM CATH
Type: IMPLANTABLE DEVICE | Site: ARTERIAL | Status: FUNCTIONAL
Brand: GORE VIABAHN ENDOPROSTHESIS WITH HEPARIN

## 2024-02-21 DEVICE — STENT EVD35-06-040-120 EF ENTRUST V01
Type: IMPLANTABLE DEVICE | Site: ARTERIAL | Status: FUNCTIONAL
Brand: EVERFLEX™

## 2024-02-21 DEVICE — STENT EVD35-06-150-120 EF ENTRUST V01
Type: IMPLANTABLE DEVICE | Site: ARTERIAL | Status: FUNCTIONAL
Brand: EVERFLEX™

## 2024-02-21 RX ORDER — HEPARIN SODIUM 10000 [USP'U]/100ML
0-4500 INJECTION, SOLUTION INTRAVENOUS CONTINUOUS
Status: DISCONTINUED | OUTPATIENT
Start: 2024-02-21 | End: 2024-02-22

## 2024-02-21 RX ORDER — LIDOCAINE HYDROCHLORIDE 10 MG/ML
0.1 INJECTION INFILTRATION; PERINEURAL ONCE
Status: DISCONTINUED | OUTPATIENT
Start: 2024-02-21 | End: 2024-02-22 | Stop reason: HOSPADM

## 2024-02-21 RX ORDER — SODIUM CHLORIDE, SODIUM LACTATE, POTASSIUM CHLORIDE, CALCIUM CHLORIDE 600; 310; 30; 20 MG/100ML; MG/100ML; MG/100ML; MG/100ML
100 INJECTION, SOLUTION INTRAVENOUS CONTINUOUS
Status: DISCONTINUED | OUTPATIENT
Start: 2024-02-21 | End: 2024-02-22 | Stop reason: HOSPADM

## 2024-02-21 RX ORDER — SODIUM CHLORIDE, SODIUM LACTATE, POTASSIUM CHLORIDE, CALCIUM CHLORIDE 600; 310; 30; 20 MG/100ML; MG/100ML; MG/100ML; MG/100ML
INJECTION, SOLUTION INTRAVENOUS CONTINUOUS PRN
Status: DISCONTINUED | OUTPATIENT
Start: 2024-02-21 | End: 2024-02-21

## 2024-02-21 RX ORDER — ONDANSETRON HYDROCHLORIDE 2 MG/ML
4 INJECTION, SOLUTION INTRAVENOUS ONCE AS NEEDED
Status: DISCONTINUED | OUTPATIENT
Start: 2024-02-21 | End: 2024-02-22 | Stop reason: HOSPADM

## 2024-02-21 RX ORDER — ONDANSETRON HYDROCHLORIDE 2 MG/ML
INJECTION, SOLUTION INTRAVENOUS AS NEEDED
Status: DISCONTINUED | OUTPATIENT
Start: 2024-02-21 | End: 2024-02-21

## 2024-02-21 RX ORDER — FENTANYL CITRATE 50 UG/ML
INJECTION, SOLUTION INTRAMUSCULAR; INTRAVENOUS AS NEEDED
Status: DISCONTINUED | OUTPATIENT
Start: 2024-02-21 | End: 2024-02-21

## 2024-02-21 RX ORDER — LIDOCAINE HCL/PF 100 MG/5ML
SYRINGE (ML) INTRAVENOUS AS NEEDED
Status: DISCONTINUED | OUTPATIENT
Start: 2024-02-21 | End: 2024-02-21

## 2024-02-21 RX ORDER — DEXAMETHASONE SODIUM PHOSPHATE 4 MG/ML
INJECTION, SOLUTION INTRA-ARTICULAR; INTRALESIONAL; INTRAMUSCULAR; INTRAVENOUS; SOFT TISSUE AS NEEDED
Status: DISCONTINUED | OUTPATIENT
Start: 2024-02-21 | End: 2024-02-21

## 2024-02-21 RX ORDER — IODIXANOL 320 MG/ML
INJECTION, SOLUTION INTRAVASCULAR AS NEEDED
Status: DISCONTINUED | OUTPATIENT
Start: 2024-02-21 | End: 2024-02-21 | Stop reason: HOSPADM

## 2024-02-21 RX ORDER — DROPERIDOL 2.5 MG/ML
0.62 INJECTION, SOLUTION INTRAMUSCULAR; INTRAVENOUS ONCE AS NEEDED
Status: DISCONTINUED | OUTPATIENT
Start: 2024-02-21 | End: 2024-02-22 | Stop reason: HOSPADM

## 2024-02-21 RX ORDER — CEFAZOLIN 1 G/1
INJECTION, POWDER, FOR SOLUTION INTRAVENOUS AS NEEDED
Status: DISCONTINUED | OUTPATIENT
Start: 2024-02-21 | End: 2024-02-21

## 2024-02-21 RX ORDER — PROPOFOL 10 MG/ML
INJECTION, EMULSION INTRAVENOUS AS NEEDED
Status: DISCONTINUED | OUTPATIENT
Start: 2024-02-21 | End: 2024-02-21

## 2024-02-21 RX ORDER — LIDOCAINE HYDROCHLORIDE 10 MG/ML
INJECTION INFILTRATION; PERINEURAL AS NEEDED
Status: DISCONTINUED | OUTPATIENT
Start: 2024-02-21 | End: 2024-02-21 | Stop reason: HOSPADM

## 2024-02-21 RX ORDER — FENTANYL CITRATE 50 UG/ML
25 INJECTION, SOLUTION INTRAMUSCULAR; INTRAVENOUS EVERY 5 MIN PRN
Status: DISCONTINUED | OUTPATIENT
Start: 2024-02-21 | End: 2024-02-22 | Stop reason: HOSPADM

## 2024-02-21 RX ORDER — PHENYLEPHRINE HYDROCHLORIDE 10 MG/ML
INJECTION INTRAVENOUS AS NEEDED
Status: DISCONTINUED | OUTPATIENT
Start: 2024-02-21 | End: 2024-02-21

## 2024-02-21 RX ORDER — HEPARIN SODIUM 5000 [USP'U]/ML
2000-4000 INJECTION, SOLUTION INTRAVENOUS; SUBCUTANEOUS EVERY 4 HOURS PRN
Status: DISCONTINUED | OUTPATIENT
Start: 2024-02-21 | End: 2024-02-22

## 2024-02-21 RX ORDER — ROCURONIUM BROMIDE 10 MG/ML
INJECTION, SOLUTION INTRAVENOUS AS NEEDED
Status: DISCONTINUED | OUTPATIENT
Start: 2024-02-21 | End: 2024-02-21

## 2024-02-21 RX ORDER — VANCOMYCIN HYDROCHLORIDE 1 G/20ML
INJECTION, POWDER, LYOPHILIZED, FOR SOLUTION INTRAVENOUS DAILY PRN
Status: DISCONTINUED | OUTPATIENT
Start: 2024-02-21 | End: 2024-02-28

## 2024-02-21 RX ORDER — FENTANYL CITRATE 50 UG/ML
50 INJECTION, SOLUTION INTRAMUSCULAR; INTRAVENOUS EVERY 5 MIN PRN
Status: DISCONTINUED | OUTPATIENT
Start: 2024-02-21 | End: 2024-02-22 | Stop reason: HOSPADM

## 2024-02-21 RX ORDER — HEPARIN SODIUM 1000 [USP'U]/ML
INJECTION, SOLUTION INTRAVENOUS; SUBCUTANEOUS AS NEEDED
Status: DISCONTINUED | OUTPATIENT
Start: 2024-02-21 | End: 2024-02-21

## 2024-02-21 RX ORDER — SODIUM CHLORIDE 0.9 G/100ML
IRRIGANT IRRIGATION AS NEEDED
Status: DISCONTINUED | OUTPATIENT
Start: 2024-02-21 | End: 2024-02-21 | Stop reason: HOSPADM

## 2024-02-21 RX ORDER — VANCOMYCIN HYDROCHLORIDE 1 G/200ML
1000 INJECTION, SOLUTION INTRAVENOUS ONCE
Status: COMPLETED | OUTPATIENT
Start: 2024-02-21 | End: 2024-02-21

## 2024-02-21 RX ORDER — FENTANYL CITRATE 50 UG/ML
12.5 INJECTION, SOLUTION INTRAMUSCULAR; INTRAVENOUS EVERY 5 MIN PRN
Status: DISCONTINUED | OUTPATIENT
Start: 2024-02-21 | End: 2024-02-22 | Stop reason: HOSPADM

## 2024-02-21 RX ADMIN — SODIUM CHLORIDE, POTASSIUM CHLORIDE, SODIUM LACTATE AND CALCIUM CHLORIDE: 600; 310; 30; 20 INJECTION, SOLUTION INTRAVENOUS at 18:00

## 2024-02-21 RX ADMIN — FENTANYL CITRATE 50 MCG: 50 INJECTION, SOLUTION INTRAMUSCULAR; INTRAVENOUS at 19:07

## 2024-02-21 RX ADMIN — SODIUM CHLORIDE, POTASSIUM CHLORIDE, SODIUM LACTATE AND CALCIUM CHLORIDE 500 ML: 600; 310; 30; 20 INJECTION, SOLUTION INTRAVENOUS at 12:51

## 2024-02-21 RX ADMIN — Medication 10 MG: at 09:27

## 2024-02-21 RX ADMIN — Medication 2000 UNITS: at 09:28

## 2024-02-21 RX ADMIN — HEPARIN SODIUM 1000 UNITS: 1000 INJECTION, SOLUTION INTRAVENOUS; SUBCUTANEOUS at 20:51

## 2024-02-21 RX ADMIN — FOLIC ACID 5 MG: 1 TABLET ORAL at 09:27

## 2024-02-21 RX ADMIN — ATENOLOL 50 MG: 50 TABLET ORAL at 09:28

## 2024-02-21 RX ADMIN — PHENYLEPHRINE HYDROCHLORIDE 40 MCG: 10 INJECTION INTRAVENOUS at 19:50

## 2024-02-21 RX ADMIN — Medication 4 L/MIN: at 21:51

## 2024-02-21 RX ADMIN — PIPERACILLIN SODIUM AND TAZOBACTAM SODIUM 2.25 G: 2; .25 INJECTION, SOLUTION INTRAVENOUS at 09:27

## 2024-02-21 RX ADMIN — VANCOMYCIN HYDROCHLORIDE 1000 MG: 1 INJECTION, SOLUTION INTRAVENOUS at 09:27

## 2024-02-21 RX ADMIN — FENTANYL CITRATE 50 MCG: 50 INJECTION, SOLUTION INTRAMUSCULAR; INTRAVENOUS at 18:23

## 2024-02-21 RX ADMIN — PHENYLEPHRINE HYDROCHLORIDE 40 MCG: 10 INJECTION INTRAVENOUS at 20:30

## 2024-02-21 RX ADMIN — CEFAZOLIN 1 G: 330 INJECTION, POWDER, FOR SOLUTION INTRAMUSCULAR; INTRAVENOUS at 18:12

## 2024-02-21 RX ADMIN — SUGAMMADEX 200 MG: 100 INJECTION, SOLUTION INTRAVENOUS at 21:35

## 2024-02-21 RX ADMIN — ACETAMINOPHEN 975 MG: 325 TABLET ORAL at 09:54

## 2024-02-21 RX ADMIN — FENTANYL CITRATE 50 MCG: 50 INJECTION, SOLUTION INTRAMUSCULAR; INTRAVENOUS at 20:00

## 2024-02-21 RX ADMIN — PIPERACILLIN SODIUM AND TAZOBACTAM SODIUM 2.25 G: 2; .25 INJECTION, SOLUTION INTRAVENOUS at 04:03

## 2024-02-21 RX ADMIN — PROPOFOL 20 MG: 10 INJECTION, EMULSION INTRAVENOUS at 18:13

## 2024-02-21 RX ADMIN — PHENYLEPHRINE HYDROCHLORIDE 40 MCG: 10 INJECTION INTRAVENOUS at 20:07

## 2024-02-21 RX ADMIN — Medication 1 TABLET: at 09:28

## 2024-02-21 RX ADMIN — PIPERACILLIN SODIUM AND TAZOBACTAM SODIUM 2.25 G: 2; .25 INJECTION, SOLUTION INTRAVENOUS at 16:24

## 2024-02-21 RX ADMIN — PROPOFOL 20 MG: 10 INJECTION, EMULSION INTRAVENOUS at 18:24

## 2024-02-21 RX ADMIN — PROPOFOL 20 MG: 10 INJECTION, EMULSION INTRAVENOUS at 21:19

## 2024-02-21 RX ADMIN — ONDANSETRON 4 MG: 2 INJECTION, SOLUTION INTRAMUSCULAR; INTRAVENOUS at 21:24

## 2024-02-21 RX ADMIN — HEPARIN SODIUM AND DEXTROSE 1800 UNITS/HR: 10000; 5 INJECTION INTRAVENOUS at 23:51

## 2024-02-21 RX ADMIN — ROCURONIUM BROMIDE 10 MG: 10 INJECTION INTRAVENOUS at 20:19

## 2024-02-21 RX ADMIN — PROPOFOL 30 MG: 10 INJECTION, EMULSION INTRAVENOUS at 18:16

## 2024-02-21 RX ADMIN — PHENYLEPHRINE HYDROCHLORIDE 40 MCG: 10 INJECTION INTRAVENOUS at 19:31

## 2024-02-21 RX ADMIN — CLOPIDOGREL BISULFATE 75 MG: 75 TABLET ORAL at 09:28

## 2024-02-21 RX ADMIN — AZELASTINE HYDROCHLORIDE 2 SPRAY: 137 SPRAY, METERED NASAL at 09:33

## 2024-02-21 RX ADMIN — LIDOCAINE HYDROCHLORIDE 60 MG: 20 INJECTION INTRAVENOUS at 18:13

## 2024-02-21 RX ADMIN — FENTANYL CITRATE 50 MCG: 50 INJECTION, SOLUTION INTRAMUSCULAR; INTRAVENOUS at 18:33

## 2024-02-21 RX ADMIN — PROPOFOL 130 MG: 10 INJECTION, EMULSION INTRAVENOUS at 18:32

## 2024-02-21 RX ADMIN — HEPARIN SODIUM 4000 UNITS: 1000 INJECTION, SOLUTION INTRAVENOUS; SUBCUTANEOUS at 18:51

## 2024-02-21 RX ADMIN — ESCITALOPRAM OXALATE 10 MG: 10 TABLET ORAL at 09:28

## 2024-02-21 RX ADMIN — POLYETHYLENE GLYCOL 3350 17 G: 17 POWDER, FOR SOLUTION ORAL at 09:27

## 2024-02-21 RX ADMIN — ROCURONIUM BROMIDE 30 MG: 10 INJECTION INTRAVENOUS at 18:33

## 2024-02-21 RX ADMIN — ASPIRIN 81 MG: 81 TABLET, COATED ORAL at 09:28

## 2024-02-21 RX ADMIN — DEXAMETHASONE SODIUM PHOSPHATE 8 MG: 4 INJECTION, SOLUTION INTRAMUSCULAR; INTRAVENOUS at 18:47

## 2024-02-21 ASSESSMENT — PAIN SCALES - GENERAL
PAINLEVEL_OUTOF10: 0 - NO PAIN
PAINLEVEL_OUTOF10: 0 - NO PAIN
PAINLEVEL_OUTOF10: 1
PAINLEVEL_OUTOF10: 0 - NO PAIN
PAINLEVEL_OUTOF10: 3
PAINLEVEL_OUTOF10: 0 - NO PAIN
PAINLEVEL_OUTOF10: 0 - NO PAIN
PAINLEVEL_OUTOF10: 1
PAINLEVEL_OUTOF10: 0 - NO PAIN
PAIN_LEVEL: 0
PAINLEVEL_OUTOF10: 5 - MODERATE PAIN
PAINLEVEL_OUTOF10: 0 - NO PAIN
PAINLEVEL_OUTOF10: 3

## 2024-02-21 ASSESSMENT — COGNITIVE AND FUNCTIONAL STATUS - GENERAL
DRESSING REGULAR LOWER BODY CLOTHING: A LITTLE
MOVING TO AND FROM BED TO CHAIR: A LITTLE
HELP NEEDED FOR BATHING: A LOT
PERSONAL GROOMING: A LITTLE
EATING MEALS: A LITTLE
CLIMB 3 TO 5 STEPS WITH RAILING: TOTAL
TOILETING: A LITTLE
CLIMB 3 TO 5 STEPS WITH RAILING: A LOT
TURNING FROM BACK TO SIDE WHILE IN FLAT BAD: A LITTLE
MOBILITY SCORE: 19
STANDING UP FROM CHAIR USING ARMS: A LITTLE
MOVING TO AND FROM BED TO CHAIR: A LITTLE
MOBILITY SCORE: 17
WALKING IN HOSPITAL ROOM: A LITTLE
DAILY ACTIVITIY SCORE: 17
DRESSING REGULAR UPPER BODY CLOTHING: A LITTLE
WALKING IN HOSPITAL ROOM: A LITTLE
STANDING UP FROM CHAIR USING ARMS: A LITTLE

## 2024-02-21 ASSESSMENT — PAIN DESCRIPTION - ORIENTATION: ORIENTATION: LEFT

## 2024-02-21 ASSESSMENT — PAIN - FUNCTIONAL ASSESSMENT
PAIN_FUNCTIONAL_ASSESSMENT: 0-10

## 2024-02-21 ASSESSMENT — COLUMBIA-SUICIDE SEVERITY RATING SCALE - C-SSRS
1. IN THE PAST MONTH, HAVE YOU WISHED YOU WERE DEAD OR WISHED YOU COULD GO TO SLEEP AND NOT WAKE UP?: NO
2. HAVE YOU ACTUALLY HAD ANY THOUGHTS OF KILLING YOURSELF?: NO
6. HAVE YOU EVER DONE ANYTHING, STARTED TO DO ANYTHING, OR PREPARED TO DO ANYTHING TO END YOUR LIFE?: NO

## 2024-02-21 ASSESSMENT — PAIN DESCRIPTION - LOCATION: LOCATION: FOOT

## 2024-02-21 NOTE — ANESTHESIA PROCEDURE NOTES
Airway  Date/Time: 2/21/2024 6:33 PM  Urgency: elective    Airway not difficult    Staffing  Performed: resident   Authorized by: Nasim Patterson MD    Performed by: Zachary Galeas MD  Patient location during procedure: OR    Indications and Patient Condition  Spontaneous Ventilation: absent  Sedation level: deep  Preoxygenated: yes  Patient position: sniffing  Mask difficulty assessment: 1 - vent by mask    Final Airway Details  Final airway type: endotracheal airway      Successful airway: ETT     Successful intubation technique: direct laryngoscopy  Facilitating devices/methods: intubating stylet  Endotracheal tube insertion site: oral  Blade: Demario  Blade size: #4  ETT size (mm): 6.5  Cormack-Lehane Classification: grade I - full view of glottis  Measured from: gums  ETT to gums (cm): 21  Number of attempts at approach: 1

## 2024-02-21 NOTE — PROGRESS NOTES
Physical Therapy    Physical Therapy Treatment    Patient Name: Maggi Gordon  MRN: [id number]  Today's Date: 2/21/2024  Time Calculation  Start Time: 1305  Stop Time: 1401  Time Calculation (min): 56 min       Assessment/Plan   PT Assessment  PT Assessment Results: Decreased strength, Decreased range of motion, Decreased endurance, Impaired balance, Decreased mobility, Orthopedic restrictions, Pain  End of Session Communication: Bedside nurse  Assessment Comment: Pt demos decreased strength, endurance and is also NWB on LLE. Pt can continue to benefit from high level intensity PT to address functional mobility deficits.  End of Session Patient Position: Up in chair, Alarm on  PT Plan  Inpatient/Swing Bed or Outpatient: Inpatient  PT Plan  Treatment/Interventions: Bed mobility, Transfer training, Gait training, Balance training, Strengthening, Endurance training, Therapeutic exercise, Therapeutic activity, Home exercise program  PT Plan: Skilled PT  PT Frequency: 5 times per week  PT Discharge Recommendations: High intensity level of continued care  Equipment Recommended upon Discharge:  (none)  PT Recommended Transfer Status: Assist x1  PT - OK to Discharge: Yes      General Visit Information:   PT  Visit  PT Received On: 02/21/24  Response to Previous Treatment: Patient with no complaints from previous session.  General  Reason for Referral: (L) ankle osteomyelitis, mechanical fall and possible surgery to address left ankle wound  Past Medical History Relevant to Rehab: HTN, HLD, CKD, CAD s/p CABG, A-fib, aortic stenosis, PAD, vertebral artery syndrome  Prior to Session Communication: Bedside nurse  Patient Position Received: Bed, 2 rail up, Alarm off, not on at start of session  General Comment: Pt w/ HOB elevated upon arrival with IV line in place. Willing & cooperative to participate in PT. Pt is pleasant throughout activities.    Subjective   Precautions:  Precautions  Hearing/Visual Limitations: [place] (L) ear (w/o  hearing aide at hospital); (R) ear WFL  Medical Precautions: Fall precautions  Precautions Comment: The pt in compliance with precaution throughout PT evaluation.  Vital Signs:       Objective   Pain:  Pain Assessment  Pain Assessment: 0-10  Pain Score: 5 - Moderate pain  Pain Location: Foot  Pain Orientation: Left  Pain Interventions: Medication (See MAR)  Cognition:  Cognition  Overall Cognitive Status: Within Functional Limits  Arousal/Alertness: Appropriate responses to stimuli  Orientation Level: Oriented X4  Following Commands: Follows multistep commands with repetition (Pt is impulsive and will begin to move before hearing full instructions.)  Safety Judgment: Good awareness of safety precautions  Impulsive: Mildly  Postural Control:  Postural Control  Postural Control: Within Functional Limits  Static Sitting Balance  Static Sitting-Balance Support: Bilateral upper extremity supported, Feet supported  Static Sitting-Level of Assistance: Independent  Static Standing Balance  Static Standing-Balance Support: Bilateral upper extremity supported  Static Standing-Level of Assistance: Contact guard  Static Standing-Comment/Number of Minutes: Had pt stand on RLE w/ fww in order for pt to get used to SLS prior to ambulating.  Extremity/Trunk Assessments:    Activity Tolerance:  Activity Tolerance  Endurance: Tolerates 10 - 20 min exercise with multiple rests  Activity Tolerance Comments: Pt tolerated PT very well.  Treatments:  Therapeutic Exercise  Therapeutic Exercise Performed: Yes  Therapeutic Exercise Activity 1: Pt performed RLE AP, QS, SAQ, HS, ABD x 15.  Therapeutic Exercise Activity 2: Pt performed LLE QS, SAQ, Hip ABD x 15.    Therapeutic Activity  Therapeutic Activity Performed: Yes  Therapeutic Activity 1: Sat pt at EOB prior to ambulation in order to demonstrate STS and  gait pattern while maintain NWB precautions.    Bed Mobility  Bed Mobility: Yes  Bed Mobility 1  Bed Mobility 1: Scooting  Level of  Assistance 1: Close supervision  Bed Mobility Comments 1: Pt instructed to scoot to EOB prior to standing.  Bed Mobility 2  Bed Mobility  2: Supine to sitting  Level of Assistance 2: Independent    Ambulation/Gait Training  Ambulation/Gait Training Performed: Yes  Ambulation/Gait Training 1  Surface 1: Level tile  Device 1: Rolling walker  Assistance 1: Contact guard  Quality of Gait 1: Decreased step length  Comments/Distance (ft) 1: Pt educated on LLE NWB gait pattern w/ fww. Pt ambulated 10ft.  Transfers  Transfer: Yes  Transfer 1  Transfer From 1: Bed to  Transfer to 1: Stand  Technique 1: Sit to stand  Transfer Device 1: Walker  Transfer Level of Assistance 1: Minimum assistance  Trials/Comments 1: 3x trials throughout session. Pt needed reminders to push off with UE's.  Transfers 2  Transfer From 2: Stand to  Transfer to 2: Bed, Chair with arms  Technique 2: Stand to sit  Transfer Device 2: Walker  Transfer Level of Assistance 2: Minimum assistance  Trials/Comments 2: Pt needed reminders to reach back with UE's and to lower back down slowly.    Outcome Measures:  [facility] Basic Mobility  Turning from your back to your side while in a flat bed without using bedrails: None  Moving from lying on your back to sitting on the side of a flat bed without using bedrails: A little  Moving to and from bed to chair (including a wheelchair): A little  Standing up from a chair using your arms (e.g. wheelchair or bedside chair): A little  To walk in hospital room: A little  Climbing 3-5 steps with railing: Total  Basic Mobility - Total Score: 17    Education Documentation  Precautions, taught by LAURI Antony at 2/21/2024  2:30 PM.  Learner: Patient  Readiness: Acceptance  Method: Explanation  Response: Verbalizes Understanding, Needs Reinforcement, Demonstrated Understanding  Comment: Provided pt education on gait and STS techniques using FWW while maintaining NWB precautions for LLE.    Body Mechanics, taught by  LAURI Antony at 2/21/2024  2:30 PM.  Learner: Patient  Readiness: Acceptance  Method: Explanation  Response: Verbalizes Understanding, Needs Reinforcement, Demonstrated Understanding  Comment: Provided pt education on gait and STS techniques using FWW while maintaining NWB precautions for LLE.    Home Exercise Program, taught by LAURI Antony at 2/21/2024  2:30 PM.  Learner: Patient  Readiness: Acceptance  Method: Explanation  Response: Verbalizes Understanding, Needs Reinforcement, Demonstrated Understanding  Comment: Provided pt education on gait and STS techniques using FWW while maintaining NWB precautions for LLE.    Mobility Training, taught by LAURI Antony at 2/21/2024  2:30 PM.  Learner: Patient  Readiness: Acceptance  Method: Explanation  Response: Verbalizes Understanding, Needs Reinforcement, Demonstrated Understanding  Comment: Provided pt education on gait and STS techniques using FWW while maintaining NWB precautions for LLE.    Education Comments  No comments found.        OP EDUCATION:       Encounter Problems       Encounter Problems (Active)       Balance       STG - Maintains supervision assistance dynamic standing balance with upper extremity support using a wheeled walker. (Progressing)       Start:  02/18/24    Expected End:  03/03/24            STG - Maintains supervision assistance static standing balance with upper extremity support using a wheeled walker. (Progressing)       Start:  02/18/24    Expected End:  03/03/24               Mobility       STG - Patient will ambulate 125ft using a wheeled walker with supervision assistance. (Progressing)       Start:  02/18/24    Expected End:  03/03/24               Transfers       STG - Transfer from bed to chair using a wheeled walker with supervision assistance. (Progressing)       Start:  02/18/24    Expected End:  03/03/24            STG - Patient to transfer to and from sit to supine with supervision assistance.  (Progressing)       Start:  02/18/24    Expected End:  03/03/24            STG - Patient will transfer sit to and from stand using a wheeled walker with supervision assistance. (Progressing)       Start:  02/18/24    Expected End:  03/03/24                 JOY LYMAN-BRI

## 2024-02-21 NOTE — CARE PLAN
The patient's goals for the shift include      The clinical goals for the shift include Pt will remain safe and have pain controlled.    Goals met.

## 2024-02-21 NOTE — ANESTHESIA PREPROCEDURE EVALUATION
Patient: Maggi Gordon    Procedure Information       Date/Time: 02/21/24 1630    Procedure: Angiogram Lower Extremity with possible intervention (Left) - Typically under MAC sedation    Location: [facility] OR  / [facility] OR    Surgeons: Pura Montes MD            Relevant Problems   Cardiovascular   (+) Acute lower limb ischemia   (+) Atherosclerosis of CABG w/o angina pectoris   (+) Bilateral carotid artery stenosis   (+) 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   (+) Essential hypertension   (+) Mixed hyperlipidemia   (+) Occlusion and stenosis of bilateral carotid arteries   (+) Paroxysmal atrial fibrillation (CMS/HCC)   (+) Peripheral vascular disease (CMS/HCC)   (+) Peripheral vascular disease, unspecified (CMS/HCC)   (+) Pure hyperglyceridemia   (+) Thoracic aortic aneurysm (TAA) (CMS/HCC)      Endocrine   (+) Acquired hypothyroidism   (+) Nontoxic multinodular goiter      /Renal   (+) Chronic kidney disease (CKD) stage G3a/A1, moderately decreased glomerular filtration rate (GFR) between 45-59 mL/min/1.73 square meter and albuminuria creatinine ratio less than 30 mg/g (CMS/HCC)      Neuro/Psych   (+) Bilateral carotid artery stenosis   (+) OLLIE (generalized anxiety disorder)   (+) Occlusion and stenosis of bilateral carotid arteries      Hematology   (+) Anemia   (+) Chronic anticoagulation      Musculoskeletal   (+) Primary osteoarthritis of both knees      Infectious Disease   (+) Herpes zoster   (+) Onychomycosis   (+) Osteomyelitis of left ankle (CMS/HCC)   (+) Osteomyelitis of left ankle, unspecified type (CMS/HCC)      Other   (+) Osteomyelitis of left ankle (CMS/HCC)   (+) Osteomyelitis of left ankle, unspecified type (CMS/HCC)       Clinical information reviewed:    Allergies  Meds               NPO Detail:  No data recorded     Physical Exam    Airway  Mallampati: II  TM distance: >3 FB  Neck ROM: full     Cardiovascular - normal exam     Dental    Pulmonary - normal  exam     Abdominal - normal exam           Anesthesia Plan    History of general anesthesia?: yes  History of complications of general anesthesia?: no    ASA 3     general       Plan discussed with attending.

## 2024-02-21 NOTE — PROGRESS NOTES
Physical Therapy    Physical Therapy Treatment    Patient Name: Maggi Gordon  MRN: [id number]  Today's Date: 2/21/2024  Time Calculation  Start Time: 1305  Stop Time: 1401  Time Calculation (min): 56 min       Assessment/Plan   PT Assessment  PT Assessment Results: Decreased strength, Decreased range of motion, Decreased endurance, Impaired balance, Decreased mobility, Orthopedic restrictions, Pain  End of Session Communication: Bedside nurse  Assessment Comment: Pt demos decreased strength, endurance and is also NWB on LLE. Pt can continue to benefit from high level intensity PT to address functional mobility deficits.  End of Session Patient Position: Up in chair, Alarm on  PT Plan  Inpatient/Swing Bed or Outpatient: Inpatient  PT Plan  Treatment/Interventions: Bed mobility, Transfer training, Gait training, Balance training, Strengthening, Endurance training, Therapeutic exercise, Therapeutic activity, Home exercise program  PT Plan: Skilled PT  PT Frequency: 5 times per week  PT Discharge Recommendations: High intensity level of continued care  Equipment Recommended upon Discharge:  (none)  PT Recommended Transfer Status: Assist x1  PT - OK to Discharge: Yes      General Visit Information:   PT  Visit  PT Received On: 02/21/24  Response to Previous Treatment: Patient with no complaints from previous session.  General  Reason for Referral: (L) ankle osteomyelitis, mechanical fall and possible surgery to address left ankle wound  Past Medical History Relevant to Rehab: HTN, HLD, CKD, CAD s/p CABG, A-fib, aortic stenosis, PAD, vertebral artery syndrome  Prior to Session Communication: Bedside nurse  Patient Position Received: Bed, 2 rail up, Alarm off, not on at start of session  General Comment: Pt w/ HOB elevated upon arrival with IV line in place. Willing & cooperative to participate in PT. Pt is pleasant throughout activities.    Subjective   Precautions:  Precautions  Hearing/Visual Limitations: [place] (L) ear (w/o  hearing aide at hospital); (R) ear WFL  Medical Precautions: Fall precautions  Precautions Comment: The pt in compliance with precaution throughout PT evaluation.  Vital Signs:       Objective   Pain:  Pain Assessment  Pain Assessment: 0-10  Pain Score: 5 - Moderate pain  Pain Location: Foot  Pain Orientation: Left  Pain Interventions: Medication (See MAR)  Cognition:  Cognition  Overall Cognitive Status: Within Functional Limits  Arousal/Alertness: Appropriate responses to stimuli  Orientation Level: Oriented X4  Following Commands: Follows multistep commands with repetition (Pt is impulsive and will begin to move before hearing full instructions.)  Safety Judgment: Good awareness of safety precautions  Impulsive: Mildly  Postural Control:  Postural Control  Postural Control: Within Functional Limits  Static Sitting Balance  Static Sitting-Balance Support: Bilateral upper extremity supported, Feet supported  Static Sitting-Level of Assistance: Independent  Static Sitting-Comment/Number of Minutes: Sat pt at EOB prior to ambulation in order to educate pt on STS technique while maintaining LLE NWB precautions.  Static Standing Balance  Static Standing-Balance Support: Bilateral upper extremity supported  Static Standing-Level of Assistance: Contact guard  Static Standing-Comment/Number of Minutes: Had pt stand on RLE w/ fww in order for pt to get used to SLS prior to ambulating.  Extremity/Trunk Assessments:    Activity Tolerance:  Activity Tolerance  Endurance: Tolerates 10 - 20 min exercise with multiple rests  Activity Tolerance Comments: Pt tolerated PT very well.  Treatments:            Bed Mobility  Bed Mobility: Yes  Bed Mobility 1  Bed Mobility 1: Scooting  Level of Assistance 1: Close supervision  Bed Mobility Comments 1: Pt instructed to scoot to EOB prior to standing.  Bed Mobility 2  Bed Mobility  2: Supine to sitting  Level of Assistance 2: Independent    Ambulation/Gait Training  Ambulation/Gait  Training Performed: Yes  Ambulation/Gait Training 1  Surface 1: Level tile  Device 1: Rolling walker  Assistance 1: Contact guard  Quality of Gait 1: Decreased step length  Comments/Distance (ft) 1: Pt educated on LLE NWB gait pattern w/ fww. Pt ambulated 10ft. Pt tends to twist on right le during turns - cues to hop instead.   Transfers  Transfer: Yes  Transfer 1  Transfer From 1: Bed to  Transfer to 1: Stand  Technique 1: Sit to stand  Transfer Device 1: Walker  Transfer Level of Assistance 1: Minimum assistance  Trials/Comments 1: 3x trials throughout session. Pt needed reminders to push off with UE's.  Transfers 2  Transfer From 2: Stand to  Transfer to 2: Bed, Chair with arms  Technique 2: Stand to sit  Transfer Device 2: Walker  Transfer Level of Assistance 2: Minimum assistance  Trials/Comments 2: Pt needed reminders to reach back with UE's and to lower back down slowly.    Outcome Measures:  [facility] Basic Mobility  Turning from your back to your side while in a flat bed without using bedrails: None  Moving from lying on your back to sitting on the side of a flat bed without using bedrails: A little  Moving to and from bed to chair (including a wheelchair): A little  Standing up from a chair using your arms (e.g. wheelchair or bedside chair): A little  To walk in hospital room: A little  Climbing 3-5 steps with railing: Total  Basic Mobility - Total Score: 17    Education Documentation  Precautions, taught by LAURI Antony at 2/21/2024  2:30 PM.  Learner: Patient  Readiness: Acceptance  Method: Explanation  Response: Verbalizes Understanding, Needs Reinforcement, Demonstrated Understanding  Comment: Provided pt education on gait and STS techniques using FWW while maintaining NWB precautions for LLE.    Body Mechanics, taught by LAURI Antony at 2/21/2024  2:30 PM.  Learner: Patient  Readiness: Acceptance  Method: Explanation  Response: Verbalizes Understanding, Needs Reinforcement,  Demonstrated Understanding  Comment: Provided pt education on gait and STS techniques using FWW while maintaining NWB precautions for LLE.    Home Exercise Program, taught by LAURI Antony at 2/21/2024  2:30 PM.  Learner: Patient  Readiness: Acceptance  Method: Explanation  Response: Verbalizes Understanding, Needs Reinforcement, Demonstrated Understanding  Comment: Provided pt education on gait and STS techniques using FWW while maintaining NWB precautions for LLE.    Mobility Training, taught by LAURI Antony at 2/21/2024  2:30 PM.  Learner: Patient  Readiness: Acceptance  Method: Explanation  Response: Verbalizes Understanding, Needs Reinforcement, Demonstrated Understanding  Comment: Provided pt education on gait and STS techniques using FWW while maintaining NWB precautions for LLE.    Education Comments  No comments found.        OP EDUCATION:       Encounter Problems       Encounter Problems (Active)       Balance       STG - Maintains supervision assistance dynamic standing balance with upper extremity support using a wheeled walker. (Progressing)       Start:  02/18/24    Expected End:  03/03/24            STG - Maintains supervision assistance static standing balance with upper extremity support using a wheeled walker. (Progressing)       Start:  02/18/24    Expected End:  03/03/24               Mobility       STG - Patient will ambulate 125ft using a wheeled walker with supervision assistance. (Progressing)       Start:  02/18/24    Expected End:  03/03/24               Transfers       STG - Transfer from bed to chair using a wheeled walker with supervision assistance. (Progressing)       Start:  02/18/24    Expected End:  03/03/24            STG - Patient to transfer to and from sit to supine with supervision assistance. (Progressing)       Start:  02/18/24    Expected End:  03/03/24            STG - Patient will transfer sit to and from stand using a wheeled walker with supervision  assistance. (Progressing)       Start:  02/18/24    Expected End:  03/03/24

## 2024-02-21 NOTE — PROGRESS NOTES
Subjective   NAEON - no changes in her sxs since admission, no pain, no f/c/n/v/sob/cp. Bms daily, eating/drinking well, no urinary difficulty. No other concerns this morning.       Objective   Patient Vitals for the past 24 hrs:   BP Temp Temp src Pulse Resp SpO2   02/21/24 1107 130/56 36.4 °C (97.5 °F) Temporal 68 18 97 %   02/21/24 0420 174/83 36.6 °C (97.9 °F) Temporal 72 16 96 %   02/20/24 2042 147/73 36.6 °C (97.9 °F) Temporal 74 17 99 %   02/20/24 1342 152/69 36.1 °C (97 °F) Temporal 65 18 98 %     Gen: well-appearing, NAD, thin  Head and neck: NCAT, neck supple without LAD  HEENT: MMM, normal nose without congestion  Pulm: normal WOB on RA  Ext: feet warm, but absent PT pulse on L with doppler  -LLE: ulcerative lesion on the posterior aspect of the left ankle  Neuro: alert and oriented x3, normal tone, face symmetric, moves all extremities spontaneously    Assessment/Plan   89yo with PMH HTN, HLD, CKD, CAD s/p CABG x3 1995, vertebral artery syndrome s/p L PICA coiling and stent, Afib (not on AC), aortic stenosis s/p AVR 2011, PAD s/p multiple interventions, the most recent of which was a L femoral endartecomy on 1/18/24 at [facility], chronic L foot wound (follows with wound care) who initially presented to [place] after mechanical fall at home found on imaging to have c/f L foot OM with diminished pulses of LLE DP on exam transferred to [facility] for further evaluation. Currently treating for OM with vanc/zosyn.  Vascular surgery and podiatry following, will need to determine the salvageability of the foot before proceeding with any surgical interventions most likely.  She appears well and is hemodynamically stable, no signs of sepsis.    Updates 2/21:  -Angiogram around 4pm, gave 500cc LR bolus    Updates 2/20:  -Angiogram pushed back to 2/21  -Podiatry planning for debridement 2/23    #Chronic L foot wound c/f OM  #PAD s/p L femoral endarectomy on 1/18/24   #likely OM of L foot/ankle  -vasc surgery following: no  need for heparin gtt at this time, planning surgical intervention for later in the week  -podiatry c/s to help determine if foot is salvageable   -Achilles tendon is not salvageable, recommend debridement with removal of tendon, discussing possible procedure with vascular surgery  -c/w ASA 81mg daily, Plavix 75mg daily, rosuvastatin   -MRI L foot and ankle: Evidence c/w osteomyelitis of 4th/5th metatarsal and calcaneal tuberosity, edema of distal soleus c/w myositis  -Angiogram pushed back to 2/21  -Podiatry planning for debridement 2/21    #LUCRETIA  -prerenal most likely, improving  -continue fluid resuscitation   -hold home antihypertensives (hydrochlorothiazide, ana, valsartan) for now for soft BP    #Afib  -CHADSVASc 5  -continue home atenolol  -reportedly not on AC at home, but dc summary from Sept notes warfarin. No warfarin on fill history on EMR or on dc summary from Jan admission. She did have high INR on arrival in Jan.    #Depression - c/w home meds    N: Cardiac diet  DVT Ppx: Heparin sc  GI Ppx: -  Access/Lines: PIV   Abx: Vanc/Zosyn  O2: None    Pain regimen: Tylenol prn  GI Laxative: Miralax   PT/OT: pending     Code status: Full Code  Emergency contact: Vicenta Gordon (sister-in-law) [phone]    Arnoldo David DO  Neurology PGY-1

## 2024-02-21 NOTE — PROGRESS NOTES
Vancomycin Dosing by Pharmacy- FOLLOW UP    Maggi Gordon is a 89 y.o. year old female who Pharmacy has been consulted for vancomycin dosing for osteomyelitis/septic arthritis. Based on the patient's indication and renal status this patient is being dosed based on a goal trough/random level of 15-20.     Renal function is currently unstable.     Current vancomycin dose: dosing per random levels.    Most recent random level: 11.6 mcg/mL    Visit Vitals  /56 (BP Location: Left arm, Patient Position: Lying)   Pulse 68   Temp 36.4 °C (97.5 °F) (Temporal)   Resp 18        Lab Results   Component Value Date    CREATININE 1.57 (H) 02/21/2024    CREATININE 1.23 (H) 02/20/2024    CREATININE 1.40 (H) 02/19/2024    CREATININE 1.51 (H) 02/18/2024        Patient weight is 44kg    Assessment/Plan    Below goal random/trough level. Will re-dose with 1000 mg IV x1, and continue to dose per random levels at this time as renal function unstable.     The next level will be obtained on 2/22 at AM labs. May be obtained sooner if clinically indicated.   Will continue to monitor renal function daily while on vancomycin and order serum creatinine at least every 48 hours if not already ordered.  Follow for continued vancomycin needs, clinical response, and signs/symptoms of toxicity.       Raymond Villanueva, PharmD

## 2024-02-22 ENCOUNTER — APPOINTMENT (OUTPATIENT)
Dept: RADIOLOGY | Facility: HOSPITAL | Age: 89
DRG: 240 | End: 2024-02-22
Payer: MEDICARE

## 2024-02-22 LAB
ABO GROUP (TYPE) IN BLOOD: NORMAL
ACT BLD: 243 SEC (ref 89–169)
ACT BLD: 268 SEC (ref 89–169)
ACT BLD: 268 SEC (ref 89–169)
ALBUMIN SERPL BCP-MCNC: 2.8 G/DL (ref 3.4–5)
ANION GAP SERPL CALC-SCNC: 13 MMOL/L (ref 10–20)
ANTIBODY SCREEN: NORMAL
BASOPHILS # BLD AUTO: 0.01 X10*3/UL (ref 0–0.1)
BASOPHILS # BLD AUTO: 0.02 X10*3/UL (ref 0–0.1)
BASOPHILS NFR BLD AUTO: 0.1 %
BASOPHILS NFR BLD AUTO: 0.1 %
BUN SERPL-MCNC: 29 MG/DL (ref 6–23)
CALCIUM SERPL-MCNC: 8.6 MG/DL (ref 8.6–10.6)
CHLORIDE SERPL-SCNC: 104 MMOL/L (ref 98–107)
CO2 SERPL-SCNC: 25 MMOL/L (ref 21–32)
CREAT SERPL-MCNC: 1.3 MG/DL (ref 0.5–1.05)
EGFRCR SERPLBLD CKD-EPI 2021: 40 ML/MIN/1.73M*2
EOSINOPHIL # BLD AUTO: 0 X10*3/UL (ref 0–0.4)
EOSINOPHIL # BLD AUTO: 0 X10*3/UL (ref 0–0.4)
EOSINOPHIL NFR BLD AUTO: 0 %
EOSINOPHIL NFR BLD AUTO: 0 %
ERYTHROCYTE [DISTWIDTH] IN BLOOD BY AUTOMATED COUNT: 14.5 % (ref 11.5–14.5)
ERYTHROCYTE [DISTWIDTH] IN BLOOD BY AUTOMATED COUNT: 15 % (ref 11.5–14.5)
ERYTHROCYTE [DISTWIDTH] IN BLOOD BY AUTOMATED COUNT: 16.8 % (ref 11.5–14.5)
GLUCOSE BLD MANUAL STRIP-MCNC: 114 MG/DL (ref 74–99)
GLUCOSE SERPL-MCNC: 154 MG/DL (ref 74–99)
HCT VFR BLD AUTO: 23.3 % (ref 36–46)
HCT VFR BLD AUTO: 23.8 % (ref 36–46)
HCT VFR BLD AUTO: 25.2 % (ref 36–46)
HGB BLD-MCNC: 7.2 G/DL (ref 12–16)
HGB BLD-MCNC: 7.7 G/DL (ref 12–16)
HGB BLD-MCNC: 8.3 G/DL (ref 12–16)
IMM GRANULOCYTES # BLD AUTO: 0.08 X10*3/UL (ref 0–0.5)
IMM GRANULOCYTES # BLD AUTO: 0.11 X10*3/UL (ref 0–0.5)
IMM GRANULOCYTES NFR BLD AUTO: 0.7 % (ref 0–0.9)
IMM GRANULOCYTES NFR BLD AUTO: 0.7 % (ref 0–0.9)
LYMPHOCYTES # BLD AUTO: 0.95 X10*3/UL (ref 0.8–3)
LYMPHOCYTES # BLD AUTO: 2.62 X10*3/UL (ref 0.8–3)
LYMPHOCYTES NFR BLD AUTO: 15.9 %
LYMPHOCYTES NFR BLD AUTO: 8.8 %
MAGNESIUM SERPL-MCNC: 1.71 MG/DL (ref 1.6–2.4)
MCH RBC QN AUTO: 31.8 PG (ref 26–34)
MCH RBC QN AUTO: 32.4 PG (ref 26–34)
MCH RBC QN AUTO: 32.4 PG (ref 26–34)
MCHC RBC AUTO-ENTMCNC: 30.9 G/DL (ref 32–36)
MCHC RBC AUTO-ENTMCNC: 32.4 G/DL (ref 32–36)
MCHC RBC AUTO-ENTMCNC: 32.9 G/DL (ref 32–36)
MCV RBC AUTO: 105 FL (ref 80–100)
MCV RBC AUTO: 98 FL (ref 80–100)
MCV RBC AUTO: 98 FL (ref 80–100)
MONOCYTES # BLD AUTO: 0.4 X10*3/UL (ref 0.05–0.8)
MONOCYTES # BLD AUTO: 1.7 X10*3/UL (ref 0.05–0.8)
MONOCYTES NFR BLD AUTO: 10.3 %
MONOCYTES NFR BLD AUTO: 3.7 %
NEUTROPHILS # BLD AUTO: 11.98 X10*3/UL (ref 1.6–5.5)
NEUTROPHILS # BLD AUTO: 9.3 X10*3/UL (ref 1.6–5.5)
NEUTROPHILS NFR BLD AUTO: 73 %
NEUTROPHILS NFR BLD AUTO: 86.7 %
NRBC BLD-RTO: 0 /100 WBCS (ref 0–0)
NRBC BLD-RTO: 0 /100 WBCS (ref 0–0)
NRBC BLD-RTO: 0.1 /100 WBCS (ref 0–0)
PHOSPHATE SERPL-MCNC: 5 MG/DL (ref 2.5–4.9)
PLATELET # BLD AUTO: 202 X10*3/UL (ref 150–450)
PLATELET # BLD AUTO: 247 X10*3/UL (ref 150–450)
PLATELET # BLD AUTO: 249 X10*3/UL (ref 150–450)
POTASSIUM SERPL-SCNC: 4.4 MMOL/L (ref 3.5–5.3)
RBC # BLD AUTO: 2.22 X10*6/UL (ref 4–5.2)
RBC # BLD AUTO: 2.42 X10*6/UL (ref 4–5.2)
RBC # BLD AUTO: 2.56 X10*6/UL (ref 4–5.2)
RH FACTOR (ANTIGEN D): NORMAL
SODIUM SERPL-SCNC: 138 MMOL/L (ref 136–145)
UFH PPP CHRO-ACNC: 1 IU/ML
UFH PPP CHRO-ACNC: 1 IU/ML
UFH PPP CHRO-ACNC: >2 IU/ML
UFH PPP CHRO-ACNC: >2 IU/ML
VANCOMYCIN SERPL-MCNC: 16.9 UG/ML (ref 5–20)
WBC # BLD AUTO: 10.4 X10*3/UL (ref 4.4–11.3)
WBC # BLD AUTO: 10.7 X10*3/UL (ref 4.4–11.3)
WBC # BLD AUTO: 16.4 X10*3/UL (ref 4.4–11.3)

## 2024-02-22 PROCEDURE — P9016 RBC LEUKOCYTES REDUCED: HCPCS

## 2024-02-22 PROCEDURE — 2500000004 HC RX 250 GENERAL PHARMACY W/ HCPCS (ALT 636 FOR OP/ED): Performed by: INTERNAL MEDICINE

## 2024-02-22 PROCEDURE — 75710 ARTERY X-RAYS ARM/LEG: CPT | Performed by: SURGERY

## 2024-02-22 PROCEDURE — 1100000001 HC PRIVATE ROOM DAILY

## 2024-02-22 PROCEDURE — 80069 RENAL FUNCTION PANEL: CPT

## 2024-02-22 PROCEDURE — 2500000004 HC RX 250 GENERAL PHARMACY W/ HCPCS (ALT 636 FOR OP/ED)

## 2024-02-22 PROCEDURE — 82947 ASSAY GLUCOSE BLOOD QUANT: CPT

## 2024-02-22 PROCEDURE — 36415 COLL VENOUS BLD VENIPUNCTURE: CPT

## 2024-02-22 PROCEDURE — 36430 TRANSFUSION BLD/BLD COMPNT: CPT

## 2024-02-22 PROCEDURE — 2500000005 HC RX 250 GENERAL PHARMACY W/O HCPCS

## 2024-02-22 PROCEDURE — 83735 ASSAY OF MAGNESIUM: CPT

## 2024-02-22 PROCEDURE — A4217 STERILE WATER/SALINE, 500 ML: HCPCS | Performed by: INTERNAL MEDICINE

## 2024-02-22 PROCEDURE — 85520 HEPARIN ASSAY: CPT | Performed by: INTERNAL MEDICINE

## 2024-02-22 PROCEDURE — 85730 THROMBOPLASTIN TIME PARTIAL: CPT

## 2024-02-22 PROCEDURE — 86901 BLOOD TYPING SEROLOGIC RH(D): CPT

## 2024-02-22 PROCEDURE — 99232 SBSQ HOSP IP/OBS MODERATE 35: CPT

## 2024-02-22 PROCEDURE — 85025 COMPLETE CBC W/AUTO DIFF WBC: CPT

## 2024-02-22 PROCEDURE — 30233N1 TRANSFUSION OF NONAUTOLOGOUS RED BLOOD CELLS INTO PERIPHERAL VEIN, PERCUTANEOUS APPROACH: ICD-10-PCS | Performed by: SURGERY

## 2024-02-22 PROCEDURE — 2500000002 HC RX 250 W HCPCS SELF ADMINISTERED DRUGS (ALT 637 FOR MEDICARE OP, ALT 636 FOR OP/ED)

## 2024-02-22 PROCEDURE — 85520 HEPARIN ASSAY: CPT

## 2024-02-22 PROCEDURE — 80202 ASSAY OF VANCOMYCIN: CPT

## 2024-02-22 PROCEDURE — 86920 COMPATIBILITY TEST SPIN: CPT

## 2024-02-22 PROCEDURE — 2500000001 HC RX 250 WO HCPCS SELF ADMINISTERED DRUGS (ALT 637 FOR MEDICARE OP)

## 2024-02-22 RX ORDER — HEPARIN SODIUM 10000 [USP'U]/100ML
0-4500 INJECTION, SOLUTION INTRAVENOUS CONTINUOUS
Status: DISCONTINUED | OUTPATIENT
Start: 2024-02-22 | End: 2024-02-23

## 2024-02-22 RX ORDER — PROCHLORPERAZINE EDISYLATE 5 MG/ML
10 INJECTION INTRAMUSCULAR; INTRAVENOUS ONCE
Status: DISCONTINUED | OUTPATIENT
Start: 2024-02-22 | End: 2024-03-01 | Stop reason: HOSPADM

## 2024-02-22 RX ORDER — SODIUM CHLORIDE, SODIUM LACTATE, POTASSIUM CHLORIDE, CALCIUM CHLORIDE 600; 310; 30; 20 MG/100ML; MG/100ML; MG/100ML; MG/100ML
75 INJECTION, SOLUTION INTRAVENOUS CONTINUOUS
Status: DISCONTINUED | OUTPATIENT
Start: 2024-02-22 | End: 2024-02-23

## 2024-02-22 RX ADMIN — IPRATROPIUM BROMIDE 2 SPRAY: 21 SPRAY, METERED NASAL at 10:21

## 2024-02-22 RX ADMIN — AZELASTINE HYDROCHLORIDE 2 SPRAY: 137 SPRAY, METERED NASAL at 10:21

## 2024-02-22 RX ADMIN — ROSUVASTATIN CALCIUM 10 MG: 10 TABLET, FILM COATED ORAL at 20:31

## 2024-02-22 RX ADMIN — PIPERACILLIN SODIUM AND TAZOBACTAM SODIUM 2.25 G: 2; .25 INJECTION, SOLUTION INTRAVENOUS at 10:19

## 2024-02-22 RX ADMIN — AZELASTINE HYDROCHLORIDE 2 SPRAY: 137 SPRAY, METERED NASAL at 20:32

## 2024-02-22 RX ADMIN — ACETAMINOPHEN 975 MG: 325 TABLET ORAL at 23:39

## 2024-02-22 RX ADMIN — ATENOLOL 50 MG: 50 TABLET ORAL at 10:19

## 2024-02-22 RX ADMIN — ATENOLOL 50 MG: 50 TABLET ORAL at 20:30

## 2024-02-22 RX ADMIN — IPRATROPIUM BROMIDE 2 SPRAY: 21 SPRAY, METERED NASAL at 20:31

## 2024-02-22 RX ADMIN — ASPIRIN 81 MG: 81 TABLET, COATED ORAL at 10:19

## 2024-02-22 RX ADMIN — ACETAMINOPHEN 975 MG: 325 TABLET ORAL at 13:46

## 2024-02-22 RX ADMIN — VANCOMYCIN HYDROCHLORIDE 1250 MG: 5 INJECTION, POWDER, LYOPHILIZED, FOR SOLUTION INTRAVENOUS at 13:45

## 2024-02-22 RX ADMIN — Medication 10 MG: at 10:19

## 2024-02-22 RX ADMIN — CLOPIDOGREL BISULFATE 75 MG: 75 TABLET ORAL at 10:19

## 2024-02-22 RX ADMIN — SODIUM CHLORIDE, POTASSIUM CHLORIDE, SODIUM LACTATE AND CALCIUM CHLORIDE 75 ML/HR: 600; 310; 30; 20 INJECTION, SOLUTION INTRAVENOUS at 01:48

## 2024-02-22 RX ADMIN — Medication 1 TABLET: at 10:19

## 2024-02-22 RX ADMIN — FOLIC ACID 5 MG: 1 TABLET ORAL at 10:19

## 2024-02-22 RX ADMIN — PIPERACILLIN SODIUM AND TAZOBACTAM SODIUM 2.25 G: 2; .25 INJECTION, SOLUTION INTRAVENOUS at 04:54

## 2024-02-22 RX ADMIN — PIPERACILLIN SODIUM AND TAZOBACTAM SODIUM 2.25 G: 2; .25 INJECTION, SOLUTION INTRAVENOUS at 16:57

## 2024-02-22 RX ADMIN — Medication 2000 UNITS: at 10:19

## 2024-02-22 RX ADMIN — PIPERACILLIN SODIUM AND TAZOBACTAM SODIUM 2.25 G: 2; .25 INJECTION, SOLUTION INTRAVENOUS at 22:28

## 2024-02-22 RX ADMIN — ESCITALOPRAM OXALATE 10 MG: 10 TABLET ORAL at 10:19

## 2024-02-22 ASSESSMENT — COGNITIVE AND FUNCTIONAL STATUS - GENERAL
TOILETING: A LITTLE
TURNING FROM BACK TO SIDE WHILE IN FLAT BAD: A LITTLE
DAILY ACTIVITIY SCORE: 17
WALKING IN HOSPITAL ROOM: A LITTLE
DRESSING REGULAR LOWER BODY CLOTHING: A LITTLE
DRESSING REGULAR LOWER BODY CLOTHING: A LITTLE
PERSONAL GROOMING: A LITTLE
HELP NEEDED FOR BATHING: A LOT
STANDING UP FROM CHAIR USING ARMS: A LITTLE
EATING MEALS: A LITTLE
PERSONAL GROOMING: A LITTLE
TOILETING: A LITTLE
CLIMB 3 TO 5 STEPS WITH RAILING: TOTAL
MOBILITY SCORE: 17
WALKING IN HOSPITAL ROOM: A LITTLE
STANDING UP FROM CHAIR USING ARMS: A LITTLE
EATING MEALS: A LITTLE
DRESSING REGULAR UPPER BODY CLOTHING: A LITTLE
MOVING TO AND FROM BED TO CHAIR: A LITTLE
DAILY ACTIVITIY SCORE: 17
TURNING FROM BACK TO SIDE WHILE IN FLAT BAD: A LITTLE
HELP NEEDED FOR BATHING: A LOT
CLIMB 3 TO 5 STEPS WITH RAILING: TOTAL
MOBILITY SCORE: 17
MOVING TO AND FROM BED TO CHAIR: A LITTLE
DRESSING REGULAR UPPER BODY CLOTHING: A LITTLE

## 2024-02-22 ASSESSMENT — PAIN SCALES - GENERAL
PAINLEVEL_OUTOF10: 0 - NO PAIN
PAINLEVEL_OUTOF10: 1
PAINLEVEL_OUTOF10: 3
PAINLEVEL_OUTOF10: 0 - NO PAIN
PAINLEVEL_OUTOF10: 0 - NO PAIN
PAINLEVEL_OUTOF10: 5 - MODERATE PAIN

## 2024-02-22 ASSESSMENT — PAIN - FUNCTIONAL ASSESSMENT
PAIN_FUNCTIONAL_ASSESSMENT: 0-10

## 2024-02-22 ASSESSMENT — PAIN DESCRIPTION - LOCATION
LOCATION: HEAD
LOCATION: LEG

## 2024-02-22 ASSESSMENT — PAIN SCALES - WONG BAKER: WONGBAKER_NUMERICALRESPONSE: HURTS WHOLE LOT

## 2024-02-22 ASSESSMENT — PAIN DESCRIPTION - ORIENTATION: ORIENTATION: LEFT

## 2024-02-22 NOTE — NURSING NOTE
Geriatric Nursing rounds summary  Ms Gordon is an 89 year old admitted for fall  chronic foot wound  [facility] mirian afib anemia (transfuse prior or on 2-23) HTN cad as s/p avr 2011  Age friendly  What matters full code re vascularization  ? 2/27 lithotripsy (2-23)  Mentation cam neg  Mobility  nwb currenty  Meds vancomycin, currently on heparin

## 2024-02-22 NOTE — SIGNIFICANT EVENT
Vascular Surgery Post-Op Plan of Care:    89 y.o. female with CLTI LLE, now status post:    1) Ultrasound guided access of Right CFA  2) Left lower extremity angiogram with 4th order selective catheterization  3) Revascularization of L EIA with POBA and 6 x 40 mm Everflex stent  4) Revascularization of L SFA and popliteal artery with POBA and 6 mm Everflex stents (6 x 150 and two 6 x 120 stents)  5) Percutaneous closure of right groin with 6 Fr Mynx.     Pre-op exam: Right foot biphasic signals, Left foot biphasic PT, absent DP  Post-op exam: Right foot biphasic signals, Left foot biphasic signals    - 2 hour stay in PACU: vasc checks q15 mins x1 hr, then q30 mins x1 hr, then q1 hr x2 hrs on the floor  - q4hr vitals and vasc checks on the floor  - Pain control per primary team  - NPO while in PACU, regular diet on floor  - Maintenance IVF until tolerating oral intake  - Bedrest in PACU, may get up out of bed later and walk around  - Flat for 2 hours: 6 Fr Mynx closure device used  - Resume heparin drip in 2 hours without initial bolus, then continue high intensity protocol  - Continue daily labs  - Continue home BP meds  - Continue ASA and plavix    Patient will need further work done, likely will need intravascular lithotripsy. We will tentatively plan for this on Friday -- will coordinate with podiatry who currently have her scheduled for debridement on Friday.    Jose Garcia MD  Vascular Surgery Fellow  Service Pager: [phone]

## 2024-02-22 NOTE — PROGRESS NOTES
[facility]  Vascular Surgery  Progress Note  Maggi Gordon  [id number]  1935     Subjective    POD 1 s/p LLE angiogram and revascularization with vascular surgery. She underwent     1) Ultrasound guided access of Right CFA  2) Left lower extremity angiogram with 4th order selective catheterization  3) Revascularization of L EIA with POBA and 6 x 40 mm Everflex stent  4) Revascularization of L SFA and popliteal artery with POBA and 6 mm Everflex stents (6 x 150 and two 6 x 120 stents)  5) Percutaneous closure of right groin with 6 Fr Mynx.     Vasc. Surg team was unable to completely revascularize the LLE and will need to return to OR on 2/23 for angiogram with lithotripsy. Podiatry team will postpone their intervention until LLE is completely revascularized. POC was unremarkable.    This AM right groin access site dressing have small amount of strikethrough but right groin site appears hemostatic and without hematoma.      Patient seen at bedside s/p LLE angiogram and revascularization. Patient feels hungry, but otherwise feels well overall. Pain is well-controlled. Denies f/c, n/v, SOB, CP, or numbness or tingling of extremities. Heparin gtt running in the room.       Objective:     Last Recorded Vitals  Vitals:    02/22/24 0803   BP: 140/50   Pulse: 65   Resp: 17   Temp: 36.6 °C (97.9 °F)   SpO2: 97%         I/O last 2 completed shifts:  In: 1417.7 [I.V.:1417.7]  Out: 100 [Blood:100]     Physical Exam  General: NAD. Nontoxic  HEENT: Atraumatic. MMM. EOMI.   Card: RRR  Pulm: Nonlabored breathing on RA  Chest: No bruising or seatbelt sign. Chest is nontender.  Abdomen: Soft, nontender, nondistended. No bruising.  Extremities: MAEx4. Warm bilaterally. Kerlix wrap covering left foot wounds  Neuro: No focal deficits.  Pulses:  -biphasic  DP and PT signals in bilateral lower extremities  -no palpable masses or fluid collections in bilateral groins  -bilateral feet warm and  well-perfused  -compartments soft and compressible in bilateral lower extremities  -no sensory or motor deficits in all extremities  -palpable radial pulses bilaterally    Relevant Results  Lab Results   Component Value Date    WBC 10.7 02/22/2024    HGB 7.2 (L) 02/22/2024    HCT 23.3 (L) 02/22/2024     (H) 02/22/2024     02/22/2024     Lab Results   Component Value Date    GLUCOSE 154 (H) 02/22/2024    CALCIUM 8.6 02/22/2024     02/22/2024    K 4.4 02/22/2024    CO2 25 02/22/2024     02/22/2024    BUN 29 (H) 02/22/2024    CREATININE 1.30 (H) 02/22/2024          Assessment/Plan   Ms. Maggi Gordon is a 89 y.o. female with CLTI LLE, now status post:     1) Ultrasound guided access of Right CFA  2) Left lower extremity angiogram with 4th order selective catheterization  3) Revascularization of L EIA with POBA and 6 x 40 mm Everflex stent  4) Revascularization of L SFA and popliteal artery with POBA and 6 mm Everflex stents (6 x 150 and two 6 x 120 stents)  5) Percutaneous closure of right groin with 6 Fr Mynx.      Pre-op exam: Right foot biphasic signals, Left foot biphasic PT, absent DP  Post-op exam: Right foot biphasic signals, Left foot biphasic signals     Plan/ recommendations:  - 1 unit pRBC transfusion today 2/22  - q4hr vitals and vasc checks on the floor  - Pain control per primary team  - Maintenance IVF until tolerating appropriate oral intake  - can we UOOB and weight bearing as tolerated  - Continue heparin drip ( high intensity protocol )  - Continue daily labs  - Continue home BP meds  - Continue ASA and plavix  -NPO @ midnight   -plan for 2/23/24 LLE angiogram with lithotripsy   - podiatry aware of vascular surgery plan for revascularization on Friday and will reschedule their debridement when LLE is appropriately revascularized  -Remainder of care per primary team       Discussed with attending surgeon Dr. Montes.     Alta Alfred MD  Vascular Surgery, PGY1  Team Pager:  [phone]

## 2024-02-22 NOTE — CARE PLAN
Problem: Fall/Injury  Goal: Not fall by end of shift  Outcome: Progressing   The patient's goals for the shift include      The clinical goals for the shift include Pt will remain safe and have pain controlled.

## 2024-02-22 NOTE — PROGRESS NOTES
Subjective   NAEON - Angiogram went well yesterday, received a stent to her SFA and EIA. No changes in her sxs since admission, no pain, no f/c/n/v/sob/cp. Bms daily, eating/drinking well, no urinary difficulty. No other concerns this morning.       Objective   Patient Vitals for the past 24 hrs:   BP Temp Temp src Pulse Resp SpO2   02/22/24 0803 140/50 36.6 °C (97.9 °F) Temporal 65 17 97 %   02/22/24 0523 (!) 117/48 36.7 °C (98.1 °F) -- 65 18 96 %   02/22/24 0117 119/53 36.7 °C (98.1 °F) Temporal 76 14 97 %   02/22/24 0015 127/60 -- -- 77 -- 94 %   02/22/24 0000 126/60 36 °C (96.8 °F) Temporal 74 14 100 %   02/21/24 2345 118/52 -- -- 73 12 100 %   02/21/24 2330 136/62 -- -- 71 16 100 %   02/21/24 2315 129/60 -- -- 71 16 100 %   02/21/24 2300 120/60 -- -- 72 18 100 %   02/21/24 2245 134/62 -- -- 77 14 100 %   02/21/24 2237 -- -- -- 77 -- 99 %   02/21/24 2230 131/63 -- -- 74 14 99 %   02/21/24 2215 146/67 -- -- 79 14 99 %   02/21/24 2200 135/63 -- -- 75 16 99 %   02/21/24 2151 145/63 36 °C (96.8 °F) Temporal 76 17 99 %   02/21/24 1740 171/77 36.6 °C (97.9 °F) Temporal 80 16 --   02/21/24 1107 130/56 36.4 °C (97.5 °F) Temporal 68 18 97 %     Results for orders placed or performed during the hospital encounter of 02/17/24 (from the past 24 hour(s))   ACTIVATED CLOTTING TIME LOW   Result Value Ref Range    POCT Activated Clotting Time Low Range 268 (H) 89 - 169 sec   ACTIVATED CLOTTING TIME LOW   Result Value Ref Range    POCT Activated Clotting Time Low Range 268 (H) 89 - 169 sec   ACTIVATED CLOTTING TIME LOW   Result Value Ref Range    POCT Activated Clotting Time Low Range 243 (H) 89 - 169 sec   Heparin Assay, UFH   Result Value Ref Range    Heparin Unfractionated 1.0 See Comment Below for Therapeutic Ranges IU/mL   CBC   Result Value Ref Range    WBC 10.4 4.4 - 11.3 x10*3/uL    nRBC 0.0 0.0 - 0.0 /100 WBCs    RBC 2.56 (L) 4.00 - 5.20 x10*6/uL    Hemoglobin 8.3 (L) 12.0 - 16.0 g/dL    Hematocrit 25.2 (L) 36.0 - 46.0  %    MCV 98 80 - 100 fL    MCH 32.4 26.0 - 34.0 pg    MCHC 32.9 32.0 - 36.0 g/dL    RDW 14.5 11.5 - 14.5 %    Platelets 247 150 - 450 x10*3/uL   POCT GLUCOSE   Result Value Ref Range    POCT Glucose 114 (H) 74 - 99 mg/dL   Magnesium   Result Value Ref Range    Magnesium 1.71 1.60 - 2.40 mg/dL   Renal Function Panel   Result Value Ref Range    Glucose 154 (H) 74 - 99 mg/dL    Sodium 138 136 - 145 mmol/L    Potassium 4.4 3.5 - 5.3 mmol/L    Chloride 104 98 - 107 mmol/L    Bicarbonate 25 21 - 32 mmol/L    Anion Gap 13 10 - 20 mmol/L    Urea Nitrogen 29 (H) 6 - 23 mg/dL    Creatinine 1.30 (H) 0.50 - 1.05 mg/dL    eGFR 40 (L) >60 mL/min/1.73m*2    Calcium 8.6 8.6 - 10.6 mg/dL    Phosphorus 5.0 (H) 2.5 - 4.9 mg/dL    Albumin 2.8 (L) 3.4 - 5.0 g/dL   CBC and Auto Differential   Result Value Ref Range    WBC 10.7 4.4 - 11.3 x10*3/uL    nRBC 0.0 0.0 - 0.0 /100 WBCs    RBC 2.22 (L) 4.00 - 5.20 x10*6/uL    Hemoglobin 7.2 (L) 12.0 - 16.0 g/dL    Hematocrit 23.3 (L) 36.0 - 46.0 %     (H) 80 - 100 fL    MCH 32.4 26.0 - 34.0 pg    MCHC 30.9 (L) 32.0 - 36.0 g/dL    RDW 15.0 (H) 11.5 - 14.5 %    Platelets 249 150 - 450 x10*3/uL    Neutrophils % 86.7 40.0 - 80.0 %    Immature Granulocytes %, Automated 0.7 0.0 - 0.9 %    Lymphocytes % 8.8 13.0 - 44.0 %    Monocytes % 3.7 2.0 - 10.0 %    Eosinophils % 0.0 0.0 - 6.0 %    Basophils % 0.1 0.0 - 2.0 %    Neutrophils Absolute 9.30 (H) 1.60 - 5.50 x10*3/uL    Immature Granulocytes Absolute, Automated 0.08 0.00 - 0.50 x10*3/uL    Lymphocytes Absolute 0.95 0.80 - 3.00 x10*3/uL    Monocytes Absolute 0.40 0.05 - 0.80 x10*3/uL    Eosinophils Absolute 0.00 0.00 - 0.40 x10*3/uL    Basophils Absolute 0.01 0.00 - 0.10 x10*3/uL   Vancomycin   Result Value Ref Range    Vancomycin 16.9 5.0 - 20.0 ug/mL   Heparin Assay   Result Value Ref Range    Heparin Unfractionated >2.0 (HH) See Comment Below for Therapeutic Ranges IU/mL   Type and screen   Result Value Ref Range    ABO TYPE A     Rh TYPE POS      ANTIBODY SCREEN NEG    Heparin Assay, UFH   Result Value Ref Range    Heparin Unfractionated >2.0 (HH) See Comment Below for Therapeutic Ranges IU/mL       Gen: well-appearing, NAD, thin  Head and neck: NCAT, neck supple without LAD  HEENT: MMM, normal nose without congestion  Pulm: normal WOB on RA  Ext: feet warm, but absent PT pulse on L with doppler  -LLE: ulcerative lesion on the posterior aspect of the left ankle  Neuro: alert and oriented x3, normal tone, face symmetric, moves all extremities spontaneously    Assessment/Plan   88yo with PMH HTN, HLD, CKD, CAD s/p CABG x3 1995, vertebral artery syndrome s/p L PICA coiling and stent, Afib (not on AC), aortic stenosis s/p AVR 2011, PAD s/p multiple interventions, the most recent of which was a L femoral endartecomy on 1/18/24 at [facility], chronic L foot wound (follows with wound care) who initially presented to [place] after mechanical fall at home found on imaging to have c/f L foot OM with diminished pulses of LLE DP on exam transferred to [facility] for further evaluation. Currently treating for OM with vanc/zosyn.  Vascular surgery and podiatry following, will need to determine the salvageability of the foot before proceeding with any surgical interventions most likely.  She appears well and is hemodynamically stable, no signs of sepsis.    Updates 2/22:  -Vascular sx planning for repeat procedure 2/22 w/ lithotripsy  -NPO@MN  -Transfusing 1u PRBC    Updates 2/21:  -Angiogram around 4pm, gave 500cc LR bolus    #Chronic L foot wound c/f OM  #PAD s/p L femoral endarectomy on 1/18/24   #likely OM of L foot/ankle  -vasc surgery following:   -2/21: S/p LLE angiogram, s/p L EIA balloon angioplasty and stent, SFA balloon angioplasty and stent   -Planning to return to OR for lithotripsy 2/23   -C/w Heparin gtt  -podiatry c/s to help determine if foot is salvageable   -Achilles tendon is not salvageable, recommend debridement with removal of tendon, will have to delay  procedure to after vascular sx's 2/23  -c/w ASA 81mg daily, Plavix 75mg daily, rosuvastatin   -MRI L foot and ankle: Evidence c/w osteomyelitis of 4th/5th metatarsal and calcaneal tuberosity, edema of distal soleus c/w myositis    #LUCRETIA, resolving  -prerenal most likely, improving  -continue fluid resuscitation   -hold home antihypertensives (hydrochlorothiazide, ana, valsartan) for now for soft BP    #Afib  -CHADSVASc 5  -continue home atenolol  -reportedly not on AC at home, but dc summary from Sept notes warfarin. No warfarin on fill history on EMR or on dc summary from Jan admission. She did have high INR on arrival in Jan.    #Depression - c/w home meds    #Anemia  ::Hg 7.2 from 8.3 2/22, likely d/t post op  -Transfuse 1u PRBC to optimize prior to anticipated procedure 2/23    N: Cardiac diet  DVT Ppx: Heparin sc  GI Ppx: -  Access/Lines: PIV   Abx: Vanc/Zosyn  O2: None    Pain regimen: Tylenol prn  GI Laxative: Miralax   PT/OT: ARF     Code status: Full Code  Emergency contact: Vicenta Gordon (sister-in-law) [phone]    Arnoldo David,   Neurology PGY-1

## 2024-02-22 NOTE — NURSING NOTE
0056 Messaged Primary: Pt back to unit from angiogram. Can you please unhold her MAR medications? Also, can we give her a clear liquid diet? Thank you

## 2024-02-22 NOTE — BRIEF OP NOTE
Date: 2024  OR Location: [facility] OR    Name: Maggi Gordon, : 1935, Age: 89 y.o., MRN: [id number], Sex: female    Diagnosis  Pre-op Diagnosis     * Osteomyelitis of left ankle, unspecified type (CMS/HCC) [M86.9] Post-op Diagnosis     * Osteomyelitis of left ankle, unspecified type (CMS/HCC) [M86.9]     Procedures  1) Ultrasound guided access of Right CFA  2) Left lower extremity angiogram with 4th order selective catheterization  3) Revascularization of L EIA with POBA and 6 x 40 mm Everflex stent  4) Revascularization of L SFA and popliteal artery with POBA and 6 mm Everflex stents (6 x 150 and two 6 x 120 stents)  5) Percutaneous closure of right groin with 6 Fr Mynx    Surgeons      * Pura Montes - Primary    Resident/Fellow/Other Assistant:  Surgeon(s) and Role:     * John Carl MD - Resident - Assisting     * Jose Garcia MD - Fellow    Procedure Summary  Anesthesia: Consult  ASA: III  Anesthesia Staff: Anesthesiologist: Antonio Frey MD; Nasim Patterson MD; Elina Lau MD  CRNA: ALEXIS Guy-CRNA  Anesthesia Resident: Lyn Arnold MD; Zachary Galeas MD  Estimated Blood Loss: 50 mL  Intra-op Medications:   Administrations occurring from  to  on 24:   Medication Name Total Dose   lidocaine (Xylocaine) 10 mg/mL (1 %) injection 9 mL   heparin (porcine) 5,000 Units in sodium chloride 0.9% 100 mL irrigation 5,000 Units   sodium chloride 0.9 % irrigation solution 1,000 mL   acetaminophen (Tylenol) tablet 975 mg Cannot be calculated   aspirin EC tablet 81 mg Cannot be calculated   atenolol (Tenormin) tablet 50 mg Cannot be calculated   azelastine (Astelin) 137 mcg (0.1 %) nasal spray 2 spray Cannot be calculated   biotin capsule 10 mg Cannot be calculated   cholecalciferol (Vitamin D-3) tablet 2,000 Units Cannot be calculated   clopidogrel (Plavix) tablet 75 mg Cannot be calculated   escitalopram (Lexapro) tablet 10 mg Cannot be calculated    folic acid (Folvite) tablet 5 mg Cannot be calculated   heparin (porcine) injection 5,000 Units Cannot be calculated   ipratropium (Atrovent) 21 mcg (0.03 %) nasal spray 2 spray Cannot be calculated   multivitamin with minerals 1 tablet Cannot be calculated   piperacillin-tazobactam-dextrose (Zosyn) IV 2.25 g Cannot be calculated   polyethylene glycol (Glycolax, Miralax) packet 17 g Cannot be calculated   rosuvastatin (Crestor) tablet 10 mg Cannot be calculated   vancomycin (Vancocin) placeholder Cannot be calculated              Anesthesia Record               Intraprocedure I/O Totals          Intake    lactated Ringer's 600.00 mL    Total Intake 600 mL          Specimen: No specimens collected     Staff:   Circulator: Evie Hollingsworth RN; Sukhdeep Deras RN  Relief Circulator: Janessa Miner RN  Scrub Person: Josefina Crespo      Findings: Chronically occluded L SFA and popliteal arteries; in-stent restenosis of distal L EIA; 2 vessel run off into foot    At end of case, patient has biphasic doppler signals at BL DP and PT    Complications:  None; patient tolerated the procedure well.     Disposition: PACU - hemodynamically stable.  Condition: stable  Specimens Collected: No specimens collected  Attending Attestation:     Pura Montes  Phone Number: [phone]

## 2024-02-22 NOTE — ANESTHESIA POSTPROCEDURE EVALUATION
Patient: Maggi Gordon    Procedure Summary       Date: 02/21/24 Room / Location: [facility] OR 27 / Virtual [facility] [place] OR    Anesthesia Start: 1800 Anesthesia Stop: 2157    Procedure: Angiogram Left Lower Extremity with Ultrasound Guided Access, Left SFA Stent, Left External Iliac Artery Stent (Left) Diagnosis:       Osteomyelitis of left ankle, unspecified type (CMS/HCC)      (Osteomyelitis of left ankle, unspecified type (CMS/HCC) [M86.9])    Surgeons: Pura Montes MD Responsible Provider: Nasim Patterson MD    Anesthesia Type: general ASA Status: 3            Anesthesia Type: general    Vitals Value Taken Time   /63 02/21/24 2157   Temp 36.0 02/21/24 2157   Pulse 76 02/21/24 2152   Resp 15 02/21/24 2152   SpO2 99 % 02/21/24 2152   Vitals shown include unvalidated device data.    Anesthesia Post Evaluation    Patient location during evaluation: PACU  Patient participation: complete - patient participated  Level of consciousness: awake and alert  Pain score: 0  Pain management: adequate  Multimodal analgesia pain management approach  Airway patency: patent  Two or more strategies used to mitigate risk of obstructive sleep apnea  Cardiovascular status: acceptable and hemodynamically stable  Respiratory status: acceptable and face mask  Hydration status: acceptable  Postoperative Nausea and Vomiting: none        No notable events documented.

## 2024-02-22 NOTE — SIGNIFICANT EVENT
Informed by vascular team that they plan on taking patient 2/23 for lithotripsy, will push our debridement of achilles and foot to 2/27 with Dr Santana. Still believe she has guarded prognosis of functional limb salvage due to extent of achilles tendon involvement but will know more after debridement and removal of nonviable tissue occurs

## 2024-02-22 NOTE — PROGRESS NOTES
"Vancomycin Dosing by Pharmacy- FOLLOW UP    Maggi Gordon is a 89 y.o. year old female who Pharmacy has been consulted for vancomycin dosing for osteomyelitis/septic arthritis. Based on the patient's indication and renal status this patient is being dosed based on a goal trough/random level of 15-20.     Renal function is currently declining.    Current vancomycin dose: 1250  mg given every 24 hours    Most recent random level: 16.9 mcg/mL    Visit Vitals  /50   Pulse 65   Temp 36.6 °C (97.9 °F) (Temporal)   Resp 17        Lab Results   Component Value Date    CREATININE 1.30 (H) 02/22/2024    CREATININE 1.57 (H) 02/21/2024    CREATININE 1.23 (H) 02/20/2024    CREATININE 1.40 (H) 02/19/2024        Patient weight is No results found for: \"PTWEIGHT\"    No results found for: \"CULTURE\"     I/O last 3 completed shifts:  In: 1467.7 [I.V.:1417.7; IV Piggyback:50]  Out: 100 [Blood:100]  [unfilled]    No results found for: \"PATIENTTEMP\"     Assessment/Plan    Inc the dose to 1250 mg for once. The level was 16.9 (after 20 hours from previous vanco dose), in reality the calculated level was about 14, which is below our goal (15-20).  The next level will be obtained on 2 /23 at am labs. May be obtained sooner if clinically indicated.   Will continue to monitor renal function daily while on vancomycin and order serum creatinine at least every 48 hours if not already ordered.  Follow for continued vancomycin needs, clinical response, and signs/symptoms of toxicity.       DORI SHEEHAN           "

## 2024-02-22 NOTE — SIGNIFICANT EVENT
Post-operative Check    Patient seen at bedside s/p LLE angiogram and revascularization. Patient feels hungry, but otherwise feels well overall. Pain is well-controlled. Denies f/c, n/v, SOB, CP, or numbness or tingling of extremities. Heparin gtt running in the room.     Labs reviewed, post-op Hgb 8.3.     Physical Exam:  -multiphasic DP and PT signals in bilateral lower extremities  -no palpable masses or fluid collections in bilateral groins  -bilateral feet warm and well-perfused  -compartments soft and compressible in bilateral lower extremities  -no sensory or motor deficits in all extremities  -palpable radial pulses bilaterally    Patient is doing well post-operatively.     Plan:  - q4hr vitals and vasc checks  - Pain control per primary team  - ok for regular diet  - ambulate as tolerated  - continue heparin gtt  - Continue daily labs  - Continue home BP meds  - Continue ASA and plavi    Sia Leo MD  General Surgery Resident  Vascular Surgery Service  Pager: [phone]

## 2024-02-23 LAB
APTT PPP: 28 SECONDS (ref 27–38)
BASOPHILS # BLD AUTO: 0.02 X10*3/UL (ref 0–0.1)
BASOPHILS NFR BLD AUTO: 0.1 %
BLOOD EXPIRATION DATE: NORMAL
DISPENSE STATUS: NORMAL
EOSINOPHIL # BLD AUTO: 0.06 X10*3/UL (ref 0–0.4)
EOSINOPHIL NFR BLD AUTO: 0.4 %
ERYTHROCYTE [DISTWIDTH] IN BLOOD BY AUTOMATED COUNT: 17.1 % (ref 11.5–14.5)
HCT VFR BLD AUTO: 19.9 % (ref 36–46)
HGB BLD-MCNC: 6.3 G/DL (ref 12–16)
HOLD SPECIMEN: NORMAL
IMM GRANULOCYTES # BLD AUTO: 0.08 X10*3/UL (ref 0–0.5)
IMM GRANULOCYTES NFR BLD AUTO: 0.6 % (ref 0–0.9)
LYMPHOCYTES # BLD AUTO: 2.61 X10*3/UL (ref 0.8–3)
LYMPHOCYTES NFR BLD AUTO: 18.2 %
MCH RBC QN AUTO: 31.7 PG (ref 26–34)
MCHC RBC AUTO-ENTMCNC: 31.7 G/DL (ref 32–36)
MCV RBC AUTO: 100 FL (ref 80–100)
MONOCYTES # BLD AUTO: 1.18 X10*3/UL (ref 0.05–0.8)
MONOCYTES NFR BLD AUTO: 8.2 %
NEUTROPHILS # BLD AUTO: 10.36 X10*3/UL (ref 1.6–5.5)
NEUTROPHILS NFR BLD AUTO: 72.5 %
NRBC BLD-RTO: 0.2 /100 WBCS (ref 0–0)
PLATELET # BLD AUTO: 162 X10*3/UL (ref 150–450)
PRODUCT BLOOD TYPE: 6200
PRODUCT CODE: NORMAL
RBC # BLD AUTO: 1.99 X10*6/UL (ref 4–5.2)
UFH PPP CHRO-ACNC: 0.8 IU/ML
UFH PPP CHRO-ACNC: 1.4 IU/ML
UFH PPP CHRO-ACNC: <0.1 IU/ML
UNIT ABO: NORMAL
UNIT NUMBER: NORMAL
UNIT RH: NORMAL
UNIT VOLUME: 350
VANCOMYCIN SERPL-MCNC: 22.2 UG/ML (ref 5–20)
WBC # BLD AUTO: 14.3 X10*3/UL (ref 4.4–11.3)
XM INTEP: NORMAL

## 2024-02-23 PROCEDURE — 2500000004 HC RX 250 GENERAL PHARMACY W/ HCPCS (ALT 636 FOR OP/ED)

## 2024-02-23 PROCEDURE — 85025 COMPLETE CBC W/AUTO DIFF WBC: CPT

## 2024-02-23 PROCEDURE — 97530 THERAPEUTIC ACTIVITIES: CPT | Mod: GP,CQ

## 2024-02-23 PROCEDURE — 1100000001 HC PRIVATE ROOM DAILY

## 2024-02-23 PROCEDURE — 2500000001 HC RX 250 WO HCPCS SELF ADMINISTERED DRUGS (ALT 637 FOR MEDICARE OP)

## 2024-02-23 PROCEDURE — 97110 THERAPEUTIC EXERCISES: CPT | Mod: GP,CQ

## 2024-02-23 PROCEDURE — 99232 SBSQ HOSP IP/OBS MODERATE 35: CPT

## 2024-02-23 PROCEDURE — 2500000004 HC RX 250 GENERAL PHARMACY W/ HCPCS (ALT 636 FOR OP/ED): Performed by: INTERNAL MEDICINE

## 2024-02-23 PROCEDURE — 85520 HEPARIN ASSAY: CPT

## 2024-02-23 PROCEDURE — 2500000002 HC RX 250 W HCPCS SELF ADMINISTERED DRUGS (ALT 637 FOR MEDICARE OP, ALT 636 FOR OP/ED)

## 2024-02-23 PROCEDURE — 2500000005 HC RX 250 GENERAL PHARMACY W/O HCPCS

## 2024-02-23 PROCEDURE — 80202 ASSAY OF VANCOMYCIN: CPT | Performed by: INTERNAL MEDICINE

## 2024-02-23 RX ORDER — HEPARIN SODIUM 10000 [USP'U]/100ML
0-4500 INJECTION, SOLUTION INTRAVENOUS CONTINUOUS
Status: DISCONTINUED | OUTPATIENT
Start: 2024-02-23 | End: 2024-02-23

## 2024-02-23 RX ORDER — HEPARIN SODIUM 5000 [USP'U]/ML
2000-4000 INJECTION, SOLUTION INTRAVENOUS; SUBCUTANEOUS EVERY 4 HOURS PRN
Status: DISCONTINUED | OUTPATIENT
Start: 2024-02-23 | End: 2024-02-23

## 2024-02-23 RX ORDER — AMINOPHYLLINE 25 MG/ML
125 INJECTION, SOLUTION INTRAVENOUS AS NEEDED
Status: DISCONTINUED | OUTPATIENT
Start: 2024-02-23 | End: 2024-03-01 | Stop reason: HOSPADM

## 2024-02-23 RX ORDER — HEPARIN SODIUM 5000 [USP'U]/ML
80 INJECTION, SOLUTION INTRAVENOUS; SUBCUTANEOUS ONCE
Status: COMPLETED | OUTPATIENT
Start: 2024-02-23 | End: 2024-02-23

## 2024-02-23 RX ORDER — AMINOPHYLLINE 25 MG/ML
125 INJECTION, SOLUTION INTRAVENOUS AS NEEDED
Status: DISCONTINUED | OUTPATIENT
Start: 2024-02-23 | End: 2024-02-23 | Stop reason: SDUPTHER

## 2024-02-23 RX ORDER — HEPARIN SODIUM 5000 [USP'U]/ML
5000 INJECTION, SOLUTION INTRAVENOUS; SUBCUTANEOUS EVERY 8 HOURS
Status: COMPLETED | OUTPATIENT
Start: 2024-02-23 | End: 2024-02-25

## 2024-02-23 RX ORDER — VANCOMYCIN HYDROCHLORIDE 750 MG/150ML
750 INJECTION, SOLUTION INTRAVENOUS ONCE
Status: COMPLETED | OUTPATIENT
Start: 2024-02-23 | End: 2024-02-23

## 2024-02-23 RX ADMIN — PIPERACILLIN SODIUM AND TAZOBACTAM SODIUM 2.25 G: 2; .25 INJECTION, SOLUTION INTRAVENOUS at 04:05

## 2024-02-23 RX ADMIN — HEPARIN SODIUM 5000 UNITS: 5000 INJECTION INTRAVENOUS; SUBCUTANEOUS at 17:54

## 2024-02-23 RX ADMIN — IPRATROPIUM BROMIDE 2 SPRAY: 21 SPRAY, METERED NASAL at 08:31

## 2024-02-23 RX ADMIN — PIPERACILLIN SODIUM AND TAZOBACTAM SODIUM 2.25 G: 2; .25 INJECTION, SOLUTION INTRAVENOUS at 17:54

## 2024-02-23 RX ADMIN — Medication 2000 UNITS: at 08:28

## 2024-02-23 RX ADMIN — HEPARIN SODIUM AND DEXTROSE 795 UNITS/HR: 10000; 5 INJECTION INTRAVENOUS at 03:58

## 2024-02-23 RX ADMIN — ATENOLOL 50 MG: 50 TABLET ORAL at 08:28

## 2024-02-23 RX ADMIN — FOLIC ACID 5 MG: 1 TABLET ORAL at 08:28

## 2024-02-23 RX ADMIN — ESCITALOPRAM OXALATE 10 MG: 10 TABLET ORAL at 08:28

## 2024-02-23 RX ADMIN — CLOPIDOGREL BISULFATE 75 MG: 75 TABLET ORAL at 08:28

## 2024-02-23 RX ADMIN — AZELASTINE HYDROCHLORIDE 2 SPRAY: 137 SPRAY, METERED NASAL at 21:58

## 2024-02-23 RX ADMIN — Medication 1 TABLET: at 08:28

## 2024-02-23 RX ADMIN — IPRATROPIUM BROMIDE 2 SPRAY: 21 SPRAY, METERED NASAL at 21:58

## 2024-02-23 RX ADMIN — HEPARIN SODIUM AND DEXTROSE 795 UNITS/HR: 10000; 5 INJECTION INTRAVENOUS at 05:46

## 2024-02-23 RX ADMIN — ACETAMINOPHEN 975 MG: 325 TABLET ORAL at 08:30

## 2024-02-23 RX ADMIN — Medication 10 MG: at 08:27

## 2024-02-23 RX ADMIN — HEPARIN SODIUM AND DEXTROSE 700 UNITS/HR: 10000; 5 INJECTION INTRAVENOUS at 12:28

## 2024-02-23 RX ADMIN — PIPERACILLIN SODIUM AND TAZOBACTAM SODIUM 2.25 G: 2; .25 INJECTION, SOLUTION INTRAVENOUS at 21:57

## 2024-02-23 RX ADMIN — PIPERACILLIN SODIUM AND TAZOBACTAM SODIUM 2.25 G: 2; .25 INJECTION, SOLUTION INTRAVENOUS at 10:18

## 2024-02-23 RX ADMIN — HEPARIN SODIUM 3500 UNITS: 5000 INJECTION INTRAVENOUS; SUBCUTANEOUS at 02:45

## 2024-02-23 RX ADMIN — VANCOMYCIN HYDROCHLORIDE 750 MG: 750 INJECTION, SOLUTION INTRAVENOUS at 12:32

## 2024-02-23 RX ADMIN — ROSUVASTATIN CALCIUM 10 MG: 10 TABLET, FILM COATED ORAL at 21:57

## 2024-02-23 RX ADMIN — ASPIRIN 81 MG: 81 TABLET, COATED ORAL at 08:31

## 2024-02-23 RX ADMIN — AZELASTINE HYDROCHLORIDE 2 SPRAY: 137 SPRAY, METERED NASAL at 08:31

## 2024-02-23 ASSESSMENT — COGNITIVE AND FUNCTIONAL STATUS - GENERAL
TOILETING: A LITTLE
WALKING IN HOSPITAL ROOM: A LITTLE
MOBILITY SCORE: 19
WALKING IN HOSPITAL ROOM: A LOT
DRESSING REGULAR LOWER BODY CLOTHING: A LITTLE
DRESSING REGULAR LOWER BODY CLOTHING: A LITTLE
MOVING TO AND FROM BED TO CHAIR: A LOT
TURNING FROM BACK TO SIDE WHILE IN FLAT BAD: A LITTLE
PERSONAL GROOMING: A LITTLE
DRESSING REGULAR UPPER BODY CLOTHING: A LITTLE
MOBILITY SCORE: 16
STANDING UP FROM CHAIR USING ARMS: A LITTLE
MOVING TO AND FROM BED TO CHAIR: A LITTLE
MOBILITY SCORE: 13
TOILETING: A LITTLE
TURNING FROM BACK TO SIDE WHILE IN FLAT BAD: A LITTLE
STANDING UP FROM CHAIR USING ARMS: A LITTLE
DAILY ACTIVITIY SCORE: 17
PERSONAL GROOMING: A LITTLE
WALKING IN HOSPITAL ROOM: A LOT
EATING MEALS: A LITTLE
MOVING TO AND FROM BED TO CHAIR: A LITTLE
CLIMB 3 TO 5 STEPS WITH RAILING: TOTAL
WALKING IN HOSPITAL ROOM: A LITTLE
CLIMB 3 TO 5 STEPS WITH RAILING: A LITTLE
STANDING UP FROM CHAIR USING ARMS: A LITTLE
DRESSING REGULAR LOWER BODY CLOTHING: A LITTLE
HELP NEEDED FOR BATHING: A LOT
TURNING FROM BACK TO SIDE WHILE IN FLAT BAD: A LITTLE
DRESSING REGULAR UPPER BODY CLOTHING: A LITTLE
STANDING UP FROM CHAIR USING ARMS: A LOT
EATING MEALS: A LITTLE
EATING MEALS: A LITTLE
CLIMB 3 TO 5 STEPS WITH RAILING: A LITTLE
TURNING FROM BACK TO SIDE WHILE IN FLAT BAD: A LITTLE
TOILETING: A LITTLE
HELP NEEDED FOR BATHING: A LOT
DAILY ACTIVITIY SCORE: 17
DAILY ACTIVITIY SCORE: 17
HELP NEEDED FOR BATHING: A LOT
CLIMB 3 TO 5 STEPS WITH RAILING: TOTAL
PERSONAL GROOMING: A LITTLE
MOVING TO AND FROM BED TO CHAIR: A LITTLE
MOBILITY SCORE: 19
DRESSING REGULAR UPPER BODY CLOTHING: A LITTLE
MOVING FROM LYING ON BACK TO SITTING ON SIDE OF FLAT BED WITH BEDRAILS: A LITTLE

## 2024-02-23 ASSESSMENT — PAIN SCALES - GENERAL
PAINLEVEL_OUTOF10: 3
PAINLEVEL_OUTOF10: 0 - NO PAIN
PAINLEVEL_OUTOF10: 3
PAINLEVEL_OUTOF10: 0 - NO PAIN

## 2024-02-23 ASSESSMENT — PAIN - FUNCTIONAL ASSESSMENT
PAIN_FUNCTIONAL_ASSESSMENT: 0-10

## 2024-02-23 NOTE — DOCUMENTATION CLARIFICATION NOTE
PATIENT:               TOMASA BALTAZAR  ACCT #:                  [id number]  MRN:                       [id number]  :                       1935  ADMIT DATE:       2024 9:22 AM  DISCH DATE:  RESPONDING PROVIDER #:        [id number]          PROVIDER RESPONSE TEXT:    Acute blood loss anemia    CDI QUERY TEXT:    [facility]_Anemia Specificity        Instruction:    Based on your assessment of the patient and the clinical information, please provide the requested documentation by clicking on the appropriate radio button and enter any additional information if prompted.    Question: Please further clarify the diagnosis of anemia as          When answering this query, please exercise your independent professional judgment. The fact that a question is being asked, does not imply that any particular answer is desired or expected.    The patient's clinical indicators include:  Clinical Information:  89F with PMH HTN, HLD, CKD, CAD s/p CABG, A-fib, aortic stenosis, and PAD, s/p multiple interventions, the most recent of which was a L femoral endart on 2024, who initially presented to [facility] yesterday after a fall with headstrike without LOC, and is also with worsening L foot wounds even after revascularization in January, for which vascular surgery consulted.  Concern for arterial occlusion right foot.    Clinical Indicators:   Vascular surgery: Plan OR  for LLE angiogram     OR- 50 ml blood loss     General surgery note:  Labs reviewed, post-op Hgb 8.3.  Hgb trend    10.4 and 8.3    7.2       7.7  This AM right groin access site dressing have small amount of strikethrough but right groin site appears hemostatic and without hematoma.  Transfusing 1u PRBC     Internal Medicine:  Anemia  Hg 7.2 from 8.3 , likely d/t post op  -Transfuse 1u PRBC to optimize prior to anticipated procedure     Treatment:  blood transfusion x1, IVF, O2, ferrous sulfate  Risk Factors:  surgical  procedure  Options provided:  -- Acute blood loss anemia  -- Other - I will add my own diagnosis  -- Refer to Clinical Documentation Reviewer    Query created by: Pricila Henley on 2/23/2024 10:47 AM      Electronically signed by:  RAHUL COLON DO 2/23/2024 1:22 PM

## 2024-02-23 NOTE — PROGRESS NOTES
Subjective   NAEON. Pt comfortable this morning. Denies f/c/n/v/cp/sob. States she intermittently gets mild pain in her foot. States she vomited once yesterday but only because she was overwhelmed with everything going on. Endorses diarrhea, stating it occurs every 2 hours. Concerned she might have to lose her foot. No other concerns this morning.    Of note, on rounds, discussed thoroughly with patient the recommendation for L foot amputation. She understood the risk for foregoing the amputation and treating with antibiotics. Pt appeared to consider amputation, but would like more time to discuss it over with family.       Objective   Patient Vitals for the past 24 hrs:   BP Temp Temp src Pulse Resp SpO2 Weight   02/23/24 1356 (!) 101/49 36.6 °C (97.9 °F) Temporal 86 16 100 % --   02/23/24 0527 (!) 105/42 36.4 °C (97.5 °F) -- 68 -- 94 % (!) 44.8 kg (98 lb 12.3 oz)   02/22/24 2137 (!) 114/48 36.4 °C (97.5 °F) Temporal 69 15 99 % --   02/22/24 1852 (!) 106/45 36.8 °C (98.3 °F) Temporal 68 16 100 % --   02/22/24 1752 (!) 106/42 36.8 °C (98.3 °F) Temporal 71 16 99 % --   02/22/24 1707 (!) 99/43 36.6 °C (97.9 °F) Temporal 70 20 93 % --   02/22/24 1652 (!) 98/39 36.4 °C (97.5 °F) Temporal 73 18 100 % --   02/22/24 1445 91/68 -- Temporal -- -- -- --     Results for orders placed or performed during the hospital encounter of 02/17/24 (from the past 24 hour(s))   Heparin Assay, UFH   Result Value Ref Range    Heparin Unfractionated 1.0 See Comment Below for Therapeutic Ranges IU/mL   CBC and Auto Differential   Result Value Ref Range    WBC 16.4 (H) 4.4 - 11.3 x10*3/uL    nRBC 0.1 (H) 0.0 - 0.0 /100 WBCs    RBC 2.42 (L) 4.00 - 5.20 x10*6/uL    Hemoglobin 7.7 (L) 12.0 - 16.0 g/dL    Hematocrit 23.8 (L) 36.0 - 46.0 %    MCV 98 80 - 100 fL    MCH 31.8 26.0 - 34.0 pg    MCHC 32.4 32.0 - 36.0 g/dL    RDW 16.8 (H) 11.5 - 14.5 %    Platelets 202 150 - 450 x10*3/uL    Neutrophils % 73.0 40.0 - 80.0 %    Immature Granulocytes %,  Automated 0.7 0.0 - 0.9 %    Lymphocytes % 15.9 13.0 - 44.0 %    Monocytes % 10.3 2.0 - 10.0 %    Eosinophils % 0.0 0.0 - 6.0 %    Basophils % 0.1 0.0 - 2.0 %    Neutrophils Absolute 11.98 (H) 1.60 - 5.50 x10*3/uL    Immature Granulocytes Absolute, Automated 0.11 0.00 - 0.50 x10*3/uL    Lymphocytes Absolute 2.62 0.80 - 3.00 x10*3/uL    Monocytes Absolute 1.70 (H) 0.05 - 0.80 x10*3/uL    Eosinophils Absolute 0.00 0.00 - 0.40 x10*3/uL    Basophils Absolute 0.02 0.00 - 0.10 x10*3/uL   aPTT - baseline   Result Value Ref Range    aPTT 28 27 - 38 seconds   Heparin Assay   Result Value Ref Range    Heparin Unfractionated <0.1 See Comment Below for Therapeutic Ranges IU/mL   Vancomycin   Result Value Ref Range    Vancomycin 22.2 (H) 5.0 - 20.0 ug/mL   Heparin Assay, UFH   Result Value Ref Range    Heparin Unfractionated 1.4 (HH) See Comment Below for Therapeutic Ranges IU/mL   Heparin Assay, UFH   Result Value Ref Range    Heparin Unfractionated 0.8 See Comment Below for Therapeutic Ranges IU/mL       Gen: well-appearing, NAD, thin  Head and neck: NCAT, neck supple without LAD  HEENT: MMM, normal nose without congestion  Pulm: normal WOB on RA  Ext: feet warm, but absent PT pulse on L with doppler  -LLE: ulcerative lesion on the posterior aspect of the left ankle  Neuro: alert and oriented x3, normal tone, face symmetric, moves all extremities spontaneously    Assessment/Plan   88yo with PMH HTN, HLD, CKD, CAD s/p CABG x3 1995, vertebral artery syndrome s/p L PICA coiling and stent, Afib (not on AC), aortic stenosis s/p AVR 2011, PAD s/p multiple interventions, the most recent of which was a L femoral endartecomy on 1/18/24 at [facility], chronic L foot wound (follows with wound care) who initially presented to [place] after mechanical fall at home found on imaging to have c/f L foot OM with diminished pulses of LLE DP on exam transferred to [facility] for further evaluation. Currently treating for OM with vanc/zosyn.  Vascular  surgery and podiatry following, will need to determine the salvageability of the foot before proceeding with any surgical interventions most likely.  She appears well and is hemodynamically stable, no signs of sepsis.    Updates 2/23:  -No further plan for OR with vascular surgery, recommendation is for L foot amputation, given poor viability of achilles tendon  -Ongoing discussion with patient, family, regarding amputation  -Hg improved to 7.7 from 7.2  -C-diff testing d/t diarrhea, likely just side effect from abx    Updates 2/22:  -Vascular sx planning for repeat procedure 2/22 w/ lithotripsy  -NPO@MN  -Transfusing 1u PRBC    #Chronic L foot wound c/f OM  #PAD s/p L femoral endarectomy on 1/18/24   #likely OM of L foot/ankle  ::MRI L foot and ankle: Evidence c/w osteomyelitis of 4th/5th metatarsal and calcaneal tuberosity, edema of distal soleus c/w myositis  ::Cultures NGTD  -vasc surgery following:   -2/21: S/p LLE angiogram, s/p L EIA balloon angioplasty and stent, SFA balloon angioplasty and stent   -Vascular surgery and podiatry discussed further procedures for this pt. Since the achilles tendon is not salvageable, pt would require removal of tendon and she would not be able to walk following procedure. D/t this, vascular sx will not operate on leg given recommendation for BKA   -Plan for ongoing discussion with pt and family regarding BKA recommendation  -C/w Vanc/Zosyn  -Heparin gtt    #LUCRETIA, resolving  -prerenal most likely, improving  -continue fluid resuscitation   -hold home antihypertensives (hydrochlorothiazide, ana, valsartan) for now for soft BP    #Afib  -CHADSVASc 5  -continue home atenolol  -reportedly not on AC at home, but dc summary from Sept notes warfarin. No warfarin on fill history on EMR or on dc summary from Jan admission. She did have high INR on arrival in Jan.    #Depression - c/w home meds    #Anemia  ::Hg 7.2 from 8.3 2/22, likely d/t post op  ::S/p 1u PRBC 2/22, Hg improvement  7.2->7.7  -CTM CBCs, transfuse Hg<7    N: Cardiac diet  DVT Ppx: Heparin sc  GI Ppx: -  Access/Lines: PIV   Abx: Vanc/Zosyn  O2: None    Pain regimen: Tylenol prn  GI Laxative: Miralax   PT/OT: ARF     Code status: Full Code  Emergency contact: Vicenta Gordon (sister-in-law) [phone]    Arnoldo David DO  Neurology PGY-1

## 2024-02-23 NOTE — PROGRESS NOTES
Physical Therapy    Physical Therapy Treatment    Patient Name: Maggi Gordon  MRN: [id number]  Today's Date: 2/23/2024  Time Calculation  Start Time: 1512  Stop Time: 1538  Time Calculation (min): 26 min       Assessment/Plan   PT Assessment  End of Session Communication: Bedside nurse  Assessment Comment: Pt. required time and reassurance discussing upcoming surgery. Pt. appears distracted and slightly anxious. Pt. returned to supine to rest.  PT Plan  Inpatient/Swing Bed or Outpatient: Inpatient  PT Plan  Treatment/Interventions: Bed mobility, Transfer training, Gait training, Balance training, Strengthening, Endurance training, Therapeutic exercise, Therapeutic activity, Home exercise program  PT Plan: Skilled PT  PT Frequency: 5 times per week  PT Discharge Recommendations: High intensity level of continued care  Equipment Recommended upon Discharge:  (none)  PT Recommended Transfer Status: Assist x1  PT - OK to Discharge: Yes      General Visit Information:   PT  Visit  PT Received On: 02/23/24  General  Family/Caregiver Present: No  Prior to Session Communication: Bedside nurse  General Comment: Pt. supine in bed with left le hanging off bed- Pt. appears distracted and down due to finding out that she is going to have to have a amputation. (Pt. appeared distracted and admitted feeling uneasy. Attempted to reassure.)    Subjective   Precautions:     Vital Signs:       Objective   Pain:  Pain Assessment  Pain Assessment: 0-10  Pain Score: 3  Cognition:     Postural Control:  Static Sitting Balance  Static Sitting-Balance Support: Bilateral upper extremity supported, Feet supported  Static Sitting-Level of Assistance: Close supervision  Extremity/Trunk Assessments:                      Activity Tolerance:  Activity Tolerance  Endurance: Decreased tolerance for upright activites  Treatments:  Therapeutic Exercise  Therapeutic Exercise Performed: Yes  Therapeutic Exercise Activity 1: Pt. performed seated bilat le ex  LAQ'S X 15 REPS. Pt. performed bilat ue arm circles x 10 reps.    Therapeutic Activity  Therapeutic Activity Performed: Yes  Therapeutic Activity 1: Pt. worked on static standing and was encouraged to take side steps.    Bed Mobility  Bed Mobility: Yes  Bed Mobility 1  Bed Mobility 1: Supine to sitting  Level of Assistance 1: Minimum assistance  Bed Mobility Comments 1: Increased time and struggle to get to side of bed - assist with bilat le's.    Transfers  Transfer: Yes  Transfer 1  Transfer From 1: Bed to  Transfer to 1: Stand  Trials/Comments 1: Pt. required mod assist to stand due to decreased strength and decreased focus.    Outcome Measures:  [facility] Basic Mobility  Turning from your back to your side while in a flat bed without using bedrails: A little  Moving from lying on your back to sitting on the side of a flat bed without using bedrails: A little  Moving to and from bed to chair (including a wheelchair): A lot  Standing up from a chair using your arms (e.g. wheelchair or bedside chair): A lot  To walk in hospital room: A lot  Climbing 3-5 steps with railing: Total  Basic Mobility - Total Score: 13    Education Documentation  Precautions, taught by Casi Donovan PTA at 2/23/2024  3:53 PM.  Learner: Patient  Readiness: Acceptance  Method: Demonstration, Explanation  Response: Needs Reinforcement    Mobility Training, taught by Casi Donovan PTA at 2/23/2024  3:53 PM.  Learner: Patient  Readiness: Acceptance  Method: Demonstration, Explanation  Response: Needs Reinforcement    Education Comments  No comments found.        OP EDUCATION:       Encounter Problems       Encounter Problems (Active)       Balance       STG - Maintains supervision assistance dynamic standing balance with upper extremity support using a wheeled walker. (Progressing)       Start:  02/18/24    Expected End:  03/03/24            STG - Maintains supervision assistance static standing balance with upper extremity support using a  wheeled walker. (Progressing)       Start:  02/18/24    Expected End:  03/03/24               Mobility       STG - Patient will ambulate 125ft using a wheeled walker with supervision assistance. (Progressing)       Start:  02/18/24    Expected End:  03/03/24               Pain - Adult          Transfers       STG - Transfer from bed to chair using a wheeled walker with supervision assistance. (Progressing)       Start:  02/18/24    Expected End:  03/03/24            STG - Patient to transfer to and from sit to supine with supervision assistance. (Progressing)       Start:  02/18/24    Expected End:  03/03/24            STG - Patient will transfer sit to and from stand using a wheeled walker with supervision assistance. (Progressing)       Start:  02/18/24    Expected End:  03/03/24

## 2024-02-23 NOTE — OP NOTE
Left lower extremity angiography, EIA stent, SFA stent     Date: 2024  OR Location: [facility] OR    Name: Maggi Gordon, : 1935, Age: 89 y.o., MRN: [id number], Sex: female    Diagnosis  Pre-op Diagnosis     * Osteomyelitis of left ankle, unspecified type (CMS/HCC) [M86.9] Post-op Diagnosis     * Osteomyelitis of left ankle, unspecified type (CMS/HCC) [M86.9]     Procedures  Ultrasound guided access of the right common femoral artery  Selective angiography of the left lower extremity  Left external iliac artery stent  Left superficial femoral artery stent  Left popliteal artery angioplasty    Surgeons      * Pura Montes - Primary    Resident/Fellow/Other Assistant:  Surgeon(s) and Role:     * John Carl MD - Resident - Assisting     * Jose Garcia MD - Fellow    Procedure Summary  Anesthesia: Consult  ASA: III  Anesthesia Staff: Anesthesiologist: Antonio Frey MD; Nasim Patterson MD; Elina Lau MD  CRNA: ALEXIS Guy-CRNA  Anesthesia Resident: Lyn Arnold MD; Zachary Galeas MD  Estimated Blood Loss: 100 mL  Intra-op Medications:   Administrations occurring from  to  on 24:   Medication Name Total Dose   lidocaine (Xylocaine) 10 mg/mL (1 %) injection 9 mL   heparin (porcine) 5,000 Units in sodium chloride 0.9% 100 mL irrigation 5,000 Units   sodium chloride 0.9 % irrigation solution 1,000 mL   acetaminophen (Tylenol) tablet 975 mg Cannot be calculated   aspirin EC tablet 81 mg Cannot be calculated   atenolol (Tenormin) tablet 50 mg Cannot be calculated   azelastine (Astelin) 137 mcg (0.1 %) nasal spray 2 spray Cannot be calculated   biotin capsule 10 mg Cannot be calculated   cholecalciferol (Vitamin D-3) tablet 2,000 Units Cannot be calculated   clopidogrel (Plavix) tablet 75 mg Cannot be calculated   escitalopram (Lexapro) tablet 10 mg Cannot be calculated   folic acid (Folvite) tablet 5 mg Cannot be calculated   ipratropium (Atrovent) 21  mcg (0.03 %) nasal spray 2 spray Cannot be calculated   multivitamin with minerals 1 tablet Cannot be calculated   piperacillin-tazobactam-dextrose (Zosyn) IV 2.25 g Cannot be calculated   polyethylene glycol (Glycolax, Miralax) packet 17 g Cannot be calculated   rosuvastatin (Crestor) tablet 10 mg Cannot be calculated   vancomycin (Vancocin) placeholder Cannot be calculated   heparin (porcine) injection 5,000 Units Cannot be calculated              Anesthesia Record               Intraprocedure I/O Totals          Intake    lactated Ringer's 600.00 mL    Total Intake 600 mL       Output    Est. Blood Loss 50 mL    Total Output 50 mL       Net    Net Volume 550 mL          Specimen: No specimens collected     Staff:   Circulator: Evie Hollingsworth RN; Sukhdeep Deras RN  Relief Circulator: Janessa Miner RN  Scrub Person: Josefina Crespo         Drains and/or Catheters: * None in log *    Tourniquet Times:         Implants:  Implants       Type Name Action Serial No.      Stent STENT SYSTEM, EVERFLEX, 6 X 40 X 120CM - [id number] Implanted      Stent STENT GRAFT, ENDOPROSTHESIS, VIABAHN, W/MARKERS,HEPARIN, 5 X 5 X 120 - [id number] - [id number] Implanted [id number]     Stent STENT SYSTEM, EVERFLEX, 6 X 120 X120CM - [id number] Implanted      Stent STENT SYSTEM, EVERFLEX, 6 X 120 X120CM - [id number] Implanted      Stent STENT SYSTEM, EVERFLEX, 6 X 150 X 120CM - [id number] Implanted               Findings: SFA and popliteal long segment occlusion    Indications: Maggi Gordon is an 89 y.o. female who is having surgery for Osteomyelitis of left ankle, unspecified type (CMS/HCC) [M86.9]. Patient previously underwent left femoral artery endarterectomy with profundaplasty 1 month ago. She presented to the ED after fall x2 and found to have worsening left achilles tendon wound with necrosis of the tendon. She presents for attempted revascularization to maximize salvageability attempt of her left foot.    The patient was seen in the  preoperative area. The risks, benefits, complications, treatment options, non-operative alternatives, expected recovery and outcomes were discussed with the patient. The possibilities of reaction to medication, pulmonary aspiration, injury to surrounding structures, bleeding, recurrent infection, the need for additional procedures, failure to diagnose a condition, and creating a complication requiring transfusion or operation were discussed with the patient. The patient concurred with the proposed plan, giving informed consent.  The site of surgery was properly noted/marked if necessary per policy. The patient has been actively warmed in preoperative area. Preoperative antibiotics have been ordered and given within 1 hours of incision. Venous thrombosis prophylaxis have been ordered including chemical prophylaxis    Procedure Details:   Local anesthesia injected into the right groin. Ultrasound guided access of the right common femoral artery was performed using a micropuncture access kit taking care to avoid the calcifications. A 5F sheath was inserted and flushed. Bentson wire and angled glidecath used to go up and over the aortic bifurcation. Bentson wire placed distally and sheath exchanged for a 6F Destination sheath placed at the proximal left external iliac artery. Patient was systemically heparinized and serial ACT > 250 checked.    Selective angiography of the left lower extremity performed prior to intervention via hand injections through the sheath. This was significant for an in stent stenosis of the left EIA stent, widely patent left CFA, patent profunda, small proximal stump of SFA that had been surgically created, long segment SFA and popliteal occlusion with reconstitution of the below knee popliteal artery. The AT occludes shortly after its takeoff. The peroneal goes to the ankle, and PT is the dominant flow into the foot.    Quickcross catheter and angled glidewire used to cross the femoropopliteal  occlusion with fluoroscopy and hand injection to confirm true lumen re-entry. Wire exchanged for a Command 18. A 2.5 mm nanocross balloon was used to predilate this entire segment but the distal SFA and popliteal lesions were extremely bulky and calcific. Repeat angiography did demonstrate improvement with patent channel but with spiral dissection in the popliteal area and severe stenosis in the distal SFA/popliteal segment. Lesions were dilated up to 3 mm with a nanocross balloon. However, the lesion was unable to accommodate a 4 mm Evercross balloon. At this point, adjunct maneuvers with wire escalation and additional co-axial support failed to deliver the balloon. We opted to stent the EIA and SFA lesions with Everflex stents 6 mm and post dilated with 6 mm Evercross balloon to preserve the channel to return for vessel intravascular lithotripsy. Sheath withdrawn and closed with a 6 Mynx device    Complications:  None; patient tolerated the procedure well.    Disposition: PACU - hemodynamically stable.  Condition: stable         Additional Details: Multiphasic PT    Attending Attestation: I was present and scrubbed for the entire procedure.    Pura Montes  Phone Number: [phone]

## 2024-02-23 NOTE — CONSULTS
"Vancomycin Dosing by Pharmacy- FOLLOW UP    Maggi Gordon is a 89 y.o. year old female who Pharmacy has been consulted for vancomycin dosing for osteomyelitis/septic arthritis. Based on the patient's indication and renal status this patient is being dosed based on a goal trough/random level of 15-20.     Renal function is currently declining.    Current vancomycin dose: 750 mg given once at 1200 pm    Most recent random level: 22.2 mcg/mL    Visit Vitals  BP (!) 105/42   Pulse 68   Temp 36.4 °C (97.5 °F)   Resp 15        Lab Results   Component Value Date    CREATININE 1.30 (H) 02/22/2024    CREATININE 1.57 (H) 02/21/2024    CREATININE 1.23 (H) 02/20/2024    CREATININE 1.40 (H) 02/19/2024        Patient weight is No results found for: \"PTWEIGHT\"    No results found for: \"CULTURE\"     I/O last 3 completed shifts:  In: 1917.7 (42.8 mL/kg) [I.V.:1417.7 (31.6 mL/kg); IV Piggyback:500]  Out: 100 (2.2 mL/kg) [Blood:100]  Weight: 44.8 kg   [unfilled]    No results found for: \"PATIENTTEMP\"     Assessment/Plan    Above goal random/trough level. Orders placed for new vancomycin regimen of 750 mg once  to begin at 2/23    The next level will be obtained on 2/24 at am lab. May be obtained sooner if clinically indicated.   Will continue to monitor renal function daily while on vancomycin and order serum creatinine at least every 48 hours if not already ordered.  Follow for continued vancomycin needs, clinical response, and signs/symptoms of toxicity.       DORI SHEEHAN           "

## 2024-02-23 NOTE — PROGRESS NOTES
[facility]  Vascular Surgery  Progress Note  Maggi Gordon  [id number]  1935     Subjective    POD 2 s/p LLE angiogram and revascularization ( L EIA, SFA, POP stenting) with vascular surgery. She underwent     This AM right groin access site aappears hemostatic, stable,  and without hematoma.    Patient seen at bedside, her pulse exam remains unchanged (strong biphasic signals on LLE) Pain is well-controlled.  Denies f/c, n/v, SOB, CP, or numbness or tingling of extremities. Heparin gtt running in the room.   Hgb 7.7 s/p 1 unit pRBC transfusion yesterday      Objective:     Last Recorded Vitals  Vitals:    02/23/24 0527   BP: (!) 105/42   Pulse: 68   Resp:    Temp: 36.4 °C (97.5 °F)   SpO2: 94%         I/O last 2 completed shifts:  In: 500 (11.2 mL/kg) [IV Piggyback:500]  Out: - (0 mL/kg)   Weight: 44.8 kg      Physical Exam  General: NAD. Nontoxic  HEENT: Atraumatic. MMM. EOMI.   Card: RRR  Pulm: Nonlabored breathing on RA  Chest: No bruising or seatbelt sign. Chest is nontender.  Abdomen: Soft, nontender, nondistended. No bruising.  Extremities: MAEx4. Warm bilaterally. Kerlix wrap covering left foot wounds  Neuro: No focal deficits.  Pulses:  -biphasic  DP and PT signals in bilateral lower extremities  -no palpable masses or fluid collections in bilateral groins  -bilateral feet warm and well-perfused  -compartments soft and compressible in bilateral lower extremities  -no sensory or motor deficits in all extremities  -palpable radial pulses bilaterally    Relevant Results  Lab Results   Component Value Date    WBC 16.4 (H) 02/22/2024    HGB 7.7 (L) 02/22/2024    HCT 23.8 (L) 02/22/2024    MCV 98 02/22/2024     02/22/2024     Lab Results   Component Value Date    GLUCOSE 154 (H) 02/22/2024    CALCIUM 8.6 02/22/2024     02/22/2024    K 4.4 02/22/2024    CO2 25 02/22/2024     02/22/2024    BUN 29 (H) 02/22/2024    CREATININE 1.30 (H) 02/22/2024           Assessment/Plan   Ms. Maggi Gordon is a 89 y.o. 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      Pre-op exam: Right foot biphasic signals, Left foot biphasic PT, absent DP  Post-op exam: Right foot biphasic signals, Left foot biphasic signals     Plan/ recommendations:  - q4hr vitals and vasc checks on the floor  - Pain control per primary team  - Maintenance IVF until tolerating appropriate oral intake  - can we UOOB and weight bearing as tolerated  - Discontinue heparin drip ( high intensity protocol ), no further need at this time given patient on DAPT  - Continue daily labs  - Continue home BP meds  - Continue ASA and plavix   - regular diet okay  - ongoing planning for surgical intervention, patient will likely need BKA (guillotine BKA if WBC remains elevated) given her achilles is nonsalvagable per podiatry. Patient amenable to BKA, will need to have discussion with family members as well.  -Remainder of care per primary team       Discussed with attending surgeon Dr. Montes.     Alta Alfred MD  Vascular Surgery, PGY1  Team Pager: [phone]

## 2024-02-23 NOTE — CARE PLAN
Patient remained safe and free and falls and/or injury during this shift. Patient NPO discontinued; placed on diet, however, continued poor PO intake. Patient participating in every 2 hour turns with heels elevated off of bed.     Heparin assays continue to be non-therapeutic with draws throughout the mornin: 1.4 for heparin assay, held for one hour and redrawn per protocol/order  1140: 0.8 for heparin assay, rate decreased to a rate of 7 ml/hr per protocol/order with redraw to occur at 1630.    Vancomycin trough drawn this shift -- 22.2; provider aware. Antibiotics administered per orders.     The clinical goals for the shift include patient will have two therapeutic heparin assays by the end of this shift. Goal not met. Ongoing. Patient now running at a rate of 7 ml/hr based off of previous assay results.    Patient plan of care ongoing.    Mattie PIERCE, RN, CMSRN      Problem: Fall/Injury  Goal: Not fall by end of shift  Outcome: Progressing  Goal: Be free from injury by end of the shift  Outcome: Progressing  Goal: Verbalize understanding of personal risk factors for fall in the hospital  Outcome: Progressing     Problem: Skin  Goal: Decreased wound size/increased tissue granulation at next dressing change  Outcome: Progressing  Flowsheets (Taken 2024 111)  Decreased wound size/increased tissue granulation at next dressing change:   Promote sleep for wound healing   Protective dressings over bony prominences   Utilize specialty bed per algorithm  Goal: Participates in plan/prevention/treatment measures  Outcome: Progressing  Flowsheets (Taken 2024 1118)  Participates in plan/prevention/treatment measures:   Discuss with provider PT/OT consult   Increase activity/out of bed for meals   Elevate heels  Goal: Prevent/manage excess moisture  Outcome: Progressing  Flowsheets (Taken 2024 111)  Prevent/manage excess moisture:   Cleanse incontinence/protect with barrier cream   Moisturize  dry skin   Follow provider orders for dressing changes   Monitor for/manage infection if present  Goal: Prevent/minimize sheer/friction injuries  Outcome: Progressing  Flowsheets (Taken 2/23/2024 1118)  Prevent/minimize sheer/friction injuries:   Complete micro-shifts as needed if patient unable. Adjust patient position to relieve pressure points, not a full turn   Increase activity/out of bed for meals   HOB 30 degrees or less   Turn/reposition every 2 hours/use positioning/transfer devices   Use pull sheet  Goal: Promote/optimize nutrition  Outcome: Progressing  Flowsheets (Taken 2/23/2024 1118)  Promote/optimize nutrition:   Assist with feeding   Discuss with provider if NPO > 2 days   Offer water/supplements/favorite foods   Consume > 50% meals/supplements   Monitor/record intake including meals   Reassess MST if dietician not consulted  Goal: Promote skin healing  Outcome: Progressing  Flowsheets (Taken 2/23/2024 1118)  Promote skin healing:   Ensure correct size (line/device) and apply per  instructions   Assess skin/pad under line(s)/device(s)   Protective dressings over bony prominences   Rotate device position/do not position patient on device   Turn/reposition every 2 hours/use positioning/transfer devices     Problem: Diabetes  Goal: Achieve decreasing blood glucose levels by end of shift  Outcome: Progressing  Goal: Increase stability of blood glucose readings by end of shift  Outcome: Progressing  Goal: Decrease in ketones present in urine by end of shift  Outcome: Progressing  Goal: Maintain electrolyte levels within acceptable range throughout shift  Outcome: Progressing  Goal: Maintain glucose levels >70mg/dl to <250mg/dl throughout shift  Outcome: Progressing  Goal: No changes in neurological exam by end of shift  Outcome: Progressing  Goal: Learn about and adhere to nutrition recommendations by end of shift  Outcome: Progressing  Goal: Vital signs within normal range for age by end of  shift  Outcome: Progressing  Goal: Increase self care and/or family involovement by end of shift  Outcome: Progressing  Goal: Receive DSME education by end of shift  Outcome: Progressing     Problem: Pain - Adult  Goal: Verbalizes/displays adequate comfort level or baseline comfort level  Outcome: Progressing     Problem: Safety - Adult  Goal: Free from fall injury  Outcome: Progressing     Problem: Discharge Planning  Goal: Discharge to home or other facility with appropriate resources  Outcome: Progressing     Problem: Chronic Conditions and Co-morbidities  Goal: Patient's chronic conditions and co-morbidity symptoms are monitored and maintained or improved  Outcome: Progressing

## 2024-02-24 LAB
ALBUMIN SERPL BCP-MCNC: 2.7 G/DL (ref 3.4–5)
ANION GAP SERPL CALC-SCNC: 11 MMOL/L (ref 10–20)
BASOPHILS # BLD AUTO: 0.02 X10*3/UL (ref 0–0.1)
BASOPHILS NFR BLD AUTO: 0.2 %
BLOOD EXPIRATION DATE: NORMAL
BLOOD EXPIRATION DATE: NORMAL
BUN SERPL-MCNC: 34 MG/DL (ref 6–23)
CALCIUM SERPL-MCNC: 8.5 MG/DL (ref 8.6–10.6)
CHLORIDE SERPL-SCNC: 106 MMOL/L (ref 98–107)
CO2 SERPL-SCNC: 26 MMOL/L (ref 21–32)
CREAT SERPL-MCNC: 1.3 MG/DL (ref 0.5–1.05)
DISPENSE STATUS: NORMAL
DISPENSE STATUS: NORMAL
EGFRCR SERPLBLD CKD-EPI 2021: 40 ML/MIN/1.73M*2
EOSINOPHIL # BLD AUTO: 0.07 X10*3/UL (ref 0–0.4)
EOSINOPHIL NFR BLD AUTO: 0.6 %
ERYTHROCYTE [DISTWIDTH] IN BLOOD BY AUTOMATED COUNT: 15.9 % (ref 11.5–14.5)
ERYTHROCYTE [DISTWIDTH] IN BLOOD BY AUTOMATED COUNT: 16 % (ref 11.5–14.5)
GLUCOSE SERPL-MCNC: 115 MG/DL (ref 74–99)
HCT VFR BLD AUTO: 21.6 % (ref 36–46)
HCT VFR BLD AUTO: 23.4 % (ref 36–46)
HGB BLD-MCNC: 7.3 G/DL (ref 12–16)
HGB BLD-MCNC: 7.5 G/DL (ref 12–16)
IMM GRANULOCYTES # BLD AUTO: 0.09 X10*3/UL (ref 0–0.5)
IMM GRANULOCYTES NFR BLD AUTO: 0.8 % (ref 0–0.9)
LYMPHOCYTES # BLD AUTO: 1.97 X10*3/UL (ref 0.8–3)
LYMPHOCYTES NFR BLD AUTO: 17.2 %
MAGNESIUM SERPL-MCNC: 1.85 MG/DL (ref 1.6–2.4)
MCH RBC QN AUTO: 31.9 PG (ref 26–34)
MCH RBC QN AUTO: 32.2 PG (ref 26–34)
MCHC RBC AUTO-ENTMCNC: 32.1 G/DL (ref 32–36)
MCHC RBC AUTO-ENTMCNC: 33.8 G/DL (ref 32–36)
MCV RBC AUTO: 100 FL (ref 80–100)
MCV RBC AUTO: 95 FL (ref 80–100)
MONOCYTES # BLD AUTO: 1.14 X10*3/UL (ref 0.05–0.8)
MONOCYTES NFR BLD AUTO: 9.9 %
NEUTROPHILS # BLD AUTO: 8.17 X10*3/UL (ref 1.6–5.5)
NEUTROPHILS NFR BLD AUTO: 71.3 %
NRBC BLD-RTO: 0.2 /100 WBCS (ref 0–0)
NRBC BLD-RTO: 0.3 /100 WBCS (ref 0–0)
PHOSPHATE SERPL-MCNC: 2.3 MG/DL (ref 2.5–4.9)
PLATELET # BLD AUTO: 122 X10*3/UL (ref 150–450)
PLATELET # BLD AUTO: 144 X10*3/UL (ref 150–450)
POTASSIUM SERPL-SCNC: 3.8 MMOL/L (ref 3.5–5.3)
PRODUCT BLOOD TYPE: 6200
PRODUCT BLOOD TYPE: 6200
PRODUCT CODE: NORMAL
PRODUCT CODE: NORMAL
RBC # BLD AUTO: 2.27 X10*6/UL (ref 4–5.2)
RBC # BLD AUTO: 2.35 X10*6/UL (ref 4–5.2)
SODIUM SERPL-SCNC: 139 MMOL/L (ref 136–145)
UNIT ABO: NORMAL
UNIT ABO: NORMAL
UNIT NUMBER: NORMAL
UNIT NUMBER: NORMAL
UNIT RH: NORMAL
UNIT RH: NORMAL
UNIT VOLUME: 350
UNIT VOLUME: 350
VANCOMYCIN SERPL-MCNC: 23.7 UG/ML (ref 5–20)
WBC # BLD AUTO: 11.5 X10*3/UL (ref 4.4–11.3)
WBC # BLD AUTO: 13 X10*3/UL (ref 4.4–11.3)
XM INTEP: NORMAL
XM INTEP: NORMAL

## 2024-02-24 PROCEDURE — 2500000002 HC RX 250 W HCPCS SELF ADMINISTERED DRUGS (ALT 637 FOR MEDICARE OP, ALT 636 FOR OP/ED)

## 2024-02-24 PROCEDURE — 85025 COMPLETE CBC W/AUTO DIFF WBC: CPT

## 2024-02-24 PROCEDURE — 80202 ASSAY OF VANCOMYCIN: CPT | Performed by: INTERNAL MEDICINE

## 2024-02-24 PROCEDURE — 83735 ASSAY OF MAGNESIUM: CPT

## 2024-02-24 PROCEDURE — 36430 TRANSFUSION BLD/BLD COMPNT: CPT

## 2024-02-24 PROCEDURE — 2500000004 HC RX 250 GENERAL PHARMACY W/ HCPCS (ALT 636 FOR OP/ED)

## 2024-02-24 PROCEDURE — 2500000005 HC RX 250 GENERAL PHARMACY W/O HCPCS

## 2024-02-24 PROCEDURE — 80069 RENAL FUNCTION PANEL: CPT

## 2024-02-24 PROCEDURE — 99232 SBSQ HOSP IP/OBS MODERATE 35: CPT

## 2024-02-24 PROCEDURE — 85027 COMPLETE CBC AUTOMATED: CPT

## 2024-02-24 PROCEDURE — P9016 RBC LEUKOCYTES REDUCED: HCPCS

## 2024-02-24 PROCEDURE — 1100000001 HC PRIVATE ROOM DAILY

## 2024-02-24 PROCEDURE — 2500000001 HC RX 250 WO HCPCS SELF ADMINISTERED DRUGS (ALT 637 FOR MEDICARE OP)

## 2024-02-24 RX ORDER — SODIUM CHLORIDE, SODIUM LACTATE, POTASSIUM CHLORIDE, CALCIUM CHLORIDE 600; 310; 30; 20 MG/100ML; MG/100ML; MG/100ML; MG/100ML
40 INJECTION, SOLUTION INTRAVENOUS CONTINUOUS
Status: DISCONTINUED | OUTPATIENT
Start: 2024-02-26 | End: 2024-02-28

## 2024-02-24 RX ADMIN — Medication 2000 UNITS: at 09:31

## 2024-02-24 RX ADMIN — Medication 1 TABLET: at 09:29

## 2024-02-24 RX ADMIN — PIPERACILLIN SODIUM AND TAZOBACTAM SODIUM 2.25 G: 2; .25 INJECTION, SOLUTION INTRAVENOUS at 22:05

## 2024-02-24 RX ADMIN — ASPIRIN 81 MG: 81 TABLET, COATED ORAL at 09:29

## 2024-02-24 RX ADMIN — Medication 10 MG: at 09:29

## 2024-02-24 RX ADMIN — HEPARIN SODIUM 5000 UNITS: 5000 INJECTION INTRAVENOUS; SUBCUTANEOUS at 17:11

## 2024-02-24 RX ADMIN — FOLIC ACID 5 MG: 1 TABLET ORAL at 09:29

## 2024-02-24 RX ADMIN — HEPARIN SODIUM 5000 UNITS: 5000 INJECTION INTRAVENOUS; SUBCUTANEOUS at 09:32

## 2024-02-24 RX ADMIN — ROSUVASTATIN CALCIUM 10 MG: 10 TABLET, FILM COATED ORAL at 20:32

## 2024-02-24 RX ADMIN — IPRATROPIUM BROMIDE 2 SPRAY: 21 SPRAY, METERED NASAL at 20:33

## 2024-02-24 RX ADMIN — AZELASTINE HYDROCHLORIDE 2 SPRAY: 137 SPRAY, METERED NASAL at 09:32

## 2024-02-24 RX ADMIN — PIPERACILLIN SODIUM AND TAZOBACTAM SODIUM 2.25 G: 2; .25 INJECTION, SOLUTION INTRAVENOUS at 04:52

## 2024-02-24 RX ADMIN — PIPERACILLIN SODIUM AND TAZOBACTAM SODIUM 2.25 G: 2; .25 INJECTION, SOLUTION INTRAVENOUS at 17:11

## 2024-02-24 RX ADMIN — ATENOLOL 50 MG: 50 TABLET ORAL at 20:32

## 2024-02-24 RX ADMIN — ESCITALOPRAM OXALATE 10 MG: 10 TABLET ORAL at 09:31

## 2024-02-24 RX ADMIN — ACETAMINOPHEN 975 MG: 325 TABLET ORAL at 02:15

## 2024-02-24 RX ADMIN — PIPERACILLIN SODIUM AND TAZOBACTAM SODIUM 2.25 G: 2; .25 INJECTION, SOLUTION INTRAVENOUS at 09:31

## 2024-02-24 RX ADMIN — HEPARIN SODIUM 5000 UNITS: 5000 INJECTION INTRAVENOUS; SUBCUTANEOUS at 00:13

## 2024-02-24 RX ADMIN — ACETAMINOPHEN 975 MG: 325 TABLET ORAL at 20:32

## 2024-02-24 RX ADMIN — ATENOLOL 50 MG: 50 TABLET ORAL at 09:31

## 2024-02-24 RX ADMIN — AZELASTINE HYDROCHLORIDE 2 SPRAY: 137 SPRAY, METERED NASAL at 20:33

## 2024-02-24 ASSESSMENT — COGNITIVE AND FUNCTIONAL STATUS - GENERAL
CLIMB 3 TO 5 STEPS WITH RAILING: TOTAL
MOVING TO AND FROM BED TO CHAIR: A LITTLE
CLIMB 3 TO 5 STEPS WITH RAILING: TOTAL
DAILY ACTIVITIY SCORE: 21
PERSONAL GROOMING: A LITTLE
MOBILITY SCORE: 14
DAILY ACTIVITIY SCORE: 20
WALKING IN HOSPITAL ROOM: TOTAL
TOILETING: A LOT
PERSONAL GROOMING: A LITTLE
WALKING IN HOSPITAL ROOM: TOTAL
DRESSING REGULAR LOWER BODY CLOTHING: A LITTLE
STANDING UP FROM CHAIR USING ARMS: A LOT
MOBILITY SCORE: 15
MOVING TO AND FROM BED TO CHAIR: A LITTLE
TOILETING: A LITTLE
HELP NEEDED FOR BATHING: A LITTLE
STANDING UP FROM CHAIR USING ARMS: A LOT
TURNING FROM BACK TO SIDE WHILE IN FLAT BAD: A LITTLE

## 2024-02-24 ASSESSMENT — PAIN SCALES - WONG BAKER: WONGBAKER_NUMERICALRESPONSE: NO HURT

## 2024-02-24 ASSESSMENT — PAIN SCALES - GENERAL
PAINLEVEL_OUTOF10: 0 - NO PAIN
PAINLEVEL_OUTOF10: 0 - NO PAIN

## 2024-02-24 ASSESSMENT — PAIN - FUNCTIONAL ASSESSMENT: PAIN_FUNCTIONAL_ASSESSMENT: 0-10

## 2024-02-24 NOTE — CARE PLAN
Problem: Fall/Injury  Goal: Not fall by end of shift  Outcome: Progressing  Goal: Be free from injury by end of the shift  Outcome: Progressing  Goal: Verbalize understanding of personal risk factors for fall in the hospital  Outcome: Progressing     Problem: Safety - Adult  Goal: Free from fall injury  Outcome: Progressing     The patient's goals for the shift include      The clinical goals for the shift include Pt will remain free from falls and injuries this shift    Over the shift, the patient did make progress toward the following goals. Pt received 1 unit of blood transfusion. No reaction noted.

## 2024-02-24 NOTE — PROGRESS NOTES
Subjective   NAEON. Pt comfortable this morning. Denies f/c/n/v/cp/sob. States she intermittently gets mild pain in her foot. States diarrhea has resolved. No overt signs of bleeding, no lightheadedness or dizziness. No other concerns.    Spoke to the patient this morning before and during rounds regarding future procedures on her foot. She continues to agree with the plan for amputation. Scheduled for amputation on Monday 2/24 with vascular sx.       Objective   Patient Vitals for the past 24 hrs:   BP Temp Temp src Pulse Resp SpO2   02/24/24 0320 109/57 36.4 °C (97.5 °F) Temporal 95 16 96 %   02/24/24 0155 124/62 -- -- 104 16 97 %   02/24/24 0148 124/62 36.5 °C (97.7 °F) Temporal 104 16 96 %   02/24/24 0128 114/54 -- -- 97 17 99 %   02/24/24 0103 (!) 104/48 36.5 °C (97.7 °F) Temporal 94 16 99 %   02/24/24 0053 (!) 104/48 -- -- 94 16 99 %   02/24/24 0048 (!) 106/49 36.4 °C (97.5 °F) Temporal 99 17 100 %   02/23/24 2136 (!) 106/46 36.4 °C (97.5 °F) Temporal 96 17 98 %   02/23/24 1356 (!) 101/49 36.6 °C (97.9 °F) Temporal 86 16 100 %     Results for orders placed or performed during the hospital encounter of 02/17/24 (from the past 24 hour(s))   Heparin Assay, UFH   Result Value Ref Range    Heparin Unfractionated 0.8 See Comment Below for Therapeutic Ranges IU/mL   CBC and Auto Differential   Result Value Ref Range    WBC 14.3 (H) 4.4 - 11.3 x10*3/uL    nRBC 0.2 (H) 0.0 - 0.0 /100 WBCs    RBC 1.99 (L) 4.00 - 5.20 x10*6/uL    Hemoglobin 6.3 (LL) 12.0 - 16.0 g/dL    Hematocrit 19.9 (L) 36.0 - 46.0 %     80 - 100 fL    MCH 31.7 26.0 - 34.0 pg    MCHC 31.7 (L) 32.0 - 36.0 g/dL    RDW 17.1 (H) 11.5 - 14.5 %    Platelets 162 150 - 450 x10*3/uL    Neutrophils % 72.5 40.0 - 80.0 %    Immature Granulocytes %, Automated 0.6 0.0 - 0.9 %    Lymphocytes % 18.2 13.0 - 44.0 %    Monocytes % 8.2 2.0 - 10.0 %    Eosinophils % 0.4 0.0 - 6.0 %    Basophils % 0.1 0.0 - 2.0 %    Neutrophils Absolute 10.36 (H) 1.60 - 5.50 x10*3/uL     Immature Granulocytes Absolute, Automated 0.08 0.00 - 0.50 x10*3/uL    Lymphocytes Absolute 2.61 0.80 - 3.00 x10*3/uL    Monocytes Absolute 1.18 (H) 0.05 - 0.80 x10*3/uL    Eosinophils Absolute 0.06 0.00 - 0.40 x10*3/uL    Basophils Absolute 0.02 0.00 - 0.10 x10*3/uL   Light Blue Top   Result Value Ref Range    Extra Tube Hold for add-ons.    Prepare RBC: 1 Units   Result Value Ref Range    PRODUCT CODE R0309M30     Unit Number [id number]     Unit ABO A     Unit RH POS     XM INTEP COMP     Dispense Status IS     Blood Expiration Date February 29, 2024 23:59 EST     PRODUCT BLOOD TYPE 6200     UNIT VOLUME 350    CBC   Result Value Ref Range    WBC 13.0 (H) 4.4 - 11.3 x10*3/uL    nRBC 0.3 (H) 0.0 - 0.0 /100 WBCs    RBC 2.35 (L) 4.00 - 5.20 x10*6/uL    Hemoglobin 7.5 (L) 12.0 - 16.0 g/dL    Hematocrit 23.4 (L) 36.0 - 46.0 %     80 - 100 fL    MCH 31.9 26.0 - 34.0 pg    MCHC 32.1 32.0 - 36.0 g/dL    RDW 15.9 (H) 11.5 - 14.5 %    Platelets 144 (L) 150 - 450 x10*3/uL   Vancomycin   Result Value Ref Range    Vancomycin 23.7 (H) 5.0 - 20.0 ug/mL   Magnesium   Result Value Ref Range    Magnesium 1.85 1.60 - 2.40 mg/dL   Renal Function Panel   Result Value Ref Range    Glucose 115 (H) 74 - 99 mg/dL    Sodium 139 136 - 145 mmol/L    Potassium 3.8 3.5 - 5.3 mmol/L    Chloride 106 98 - 107 mmol/L    Bicarbonate 26 21 - 32 mmol/L    Anion Gap 11 10 - 20 mmol/L    Urea Nitrogen 34 (H) 6 - 23 mg/dL    Creatinine 1.30 (H) 0.50 - 1.05 mg/dL    eGFR 40 (L) >60 mL/min/1.73m*2    Calcium 8.5 (L) 8.6 - 10.6 mg/dL    Phosphorus 2.3 (L) 2.5 - 4.9 mg/dL    Albumin 2.7 (L) 3.4 - 5.0 g/dL   CBC and Auto Differential   Result Value Ref Range    WBC 11.5 (H) 4.4 - 11.3 x10*3/uL    nRBC 0.2 (H) 0.0 - 0.0 /100 WBCs    RBC 2.27 (L) 4.00 - 5.20 x10*6/uL    Hemoglobin 7.3 (L) 12.0 - 16.0 g/dL    Hematocrit 21.6 (L) 36.0 - 46.0 %    MCV 95 80 - 100 fL    MCH 32.2 26.0 - 34.0 pg    MCHC 33.8 32.0 - 36.0 g/dL    RDW 16.0 (H) 11.5 - 14.5  %    Platelets 122 (L) 150 - 450 x10*3/uL    Neutrophils % 71.3 40.0 - 80.0 %    Immature Granulocytes %, Automated 0.8 0.0 - 0.9 %    Lymphocytes % 17.2 13.0 - 44.0 %    Monocytes % 9.9 2.0 - 10.0 %    Eosinophils % 0.6 0.0 - 6.0 %    Basophils % 0.2 0.0 - 2.0 %    Neutrophils Absolute 8.17 (H) 1.60 - 5.50 x10*3/uL    Immature Granulocytes Absolute, Automated 0.09 0.00 - 0.50 x10*3/uL    Lymphocytes Absolute 1.97 0.80 - 3.00 x10*3/uL    Monocytes Absolute 1.14 (H) 0.05 - 0.80 x10*3/uL    Eosinophils Absolute 0.07 0.00 - 0.40 x10*3/uL    Basophils Absolute 0.02 0.00 - 0.10 x10*3/uL       Gen: well-appearing, NAD, thin  Head and neck: NCAT, neck supple without LAD  HEENT: MMM, normal nose without congestion  Pulm: normal WOB on RA  Ext: feet warm, but absent PT pulse on L with doppler  -LLE: ulcerative lesion on the posterior aspect of the left ankle  Neuro: alert and oriented x3, normal tone, face symmetric, moves all extremities spontaneously    Assessment/Plan   88yo with PMH HTN, HLD, CKD, CAD s/p CABG x3 1995, vertebral artery syndrome s/p L PICA coiling and stent, Afib (not on AC), aortic stenosis s/p AVR 2011, PAD s/p multiple interventions, the most recent of which was a L femoral endartecomy on 1/18/24 at [facility], chronic L foot wound (follows with wound care) who initially presented to [place] after mechanical fall at home found on imaging to have c/f L foot OM with diminished pulses of LLE DP on exam transferred to [facility] for further evaluation. Currently treating for OM with vanc/zosyn.  Vascular surgery and podiatry following, will need to determine the salvageability of the foot before proceeding with any surgical interventions most likely.  She appears well and is hemodynamically stable, no signs of sepsis.    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 Since the achilles tendon is not salvageable, pt would require removal of tendon and she would not be able to walk following procedure. D/t this, vascular sx will not operate on leg given recommendation for BKA   -Plan for L BKA with vascular sx 2/26, pt and family agreeable  -C/w Vanc/Zosyn  -DAPT, holding plavix for procedure 2/26    #LUCRETIA, resolving  -prerenal most likely, improving  -continue fluid resuscitation   -hold home antihypertensives (hydrochlorothiazide, ana, valsartan) for now for soft BP    #Afib  -CHADSVASc 5  -continue home atenolol  -reportedly not on AC at home, but dc summary from Sept notes warfarin. No warfarin on fill history on EMR or on dc summary from Jan admission. She did have high INR on arrival in Jan.    #Depression - c/w home meds    #Anemia  ::S/p 2u PRBC 2/22, 2/23, improved Hg to 7.3 (2/24)  -Transfuse 1u PRBC electively 2/24  -CTM CBCs, transfuse Hg<7    N: Cardiac diet  DVT Ppx: Heparin sc  GI Ppx: -  Access/Lines: PIV   Abx: Vanc/Zosyn  O2: None    Pain regimen: Tylenol prn  GI Laxative: Miralax   PT/OT: ARF     Code status: Full Code  Emergency contact: Vicenta Gordon (sister-in-law) [phone]    Arnoldo David DO  Neurology PGY-1

## 2024-02-24 NOTE — PROGRESS NOTES
Vancomycin Dosing by Pharmacy- FOLLOW UP    Maggi Gordon is a 89 y.o. year old female who Pharmacy has been consulted for vancomycin dosing for osteomyelitis/septic arthritis. Based on the patient's indication and renal status this patient is being dosed based on a goal trough/random level of 15-20.     Renal function unstable - DBL.     Visit Vitals  /57 (BP Location: Right arm, Patient Position: Lying)   Pulse 95   Temp 36.4 °C (97.5 °F) (Temporal)   Resp 16        Lab Results   Component Value Date    CREATININE 1.30 (H) 02/24/2024    CREATININE 1.30 (H) 02/22/2024    CREATININE 1.57 (H) 02/21/2024    CREATININE 1.23 (H) 02/20/2024      I/O last 3 completed shifts:  In: 5704.2 (127.3 mL/kg) [I.V.:2766.3 (61.7 mL/kg); Blood:2738; IV Piggyback:200]  Out: - (0 mL/kg)   Weight: 44.8 kg     Assessment/Plan    2/24 level = 23.7. Hold dose for today.   The next level will be obtained on 2/25 AM labs. May be obtained sooner if clinically indicated.   Will continue to monitor renal function daily while on vancomycin and order serum creatinine at least every 48 hours if not already ordered.  Follow for continued vancomycin needs, clinical response, and signs/symptoms of toxicity.     Shahnaz Dotson, [facility]

## 2024-02-24 NOTE — CARE PLAN
The patient's goals for the shift include      The clinical goals for the shift include patient will have two therapeutic heparin assays by the end of this shift.    Over the shift, the patient did not make progress toward the following goals. Barriers to progression include . Recommendations to address these barriers include   Problem: Fall/Injury  Goal: Not fall by end of shift  Outcome: Progressing  Goal: Be free from injury by end of the shift  Outcome: Progressing  Goal: Verbalize understanding of personal risk factors for fall in the hospital  Outcome: Progressing     Problem: Skin  Goal: Decreased wound size/increased tissue granulation at next dressing change  Outcome: Progressing  Goal: Participates in plan/prevention/treatment measures  Outcome: Progressing  Goal: Prevent/manage excess moisture  Outcome: Progressing  Goal: Prevent/minimize sheer/friction injuries  Outcome: Progressing  Goal: Promote/optimize nutrition  Outcome: Progressing  Goal: Promote skin healing  Outcome: Progressing     Problem: Diabetes  Goal: Achieve decreasing blood glucose levels by end of shift  Outcome: Progressing  Goal: Increase stability of blood glucose readings by end of shift  Outcome: Progressing  Goal: Decrease in ketones present in urine by end of shift  Outcome: Progressing  Goal: Maintain electrolyte levels within acceptable range throughout shift  Outcome: Progressing  Goal: Maintain glucose levels >70mg/dl to <250mg/dl throughout shift  Outcome: Progressing  Goal: No changes in neurological exam by end of shift  Outcome: Progressing  Goal: Learn about and adhere to nutrition recommendations by end of shift  Outcome: Progressing  Goal: Vital signs within normal range for age by end of shift  Outcome: Progressing  Goal: Increase self care and/or family involovement by end of shift  Outcome: Progressing  Goal: Receive DSME education by end of shift  Outcome: Progressing     Problem: Pain - Adult  Goal:  Verbalizes/displays adequate comfort level or baseline comfort level  Outcome: Progressing     Problem: Safety - Adult  Goal: Free from fall injury  Outcome: Progressing     Problem: Discharge Planning  Goal: Discharge to home or other facility with appropriate resources  Outcome: Progressing     Problem: Chronic Conditions and Co-morbidities  Goal: Patient's chronic conditions and co-morbidity symptoms are monitored and maintained or improved  Outcome: Progressing   .

## 2024-02-25 ENCOUNTER — ANESTHESIA EVENT (OUTPATIENT)
Dept: OPERATING ROOM | Facility: HOSPITAL | Age: 89
DRG: 240 | End: 2024-02-25
Payer: MEDICARE

## 2024-02-25 PROBLEM — I73.9 PERIPHERAL VASCULAR DISEASE, UNSPECIFIED (CMS-HCC): Status: RESOLVED | Noted: 2023-09-17 | Resolved: 2024-02-25

## 2024-02-25 PROBLEM — M86.9: Status: RESOLVED | Noted: 2024-02-17 | Resolved: 2024-02-25

## 2024-02-25 LAB
ABO GROUP (TYPE) IN BLOOD: NORMAL
ALBUMIN SERPL BCP-MCNC: 3.2 G/DL (ref 3.4–5)
ANION GAP SERPL CALC-SCNC: 12 MMOL/L (ref 10–20)
ANTIBODY SCREEN: NORMAL
APTT PPP: 53 SECONDS (ref 27–38)
BASOPHILS # BLD AUTO: 0.01 X10*3/UL (ref 0–0.1)
BASOPHILS NFR BLD AUTO: 0.1 %
BLOOD EXPIRATION DATE: NORMAL
BUN SERPL-MCNC: 31 MG/DL (ref 6–23)
CALCIUM SERPL-MCNC: 8.9 MG/DL (ref 8.6–10.6)
CHLORIDE SERPL-SCNC: 105 MMOL/L (ref 98–107)
CO2 SERPL-SCNC: 27 MMOL/L (ref 21–32)
CREAT SERPL-MCNC: 1.17 MG/DL (ref 0.5–1.05)
DISPENSE STATUS: NORMAL
EGFRCR SERPLBLD CKD-EPI 2021: 45 ML/MIN/1.73M*2
EOSINOPHIL # BLD AUTO: 0.11 X10*3/UL (ref 0–0.4)
EOSINOPHIL NFR BLD AUTO: 1 %
ERYTHROCYTE [DISTWIDTH] IN BLOOD BY AUTOMATED COUNT: 16.7 % (ref 11.5–14.5)
GLUCOSE SERPL-MCNC: 123 MG/DL (ref 74–99)
HCT VFR BLD AUTO: 32.4 % (ref 36–46)
HGB BLD-MCNC: 10.5 G/DL (ref 12–16)
IMM GRANULOCYTES # BLD AUTO: 0.07 X10*3/UL (ref 0–0.5)
IMM GRANULOCYTES NFR BLD AUTO: 0.6 % (ref 0–0.9)
INR PPP: 1.1 (ref 0.9–1.1)
LYMPHOCYTES # BLD AUTO: 1.57 X10*3/UL (ref 0.8–3)
LYMPHOCYTES NFR BLD AUTO: 14.5 %
MAGNESIUM SERPL-MCNC: 2.01 MG/DL (ref 1.6–2.4)
MCH RBC QN AUTO: 31.7 PG (ref 26–34)
MCHC RBC AUTO-ENTMCNC: 32.4 G/DL (ref 32–36)
MCV RBC AUTO: 98 FL (ref 80–100)
MONOCYTES # BLD AUTO: 0.7 X10*3/UL (ref 0.05–0.8)
MONOCYTES NFR BLD AUTO: 6.4 %
NEUTROPHILS # BLD AUTO: 8.4 X10*3/UL (ref 1.6–5.5)
NEUTROPHILS NFR BLD AUTO: 77.4 %
NRBC BLD-RTO: 0.2 /100 WBCS (ref 0–0)
PHOSPHATE SERPL-MCNC: 1.9 MG/DL (ref 2.5–4.9)
PLATELET # BLD AUTO: 148 X10*3/UL (ref 150–450)
POTASSIUM SERPL-SCNC: 3.8 MMOL/L (ref 3.5–5.3)
PRODUCT BLOOD TYPE: 6200
PRODUCT CODE: NORMAL
PROTHROMBIN TIME: 12.3 SECONDS (ref 9.8–12.8)
RBC # BLD AUTO: 3.31 X10*6/UL (ref 4–5.2)
RH FACTOR (ANTIGEN D): NORMAL
SODIUM SERPL-SCNC: 140 MMOL/L (ref 136–145)
UNIT ABO: NORMAL
UNIT NUMBER: NORMAL
UNIT RH: NORMAL
UNIT VOLUME: 350
VANCOMYCIN SERPL-MCNC: 14.2 UG/ML (ref 5–20)
WBC # BLD AUTO: 10.9 X10*3/UL (ref 4.4–11.3)
XM INTEP: NORMAL

## 2024-02-25 PROCEDURE — 2500000005 HC RX 250 GENERAL PHARMACY W/O HCPCS

## 2024-02-25 PROCEDURE — 80202 ASSAY OF VANCOMYCIN: CPT | Performed by: INTERNAL MEDICINE

## 2024-02-25 PROCEDURE — 86901 BLOOD TYPING SEROLOGIC RH(D): CPT

## 2024-02-25 PROCEDURE — 2500000001 HC RX 250 WO HCPCS SELF ADMINISTERED DRUGS (ALT 637 FOR MEDICARE OP)

## 2024-02-25 PROCEDURE — 2500000004 HC RX 250 GENERAL PHARMACY W/ HCPCS (ALT 636 FOR OP/ED)

## 2024-02-25 PROCEDURE — 86920 COMPATIBILITY TEST SPIN: CPT

## 2024-02-25 PROCEDURE — 85610 PROTHROMBIN TIME: CPT

## 2024-02-25 PROCEDURE — 85025 COMPLETE CBC W/AUTO DIFF WBC: CPT

## 2024-02-25 PROCEDURE — 99232 SBSQ HOSP IP/OBS MODERATE 35: CPT

## 2024-02-25 PROCEDURE — 2500000002 HC RX 250 W HCPCS SELF ADMINISTERED DRUGS (ALT 637 FOR MEDICARE OP, ALT 636 FOR OP/ED)

## 2024-02-25 PROCEDURE — 83735 ASSAY OF MAGNESIUM: CPT

## 2024-02-25 PROCEDURE — 80069 RENAL FUNCTION PANEL: CPT

## 2024-02-25 PROCEDURE — 1100000001 HC PRIVATE ROOM DAILY

## 2024-02-25 RX ORDER — POTASSIUM CHLORIDE 20 MEQ/1
40 TABLET, EXTENDED RELEASE ORAL ONCE
Status: COMPLETED | OUTPATIENT
Start: 2024-02-25 | End: 2024-02-25

## 2024-02-25 RX ORDER — TALC
6 POWDER (GRAM) TOPICAL ONCE
Status: COMPLETED | OUTPATIENT
Start: 2024-02-25 | End: 2024-02-25

## 2024-02-25 RX ORDER — VANCOMYCIN HYDROCHLORIDE 750 MG/150ML
750 INJECTION, SOLUTION INTRAVENOUS ONCE
Status: COMPLETED | OUTPATIENT
Start: 2024-02-25 | End: 2024-02-25

## 2024-02-25 RX ADMIN — PIPERACILLIN SODIUM AND TAZOBACTAM SODIUM 2.25 G: 2; .25 INJECTION, SOLUTION INTRAVENOUS at 05:22

## 2024-02-25 RX ADMIN — MELATONIN 6 MG: 3 TAB ORAL at 22:55

## 2024-02-25 RX ADMIN — PIPERACILLIN SODIUM AND TAZOBACTAM SODIUM 2.25 G: 2; .25 INJECTION, SOLUTION INTRAVENOUS at 21:13

## 2024-02-25 RX ADMIN — AZELASTINE HYDROCHLORIDE 2 SPRAY: 137 SPRAY, METERED NASAL at 10:33

## 2024-02-25 RX ADMIN — HEPARIN SODIUM 5000 UNITS: 5000 INJECTION INTRAVENOUS; SUBCUTANEOUS at 10:31

## 2024-02-25 RX ADMIN — PIPERACILLIN SODIUM AND TAZOBACTAM SODIUM 2.25 G: 2; .25 INJECTION, SOLUTION INTRAVENOUS at 10:31

## 2024-02-25 RX ADMIN — ACETAMINOPHEN 975 MG: 325 TABLET ORAL at 22:55

## 2024-02-25 RX ADMIN — HEPARIN SODIUM 5000 UNITS: 5000 INJECTION INTRAVENOUS; SUBCUTANEOUS at 00:13

## 2024-02-25 RX ADMIN — CLOPIDOGREL BISULFATE 75 MG: 75 TABLET ORAL at 10:36

## 2024-02-25 RX ADMIN — ASPIRIN 81 MG: 81 TABLET, COATED ORAL at 10:31

## 2024-02-25 RX ADMIN — FOLIC ACID 5 MG: 1 TABLET ORAL at 10:31

## 2024-02-25 RX ADMIN — ATENOLOL 50 MG: 50 TABLET ORAL at 10:31

## 2024-02-25 RX ADMIN — IPRATROPIUM BROMIDE 2 SPRAY: 21 SPRAY, METERED NASAL at 20:33

## 2024-02-25 RX ADMIN — POTASSIUM CHLORIDE 40 MEQ: 1500 TABLET, EXTENDED RELEASE ORAL at 16:06

## 2024-02-25 RX ADMIN — POLYETHYLENE GLYCOL 3350 17 G: 17 POWDER, FOR SOLUTION ORAL at 10:30

## 2024-02-25 RX ADMIN — Medication 10 MG: at 10:31

## 2024-02-25 RX ADMIN — AZELASTINE HYDROCHLORIDE 2 SPRAY: 137 SPRAY, METERED NASAL at 20:34

## 2024-02-25 RX ADMIN — PIPERACILLIN SODIUM AND TAZOBACTAM SODIUM 2.25 G: 2; .25 INJECTION, SOLUTION INTRAVENOUS at 16:06

## 2024-02-25 RX ADMIN — ESCITALOPRAM OXALATE 10 MG: 10 TABLET ORAL at 10:31

## 2024-02-25 RX ADMIN — Medication 2000 UNITS: at 10:31

## 2024-02-25 RX ADMIN — ATENOLOL 50 MG: 50 TABLET ORAL at 20:33

## 2024-02-25 RX ADMIN — HEPARIN SODIUM 5000 UNITS: 5000 INJECTION INTRAVENOUS; SUBCUTANEOUS at 16:06

## 2024-02-25 RX ADMIN — IPRATROPIUM BROMIDE 2 SPRAY: 21 SPRAY, METERED NASAL at 10:32

## 2024-02-25 RX ADMIN — Medication 1 TABLET: at 10:31

## 2024-02-25 RX ADMIN — VANCOMYCIN HYDROCHLORIDE 750 MG: 750 INJECTION, SOLUTION INTRAVENOUS at 17:38

## 2024-02-25 RX ADMIN — ROSUVASTATIN CALCIUM 10 MG: 10 TABLET, FILM COATED ORAL at 20:33

## 2024-02-25 ASSESSMENT — COGNITIVE AND FUNCTIONAL STATUS - GENERAL
STANDING UP FROM CHAIR USING ARMS: A LOT
MOBILITY SCORE: 14
DAILY ACTIVITIY SCORE: 20
WALKING IN HOSPITAL ROOM: TOTAL
WALKING IN HOSPITAL ROOM: A LOT
MOVING TO AND FROM BED TO CHAIR: A LITTLE
PERSONAL GROOMING: A LITTLE
STANDING UP FROM CHAIR USING ARMS: A LOT
HELP NEEDED FOR BATHING: A LITTLE
DRESSING REGULAR LOWER BODY CLOTHING: A LITTLE
TURNING FROM BACK TO SIDE WHILE IN FLAT BAD: A LITTLE
CLIMB 3 TO 5 STEPS WITH RAILING: TOTAL
DRESSING REGULAR LOWER BODY CLOTHING: A LITTLE
TOILETING: A LITTLE
MOVING TO AND FROM BED TO CHAIR: A LOT
CLIMB 3 TO 5 STEPS WITH RAILING: TOTAL
MOBILITY SCORE: 14
HELP NEEDED FOR BATHING: A LITTLE
TURNING FROM BACK TO SIDE WHILE IN FLAT BAD: A LITTLE
TOILETING: A LITTLE
PERSONAL GROOMING: A LITTLE
DAILY ACTIVITIY SCORE: 20

## 2024-02-25 ASSESSMENT — PAIN SCALES - GENERAL
PAINLEVEL_OUTOF10: 0 - NO PAIN
PAINLEVEL_OUTOF10: 0 - NO PAIN
PAINLEVEL_OUTOF10: 3

## 2024-02-25 ASSESSMENT — PAIN - FUNCTIONAL ASSESSMENT
PAIN_FUNCTIONAL_ASSESSMENT: 0-10
PAIN_FUNCTIONAL_ASSESSMENT: 0-10

## 2024-02-25 ASSESSMENT — PAIN DESCRIPTION - LOCATION: LOCATION: GENERALIZED

## 2024-02-25 NOTE — PROGRESS NOTES
"Vancomycin Dosing by Pharmacy- FOLLOW UP    Maggi Gordon is a 89 y.o. year old female who Pharmacy has been consulted for vancomycin dosing for osteomyelitis/septic arthritis. Based on the patient's indication and renal status this patient is being dosed based on a goal trough/random level of 15-20.     Renal function is currently declining.    Most recent trough level: 14.2 mcg/mL    Visit Vitals  /67   Pulse 68   Temp 36.1 °C (97 °F)   Resp 20        Lab Results   Component Value Date    CREATININE 1.17 (H) 02/25/2024    CREATININE 1.30 (H) 02/24/2024    CREATININE 1.30 (H) 02/22/2024    CREATININE 1.57 (H) 02/21/2024        Patient weight is No results found for: \"PTWEIGHT\"    No results found for: \"CULTURE\"     I/O last 3 completed shifts:  In: 720.1 (16.1 mL/kg) [Blood:720.1]  Out: - (0 mL/kg)   Weight: 44.8 kg   [unfilled]    No results found for: \"PATIENTTEMP\"     Assessment/Plan    Below goal random/trough level. Orders placed for new vancomycin regimen of 750 mg once.  The next level will be obtained on 2/26 PM. May be obtained sooner if clinically indicated.   Will continue to monitor renal function daily while on vancomycin and order serum creatinine at least every 48 hours if not already ordered.  Follow for continued vancomycin needs, clinical response, and signs/symptoms of toxicity.       Iris Gallardo, [facility]           "

## 2024-02-25 NOTE — PROGRESS NOTES
Subjective   NAEON. Pt comfortable this morning. Denies f/c/n/v/cp/sob. Intermittent pain in her L foot. States she is ready for her surgery tomorrow but would like to go over the procedure with the vascular surgery team. Otherwise no concerns this morning.     Objective   Patient Vitals for the past 24 hrs:   BP Temp Temp src Pulse Resp SpO2   02/25/24 0651 123/66 36.1 °C (97 °F) -- 74 16 100 %   02/24/24 2031 149/66 36.9 °C (98.4 °F) -- 85 17 98 %   02/24/24 1623 127/65 37.1 °C (98.8 °F) Temporal 82 18 98 %   02/24/24 1518 131/67 36.7 °C (98.1 °F) Temporal 81 18 97 %   02/24/24 1451 115/65 -- -- 85 -- --   02/24/24 1433 150/61 36.6 °C (97.9 °F) Temporal 82 18 97 %   02/24/24 1418 128/64 36.4 °C (97.5 °F) Temporal 81 18 97 %   02/24/24 1408 128/64 36.4 °C (97.5 °F) -- 81 18 --   02/24/24 1235 -- -- -- 76 -- --   02/24/24 1217 (!) 108/47 37.1 °C (98.8 °F) -- (!) 37 18 90 %     Results for orders placed or performed during the hospital encounter of 02/17/24 (from the past 24 hour(s))   Vancomycin   Result Value Ref Range    Vancomycin 23.7 (H) 5.0 - 20.0 ug/mL   Magnesium   Result Value Ref Range    Magnesium 1.85 1.60 - 2.40 mg/dL   Renal Function Panel   Result Value Ref Range    Glucose 115 (H) 74 - 99 mg/dL    Sodium 139 136 - 145 mmol/L    Potassium 3.8 3.5 - 5.3 mmol/L    Chloride 106 98 - 107 mmol/L    Bicarbonate 26 21 - 32 mmol/L    Anion Gap 11 10 - 20 mmol/L    Urea Nitrogen 34 (H) 6 - 23 mg/dL    Creatinine 1.30 (H) 0.50 - 1.05 mg/dL    eGFR 40 (L) >60 mL/min/1.73m*2    Calcium 8.5 (L) 8.6 - 10.6 mg/dL    Phosphorus 2.3 (L) 2.5 - 4.9 mg/dL    Albumin 2.7 (L) 3.4 - 5.0 g/dL   CBC and Auto Differential   Result Value Ref Range    WBC 11.5 (H) 4.4 - 11.3 x10*3/uL    nRBC 0.2 (H) 0.0 - 0.0 /100 WBCs    RBC 2.27 (L) 4.00 - 5.20 x10*6/uL    Hemoglobin 7.3 (L) 12.0 - 16.0 g/dL    Hematocrit 21.6 (L) 36.0 - 46.0 %    MCV 95 80 - 100 fL    MCH 32.2 26.0 - 34.0 pg    MCHC 33.8 32.0 - 36.0 g/dL    RDW 16.0 (H) 11.5  - 14.5 %    Platelets 122 (L) 150 - 450 x10*3/uL    Neutrophils % 71.3 40.0 - 80.0 %    Immature Granulocytes %, Automated 0.8 0.0 - 0.9 %    Lymphocytes % 17.2 13.0 - 44.0 %    Monocytes % 9.9 2.0 - 10.0 %    Eosinophils % 0.6 0.0 - 6.0 %    Basophils % 0.2 0.0 - 2.0 %    Neutrophils Absolute 8.17 (H) 1.60 - 5.50 x10*3/uL    Immature Granulocytes Absolute, Automated 0.09 0.00 - 0.50 x10*3/uL    Lymphocytes Absolute 1.97 0.80 - 3.00 x10*3/uL    Monocytes Absolute 1.14 (H) 0.05 - 0.80 x10*3/uL    Eosinophils Absolute 0.07 0.00 - 0.40 x10*3/uL    Basophils Absolute 0.02 0.00 - 0.10 x10*3/uL   Prepare RBC: 1 Units   Result Value Ref Range    PRODUCT CODE B7688U55     Unit Number [id number]     Unit ABO A     Unit RH POS     XM INTEP COMP     Dispense Status RE     Blood Expiration Date March 18, 2024 23:59 EDT     PRODUCT BLOOD TYPE 6200     UNIT VOLUME 350    Prepare RBC   Result Value Ref Range    PRODUCT CODE U0625B69     Unit Number [id number]     Unit ABO A     Unit RH POS     XM INTEP COMP     Dispense Status TR     Blood Expiration Date March 25, 2024 23:59 EDT     PRODUCT BLOOD TYPE 6200     UNIT VOLUME 350        Gen: well-appearing, NAD, thin  Head and neck: NCAT, neck supple without LAD  HEENT: MMM, normal nose without congestion  Pulm: normal WOB on RA  Ext: feet warm, but absent PT pulse on L with doppler  -LLE: ulcerative lesion on the posterior aspect of the left ankle  Neuro: alert and oriented x3, normal tone, face symmetric, moves all extremities spontaneously    Assessment/Plan   89yo with PMH HTN, HLD, CKD, CAD s/p CABG x3 1995, vertebral artery syndrome s/p L PICA coiling and stent, Afib (not on AC), aortic stenosis s/p AVR 2011, PAD s/p multiple interventions, the most recent of which was a L femoral endartecomy on 1/18/24 at [facility], chronic L foot wound (follows with wound care) who initially presented to [place] after mechanical fall at home found on imaging to have c/f L foot OM with  diminished pulses of LLE DP on exam transferred to [facility] for further evaluation. Currently treating for OM with vanc/zosyn.  Vascular surgery and podiatry following, will need to determine the salvageability of the foot before proceeding with any surgical interventions most likely.  She appears well and is hemodynamically stable, no signs of sepsis.    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 Since the achilles tendon is not salvageable, pt would require removal of tendon and she would not be able to walk following procedure. D/t this, vascular sx will not operate on leg given recommendation for BKA   -Plan for L BKA with vascular sx 2/26, pt and family agreeable  -NPO@MN, DVT ppx held @MN, type and screen, coags ordered  -C/w Vanc/Zosyn  -DAPT, holding plavix for procedure 2/26    #LUCRETIA, resolving  -prerenal most likely, improving  -continue fluid resuscitation   -hold home antihypertensives (hydrochlorothiazide, ana, valsartan) for now for soft BP    #Afib  -CHADSVASc 5  -continue home atenolol  -reportedly not on AC at home, but dc summary from Sept notes warfarin. No warfarin on fill history on EMR or on dc summary from Jan admission. She did have high INR on arrival in Jan.  -Given valve is bovine and not  mechanical, will start pt on DOAC following procedure    #Depression - c/w home meds    #Anemia  ::S/p 2u PRBC 2/22, 2/23, improved Hg to 7.3 (2/24)  -Transfuse 1u PRBC electively 2/24  -CTM CBCs, transfuse Hg<7  -Giving 1u PRBC 2/25 in anticipation of procedure 2/26    N: Cardiac diet  DVT Ppx: Heparin sc  GI Ppx: -  Access/Lines: PIV   Abx: Vanc/Zosyn  O2: None    Pain regimen: Tylenol prn  GI Laxative: Miralax   PT/OT: ARF     Code status: Full Code  Emergency contact: Vicenta Gordon (sister-in-law) [phone]    Arnoldo David,   Neurology PGY-1

## 2024-02-25 NOTE — CARE PLAN
The patient's goals for the shift include      The clinical goals for the shift include patient to remain free from a fall during this shift    Over the shift, the patient did not make progress toward the following goals. Barriers to progression include . Recommendations to address these barriers include   Problem: Fall/Injury  Goal: Not fall by end of shift  Outcome: Progressing  Goal: Be free from injury by end of the shift  Outcome: Progressing  Goal: Verbalize understanding of personal risk factors for fall in the hospital  Outcome: Progressing     Problem: Skin  Goal: Decreased wound size/increased tissue granulation at next dressing change  Outcome: Progressing  Goal: Participates in plan/prevention/treatment measures  Outcome: Progressing  Goal: Prevent/manage excess moisture  Outcome: Progressing  Goal: Prevent/minimize sheer/friction injuries  Outcome: Progressing  Goal: Promote/optimize nutrition  Outcome: Progressing  Goal: Promote skin healing  Outcome: Progressing     Problem: Diabetes  Goal: Achieve decreasing blood glucose levels by end of shift  Outcome: Progressing  Goal: Increase stability of blood glucose readings by end of shift  Outcome: Progressing  Goal: Decrease in ketones present in urine by end of shift  Outcome: Progressing  Goal: Maintain electrolyte levels within acceptable range throughout shift  Outcome: Progressing  Goal: Maintain glucose levels >70mg/dl to <250mg/dl throughout shift  Outcome: Progressing  Goal: No changes in neurological exam by end of shift  Outcome: Progressing  Goal: Learn about and adhere to nutrition recommendations by end of shift  Outcome: Progressing  Goal: Vital signs within normal range for age by end of shift  Outcome: Progressing  Goal: Increase self care and/or family involovement by end of shift  Outcome: Progressing  Goal: Receive DSME education by end of shift  Outcome: Progressing     Problem: Pain - Adult  Goal: Verbalizes/displays adequate comfort  level or baseline comfort level  Outcome: Progressing     Problem: Safety - Adult  Goal: Free from fall injury  Outcome: Progressing     Problem: Discharge Planning  Goal: Discharge to home or other facility with appropriate resources  Outcome: Progressing     Problem: Chronic Conditions and Co-morbidities  Goal: Patient's chronic conditions and co-morbidity symptoms are monitored and maintained or improved  Outcome: Progressing   .

## 2024-02-25 NOTE — PROGRESS NOTES
[facility]  Vascular Surgery  Progress Note  Maggi Gordon  [id number]  1935     Subjective    POD 4 s/p LLE angiogram and revascularization ( L EIA, SFA, POP stenting) with vascular surgery. She underwent     This AM right groin access site aappears hemostatic, stable,  and without hematoma.    Patient seen at bedside, her pulse exam remains unchanged (strong biphasic signals on LLE) Pain is well-controlled.  Denies f/c, n/v, SOB, CP, or numbness or tingling of extremities. Heparin gtt running in the room.   Pending  1 unit pRBC transfusion today.    Patient consented this AM for Anastacio JEAN-BAPTISTE tomorrow 2/26 with vascular surgery , all questions answered.      Objective:     Last Recorded Vitals  Vitals:    02/25/24 0651   BP: 123/66   Pulse: 74   Resp: 16   Temp: 36.1 °C (97 °F)   SpO2: 100%         I/O last 2 completed shifts:  In: 402.9 (9 mL/kg) [Blood:402.9]  Out: - (0 mL/kg)   Weight: 44.8 kg      Physical Exam  General: NAD. Nontoxic  HEENT: Atraumatic. MMM. EOMI.   Card: RRR  Pulm: Nonlabored breathing on RA  Chest: No bruising or seatbelt sign. Chest is nontender.  Abdomen: Soft, nontender, nondistended. No bruising.  Extremities: MAEx4. Warm bilaterally. Kerlix wrap covering left foot wounds  Neuro: No focal deficits.  Pulses:  -biphasic  DP and PT signals in bilateral lower extremities  -no palpable masses or fluid collections in bilateral groins  -bilateral feet warm and well-perfused  -compartments soft and compressible in bilateral lower extremities  -no sensory or motor deficits in all extremities  -palpable radial pulses bilaterally    Relevant Results  Lab Results   Component Value Date    WBC 11.5 (H) 02/24/2024    HGB 7.3 (L) 02/24/2024    HCT 21.6 (L) 02/24/2024    MCV 95 02/24/2024     (L) 02/24/2024     Lab Results   Component Value Date    GLUCOSE 115 (H) 02/24/2024    CALCIUM 8.5 (L) 02/24/2024     02/24/2024    K 3.8 02/24/2024    CO2 26  02/24/2024     02/24/2024    BUN 34 (H) 02/24/2024    CREATININE 1.30 (H) 02/24/2024          Assessment/Plan   Ms. Maggi Gordon is a 89 y.o. 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      Pre-op exam: Right foot biphasic signals, Left foot biphasic PT, absent DP  Post-op exam: Right foot biphasic signals, Left foot biphasic signals     Plan/ recommendations:  - NPO @ midnight for surgery 2/26, please start patient on mIVF while NPO  - obtain updated T&S, Coags, CBC, RFP    - q4hr vitals and vasc checks on the floor  - Pain control per primary team  - can we UOOB and weight bearing as tolerated  - Continue daily labs  - Continue home BP meds  - Continue ASA and plavix   - regular diet okay  -Remainder of care per primary team       Discussed with attending surgeon Dr. Elise.     Alta Alfred MD  Vascular Surgery, PGY1  Team Pager: [phone]

## 2024-02-26 ENCOUNTER — APPOINTMENT (OUTPATIENT)
Dept: WOUND CARE | Facility: CLINIC | Age: 89
End: 2024-02-26
Payer: MEDICARE

## 2024-02-26 ENCOUNTER — ANESTHESIA (OUTPATIENT)
Dept: OPERATING ROOM | Facility: HOSPITAL | Age: 89
DRG: 240 | End: 2024-02-26
Payer: MEDICARE

## 2024-02-26 ENCOUNTER — ANESTHESIA EVENT (OUTPATIENT)
Dept: OPERATING ROOM | Facility: HOSPITAL | Age: 89
DRG: 240 | End: 2024-02-26
Payer: MEDICARE

## 2024-02-26 LAB
ALBUMIN SERPL BCP-MCNC: 2.4 G/DL (ref 3.4–5)
ALBUMIN SERPL BCP-MCNC: 3.4 G/DL (ref 3.4–5)
ANION GAP SERPL CALC-SCNC: 13 MMOL/L (ref 10–20)
ANION GAP SERPL CALC-SCNC: 15 MMOL/L (ref 10–20)
BASOPHILS # BLD AUTO: 0.02 X10*3/UL (ref 0–0.1)
BASOPHILS # BLD AUTO: 0.02 X10*3/UL (ref 0–0.1)
BASOPHILS NFR BLD AUTO: 0.1 %
BASOPHILS NFR BLD AUTO: 0.2 %
BUN SERPL-MCNC: 31 MG/DL (ref 6–23)
BUN SERPL-MCNC: 37 MG/DL (ref 6–23)
CALCIUM SERPL-MCNC: 8.2 MG/DL (ref 8.6–10.6)
CALCIUM SERPL-MCNC: 9.6 MG/DL (ref 8.6–10.6)
CHLORIDE SERPL-SCNC: 104 MMOL/L (ref 98–107)
CHLORIDE SERPL-SCNC: 108 MMOL/L (ref 98–107)
CO2 SERPL-SCNC: 20 MMOL/L (ref 21–32)
CO2 SERPL-SCNC: 28 MMOL/L (ref 21–32)
CREAT SERPL-MCNC: 1.18 MG/DL (ref 0.5–1.05)
CREAT SERPL-MCNC: 1.3 MG/DL (ref 0.5–1.05)
EGFRCR SERPLBLD CKD-EPI 2021: 40 ML/MIN/1.73M*2
EGFRCR SERPLBLD CKD-EPI 2021: 45 ML/MIN/1.73M*2
EOSINOPHIL # BLD AUTO: 0.04 X10*3/UL (ref 0–0.4)
EOSINOPHIL # BLD AUTO: 0.11 X10*3/UL (ref 0–0.4)
EOSINOPHIL NFR BLD AUTO: 0.3 %
EOSINOPHIL NFR BLD AUTO: 1 %
ERYTHROCYTE [DISTWIDTH] IN BLOOD BY AUTOMATED COUNT: 16.8 % (ref 11.5–14.5)
ERYTHROCYTE [DISTWIDTH] IN BLOOD BY AUTOMATED COUNT: 16.9 % (ref 11.5–14.5)
GLUCOSE SERPL-MCNC: 134 MG/DL (ref 74–99)
GLUCOSE SERPL-MCNC: 94 MG/DL (ref 74–99)
HCT VFR BLD AUTO: 22.4 % (ref 36–46)
HCT VFR BLD AUTO: 33.3 % (ref 36–46)
HGB BLD-MCNC: 10.4 G/DL (ref 12–16)
HGB BLD-MCNC: 6.8 G/DL (ref 12–16)
IMM GRANULOCYTES # BLD AUTO: 0.07 X10*3/UL (ref 0–0.5)
IMM GRANULOCYTES # BLD AUTO: 0.09 X10*3/UL (ref 0–0.5)
IMM GRANULOCYTES NFR BLD AUTO: 0.7 % (ref 0–0.9)
IMM GRANULOCYTES NFR BLD AUTO: 0.7 % (ref 0–0.9)
LYMPHOCYTES # BLD AUTO: 1.94 X10*3/UL (ref 0.8–3)
LYMPHOCYTES # BLD AUTO: 1.96 X10*3/UL (ref 0.8–3)
LYMPHOCYTES NFR BLD AUTO: 14.3 %
LYMPHOCYTES NFR BLD AUTO: 18.1 %
MAGNESIUM SERPL-MCNC: 1.81 MG/DL (ref 1.6–2.4)
MAGNESIUM SERPL-MCNC: 2.02 MG/DL (ref 1.6–2.4)
MCH RBC QN AUTO: 31.5 PG (ref 26–34)
MCH RBC QN AUTO: 31.5 PG (ref 26–34)
MCHC RBC AUTO-ENTMCNC: 30.4 G/DL (ref 32–36)
MCHC RBC AUTO-ENTMCNC: 31.2 G/DL (ref 32–36)
MCV RBC AUTO: 101 FL (ref 80–100)
MCV RBC AUTO: 104 FL (ref 80–100)
MONOCYTES # BLD AUTO: 0.82 X10*3/UL (ref 0.05–0.8)
MONOCYTES # BLD AUTO: 1.08 X10*3/UL (ref 0.05–0.8)
MONOCYTES NFR BLD AUTO: 7.6 %
MONOCYTES NFR BLD AUTO: 7.9 %
NEUTROPHILS # BLD AUTO: 10.48 X10*3/UL (ref 1.6–5.5)
NEUTROPHILS # BLD AUTO: 7.78 X10*3/UL (ref 1.6–5.5)
NEUTROPHILS NFR BLD AUTO: 72.4 %
NEUTROPHILS NFR BLD AUTO: 76.7 %
NRBC BLD-RTO: 0.3 /100 WBCS (ref 0–0)
NRBC BLD-RTO: 0.8 /100 WBCS (ref 0–0)
PHOSPHATE SERPL-MCNC: 2.4 MG/DL (ref 2.5–4.9)
PHOSPHATE SERPL-MCNC: 3.6 MG/DL (ref 2.5–4.9)
PLATELET # BLD AUTO: 180 X10*3/UL (ref 150–450)
PLATELET # BLD AUTO: 195 X10*3/UL (ref 150–450)
POTASSIUM SERPL-SCNC: 4.5 MMOL/L (ref 3.5–5.3)
POTASSIUM SERPL-SCNC: 5 MMOL/L (ref 3.5–5.3)
RBC # BLD AUTO: 2.16 X10*6/UL (ref 4–5.2)
RBC # BLD AUTO: 3.3 X10*6/UL (ref 4–5.2)
SODIUM SERPL-SCNC: 138 MMOL/L (ref 136–145)
SODIUM SERPL-SCNC: 140 MMOL/L (ref 136–145)
VANCOMYCIN SERPL-MCNC: 14.1 UG/ML (ref 5–20)
WBC # BLD AUTO: 10.7 X10*3/UL (ref 4.4–11.3)
WBC # BLD AUTO: 13.7 X10*3/UL (ref 4.4–11.3)

## 2024-02-26 PROCEDURE — 7100000002 HC RECOVERY ROOM TIME - EACH INCREMENTAL 1 MINUTE: Performed by: SURGERY

## 2024-02-26 PROCEDURE — 36415 COLL VENOUS BLD VENIPUNCTURE: CPT

## 2024-02-26 PROCEDURE — 3700000002 HC GENERAL ANESTHESIA TIME - EACH INCREMENTAL 1 MINUTE: Performed by: SURGERY

## 2024-02-26 PROCEDURE — 88311 DECALCIFY TISSUE: CPT | Performed by: PATHOLOGY

## 2024-02-26 PROCEDURE — 83735 ASSAY OF MAGNESIUM: CPT

## 2024-02-26 PROCEDURE — 99232 SBSQ HOSP IP/OBS MODERATE 35: CPT

## 2024-02-26 PROCEDURE — A27880 PR AMPUTATION LOW LEG THRU TIB/FIB: Performed by: ANESTHESIOLOGY

## 2024-02-26 PROCEDURE — 2500000001 HC RX 250 WO HCPCS SELF ADMINISTERED DRUGS (ALT 637 FOR MEDICARE OP)

## 2024-02-26 PROCEDURE — 2500000004 HC RX 250 GENERAL PHARMACY W/ HCPCS (ALT 636 FOR OP/ED)

## 2024-02-26 PROCEDURE — 85025 COMPLETE CBC W/AUTO DIFF WBC: CPT

## 2024-02-26 PROCEDURE — 2500000004 HC RX 250 GENERAL PHARMACY W/ HCPCS (ALT 636 FOR OP/ED): Performed by: STUDENT IN AN ORGANIZED HEALTH CARE EDUCATION/TRAINING PROGRAM

## 2024-02-26 PROCEDURE — 2500000005 HC RX 250 GENERAL PHARMACY W/O HCPCS

## 2024-02-26 PROCEDURE — 3600000008 HC OR TIME - EACH INCREMENTAL 1 MINUTE - PROCEDURE LEVEL THREE: Performed by: SURGERY

## 2024-02-26 PROCEDURE — 7100000001 HC RECOVERY ROOM TIME - INITIAL BASE CHARGE: Performed by: SURGERY

## 2024-02-26 PROCEDURE — 2500000004 HC RX 250 GENERAL PHARMACY W/ HCPCS (ALT 636 FOR OP/ED): Performed by: SURGERY

## 2024-02-26 PROCEDURE — 88307 TISSUE EXAM BY PATHOLOGIST: CPT | Performed by: PATHOLOGY

## 2024-02-26 PROCEDURE — 2500000005 HC RX 250 GENERAL PHARMACY W/O HCPCS: Performed by: STUDENT IN AN ORGANIZED HEALTH CARE EDUCATION/TRAINING PROGRAM

## 2024-02-26 PROCEDURE — 80069 RENAL FUNCTION PANEL: CPT

## 2024-02-26 PROCEDURE — 99100 ANES PT EXTEME AGE<1 YR&>70: CPT | Performed by: ANESTHESIOLOGY

## 2024-02-26 PROCEDURE — 80202 ASSAY OF VANCOMYCIN: CPT

## 2024-02-26 PROCEDURE — 88307 TISSUE EXAM BY PATHOLOGIST: CPT | Mod: TC,SUR | Performed by: INTERNAL MEDICINE

## 2024-02-26 PROCEDURE — 2500000002 HC RX 250 W HCPCS SELF ADMINISTERED DRUGS (ALT 637 FOR MEDICARE OP, ALT 636 FOR OP/ED)

## 2024-02-26 PROCEDURE — 84100 ASSAY OF PHOSPHORUS: CPT

## 2024-02-26 PROCEDURE — 27880 AMPUTATION OF LOWER LEG: CPT | Performed by: SURGERY

## 2024-02-26 PROCEDURE — A4217 STERILE WATER/SALINE, 500 ML: HCPCS | Performed by: SURGERY

## 2024-02-26 PROCEDURE — 0Y6J0Z2 DETACHMENT AT LEFT LOWER LEG, MID, OPEN APPROACH: ICD-10-PCS | Performed by: SURGERY

## 2024-02-26 PROCEDURE — 1100000001 HC PRIVATE ROOM DAILY

## 2024-02-26 PROCEDURE — 2720000007 HC OR 272 NO HCPCS: Performed by: SURGERY

## 2024-02-26 PROCEDURE — 3600000003 HC OR TIME - INITIAL BASE CHARGE - PROCEDURE LEVEL THREE: Performed by: SURGERY

## 2024-02-26 PROCEDURE — 3700000001 HC GENERAL ANESTHESIA TIME - INITIAL BASE CHARGE: Performed by: SURGERY

## 2024-02-26 RX ORDER — OXYCODONE HYDROCHLORIDE 5 MG/1
10 TABLET ORAL EVERY 4 HOURS PRN
Status: DISCONTINUED | OUTPATIENT
Start: 2024-02-26 | End: 2024-03-01 | Stop reason: HOSPADM

## 2024-02-26 RX ORDER — ROCURONIUM BROMIDE 10 MG/ML
INJECTION, SOLUTION INTRAVENOUS AS NEEDED
Status: DISCONTINUED | OUTPATIENT
Start: 2024-02-26 | End: 2024-02-26

## 2024-02-26 RX ORDER — GABAPENTIN 100 MG/1
100 CAPSULE ORAL 2 TIMES DAILY
Status: DISCONTINUED | OUTPATIENT
Start: 2024-02-26 | End: 2024-02-26

## 2024-02-26 RX ORDER — PROPOFOL 10 MG/ML
INJECTION, EMULSION INTRAVENOUS AS NEEDED
Status: DISCONTINUED | OUTPATIENT
Start: 2024-02-26 | End: 2024-02-26

## 2024-02-26 RX ORDER — SODIUM CHLORIDE 0.9 G/100ML
IRRIGANT IRRIGATION AS NEEDED
Status: DISCONTINUED | OUTPATIENT
Start: 2024-02-26 | End: 2024-02-26 | Stop reason: HOSPADM

## 2024-02-26 RX ORDER — ONDANSETRON HYDROCHLORIDE 2 MG/ML
INJECTION, SOLUTION INTRAVENOUS AS NEEDED
Status: DISCONTINUED | OUTPATIENT
Start: 2024-02-26 | End: 2024-02-26

## 2024-02-26 RX ORDER — LIDOCAINE 560 MG/1
2 PATCH PERCUTANEOUS; TOPICAL; TRANSDERMAL DAILY
Status: DISCONTINUED | OUTPATIENT
Start: 2024-02-27 | End: 2024-03-01 | Stop reason: HOSPADM

## 2024-02-26 RX ORDER — GABAPENTIN 100 MG/1
100 CAPSULE ORAL 3 TIMES DAILY
Status: DISCONTINUED | OUTPATIENT
Start: 2024-02-26 | End: 2024-03-01 | Stop reason: HOSPADM

## 2024-02-26 RX ORDER — ONDANSETRON HYDROCHLORIDE 2 MG/ML
4 INJECTION, SOLUTION INTRAVENOUS ONCE AS NEEDED
Status: DISCONTINUED | OUTPATIENT
Start: 2024-02-26 | End: 2024-02-26 | Stop reason: HOSPADM

## 2024-02-26 RX ORDER — HYDROMORPHONE HYDROCHLORIDE 1 MG/ML
INJECTION, SOLUTION INTRAMUSCULAR; INTRAVENOUS; SUBCUTANEOUS AS NEEDED
Status: DISCONTINUED | OUTPATIENT
Start: 2024-02-26 | End: 2024-02-26

## 2024-02-26 RX ORDER — ACETAMINOPHEN 325 MG/1
975 TABLET ORAL EVERY 6 HOURS
Status: DISCONTINUED | OUTPATIENT
Start: 2024-02-26 | End: 2024-03-01 | Stop reason: HOSPADM

## 2024-02-26 RX ORDER — CEFAZOLIN 1 G/1
INJECTION, POWDER, FOR SOLUTION INTRAVENOUS AS NEEDED
Status: DISCONTINUED | OUTPATIENT
Start: 2024-02-26 | End: 2024-02-26

## 2024-02-26 RX ORDER — DROPERIDOL 2.5 MG/ML
0.62 INJECTION, SOLUTION INTRAMUSCULAR; INTRAVENOUS ONCE AS NEEDED
Status: DISCONTINUED | OUTPATIENT
Start: 2024-02-26 | End: 2024-02-26 | Stop reason: HOSPADM

## 2024-02-26 RX ORDER — LIDOCAINE HYDROCHLORIDE 10 MG/ML
0.1 INJECTION INFILTRATION; PERINEURAL ONCE
Status: DISCONTINUED | OUTPATIENT
Start: 2024-02-26 | End: 2024-02-26 | Stop reason: HOSPADM

## 2024-02-26 RX ORDER — FENTANYL CITRATE 50 UG/ML
INJECTION, SOLUTION INTRAMUSCULAR; INTRAVENOUS CONTINUOUS PRN
Status: DISCONTINUED | OUTPATIENT
Start: 2024-02-26 | End: 2024-02-26

## 2024-02-26 RX ORDER — ACETAMINOPHEN 325 MG/1
650 TABLET ORAL EVERY 4 HOURS PRN
Status: DISCONTINUED | OUTPATIENT
Start: 2024-02-26 | End: 2024-02-26 | Stop reason: HOSPADM

## 2024-02-26 RX ORDER — HYDROMORPHONE HYDROCHLORIDE 1 MG/ML
0.2 INJECTION, SOLUTION INTRAMUSCULAR; INTRAVENOUS; SUBCUTANEOUS EVERY 5 MIN PRN
Status: DISCONTINUED | OUTPATIENT
Start: 2024-02-26 | End: 2024-02-26 | Stop reason: HOSPADM

## 2024-02-26 RX ORDER — VANCOMYCIN HYDROCHLORIDE 1 G/20ML
INJECTION, POWDER, LYOPHILIZED, FOR SOLUTION INTRAVENOUS AS NEEDED
Status: DISCONTINUED | OUTPATIENT
Start: 2024-02-26 | End: 2024-02-26 | Stop reason: HOSPADM

## 2024-02-26 RX ORDER — FENTANYL CITRATE 50 UG/ML
INJECTION, SOLUTION INTRAMUSCULAR; INTRAVENOUS AS NEEDED
Status: DISCONTINUED | OUTPATIENT
Start: 2024-02-26 | End: 2024-02-26

## 2024-02-26 RX ORDER — OXYCODONE HYDROCHLORIDE 5 MG/1
5 TABLET ORAL EVERY 4 HOURS PRN
Status: DISCONTINUED | OUTPATIENT
Start: 2024-02-26 | End: 2024-03-01 | Stop reason: HOSPADM

## 2024-02-26 RX ORDER — HYDROMORPHONE HYDROCHLORIDE 1 MG/ML
0.4 INJECTION, SOLUTION INTRAMUSCULAR; INTRAVENOUS; SUBCUTANEOUS ONCE
Status: COMPLETED | OUTPATIENT
Start: 2024-02-26 | End: 2024-02-26

## 2024-02-26 RX ORDER — LIDOCAINE HYDROCHLORIDE 10 MG/ML
INJECTION INFILTRATION; PERINEURAL AS NEEDED
Status: DISCONTINUED | OUTPATIENT
Start: 2024-02-26 | End: 2024-02-26

## 2024-02-26 RX ORDER — PHENYLEPHRINE HYDROCHLORIDE 10 MG/ML
INJECTION INTRAVENOUS AS NEEDED
Status: DISCONTINUED | OUTPATIENT
Start: 2024-02-26 | End: 2024-02-26

## 2024-02-26 RX ORDER — HYDROMORPHONE HYDROCHLORIDE 1 MG/ML
0.1 INJECTION, SOLUTION INTRAMUSCULAR; INTRAVENOUS; SUBCUTANEOUS EVERY 5 MIN PRN
Status: DISCONTINUED | OUTPATIENT
Start: 2024-02-26 | End: 2024-02-26 | Stop reason: HOSPADM

## 2024-02-26 RX ADMIN — PHENYLEPHRINE HYDROCHLORIDE 80 MCG: 10 INJECTION INTRAVENOUS at 16:20

## 2024-02-26 RX ADMIN — PHENYLEPHRINE HYDROCHLORIDE 80 MCG: 10 INJECTION INTRAVENOUS at 15:55

## 2024-02-26 RX ADMIN — ACETAMINOPHEN 975 MG: 325 TABLET ORAL at 08:22

## 2024-02-26 RX ADMIN — ATENOLOL 50 MG: 50 TABLET ORAL at 08:21

## 2024-02-26 RX ADMIN — AZELASTINE HYDROCHLORIDE 2 SPRAY: 137 SPRAY, METERED NASAL at 08:23

## 2024-02-26 RX ADMIN — ESCITALOPRAM OXALATE 10 MG: 10 TABLET ORAL at 08:21

## 2024-02-26 RX ADMIN — FENTANYL CITRATE 50 MCG: 50 INJECTION, SOLUTION INTRAMUSCULAR; INTRAVENOUS at 15:28

## 2024-02-26 RX ADMIN — GABAPENTIN 100 MG: 100 CAPSULE ORAL at 22:54

## 2024-02-26 RX ADMIN — IPRATROPIUM BROMIDE 2 SPRAY: 21 SPRAY, METERED NASAL at 21:58

## 2024-02-26 RX ADMIN — HYDROMORPHONE HYDROCHLORIDE 0.4 MG: 1 INJECTION, SOLUTION INTRAMUSCULAR; INTRAVENOUS; SUBCUTANEOUS at 21:48

## 2024-02-26 RX ADMIN — PIPERACILLIN SODIUM AND TAZOBACTAM SODIUM 2.25 G: 2; .25 INJECTION, SOLUTION INTRAVENOUS at 04:44

## 2024-02-26 RX ADMIN — ASPIRIN 81 MG: 81 TABLET, COATED ORAL at 08:22

## 2024-02-26 RX ADMIN — HYDROMORPHONE HYDROCHLORIDE 0.2 MG: 1 INJECTION, SOLUTION INTRAMUSCULAR; INTRAVENOUS; SUBCUTANEOUS at 17:42

## 2024-02-26 RX ADMIN — PIPERACILLIN SODIUM AND TAZOBACTAM SODIUM 2.25 G: 2; .25 INJECTION, SOLUTION INTRAVENOUS at 21:48

## 2024-02-26 RX ADMIN — HYDROMORPHONE HYDROCHLORIDE 0.2 MG: 1 INJECTION, SOLUTION INTRAMUSCULAR; INTRAVENOUS; SUBCUTANEOUS at 16:51

## 2024-02-26 RX ADMIN — ACETAMINOPHEN 975 MG: 325 TABLET ORAL at 21:55

## 2024-02-26 RX ADMIN — HYDROMORPHONE HYDROCHLORIDE 0.3 MG: 1 INJECTION, SOLUTION INTRAMUSCULAR; INTRAVENOUS; SUBCUTANEOUS at 16:13

## 2024-02-26 RX ADMIN — SODIUM CHLORIDE, POTASSIUM CHLORIDE, SODIUM LACTATE AND CALCIUM CHLORIDE 40 ML/HR: 600; 310; 30; 20 INJECTION, SOLUTION INTRAVENOUS at 00:13

## 2024-02-26 RX ADMIN — ATENOLOL 50 MG: 50 TABLET ORAL at 20:58

## 2024-02-26 RX ADMIN — CEFAZOLIN 2 G: 330 INJECTION, POWDER, FOR SOLUTION INTRAMUSCULAR; INTRAVENOUS at 15:00

## 2024-02-26 RX ADMIN — Medication 2 L/MIN: at 16:45

## 2024-02-26 RX ADMIN — LIDOCAINE HYDROCHLORIDE 2 ML: 10 INJECTION, SOLUTION INFILTRATION; PERINEURAL at 14:48

## 2024-02-26 RX ADMIN — Medication 2000 UNITS: at 08:21

## 2024-02-26 RX ADMIN — FOLIC ACID 5 MG: 1 TABLET ORAL at 08:21

## 2024-02-26 RX ADMIN — PIPERACILLIN SODIUM AND TAZOBACTAM SODIUM 2.25 G: 2; .25 INJECTION, SOLUTION INTRAVENOUS at 10:25

## 2024-02-26 RX ADMIN — ROCURONIUM BROMIDE 10 MG: 10 INJECTION INTRAVENOUS at 15:51

## 2024-02-26 RX ADMIN — PROPOFOL 170 MG: 10 INJECTION, EMULSION INTRAVENOUS at 14:48

## 2024-02-26 RX ADMIN — HYDROMORPHONE HYDROCHLORIDE 0.2 MG: 1 INJECTION, SOLUTION INTRAMUSCULAR; INTRAVENOUS; SUBCUTANEOUS at 17:53

## 2024-02-26 RX ADMIN — TAZOBACTAM SODIUM AND PIPERACILLIN SODIUM 2.25 G: 250; 2 INJECTION, SOLUTION INTRAVENOUS at 15:45

## 2024-02-26 RX ADMIN — IPRATROPIUM BROMIDE 2 SPRAY: 21 SPRAY, METERED NASAL at 08:23

## 2024-02-26 RX ADMIN — HYDROMORPHONE HYDROCHLORIDE 0.1 MG: 1 INJECTION, SOLUTION INTRAMUSCULAR; INTRAVENOUS; SUBCUTANEOUS at 16:40

## 2024-02-26 RX ADMIN — FENTANYL CITRATE 50 MCG: 50 INJECTION, SOLUTION INTRAMUSCULAR; INTRAVENOUS at 15:02

## 2024-02-26 RX ADMIN — ROSUVASTATIN CALCIUM 10 MG: 10 TABLET, FILM COATED ORAL at 20:59

## 2024-02-26 RX ADMIN — Medication 10 MG: at 08:21

## 2024-02-26 RX ADMIN — Medication 1 TABLET: at 08:21

## 2024-02-26 RX ADMIN — ONDANSETRON 4 MG: 2 INJECTION INTRAMUSCULAR; INTRAVENOUS at 16:00

## 2024-02-26 RX ADMIN — ROCURONIUM BROMIDE 40 MG: 10 INJECTION INTRAVENOUS at 14:48

## 2024-02-26 RX ADMIN — HYDROMORPHONE HYDROCHLORIDE 0.2 MG: 1 INJECTION, SOLUTION INTRAMUSCULAR; INTRAVENOUS; SUBCUTANEOUS at 17:01

## 2024-02-26 RX ADMIN — HYDROMORPHONE HYDROCHLORIDE 0.2 MG: 1 INJECTION, SOLUTION INTRAMUSCULAR; INTRAVENOUS; SUBCUTANEOUS at 16:56

## 2024-02-26 SDOH — SOCIAL STABILITY: SOCIAL INSECURITY: DO YOU FEEL UNSAFE GOING BACK TO THE PLACE WHERE YOU ARE LIVING?: NO

## 2024-02-26 SDOH — SOCIAL STABILITY: SOCIAL INSECURITY: WERE YOU ABLE TO COMPLETE ALL THE BEHAVIORAL HEALTH SCREENINGS?: YES

## 2024-02-26 SDOH — SOCIAL STABILITY: SOCIAL INSECURITY: ARE THERE ANY APPARENT SIGNS OF INJURIES/BEHAVIORS THAT COULD BE RELATED TO ABUSE/NEGLECT?: NO

## 2024-02-26 SDOH — SOCIAL STABILITY: SOCIAL INSECURITY: HAVE YOU HAD THOUGHTS OF HARMING ANYONE ELSE?: NO

## 2024-02-26 SDOH — SOCIAL STABILITY: SOCIAL INSECURITY: HAS ANYONE EVER THREATENED TO HURT YOUR FAMILY OR YOUR PETS?: NO

## 2024-02-26 SDOH — SOCIAL STABILITY: SOCIAL INSECURITY: ARE YOU OR HAVE YOU BEEN THREATENED OR ABUSED PHYSICALLY, EMOTIONALLY, OR SEXUALLY BY ANYONE?: NO

## 2024-02-26 SDOH — SOCIAL STABILITY: SOCIAL INSECURITY: DOES ANYONE TRY TO KEEP YOU FROM HAVING/CONTACTING OTHER FRIENDS OR DOING THINGS OUTSIDE YOUR HOME?: NO

## 2024-02-26 SDOH — SOCIAL STABILITY: SOCIAL INSECURITY: ABUSE: ADULT

## 2024-02-26 SDOH — SOCIAL STABILITY: SOCIAL INSECURITY: DO YOU FEEL ANYONE HAS EXPLOITED OR TAKEN ADVANTAGE OF YOU FINANCIALLY OR OF YOUR PERSONAL PROPERTY?: NO

## 2024-02-26 ASSESSMENT — LIFESTYLE VARIABLES
HOW MANY STANDARD DRINKS CONTAINING ALCOHOL DO YOU HAVE ON A TYPICAL DAY: PATIENT DOES NOT DRINK
AUDIT-C TOTAL SCORE: 0
SKIP TO QUESTIONS 9-10: 1
AUDIT-C TOTAL SCORE: 0
HOW OFTEN DO YOU HAVE 6 OR MORE DRINKS ON ONE OCCASION: NEVER
HOW OFTEN DO YOU HAVE A DRINK CONTAINING ALCOHOL: NEVER

## 2024-02-26 ASSESSMENT — COGNITIVE AND FUNCTIONAL STATUS - GENERAL
MOBILITY SCORE: 14
DAILY ACTIVITIY SCORE: 20
DRESSING REGULAR LOWER BODY CLOTHING: A LITTLE
TURNING FROM BACK TO SIDE WHILE IN FLAT BAD: A LITTLE
DAILY ACTIVITIY SCORE: 16
MOVING FROM LYING ON BACK TO SITTING ON SIDE OF FLAT BED WITH BEDRAILS: A LITTLE
HELP NEEDED FOR BATHING: A LITTLE
WALKING IN HOSPITAL ROOM: A LOT
DRESSING REGULAR UPPER BODY CLOTHING: A LITTLE
PERSONAL GROOMING: A LOT
TOILETING: TOTAL
MOVING TO AND FROM BED TO CHAIR: A LOT
STANDING UP FROM CHAIR USING ARMS: A LOT
MOBILITY SCORE: 14
HELP NEEDED FOR BATHING: A LITTLE
DAILY ACTIVITIY SCORE: 20
TURNING FROM BACK TO SIDE WHILE IN FLAT BAD: A LITTLE
TOILETING: A LITTLE
MOVING TO AND FROM BED TO CHAIR: A LOT
STANDING UP FROM CHAIR USING ARMS: A LOT
MOVING TO AND FROM BED TO CHAIR: A LITTLE
HELP NEEDED FOR BATHING: A LITTLE
PERSONAL GROOMING: A LITTLE
TOILETING: A LITTLE
DRESSING REGULAR LOWER BODY CLOTHING: A LITTLE
PERSONAL GROOMING: A LITTLE
CLIMB 3 TO 5 STEPS WITH RAILING: TOTAL
WALKING IN HOSPITAL ROOM: A LOT
TURNING FROM BACK TO SIDE WHILE IN FLAT BAD: A LITTLE
CLIMB 3 TO 5 STEPS WITH RAILING: TOTAL
WALKING IN HOSPITAL ROOM: A LOT
PATIENT BASELINE BEDBOUND: NO
STANDING UP FROM CHAIR USING ARMS: A LOT
MOBILITY SCORE: 14
DRESSING REGULAR LOWER BODY CLOTHING: A LITTLE
CLIMB 3 TO 5 STEPS WITH RAILING: TOTAL

## 2024-02-26 ASSESSMENT — PAIN DESCRIPTION - DESCRIPTORS
DESCRIPTORS: SHARP;SHOOTING
DESCRIPTORS: ACHING;SORE
DESCRIPTORS: SHARP;SHOOTING
DESCRIPTORS: ACHING;SORE

## 2024-02-26 ASSESSMENT — PAIN SCALES - GENERAL
PAINLEVEL_OUTOF10: 2
PAINLEVEL_OUTOF10: 3
PAINLEVEL_OUTOF10: 7
PAINLEVEL_OUTOF10: 6
PAINLEVEL_OUTOF10: 2
PAINLEVEL_OUTOF10: 8
PAINLEVEL_OUTOF10: 0 - NO PAIN
PAINLEVEL_OUTOF10: 7
PAINLEVEL_OUTOF10: 7
PAINLEVEL_OUTOF10: 8
PAINLEVEL_OUTOF10: 10 - WORST POSSIBLE PAIN
PAINLEVEL_OUTOF10: 8
PAINLEVEL_OUTOF10: 3

## 2024-02-26 ASSESSMENT — ACTIVITIES OF DAILY LIVING (ADL)
HEARING - RIGHT EAR: DIFFICULTY WITH NOISE
HEARING - LEFT EAR: DIFFICULTY WITH NOISE
BATHING: NEEDS ASSISTANCE
ADEQUATE_TO_COMPLETE_ADL: YES
GROOMING: INDEPENDENT
TOILETING: NEEDS ASSISTANCE
FEEDING YOURSELF: INDEPENDENT
WALKS IN HOME: NEEDS ASSISTANCE
DRESSING YOURSELF: INDEPENDENT
PATIENT'S MEMORY ADEQUATE TO SAFELY COMPLETE DAILY ACTIVITIES?: NO
JUDGMENT_ADEQUATE_SAFELY_COMPLETE_DAILY_ACTIVITIES: YES
LACK_OF_TRANSPORTATION: NO

## 2024-02-26 ASSESSMENT — PATIENT HEALTH QUESTIONNAIRE - PHQ9
SUM OF ALL RESPONSES TO PHQ9 QUESTIONS 1 & 2: 0
1. LITTLE INTEREST OR PLEASURE IN DOING THINGS: NOT AT ALL
2. FEELING DOWN, DEPRESSED OR HOPELESS: NOT AT ALL

## 2024-02-26 ASSESSMENT — PAIN DESCRIPTION - LOCATION: LOCATION: LEG

## 2024-02-26 NOTE — BRIEF OP NOTE
Date: 2024  OR Location: [facility] OR    Name: Maggi Gordon, : 1935, Age: 89 y.o., MRN: [id number], Sex: female    Diagnosis  Pre-op Diagnosis     * Osteomyelitis of left ankle, unspecified type (CMS/HCC) [M86.9] Post-op Diagnosis     * Osteomyelitis of left ankle, unspecified type (CMS/HCC) [M86.9]     Procedures  Left below-knee amputation      Surgeons      * Pura Montes - Primary    Resident/Fellow/Other Assistant:  Surgeon(s) and Role:     * Jonny Osei MD - Assisting     * Sonny Cr MD - Assisting    Procedure Summary  Anesthesia: General  ASA: III  Anesthesia Staff: Anesthesiologist: Koko Hutchison MD; Padmini Merchant MD  Anesthesia Resident: Artie Ibrahim MD  Estimated Blood Loss: 200mL  Intra-op Medications:   Administrations occurring from 1400 to 1540 on 24:   Medication Name Total Dose   sodium chloride 0.9 % irrigation solution 1,000 mL              Anesthesia Record               Intraprocedure I/O Totals       None           Specimen:   ID Type Source Tests Collected by Time   1 : LEFT LOWER LEG Tissue LEG AMPUTATION BELOW THE KNEE LEFT SURGICAL PATHOLOGY EXAM Pura Montes MD 2024 1522        Staff:   Circulator: Sandhya Canas RN; Serafin Mascorro RN  Relief Circulator: Jacquie Cr RN  Relief Scrub: Serafin Mascorro RN  Scrub Person: Kameron Peters RN; Diane Lobo RN          Findings: L BKA. Well-perfused muscle    Complications:  None; patient tolerated the procedure well.     Disposition: PACU - hemodynamically stable.  Condition: stable  Specimens Collected:   ID Type Source Tests Collected by Time   1 : LEFT LOWER LEG Tissue LEG AMPUTATION BELOW THE KNEE LEFT SURGICAL PATHOLOGY EXAM Pura Montes MD 2024 1522     Attending Attestation: I was present and scrubbed for the entire procedure.    Pura Montes  Phone Number: [phone]

## 2024-02-26 NOTE — SIGNIFICANT EVENT
Vascular Surgery Post-Op Plan of Care  S/p L BKA    Plan:  NWB LLE  Abx can be discontinued 24hrs post-op  Dressing to be taken down by vascular surgery POD#3  AM labs 2/27    Sonny Cr MD  PGY5 - Integrated Vascular Surgery

## 2024-02-26 NOTE — CARE PLAN
The patient's goals for the shift include      The clinical goals for the shift include Patient remained safe and stable this shift, pain managed with pain medicine    Over the shift, the patient did not make progress toward the following goals. Barriers to progression include . Recommendations to address these barriers include .  Problem: Fall/Injury  Goal: Not fall by end of shift  Outcome: Progressing  Goal: Be free from injury by end of the shift  Outcome: Progressing  Goal: Verbalize understanding of personal risk factors for fall in the hospital  Outcome: Progressing     Problem: Skin  Goal: Decreased wound size/increased tissue granulation at next dressing change  Outcome: Progressing  Goal: Participates in plan/prevention/treatment measures  Outcome: Progressing  Goal: Prevent/manage excess moisture  Outcome: Progressing  Goal: Prevent/minimize sheer/friction injuries  Outcome: Progressing  Goal: Promote/optimize nutrition  Outcome: Progressing  Goal: Promote skin healing  Outcome: Progressing     Problem: Diabetes  Goal: Achieve decreasing blood glucose levels by end of shift  Outcome: Progressing  Goal: Increase stability of blood glucose readings by end of shift  Outcome: Progressing  Goal: Decrease in ketones present in urine by end of shift  Outcome: Progressing  Goal: Maintain electrolyte levels within acceptable range throughout shift  Outcome: Progressing  Goal: Maintain glucose levels >70mg/dl to <250mg/dl throughout shift  Outcome: Progressing  Goal: No changes in neurological exam by end of shift  Outcome: Progressing  Goal: Learn about and adhere to nutrition recommendations by end of shift  Outcome: Progressing  Goal: Vital signs within normal range for age by end of shift  Outcome: Progressing  Goal: Increase self care and/or family involovement by end of shift  Outcome: Progressing  Goal: Receive DSME education by end of shift  Outcome: Progressing     Problem: Pain - Adult  Goal:  Verbalizes/displays adequate comfort level or baseline comfort level  Outcome: Progressing     Problem: Safety - Adult  Goal: Free from fall injury  Outcome: Progressing     Problem: Discharge Planning  Goal: Discharge to home or other facility with appropriate resources  Outcome: Progressing     Problem: Chronic Conditions and Co-morbidities  Goal: Patient's chronic conditions and co-morbidity symptoms are monitored and maintained or improved  Outcome: Progressing   .

## 2024-02-26 NOTE — ANESTHESIA POSTPROCEDURE EVALUATION
Patient: Maggi Gordon    Procedure Summary       Date: 02/26/24 Room / Location: [facility] OR 12 / Virtual [facility] [place]    Anesthesia Start: 1430 Anesthesia Stop: 1644    Procedure: Left Leg Amputation Below Knee (Left: Leg Lower) Diagnosis:       Osteomyelitis of left ankle, unspecified type (CMS/HCC)      (Osteomyelitis of left ankle, unspecified type (CMS/HCC) [M86.9])    Surgeons: Pura Montes MD Responsible Provider: Koko Hutchison MD    Anesthesia Type: general ASA Status: 3            Anesthesia Type: general    Vitals Value Taken Time   /68 02/26/24 1648   Temp 36 02/26/24 1648   Pulse 64 02/26/24 1645   Resp 8 02/26/24 1645   SpO2 99 % 02/26/24 1645   Vitals shown include unvalidated device data.    Anesthesia Post Evaluation    Patient participation: complete - patient participated  Level of consciousness: awake  Pain management: adequate  Airway patency: patent  Cardiovascular status: blood pressure returned to baseline  Respiratory status: nasal cannula  Hydration status: acceptable  Postoperative Nausea and Vomiting: none        No notable events documented.

## 2024-02-26 NOTE — PROGRESS NOTES
Physical Therapy                 Therapy Communication Note    Patient Name: Maggi Gordon  MRN: [id number]  Today's Date: 2/26/2024     Discipline: Physical Therapy    Missed Visit Reason: Missed Visit Reason:  (@1304 per chart review and discussion with bedside nurse, pt to go to OR this date for BKA.)    Missed Time: Attempt      02/26/24 at 1:06 PM - Anette Crum PT

## 2024-02-26 NOTE — INTERVAL H&P NOTE
H&P reviewed. The patient was examined and there are no changes to the H&P.    OR for L guillotine BKA

## 2024-02-26 NOTE — ANESTHESIA PROCEDURE NOTES
Airway  Date/Time: 2/26/2024 2:50 PM  Urgency: elective    Airway not difficult    Staffing  Performed: resident   Authorized by: Padmini Merchant MD    Performed by: Artie Ibrahim MD  Patient location during procedure: OR    Indications and Patient Condition  Indications for airway management: anesthesia and airway protection  Spontaneous ventilation: present  Sedation level: deep  Preoxygenated: yes  Patient position: sniffing  Mask difficulty assessment: 1 - vent by mask  Planned trial extubation    Final Airway Details  Final airway type: endotracheal airway      Successful airway: ETT  Cuffed: yes   Successful intubation technique: direct laryngoscopy  Blade: Demario  Blade size: #4  ETT size (mm): 7.0  Cormack-Lehane Classification: grade IIa - partial view of glottis  Placement verified by: capnometry   Measured from: teeth  ETT to teeth (cm): 22  Number of attempts at approach: 1  Ventilation between attempts: none  Number of other approaches attempted: 0

## 2024-02-26 NOTE — PERIOPERATIVE NURSING NOTE
Called report to DERRICK 20 RN. Updated r/t post op pain management, vs, L BKA drsg, and neuro status.   [General Appearance - Alert] : alert [General Appearance - Well-Appearing] : well appearing [Demonstrated Behavior - Infant Nonreactive To Parents] : active [General Appearance - In No Acute Distress] : in no acute distress [Evidence Of Head Injury] : atraumatic [Facies] : there were no dysmorphic facial features [Fontanelles Flat] : the anterior fontanelle was soft and flat [Appearance Of Head] : the head was normocephalic [Outer Ear] : the ears and nose were normal in appearance [Sclera] : the conjunctiva were normal [Auscultation Breath Sounds / Voice Sounds] : breath sounds clear to auscultation bilaterally [Examination Of The Oral Cavity] : mucous membranes were moist and pink [Apical Impulse] : quiet precordium with normal apical impulse [Normal Chest Appearance] : the chest was normal in appearance [Heart Rate And Rhythm] : normal heart rate and rhythm [Heart Sounds] : normal S1 and S2 [Heart Sounds Pericardial Friction Rub] : no pericardial rub [Heart Sounds Click] : no clicks [Heart Sounds Gallop] : no gallops [III] : a grade 3/6   [Edema] : no edema [Arterial Pulses] : normal upper and lower extremity pulses with no pulse delay [Capillary Refill Test] : normal capillary refill [LLSB] : LLSB  [Holosystolic] : holosystolic [Abdomen Soft] : soft [Harsh] : harsh [Abdomen Tenderness] : non-tender [Nondistended] : nondistended [Musculoskeletal Exam: Normal Movement Of All Extremities] : normal movements of all extremities [Nail Clubbing] : no clubbing  or cyanosis of the fingers [FreeTextEntry1] : hypotonic [] : no rash [Skin Turgor] : normal turgor [Skin Lesions] : no lesions

## 2024-02-26 NOTE — CARE PLAN
Patient remained safe and free of falls and/or injury throughout this shift. Patient c/o mild to moderate pain to the left lower extremity this shift; medicated per EMR. Patient down to OR for left BKA this afternoon. Pending patient arrival/ report to be called from PACU, back to the unit at this time from PACU.    The clinical goals for the shift include patient will rate pain at or below a level of 4/10 by the end of this shift. Goal not met. Patient complained of elevated pain to the LLE this AM; medicated per EMR.     Patient plan of care ongoing.    Mattie FARRN, RN, CMSRN      Problem: Fall/Injury  Goal: Not fall by end of shift  Outcome: Progressing  Goal: Be free from injury by end of the shift  Outcome: Progressing  Goal: Verbalize understanding of personal risk factors for fall in the hospital  Outcome: Progressing     Problem: Skin  Goal: Decreased wound size/increased tissue granulation at next dressing change  Outcome: Progressing  Flowsheets (Taken 2/26/2024 0929)  Decreased wound size/increased tissue granulation at next dressing change:   Protective dressings over bony prominences   Promote sleep for wound healing   Utilize specialty bed per algorithm  Goal: Participates in plan/prevention/treatment measures  Outcome: Progressing  Flowsheets (Taken 2/26/2024 0929)  Participates in plan/prevention/treatment measures:   Discuss with provider PT/OT consult   Elevate heels   Increase activity/out of bed for meals  Goal: Prevent/manage excess moisture  Outcome: Progressing  Flowsheets (Taken 2/26/2024 0929)  Prevent/manage excess moisture:   Cleanse incontinence/protect with barrier cream   Moisturize dry skin   Monitor for/manage infection if present   Follow provider orders for dressing changes  Goal: Prevent/minimize sheer/friction injuries  Outcome: Progressing  Flowsheets (Taken 2/26/2024 0929)  Prevent/minimize sheer/friction injuries:   Complete micro-shifts as needed if patient unable.  Adjust patient position to relieve pressure points, not a full turn   Use pull sheet   Turn/reposition every 2 hours/use positioning/transfer devices   Increase activity/out of bed for meals   HOB 30 degrees or less   Utilize specialty bed per algorithm  Goal: Promote/optimize nutrition  Outcome: Progressing  Flowsheets (Taken 2/26/2024 0929)  Promote/optimize nutrition:   Discuss with provider if NPO > 2 days   Assist with feeding   Consume > 50% meals/supplements   Offer water/supplements/favorite foods   Reassess MST if dietician not consulted   Monitor/record intake including meals  Goal: Promote skin healing  Outcome: Progressing  Flowsheets (Taken 2/26/2024 0929)  Promote skin healing:   Assess skin/pad under line(s)/device(s)   Ensure correct size (line/device) and apply per  instructions   Protective dressings over bony prominences   Rotate device position/do not position patient on device   Turn/reposition every 2 hours/use positioning/transfer devices     Problem: Diabetes  Goal: Achieve decreasing blood glucose levels by end of shift  Outcome: Progressing  Goal: Increase stability of blood glucose readings by end of shift  Outcome: Progressing  Goal: Decrease in ketones present in urine by end of shift  Outcome: Progressing  Goal: Maintain electrolyte levels within acceptable range throughout shift  Outcome: Progressing  Goal: Maintain glucose levels >70mg/dl to <250mg/dl throughout shift  Outcome: Progressing  Goal: No changes in neurological exam by end of shift  Outcome: Progressing  Goal: Learn about and adhere to nutrition recommendations by end of shift  Outcome: Progressing  Goal: Vital signs within normal range for age by end of shift  Outcome: Progressing  Goal: Increase self care and/or family involovement by end of shift  Outcome: Progressing  Goal: Receive DSME education by end of shift  Outcome: Progressing     Problem: Pain - Adult  Goal: Verbalizes/displays adequate comfort  level or baseline comfort level  Outcome: Progressing     Problem: Safety - Adult  Goal: Free from fall injury  Outcome: Progressing     Problem: Discharge Planning  Goal: Discharge to home or other facility with appropriate resources  Outcome: Progressing     Problem: Chronic Conditions and Co-morbidities  Goal: Patient's chronic conditions and co-morbidity symptoms are monitored and maintained or improved  Outcome: Progressing

## 2024-02-26 NOTE — ANESTHESIA PREPROCEDURE EVALUATION
Patient: Maggi Gordon    Procedure Information       Date/Time: 02/26/24 1400    Procedure: Leg Amputation Below Knee (Left)    Location: [facility] OR 12 / Virtual [facility] OR    Surgeons: Pura Montes MD            Relevant Problems   Cardiovascular   (+) Acute lower limb ischemia   (+) Atherosclerosis of CABG w/o angina pectoris   (+) Bilateral carotid artery stenosis   (+) 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   (+) Essential hypertension   (+) Mixed hyperlipidemia   (+) Occlusion and stenosis of bilateral carotid arteries   (+) Paroxysmal atrial fibrillation (CMS/HCC)   (+) Peripheral vascular disease (CMS/HCC)   (+) Pure hyperglyceridemia   (+) Thoracic aortic aneurysm (TAA) (CMS/HCC)      Endocrine   (+) Acquired hypothyroidism   (+) Nontoxic multinodular goiter      /Renal   (+) Chronic kidney disease (CKD) stage G3a/A1, moderately decreased glomerular filtration rate (GFR) between 45-59 mL/min/1.73 square meter and albuminuria creatinine ratio less than 30 mg/g (CMS/HCC)      Neuro/Psych   (+) Bilateral carotid artery stenosis   (+) OLLIE (generalized anxiety disorder)   (+) Occlusion and stenosis of bilateral carotid arteries      Hematology   (+) Anemia   (+) Chronic anticoagulation      Musculoskeletal   (+) Primary osteoarthritis of both knees      Infectious Disease   (+) Herpes zoster   (+) Onychomycosis       Clinical information reviewed:   Tobacco  Allergies  Meds   Med Hx  Surg Hx   Fam Hx  Soc Hx        NPO Detail:  No data recorded     PHYSICAL EXAM    Anesthesia Plan    History of general anesthesia?: yes  History of complications of general anesthesia?: no    ASA 3     general

## 2024-02-26 NOTE — PROGRESS NOTES
Subjective   NAEON. Pt comfortable this morning. Denies f/c/n/v/cp/sob. Intermittent pain in her L foot. Ready for procedure with vascular surgery.     Objective   Patient Vitals for the past 24 hrs:   BP Temp Pulse Resp SpO2   02/26/24 0619 134/72 36.3 °C (97.3 °F) 74 16 97 %   02/25/24 2033 163/74 36.9 °C (98.4 °F) 75 20 97 %   02/25/24 1238 141/67 36.6 °C (97.9 °F) 68 20 98 %     Results for orders placed or performed during the hospital encounter of 02/17/24 (from the past 24 hour(s))   CBC and Auto Differential   Result Value Ref Range    WBC 10.9 4.4 - 11.3 x10*3/uL    nRBC 0.2 (H) 0.0 - 0.0 /100 WBCs    RBC 3.31 (L) 4.00 - 5.20 x10*6/uL    Hemoglobin 10.5 (L) 12.0 - 16.0 g/dL    Hematocrit 32.4 (L) 36.0 - 46.0 %    MCV 98 80 - 100 fL    MCH 31.7 26.0 - 34.0 pg    MCHC 32.4 32.0 - 36.0 g/dL    RDW 16.7 (H) 11.5 - 14.5 %    Platelets 148 (L) 150 - 450 x10*3/uL    Neutrophils % 77.4 40.0 - 80.0 %    Immature Granulocytes %, Automated 0.6 0.0 - 0.9 %    Lymphocytes % 14.5 13.0 - 44.0 %    Monocytes % 6.4 2.0 - 10.0 %    Eosinophils % 1.0 0.0 - 6.0 %    Basophils % 0.1 0.0 - 2.0 %    Neutrophils Absolute 8.40 (H) 1.60 - 5.50 x10*3/uL    Immature Granulocytes Absolute, Automated 0.07 0.00 - 0.50 x10*3/uL    Lymphocytes Absolute 1.57 0.80 - 3.00 x10*3/uL    Monocytes Absolute 0.70 0.05 - 0.80 x10*3/uL    Eosinophils Absolute 0.11 0.00 - 0.40 x10*3/uL    Basophils Absolute 0.01 0.00 - 0.10 x10*3/uL   Magnesium   Result Value Ref Range    Magnesium 2.01 1.60 - 2.40 mg/dL   Renal Function Panel   Result Value Ref Range    Glucose 123 (H) 74 - 99 mg/dL    Sodium 140 136 - 145 mmol/L    Potassium 3.8 3.5 - 5.3 mmol/L    Chloride 105 98 - 107 mmol/L    Bicarbonate 27 21 - 32 mmol/L    Anion Gap 12 10 - 20 mmol/L    Urea Nitrogen 31 (H) 6 - 23 mg/dL    Creatinine 1.17 (H) 0.50 - 1.05 mg/dL    eGFR 45 (L) >60 mL/min/1.73m*2    Calcium 8.9 8.6 - 10.6 mg/dL    Phosphorus 1.9 (L) 2.5 - 4.9 mg/dL    Albumin 3.2 (L) 3.4 - 5.0  g/dL   Vancomycin   Result Value Ref Range    Vancomycin 14.2 5.0 - 20.0 ug/mL   Type and screen   Result Value Ref Range    ABO TYPE A     Rh TYPE POS     ANTIBODY SCREEN NEG    Coagulation Screen   Result Value Ref Range    Protime 12.3 9.8 - 12.8 seconds    INR 1.1 0.9 - 1.1    aPTT 53 (H) 27 - 38 seconds       Gen: well-appearing, NAD, thin  Head and neck: NCAT, neck supple without LAD  HEENT: MMM, normal nose without congestion  Pulm: normal WOB on RA  Ext: feet warm, but absent PT pulse on L with doppler  -LLE: ulcerative lesion on the posterior aspect of the left ankle  Neuro: alert and oriented x3, normal tone, face symmetric, moves all extremities spontaneously    Assessment/Plan   88yo with PMH HTN, HLD, CKD, CAD s/p CABG x3 1995, 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 Currently treating for OM with vanc/zosyn.  Vascular surgery and podiatry following, will need to determine the salvageability of the foot before proceeding with any surgical interventions most likely.  She appears well and is hemodynamically stable, no signs of sepsis.    Updates 2/26:  -L BKA with vascular surgery    Updates 2/25:  -L BKA 2/26: NPO@MN, DVT ppx held @MN, type and screen, coags ordered  -Giving 1u PRBC in anticipation of procedure tomorrow    #Chronic L foot wound c/f OM  #PAD s/p L femoral endarectomy on 1/18/24   #likely OM of L foot/ankle  ::MRI L foot and ankle: Evidence c/w osteomyelitis of 4th/5th metatarsal and calcaneal tuberosity, edema of distal soleus c/w myositis  ::Cultures NGTD  -vasc surgery  following:   -2/21: S/p LLE angiogram, s/p L EIA balloon angioplasty and stent, SFA balloon angioplasty and stent   -Vascular surgery and podiatry discussed further procedures for this pt. Since the achilles tendon is not salvageable, pt would require removal of tendon and she would not be able to walk following procedure. D/t this, vascular sx will not operate on leg given recommendation for BKA   -Plan for L BKA with vascular sx 2/26, pt and family agreeable  -NPO@MN, DVT ppx held @MN, type and screen, coags ordered  -C/w Vanc/Zosyn  -DAPT, holding plavix for procedure 2/26    #LUCRETIA, resolving  -prerenal most likely, improving  -continue fluid resuscitation   -hold home antihypertensives (hydrochlorothiazide, ana, valsartan) for now for soft BP    #Afib  -CHADSVASc 5  -continue home atenolol  -reportedly not on AC at home, but dc summary from Sept notes warfarin. No warfarin on fill history on EMR or on dc summary from Jan admission. She did have high INR on arrival in Jan.  -Given valve is bovine and not mechanical, will start pt on DOAC following procedure    #Depression - c/w home meds    #Anemia  ::S/p 2u PRBC 2/22, 2/23, improved Hg to 7.3 (2/24)  -Transfuse 1u PRBC electively 2/24  -CTM CBCs, transfuse Hg<7  -Giving 1u PRBC 2/25 in anticipation of procedure 2/26    N: Cardiac diet  DVT Ppx: Heparin sc  GI Ppx: -  Access/Lines: PIV   Abx: Vanc/Zosyn  O2: None    Pain regimen: Tylenol prn  GI Laxative: Miralax   PT/OT: ARF     Code status: Full Code  Emergency contact: Vicenta Gordon (sister-in-law) [phone]    Arnoldo David,   Neurology PGY-1

## 2024-02-26 NOTE — ANESTHESIA PROCEDURE NOTES
Peripheral IV  Date/Time: 2/26/2024 3:00 PM      Placement  Needle size: 20 G  Laterality: left  Location: forearm  Local anesthetic: none  Site prep: alcohol  Technique: anatomical landmarks  Attempts: 1

## 2024-02-27 ENCOUNTER — ANESTHESIA (OUTPATIENT)
Dept: OPERATING ROOM | Facility: HOSPITAL | Age: 89
DRG: 240 | End: 2024-02-27
Payer: MEDICARE

## 2024-02-27 LAB
ALBUMIN SERPL BCP-MCNC: 2.4 G/DL (ref 3.4–5)
ANION GAP SERPL CALC-SCNC: 16 MMOL/L (ref 10–20)
BASOPHILS # BLD AUTO: 0.02 X10*3/UL (ref 0–0.1)
BASOPHILS NFR BLD AUTO: 0.2 %
BLOOD EXPIRATION DATE: NORMAL
BUN SERPL-MCNC: 36 MG/DL (ref 6–23)
CALCIUM SERPL-MCNC: 8.3 MG/DL (ref 8.6–10.6)
CHLORIDE SERPL-SCNC: 107 MMOL/L (ref 98–107)
CO2 SERPL-SCNC: 21 MMOL/L (ref 21–32)
CREAT SERPL-MCNC: 1.45 MG/DL (ref 0.5–1.05)
DISPENSE STATUS: NORMAL
EGFRCR SERPLBLD CKD-EPI 2021: 35 ML/MIN/1.73M*2
EOSINOPHIL # BLD AUTO: 0.06 X10*3/UL (ref 0–0.4)
EOSINOPHIL NFR BLD AUTO: 0.6 %
ERYTHROCYTE [DISTWIDTH] IN BLOOD BY AUTOMATED COUNT: 19.9 % (ref 11.5–14.5)
GLUCOSE SERPL-MCNC: 98 MG/DL (ref 74–99)
HCT VFR BLD AUTO: 28.5 % (ref 36–46)
HGB BLD-MCNC: 8.6 G/DL (ref 12–16)
IMM GRANULOCYTES # BLD AUTO: 0.06 X10*3/UL (ref 0–0.5)
IMM GRANULOCYTES NFR BLD AUTO: 0.6 % (ref 0–0.9)
LYMPHOCYTES # BLD AUTO: 2.19 X10*3/UL (ref 0.8–3)
LYMPHOCYTES NFR BLD AUTO: 21.4 %
MAGNESIUM SERPL-MCNC: 1.93 MG/DL (ref 1.6–2.4)
MCH RBC QN AUTO: 29.1 PG (ref 26–34)
MCHC RBC AUTO-ENTMCNC: 30.2 G/DL (ref 32–36)
MCV RBC AUTO: 96 FL (ref 80–100)
MONOCYTES # BLD AUTO: 1.06 X10*3/UL (ref 0.05–0.8)
MONOCYTES NFR BLD AUTO: 10.4 %
NEUTROPHILS # BLD AUTO: 6.84 X10*3/UL (ref 1.6–5.5)
NEUTROPHILS NFR BLD AUTO: 66.8 %
NRBC BLD-RTO: 0.7 /100 WBCS (ref 0–0)
PHOSPHATE SERPL-MCNC: 3.6 MG/DL (ref 2.5–4.9)
PLATELET # BLD AUTO: 154 X10*3/UL (ref 150–450)
POTASSIUM SERPL-SCNC: 4.9 MMOL/L (ref 3.5–5.3)
PRODUCT BLOOD TYPE: 6200
PRODUCT CODE: NORMAL
RBC # BLD AUTO: 2.96 X10*6/UL (ref 4–5.2)
SODIUM SERPL-SCNC: 139 MMOL/L (ref 136–145)
UNIT ABO: NORMAL
UNIT NUMBER: NORMAL
UNIT RH: NORMAL
UNIT VOLUME: 350
WBC # BLD AUTO: 10.2 X10*3/UL (ref 4.4–11.3)
XM INTEP: NORMAL

## 2024-02-27 PROCEDURE — 36430 TRANSFUSION BLD/BLD COMPNT: CPT

## 2024-02-27 PROCEDURE — 2500000004 HC RX 250 GENERAL PHARMACY W/ HCPCS (ALT 636 FOR OP/ED)

## 2024-02-27 PROCEDURE — 2500000005 HC RX 250 GENERAL PHARMACY W/O HCPCS

## 2024-02-27 PROCEDURE — 2500000002 HC RX 250 W HCPCS SELF ADMINISTERED DRUGS (ALT 637 FOR MEDICARE OP, ALT 636 FOR OP/ED)

## 2024-02-27 PROCEDURE — 85025 COMPLETE CBC W/AUTO DIFF WBC: CPT

## 2024-02-27 PROCEDURE — 1100000001 HC PRIVATE ROOM DAILY

## 2024-02-27 PROCEDURE — 97110 THERAPEUTIC EXERCISES: CPT | Mod: GP

## 2024-02-27 PROCEDURE — P9016 RBC LEUKOCYTES REDUCED: HCPCS

## 2024-02-27 PROCEDURE — 97164 PT RE-EVAL EST PLAN CARE: CPT | Mod: GP

## 2024-02-27 PROCEDURE — 2500000001 HC RX 250 WO HCPCS SELF ADMINISTERED DRUGS (ALT 637 FOR MEDICARE OP)

## 2024-02-27 PROCEDURE — 99232 SBSQ HOSP IP/OBS MODERATE 35: CPT

## 2024-02-27 PROCEDURE — 36415 COLL VENOUS BLD VENIPUNCTURE: CPT

## 2024-02-27 PROCEDURE — 84100 ASSAY OF PHOSPHORUS: CPT

## 2024-02-27 PROCEDURE — 83735 ASSAY OF MAGNESIUM: CPT

## 2024-02-27 RX ORDER — VANCOMYCIN HYDROCHLORIDE 750 MG/150ML
750 INJECTION, SOLUTION INTRAVENOUS ONCE
Status: COMPLETED | OUTPATIENT
Start: 2024-02-27 | End: 2024-02-27

## 2024-02-27 RX ORDER — HYDROMORPHONE HYDROCHLORIDE 1 MG/ML
0.2 INJECTION, SOLUTION INTRAMUSCULAR; INTRAVENOUS; SUBCUTANEOUS
Status: DISCONTINUED | OUTPATIENT
Start: 2024-02-27 | End: 2024-02-29

## 2024-02-27 RX ADMIN — PIPERACILLIN SODIUM AND TAZOBACTAM SODIUM 2.25 G: 2; .25 INJECTION, SOLUTION INTRAVENOUS at 03:38

## 2024-02-27 RX ADMIN — Medication 10 MG: at 08:36

## 2024-02-27 RX ADMIN — VANCOMYCIN HYDROCHLORIDE 750 MG: 750 INJECTION, SOLUTION INTRAVENOUS at 08:45

## 2024-02-27 RX ADMIN — HYDROMORPHONE HYDROCHLORIDE 0.2 MG: 1 INJECTION, SOLUTION INTRAMUSCULAR; INTRAVENOUS; SUBCUTANEOUS at 16:01

## 2024-02-27 RX ADMIN — SODIUM CHLORIDE, POTASSIUM CHLORIDE, SODIUM LACTATE AND CALCIUM CHLORIDE 1000 ML: 600; 310; 30; 20 INJECTION, SOLUTION INTRAVENOUS at 11:42

## 2024-02-27 RX ADMIN — LIDOCAINE 2 PATCH: 4 PATCH TOPICAL at 09:37

## 2024-02-27 RX ADMIN — ATENOLOL 50 MG: 50 TABLET ORAL at 20:11

## 2024-02-27 RX ADMIN — OXYCODONE HYDROCHLORIDE 5 MG: 5 TABLET ORAL at 08:35

## 2024-02-27 RX ADMIN — ASPIRIN 81 MG: 81 TABLET, COATED ORAL at 08:36

## 2024-02-27 RX ADMIN — OXYCODONE HYDROCHLORIDE 5 MG: 5 TABLET ORAL at 13:52

## 2024-02-27 RX ADMIN — PIPERACILLIN SODIUM AND TAZOBACTAM SODIUM 2.25 G: 2; .25 INJECTION, SOLUTION INTRAVENOUS at 10:00

## 2024-02-27 RX ADMIN — ACETAMINOPHEN 975 MG: 325 TABLET ORAL at 15:11

## 2024-02-27 RX ADMIN — GABAPENTIN 100 MG: 100 CAPSULE ORAL at 15:11

## 2024-02-27 RX ADMIN — POLYETHYLENE GLYCOL 3350 17 G: 17 POWDER, FOR SOLUTION ORAL at 08:42

## 2024-02-27 RX ADMIN — IPRATROPIUM BROMIDE 2 SPRAY: 21 SPRAY, METERED NASAL at 20:12

## 2024-02-27 RX ADMIN — ACETAMINOPHEN 975 MG: 325 TABLET ORAL at 08:36

## 2024-02-27 RX ADMIN — AZELASTINE HYDROCHLORIDE 2 SPRAY: 137 SPRAY, METERED NASAL at 08:49

## 2024-02-27 RX ADMIN — IPRATROPIUM BROMIDE 2 SPRAY: 21 SPRAY, METERED NASAL at 08:48

## 2024-02-27 RX ADMIN — OXYCODONE HYDROCHLORIDE 5 MG: 5 TABLET ORAL at 22:15

## 2024-02-27 RX ADMIN — AZELASTINE HYDROCHLORIDE 2 SPRAY: 137 SPRAY, METERED NASAL at 20:12

## 2024-02-27 RX ADMIN — PIPERACILLIN SODIUM AND TAZOBACTAM SODIUM 2.25 G: 2; .25 INJECTION, SOLUTION INTRAVENOUS at 16:02

## 2024-02-27 RX ADMIN — OXYCODONE HYDROCHLORIDE 10 MG: 5 TABLET ORAL at 00:34

## 2024-02-27 RX ADMIN — ROSUVASTATIN CALCIUM 10 MG: 10 TABLET, FILM COATED ORAL at 20:11

## 2024-02-27 RX ADMIN — ATENOLOL 50 MG: 50 TABLET ORAL at 08:36

## 2024-02-27 RX ADMIN — FOLIC ACID 5 MG: 1 TABLET ORAL at 08:37

## 2024-02-27 RX ADMIN — ACETAMINOPHEN 975 MG: 325 TABLET ORAL at 21:45

## 2024-02-27 RX ADMIN — GABAPENTIN 100 MG: 100 CAPSULE ORAL at 08:37

## 2024-02-27 RX ADMIN — HYDROMORPHONE HYDROCHLORIDE 0.2 MG: 1 INJECTION, SOLUTION INTRAMUSCULAR; INTRAVENOUS; SUBCUTANEOUS at 10:31

## 2024-02-27 RX ADMIN — GABAPENTIN 100 MG: 100 CAPSULE ORAL at 20:12

## 2024-02-27 RX ADMIN — Medication 1 TABLET: at 08:36

## 2024-02-27 RX ADMIN — PIPERACILLIN SODIUM AND TAZOBACTAM SODIUM 2.25 G: 2; .25 INJECTION, SOLUTION INTRAVENOUS at 21:47

## 2024-02-27 RX ADMIN — ESCITALOPRAM OXALATE 10 MG: 10 TABLET ORAL at 08:36

## 2024-02-27 RX ADMIN — Medication 2000 UNITS: at 08:45

## 2024-02-27 RX ADMIN — CLOPIDOGREL BISULFATE 75 MG: 75 TABLET ORAL at 08:37

## 2024-02-27 ASSESSMENT — COGNITIVE AND FUNCTIONAL STATUS - GENERAL
MOBILITY SCORE: 12
MOVING TO AND FROM BED TO CHAIR: A LOT
STANDING UP FROM CHAIR USING ARMS: A LOT
MOVING FROM LYING ON BACK TO SITTING ON SIDE OF FLAT BED WITH BEDRAILS: A LITTLE
MOVING TO AND FROM BED TO CHAIR: A LOT
MOVING FROM LYING ON BACK TO SITTING ON SIDE OF FLAT BED WITH BEDRAILS: A LITTLE
WALKING IN HOSPITAL ROOM: A LOT
TURNING FROM BACK TO SIDE WHILE IN FLAT BAD: A LOT
MOBILITY SCORE: 12
TOILETING: TOTAL
DRESSING REGULAR UPPER BODY CLOTHING: A LITTLE
TURNING FROM BACK TO SIDE WHILE IN FLAT BAD: A LOT
HELP NEEDED FOR BATHING: A LITTLE
DRESSING REGULAR LOWER BODY CLOTHING: A LITTLE
DAILY ACTIVITIY SCORE: 16
CLIMB 3 TO 5 STEPS WITH RAILING: TOTAL
STANDING UP FROM CHAIR USING ARMS: A LOT
CLIMB 3 TO 5 STEPS WITH RAILING: TOTAL
PERSONAL GROOMING: A LOT
WALKING IN HOSPITAL ROOM: A LOT

## 2024-02-27 ASSESSMENT — PAIN DESCRIPTION - ORIENTATION
ORIENTATION: LEFT
ORIENTATION: LEFT

## 2024-02-27 ASSESSMENT — PAIN - FUNCTIONAL ASSESSMENT
PAIN_FUNCTIONAL_ASSESSMENT: 0-10
PAIN_FUNCTIONAL_ASSESSMENT: 0-10
PAIN_FUNCTIONAL_ASSESSMENT: WONG-BAKER FACES
PAIN_FUNCTIONAL_ASSESSMENT: 0-10

## 2024-02-27 ASSESSMENT — PAIN SCALES - WONG BAKER
WONGBAKER_NUMERICALRESPONSE: NO HURT
WONGBAKER_NUMERICALRESPONSE: HURTS LITTLE BIT
WONGBAKER_NUMERICALRESPONSE: NO HURT
WONGBAKER_NUMERICALRESPONSE: NO HURT

## 2024-02-27 ASSESSMENT — PAIN SCALES - GENERAL
PAINLEVEL_OUTOF10: 5 - MODERATE PAIN
PAINLEVEL_OUTOF10: 4
PAINLEVEL_OUTOF10: 5 - MODERATE PAIN
PAINLEVEL_OUTOF10: 9
PAINLEVEL_OUTOF10: 0 - NO PAIN
PAINLEVEL_OUTOF10: 5 - MODERATE PAIN
PAINLEVEL_OUTOF10: 3
PAINLEVEL_OUTOF10: 0 - NO PAIN
PAINLEVEL_OUTOF10: 10 - WORST POSSIBLE PAIN
PAINLEVEL_OUTOF10: 0 - NO PAIN
PAINLEVEL_OUTOF10: 0 - NO PAIN
PAINLEVEL_OUTOF10: 10 - WORST POSSIBLE PAIN

## 2024-02-27 ASSESSMENT — PAIN DESCRIPTION - LOCATION
LOCATION: LEG
LOCATION: LEG

## 2024-02-27 NOTE — PROGRESS NOTES
TCC met with pt to discuss care team reccs for High intensity. Pt agreeable and TCC gave pt AR list for review. Pt does not have FOC at this time and would like TCC to send out AR referrals in her area. Per pt, ok for TCC to discuss dispo plans with her sister in lawVicenta. Pending updated PT/OT notes. TCC will continue to follow.   1514-TCC spoke with pt and pt's sister in law, Vicenta at bedside and made aware [facility] can accept pt so far. Per pt's sister in law FOC is [facility]. TCC explained to pt and Vicenta that care team reccs for High intensity and TCC explained the difference in therapy between High intensity and MOD intensity and pt and Vicenta verbalized their understanding and their preference is [facility] SNF since it is closer to home. Will send referral and will give SNF list to review.

## 2024-02-27 NOTE — SIGNIFICANT EVENT
Post-operative Check    S: Patient seen at bedside s/p LLE BKA. Denies f/c, n/v, SOB, CP. Patient was in a significant amount of pain and is tearful because of it. Per nursing, she hasn't received pain medications in awhile and there is nothing ordered for pain.     O:  - Extremities:   - Left BKA stump with clean dressing and ace wrap in place; patient able to flex and extend knee without issues   - RLE warm, well-perfused, multiphasic DP/PT pulses    A/P: Patient is in stable conditions post-operatively. Pain control is not optimized.    Plan:  - Please optimize patient's pain control   - avoid Fentanyl per patient's request  - NWB LLE  - Abx can be discontinued 24hrs post-op  - Dressing to be taken down by vascular surgery POD#3  - AM labs 2/27  - OK for CLD, advance as tolerated    Sia Leo MD  General Surgery Resident  Vascular Surgery Service  Pager: [phone]

## 2024-02-27 NOTE — CARE PLAN
Problem: Fall/Injury  Goal: Not fall by end of shift  Outcome: Progressing  Goal: Be free from injury by end of the shift  Outcome: Progressing  Goal: Verbalize understanding of personal risk factors for fall in the hospital  Outcome: Progressing   The patient's goals for the shift include      The clinical goals for the shift include Pt will be monitor for bleeding and treat for pain post surgery during this shift

## 2024-02-27 NOTE — PROGRESS NOTES
[facility]  Vascular Surgery  Progress Note  Maggi Gordon  [id number]  1935     Subjective    POD 6 s/p LLE angiogram and revascularization ( L EIA, SFA, POP stenting) with vascular surgery.   POD 1 s/p L BKA    This AM right groin access site aappears hemostatic, stable,  and without hematoma. Her leg was bent on exam, applied knee immobilizer at bedside. Pain is now better controlled.  Pending  1 unit pRBC transfusion today.    No headaches, lightheadedness, dizziness, chest pain, SOB, abdominal pain. Her pain is limited to her BKA. No strikethrough on dressing.    Objective:     Last Recorded Vitals  Vitals:    02/27/24 1220   BP: 106/52   Pulse:    Resp:    Temp:    SpO2:          I/O last 2 completed shifts:  In: 3957 (88.3 mL/kg) [P.O.:60; I.V.:1355.3 (30.3 mL/kg); Blood:2441.7; IV Piggyback:100]  Out: 200 (4.5 mL/kg) [Blood:200]  Weight: 44.8 kg      Physical Exam  General: NAD. Nontoxic  HEENT: Atraumatic. MMM. EOMI.   Card: RRR  Pulm: Nonlabored breathing on RA  Chest: No bruising or seatbelt sign. Chest is nontender.  Abdomen: Soft, nontender, nondistended. No bruising.  Extremities: MAEx4. Warm bilaterally. OR ACE dressing in place on L BKA sutmp  Neuro: No focal deficits.  Pulses:  -biphasic  DP and PT signals in right lower extremities  -no palpable masses or fluid collections in bilateral groins  -right foot warm and well-perfused. L BKA stump, TTP.  -compartments soft and compressible in bilateral lower extremities  -palpable radial pulses bilaterally    Relevant Results  Lab Results   Component Value Date    WBC 10.2 02/27/2024    HGB 8.6 (L) 02/27/2024    HCT 28.5 (L) 02/27/2024    MCV 96 02/27/2024     02/27/2024     Lab Results   Component Value Date    GLUCOSE 98 02/27/2024    CALCIUM 8.3 (L) 02/27/2024     02/27/2024    K 4.9 02/27/2024    CO2 21 02/27/2024     02/27/2024    BUN 36 (H) 02/27/2024    CREATININE 1.45 (H) 02/27/2024           Assessment/Plan   Ms. Maggi Gordon is a 89 y.o. 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      Pre-op exam: Right foot biphasic signals, Left foot biphasic PT, absent DP  Post-op exam: Right foot biphasic signals, Left foot biphasic signals    2/26: S/p L BKA (formal)     Plan/ recommendations:  -Vascular surgery to change OR dressing on POD3  - q4hr vitals and vasc checks on the floor  - Pain control per primary team  - can we UOOB and weight bearing as tolerated -- would recommend waiting until  Clinic delivers stump protector  -KNEE IMMOBILIZER TO REMAIN IN PLACE AT ALL TIMES.  - Continue daily labs  - Continue home BP meds  - Continue ASA and plavix , can switch to ASA + DOAC on Thursday following takedown of OR dressing if stump incision appears c/d/i  - regular diet okay  -Remainder of care per primary team       Discussed with attending surgeon Dr. HICKEY.     Alta Alfred MD  Vascular Surgery, PGY1  Team Pager: [phone]

## 2024-02-27 NOTE — PROGRESS NOTES
Subjective   NAEON. Pt comfortable this morning. Denies f/c/n/v/cp/sob. POD#1. States she was in severe pain overnight and did not sleep well. Still in severe pain this morning however improved with PO medications. Does not feel pain is well managed on current regimen. Otherwise no other complaints or questions.     Objective   Patient Vitals for the past 24 hrs:   BP Temp Temp src Pulse Resp SpO2   02/27/24 0632 139/61 37.1 °C (98.8 °F) -- 75 18 95 %   02/27/24 0334 136/56 36.5 °C (97.7 °F) Temporal 83 16 95 %   02/27/24 0218 (!) 114/44 36.7 °C (98.1 °F) Temporal 69 14 94 %   02/27/24 0135 115/50 37 °C (98.6 °F) Temporal 82 14 --   02/27/24 0123 (!) 103/38 36.8 °C (98.2 °F) Temporal 70 18 92 %   02/27/24 0117 (!) 95/38 -- -- 67 -- 93 %   02/27/24 0055 110/52 36.6 °C (97.9 °F) -- 72 12 95 %   02/26/24 2010 129/55 36.4 °C (97.6 °F) -- 69 14 94 %   02/26/24 1900 118/56 36.4 °C (97.5 °F) Temporal 62 17 93 %   02/26/24 1830 131/61 36.4 °C (97.5 °F) -- 61 14 95 %   02/26/24 1815 142/65 36.4 °C (97.5 °F) -- 61 14 96 %   02/26/24 1810 124/56 -- -- 59 12 91 %   02/26/24 1800 107/54 -- -- 55 12 100 %   02/26/24 1753 -- -- -- 54 14 100 %   02/26/24 1745 122/57 -- -- 54 14 100 %   02/26/24 1730 138/64 -- -- 54 12 100 %   02/26/24 1715 138/60 -- -- 53 12 100 %   02/26/24 1700 137/65 -- -- 59 14 100 %   02/26/24 1656 131/62 -- -- 54 14 100 %   02/26/24 1651 160/73 -- -- 65 16 100 %   02/26/24 1645 161/70 36.2 °C (97.2 °F) -- 64 16 99 %   02/26/24 1346 (!) 181/87 36.1 °C (97 °F) -- 73 16 97 %   02/26/24 1218 146/88 -- -- 65 18 95 %   02/26/24 1130 146/88 36.3 °C (97.3 °F) -- 65 18 95 %     Results for orders placed or performed during the hospital encounter of 02/17/24 (from the past 24 hour(s))   CBC and Auto Differential   Result Value Ref Range    WBC 10.7 4.4 - 11.3 x10*3/uL    nRBC 0.3 (H) 0.0 - 0.0 /100 WBCs    RBC 3.30 (L) 4.00 - 5.20 x10*6/uL    Hemoglobin 10.4 (L) 12.0 - 16.0 g/dL    Hematocrit 33.3 (L) 36.0 - 46.0 %      (H) 80 - 100 fL    MCH 31.5 26.0 - 34.0 pg    MCHC 31.2 (L) 32.0 - 36.0 g/dL    RDW 16.8 (H) 11.5 - 14.5 %    Platelets 180 150 - 450 x10*3/uL    Neutrophils % 72.4 40.0 - 80.0 %    Immature Granulocytes %, Automated 0.7 0.0 - 0.9 %    Lymphocytes % 18.1 13.0 - 44.0 %    Monocytes % 7.6 2.0 - 10.0 %    Eosinophils % 1.0 0.0 - 6.0 %    Basophils % 0.2 0.0 - 2.0 %    Neutrophils Absolute 7.78 (H) 1.60 - 5.50 x10*3/uL    Immature Granulocytes Absolute, Automated 0.07 0.00 - 0.50 x10*3/uL    Lymphocytes Absolute 1.94 0.80 - 3.00 x10*3/uL    Monocytes Absolute 0.82 (H) 0.05 - 0.80 x10*3/uL    Eosinophils Absolute 0.11 0.00 - 0.40 x10*3/uL    Basophils Absolute 0.02 0.00 - 0.10 x10*3/uL   Magnesium   Result Value Ref Range    Magnesium 2.02 1.60 - 2.40 mg/dL   Renal Function Panel   Result Value Ref Range    Glucose 94 74 - 99 mg/dL    Sodium 140 136 - 145 mmol/L    Potassium 4.5 3.5 - 5.3 mmol/L    Chloride 104 98 - 107 mmol/L    Bicarbonate 28 21 - 32 mmol/L    Anion Gap 13 10 - 20 mmol/L    Urea Nitrogen 31 (H) 6 - 23 mg/dL    Creatinine 1.18 (H) 0.50 - 1.05 mg/dL    eGFR 45 (L) >60 mL/min/1.73m*2    Calcium 9.6 8.6 - 10.6 mg/dL    Phosphorus 2.4 (L) 2.5 - 4.9 mg/dL    Albumin 3.4 3.4 - 5.0 g/dL   CBC and Auto Differential   Result Value Ref Range    WBC 13.7 (H) 4.4 - 11.3 x10*3/uL    nRBC 0.8 (H) 0.0 - 0.0 /100 WBCs    RBC 2.16 (L) 4.00 - 5.20 x10*6/uL    Hemoglobin 6.8 (L) 12.0 - 16.0 g/dL    Hematocrit 22.4 (L) 36.0 - 46.0 %     (H) 80 - 100 fL    MCH 31.5 26.0 - 34.0 pg    MCHC 30.4 (L) 32.0 - 36.0 g/dL    RDW 16.9 (H) 11.5 - 14.5 %    Platelets 195 150 - 450 x10*3/uL    Neutrophils % 76.7 40.0 - 80.0 %    Immature Granulocytes %, Automated 0.7 0.0 - 0.9 %    Lymphocytes % 14.3 13.0 - 44.0 %    Monocytes % 7.9 2.0 - 10.0 %    Eosinophils % 0.3 0.0 - 6.0 %    Basophils % 0.1 0.0 - 2.0 %    Neutrophils Absolute 10.48 (H) 1.60 - 5.50 x10*3/uL    Immature Granulocytes Absolute, Automated 0.09 0.00 -  0.50 x10*3/uL    Lymphocytes Absolute 1.96 0.80 - 3.00 x10*3/uL    Monocytes Absolute 1.08 (H) 0.05 - 0.80 x10*3/uL    Eosinophils Absolute 0.04 0.00 - 0.40 x10*3/uL    Basophils Absolute 0.02 0.00 - 0.10 x10*3/uL   Magnesium   Result Value Ref Range    Magnesium 1.81 1.60 - 2.40 mg/dL   Renal Function Panel   Result Value Ref Range    Glucose 134 (H) 74 - 99 mg/dL    Sodium 138 136 - 145 mmol/L    Potassium 5.0 3.5 - 5.3 mmol/L    Chloride 108 (H) 98 - 107 mmol/L    Bicarbonate 20 (L) 21 - 32 mmol/L    Anion Gap 15 10 - 20 mmol/L    Urea Nitrogen 37 (H) 6 - 23 mg/dL    Creatinine 1.30 (H) 0.50 - 1.05 mg/dL    eGFR 40 (L) >60 mL/min/1.73m*2    Calcium 8.2 (L) 8.6 - 10.6 mg/dL    Phosphorus 3.6 2.5 - 4.9 mg/dL    Albumin 2.4 (L) 3.4 - 5.0 g/dL   Vancomycin   Result Value Ref Range    Vancomycin 14.1 5.0 - 20.0 ug/mL   Prepare RBC: 1 Units   Result Value Ref Range    PRODUCT CODE S9192G23     Unit Number [id number]     Unit ABO A     Unit RH POS     XM INTEP COMP     Dispense Status TR     Blood Expiration Date March 28, 2024 23:59 EDT     PRODUCT BLOOD TYPE 6200     UNIT VOLUME 350    CBC and Auto Differential   Result Value Ref Range    WBC 10.2 4.4 - 11.3 x10*3/uL    nRBC 0.7 (H) 0.0 - 0.0 /100 WBCs    RBC 2.96 (L) 4.00 - 5.20 x10*6/uL    Hemoglobin 8.6 (L) 12.0 - 16.0 g/dL    Hematocrit 28.5 (L) 36.0 - 46.0 %    MCV 96 80 - 100 fL    MCH 29.1 26.0 - 34.0 pg    MCHC 30.2 (L) 32.0 - 36.0 g/dL    RDW 19.9 (H) 11.5 - 14.5 %    Platelets 154 150 - 450 x10*3/uL    Neutrophils % 66.8 40.0 - 80.0 %    Immature Granulocytes %, Automated 0.6 0.0 - 0.9 %    Lymphocytes % 21.4 13.0 - 44.0 %    Monocytes % 10.4 2.0 - 10.0 %    Eosinophils % 0.6 0.0 - 6.0 %    Basophils % 0.2 0.0 - 2.0 %    Neutrophils Absolute 6.84 (H) 1.60 - 5.50 x10*3/uL    Immature Granulocytes Absolute, Automated 0.06 0.00 - 0.50 x10*3/uL    Lymphocytes Absolute 2.19 0.80 - 3.00 x10*3/uL    Monocytes Absolute 1.06 (H) 0.05 - 0.80 x10*3/uL     Eosinophils Absolute 0.06 0.00 - 0.40 x10*3/uL    Basophils Absolute 0.02 0.00 - 0.10 x10*3/uL   Renal function panel   Result Value Ref Range    Glucose 98 74 - 99 mg/dL    Sodium 139 136 - 145 mmol/L    Potassium 4.9 3.5 - 5.3 mmol/L    Chloride 107 98 - 107 mmol/L    Bicarbonate 21 21 - 32 mmol/L    Anion Gap 16 10 - 20 mmol/L    Urea Nitrogen 36 (H) 6 - 23 mg/dL    Creatinine 1.45 (H) 0.50 - 1.05 mg/dL    eGFR 35 (L) >60 mL/min/1.73m*2    Calcium 8.3 (L) 8.6 - 10.6 mg/dL    Phosphorus 3.6 2.5 - 4.9 mg/dL    Albumin 2.4 (L) 3.4 - 5.0 g/dL   Magnesium   Result Value Ref Range    Magnesium 1.93 1.60 - 2.40 mg/dL       Gen: well-appearing, NAD, thin  Head and neck: NCAT, neck supple without LAD  HEENT: MMM, normal nose without congestion  Pulm: normal WOB on RA  Ext: L BKA site wrapped in ACE bandage with no strikethrough  Neuro: alert and oriented x3, normal tone, face symmetric, moves all extremities spontaneously    Assessment/Plan   89yo with PMH HTN, HLD, CKD, CAD s/p CABG x3 1995, 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 Currently treating for OM with vanc/zosyn.  Vascular surgery and podiatry following, will need to determine the salvageability of the foot before proceeding with any surgical interventions most likely.  She appears well and is hemodynamically stable, no signs of sepsis.    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 Since the achilles tendon is not salvageable, pt would require removal of tendon and she would not be able to walk following procedure. D/t this, vascular sx will not operate on leg given recommendation for BKA   -DC abx 24 hours post procedure, will take down dressing POD #3  -C/w Vanc/Zosyn plan to dc 2/28  -DAPT, plavix    #LUCRETIA  -prerenal most likely  -continue fluid resuscitation, 1L LR bolus 2/27  -hold home antihypertensives (hydrochlorothiazide, ana, valsartan) for now for soft BP    #Afib  -CHADSVASc 5  -continue home atenolol  -reportedly not on AC at home, but dc summary from Sept notes warfarin. No warfarin on fill history on EMR or on dc summary from Jan admission. She did have high INR on arrival in Jan.  -Given valve is bovine and not mechanical, will start pt on DOAC following procedure, discussing with vascular sx    #Depression - c/w home meds    #Anemia  ::Requiring multiple units, likely 2/2 post op  -CTM CBCs, transfuse Hg<7    N: Cardiac diet  DVT Ppx: Heparin sc  GI Ppx: -  Access/Lines: PIV   Abx: Vanc/Zosyn  O2: None    Pain regimen: Tylenol prn  GI Laxative: Miralax   PT/OT: ARF     Code status: Full Code  Emergency contact: Vicenta Gordon (sister-in-law) [phone]    Arnoldo David DO  Neurology PGY-1

## 2024-02-27 NOTE — PROGRESS NOTES
"Vancomycin Dosing by Pharmacy- FOLLOW UP    Maggi Gordon is a 89 y.o. year old female who Pharmacy has been consulted for vancomycin dosing for osteomyelitis/septic arthritis. Based on the patient's indication and renal status this patient is being dosed based on a goal trough/random level of 15-20.     Renal function:  serum creatinine is steadily rising, dose by level.    Current vancomycin dose: 750 mg given once.    Most recent random level: 14.1 mcg/mL (2/26 @2309).    Visit Vitals  /56 (BP Location: Right arm, Patient Position: Lying)   Pulse 83   Temp 36.5 °C (97.7 °F) (Temporal)   Resp 16        Lab Results   Component Value Date    CREATININE 1.45 (H) 02/27/2024    CREATININE 1.30 (H) 02/26/2024    CREATININE 1.18 (H) 02/26/2024    CREATININE 1.17 (H) 02/25/2024        Patient weight is No results found for: \"PTWEIGHT\"    No results found for: \"CULTURE\"     I/O last 3 completed shifts:  In: 9295.7 (207.5 mL/kg) [P.O.:220; I.V.:1355.3 (30.3 mL/kg); Blood:7020.3; IV Piggyback:700]  Out: 200 (4.5 mL/kg) [Blood:200]  Weight: 44.8 kg   [unfilled]    No results found for: \"PATIENTTEMP\"     Assessment/Plan    Below goal random/trough level.  Entered 750mg once.    The next level will be obtained on 2/28 at 0700. May be obtained sooner if clinically indicated.   Will continue to monitor renal function daily while on vancomycin and order serum creatinine at least every 48 hours if not already ordered.  Follow for continued vancomycin needs, clinical response, and signs/symptoms of toxicity.       Raymond Montes, PharmD           "

## 2024-02-27 NOTE — PROGRESS NOTES
"Physical Therapy    Physical Therapy Re-Evaluation & Treatment    Patient Name: Maggi Gordon  MRN: [id number]  Today's Date: 2/27/2024   Time Calculation  Start Time: 1220  Stop Time: 1244  Time Calculation (min): 24 min    Assessment/Plan   PT Assessment  PT Assessment Results: Decreased strength, Decreased mobility, Orthopedic restrictions, Pain  Rehab Prognosis: Good  Barriers to Discharge: none PT related  End of Session Communication: Bedside nurse  Assessment Comment: Overnight having pain control issues, now with pt drowsy though complaining of minimal pain.  Completes thereex and bed mobility with assist, will continue to benefit from skilled PT to address all mobility deficits.  End of Session Patient Position: Bed, 3 rail up, Alarm on   IP OR SWING BED PT PLAN  Inpatient or Swing Bed: Inpatient  PT Plan  Treatment/Interventions: Bed mobility, Gait training, Transfer training, Balance training, Strengthening, Therapeutic exercise  PT Plan: Skilled PT  PT Frequency: 5 times per week  PT Discharge Recommendations: High intensity level of continued care  Equipment Recommended upon Discharge:  (none)  PT Recommended Transfer Status: Assist x1  PT - OK to Discharge: Yes      Subjective     General Visit Information:  General  Reason for Referral: Re-Eval s/p (L) BKA 2/26/24  Past Medical History Relevant to Rehab: Admitted with (L) ankle OM and fall.  HTN, HLD, CKD, CAD s/p CABG, A-fib, aortic stenosis, PAD, vertebral artery syndrome.  Prior to Session Communication: Bedside nurse  General Comment: Per bedside nurse, pt able to receive IV pain meds recently now somewhat drowsy.  Pt able to complete therex and bed mobility, attempted movements to EOB however pt did not feel up to attempting further as she felt tired and \" I haven't even looked at my leg yet\".  Increased time spent discussing goals with therapy inpatient and upon discharge.  Will continue to follow.  Home Living:  Home Living  Type of Home: " "Apartment  Lives With: Alone  Home Adaptive Equipment: Walker rolling or standard, Wheelchair-manual  Home Access: Level entry  Prior Level of Function:  Prior Function Per Pt/Caregiver Report  Ambulatory Assistance:  (prior to admission to hospital, mod (I) with walker for at least household distances.  During this hospitalization, has amb with assist for short distances with whw due to NWB (L) LE.)  Precautions:  Precautions  Hearing/Visual Limitations: [place], reports hearing aide is broken  LE Weight Bearing Status: Left Non-Weight Bearing  Medical Precautions: Fall precautions  Vital Signs:  Vital Signs  BP: 106/52 (post therex and prior to mobility attempts)    Objective   Pain:  Pain Assessment  Pain Assessment: Rodrigues-Baker FACES  Pain Score:  (does not give # rating, only holds fingers to indicate \"a little\")  Rodrigues-Ely FACES Pain Rating: Hurts little bit  Pain Location: Leg  Pain Orientation: Left  Pain Onset:  (with mobility attempts)  Cognition:  Cognition  Arousal/Alertness: Delayed responses to stimuli (drowsy)  Following Commands: Follows one step commands with repetition    General Assessments:     Activity Tolerance  Activity Tolerance Comments: drowsy likely due to pain medications and lack of sleep overnight    Sensation  Sensation Comment: Pt reports she did not feel knee immobilizer on (L) LE.        Perception  Inattention/Neglect: Appears intact  Initiation: Appears intact  Motor Planning: Appears intact  Perseveration: Not present              Functional Assessments:  Bed Mobility  Bed Mobility: Yes  Bed Mobility 1  Bed Mobility 1: Supine to sitting  Level of Assistance 1: Moderate assistance, Maximum verbal cues, Moderate tactile cues (x1)  Bed Mobility Comments 1: attempted to assist with advancing (L) LE towards EOB to complete supine to sit transfer, however pt grimaces and declines to attempt further, states \" I don't want to fall asleep on you trying to sit up!\"  Bed Mobility 2  Bed " Mobility  2: Scooting (in supine)  Level of Assistance 2: Maximum assistance (x2)  Bed Mobility Comments 2: using draw sheet, to boost pt higher in bed, pt lightly pushes with (R) LE    Transfers  Transfer: No (will continue to assess in future sessions, anticipate need for assist due to post-op)    Ambulation/Gait Training  Ambulation/Gait Training Performed: No  Extremity/Trunk Assessments:  RLE   RLE : Within Functional Limits  LLE   LLE : Exceptions to WFL  AROM LLE (degrees)  LLE AROM Comment: knee immobilizer in place  Strength LLE  LLE Overall Strength:  (grossly 2+/5 at hip)  Treatments:  Therapeutic Exercise  Therapeutic Exercise Performed: Yes  Therapeutic Exercise Activity 1: (R) LE: ankle pumps, heel slides, hip abduction, SLR x 10 (increased verbal and tactile cues to complete due to drowsy)  Therapeutic Exercise Activity 2: (L) LE AAROM hip flexion and abduction x 10 reps (some difficulty with directional cues for therex)  Outcome Measures:  [facility] Basic Mobility  Turning from your back to your side while in a flat bed without using bedrails: A little  Moving from lying on your back to sitting on the side of a flat bed without using bedrails: A lot  Moving to and from bed to chair (including a wheelchair): A lot  Standing up from a chair using your arms (e.g. wheelchair or bedside chair): A lot  To walk in hospital room: A lot  Climbing 3-5 steps with railing: Total  Basic Mobility - Total Score: 12    Encounter Problems       Encounter Problems (Active)       Balance       STG - Maintains supervision assistance dynamic standing balance with upper extremity support using a wheeled walker. (Progressing)       Start:  02/18/24    Expected End:  03/03/24            STG - Maintains supervision assistance static standing balance with upper extremity support using a wheeled walker. (Progressing)       Start:  02/18/24    Expected End:  03/03/24               Mobility       STG - Patient will ambulate 125ft  using a wheeled walker with supervision assistance. (Not met)       Start:  02/18/24    Expected End:  03/03/24    Resolved:  02/27/24    Updated to: STG - Patient will ambulate  10 ft x 2 with whw and min aX1.    Update reason: revised         STG - Patient will ambulate  10 ft x 2 with whw and min aX1. (Progressing)       Start:  02/27/24    Expected End:  03/03/24                            Transfers       STG - Transfer from bed to chair using a wheeled walker with supervision assistance. (Progressing)       Start:  02/18/24    Expected End:  03/03/24            STG - Patient to transfer to and from sit to supine with supervision assistance. (Progressing)       Start:  02/18/24    Expected End:  03/03/24            STG - Patient will transfer sit to and from stand using a wheeled walker with supervision assistance. (Progressing)       Start:  02/18/24    Expected End:  03/03/24                   Education Documentation  Mobility Training, taught by Anette Crum, PT at 2/27/2024  1:09 PM.  Learner: Patient  Readiness: Acceptance  Method: Explanation, Demonstration  Response: Needs Reinforcement, Demonstrated Understanding, Verbalizes Understanding  Comment: therex, mobility, transfers safety    02/27/24 at 1:10 PM - Anette Crum, PT

## 2024-02-27 NOTE — OP NOTE
Left Leg Amputation Below Knee (L) Operative Note     Date: 2024  OR Location: [facility] OR    Name: Maggi Gordon, : 1935, Age: 89 y.o., MRN: [id number], Sex: female    Diagnosis  Pre-op Diagnosis     * Osteomyelitis of left ankle, unspecified type (CMS/HCC) [M86.9] Post-op Diagnosis     * Osteomyelitis of left ankle, unspecified type (CMS/HCC) [M86.9]     Procedures  Left below knee amputation      Surgeons      * Pura Montes - Primary    Resident/Fellow/Other Assistant:  Surgeon(s) and Role:     * Jonny Osei MD - Assisting     * Sonny Cr MD - Assisting    Procedure Summary  Anesthesia: General  ASA: III  Anesthesia Staff: Anesthesiologist: Koko Hutchison MD; Padmini Merchant MD  Anesthesia Resident: Artie Ibrahim MD  Estimated Blood Loss: 200 mL  Intra-op Medications:   Administrations occurring from 1400 to 1540 on 24:   Medication Name Total Dose   sodium chloride 0.9 % irrigation solution 1,000 mL              Anesthesia Record               Intraprocedure I/O Totals       None           Specimen:   ID Type Source Tests Collected by Time   1 : LEFT LOWER LEG Tissue LEG AMPUTATION BELOW THE KNEE LEFT SURGICAL PATHOLOGY EXAM Pura Montes MD 2024 1522        Staff:   Circulator: Sandhya Canas RN; Serafin Mascorro RN  Relief Circulator: Jacquie Cr RN  Relief Scrub: Serafin Mascorro RN  Scrub Person: Kameron Peters RN; Diane Lobo RN         Drains and/or Catheters: * None in log *    Tourniquet Times:   * Missing tourniquet times found for documented tourniquets in lo *     Implants:     Findings: Healthy, viable muscle    Indications: Maggi Gordon is an 89 y.o. female who is having surgery for Osteomyelitis of left ankle, unspecified type (CMS/HCC) [M86.9]. Patient with hx of left femoral endarterectomy with nonhealing left ankle wound. Current hospitalization after presenting with falls at home and noted to have necrotic achilles tendon. Podiatry consulted and  concerned for lack of functional leg with tendon debridement and treatment. Family discussion held and agreed to proceed with BKA to preserve maximal functionality.    The patient was seen in the preoperative area. The risks, benefits, complications, treatment options, non-operative alternatives, expected recovery and outcomes were discussed with the patient. The possibilities of reaction to medication, pulmonary aspiration, injury to surrounding structures, bleeding, recurrent infection, the need for additional procedures, failure to diagnose a condition, and creating a complication requiring transfusion or operation were discussed with the patient. The patient concurred with the proposed plan, giving informed consent.  The site of surgery was properly noted/marked if necessary per policy. The patient has been actively warmed in preoperative area. Preoperative antibiotics have been ordered and given within 1 hours of incision. Venous thrombosis prophylaxis have been ordered including unilateral sequential compression device    Procedure Details:   Posterior flap marked 12 cm below the tibial tuberosity and extended medially and laterally along the gastrocnemius muscle. #10 blade used to create skin incision followed by electrocautery. The greater saphenous vein was identified and ligated with 2-0 ilk. The anterior compartment muscles were divided with electrocautery between a tonsil until the tibial vessels were seen, which were suture ligated with 2-0 silk. Electrocautery used to divide the interosseous membrane until anterior surface of fibula visualized. A lap pad was brought underneath the tibia and periosteal elevator used to remove the periosteum proximally for 2 cm. The tibia was transected at this level with an oscillating saw 2 cm proximal to the skin incision level in anterior bevel fashion. The saw was then used to smooth out the edges of the tibia. The fibula was then transected 1 cm proximal to the  tibia. Bone hook used to elevate the tibia and amputation knife used to complete the creation of the posterior flap. Specimen removed off the field and sent to pathology. Bleeding vessels were controlled with hemostats and oversewn with 2-0 silk. Irrisept used to copiously irrigate the wound. Hemostasis achieved with electrocautery. Fascia reapproximated with 2-0 Vicryl followed by 3-0 dermal stitches. Skin closed with staples. Xeroform, burn gauze, and ioban dressing placed on top.    Complications:  None; patient tolerated the procedure well.    Disposition: PACU - hemodynamically stable.  Condition: stable             Attending Attestation: I was present and scrubbed for the entire procedure.    Pura Simon  Phone Number: [phone]

## 2024-02-28 LAB
ABO GROUP (TYPE) IN BLOOD: NORMAL
ALBUMIN SERPL BCP-MCNC: 2.7 G/DL (ref 3.4–5)
ANION GAP SERPL CALC-SCNC: 14 MMOL/L (ref 10–20)
ANTIBODY SCREEN: NORMAL
BASO STIPL BLD QL SMEAR: PRESENT
BASOPHILS # BLD AUTO: 0.02 X10*3/UL (ref 0–0.1)
BASOPHILS NFR BLD AUTO: 0.2 %
BUN SERPL-MCNC: 28 MG/DL (ref 6–23)
BURR CELLS BLD QL SMEAR: NORMAL
CALCIUM SERPL-MCNC: 8.3 MG/DL (ref 8.6–10.6)
CHLORIDE SERPL-SCNC: 104 MMOL/L (ref 98–107)
CO2 SERPL-SCNC: 23 MMOL/L (ref 21–32)
CREAT SERPL-MCNC: 1.36 MG/DL (ref 0.5–1.05)
EGFRCR SERPLBLD CKD-EPI 2021: 38 ML/MIN/1.73M*2
EOSINOPHIL # BLD AUTO: 0.09 X10*3/UL (ref 0–0.4)
EOSINOPHIL NFR BLD AUTO: 0.9 %
ERYTHROCYTE [DISTWIDTH] IN BLOOD BY AUTOMATED COUNT: 20.8 % (ref 11.5–14.5)
GLUCOSE SERPL-MCNC: 180 MG/DL (ref 74–99)
HCT VFR BLD AUTO: 25.5 % (ref 36–46)
HGB BLD-MCNC: 7.7 G/DL (ref 12–16)
IMM GRANULOCYTES # BLD AUTO: 0.09 X10*3/UL (ref 0–0.5)
IMM GRANULOCYTES NFR BLD AUTO: 0.9 % (ref 0–0.9)
LYMPHOCYTES # BLD AUTO: 1.09 X10*3/UL (ref 0.8–3)
LYMPHOCYTES NFR BLD AUTO: 10.4 %
MAGNESIUM SERPL-MCNC: 1.85 MG/DL (ref 1.6–2.4)
MCH RBC QN AUTO: 30.3 PG (ref 26–34)
MCHC RBC AUTO-ENTMCNC: 30.2 G/DL (ref 32–36)
MCV RBC AUTO: 100 FL (ref 80–100)
MONOCYTES # BLD AUTO: 0.8 X10*3/UL (ref 0.05–0.8)
MONOCYTES NFR BLD AUTO: 7.6 %
NEUTROPHILS # BLD AUTO: 8.37 X10*3/UL (ref 1.6–5.5)
NEUTROPHILS NFR BLD AUTO: 80 %
NRBC BLD-RTO: 0.8 /100 WBCS (ref 0–0)
OVALOCYTES BLD QL SMEAR: NORMAL
PHOSPHATE SERPL-MCNC: 2 MG/DL (ref 2.5–4.9)
PLATELET # BLD AUTO: 172 X10*3/UL (ref 150–450)
POTASSIUM SERPL-SCNC: 4.1 MMOL/L (ref 3.5–5.3)
RBC # BLD AUTO: 2.54 X10*6/UL (ref 4–5.2)
RBC MORPH BLD: NORMAL
RH FACTOR (ANTIGEN D): NORMAL
SODIUM SERPL-SCNC: 137 MMOL/L (ref 136–145)
WBC # BLD AUTO: 10.5 X10*3/UL (ref 4.4–11.3)

## 2024-02-28 PROCEDURE — 97535 SELF CARE MNGMENT TRAINING: CPT | Mod: GO

## 2024-02-28 PROCEDURE — 2500000005 HC RX 250 GENERAL PHARMACY W/O HCPCS

## 2024-02-28 PROCEDURE — 85025 COMPLETE CBC W/AUTO DIFF WBC: CPT

## 2024-02-28 PROCEDURE — 99232 SBSQ HOSP IP/OBS MODERATE 35: CPT

## 2024-02-28 PROCEDURE — 2500000002 HC RX 250 W HCPCS SELF ADMINISTERED DRUGS (ALT 637 FOR MEDICARE OP, ALT 636 FOR OP/ED)

## 2024-02-28 PROCEDURE — 2500000004 HC RX 250 GENERAL PHARMACY W/ HCPCS (ALT 636 FOR OP/ED)

## 2024-02-28 PROCEDURE — 1100000001 HC PRIVATE ROOM DAILY

## 2024-02-28 PROCEDURE — 80069 RENAL FUNCTION PANEL: CPT

## 2024-02-28 PROCEDURE — 2500000001 HC RX 250 WO HCPCS SELF ADMINISTERED DRUGS (ALT 637 FOR MEDICARE OP)

## 2024-02-28 PROCEDURE — 86901 BLOOD TYPING SEROLOGIC RH(D): CPT

## 2024-02-28 PROCEDURE — 97530 THERAPEUTIC ACTIVITIES: CPT | Mod: GP

## 2024-02-28 PROCEDURE — 36415 COLL VENOUS BLD VENIPUNCTURE: CPT

## 2024-02-28 PROCEDURE — 97530 THERAPEUTIC ACTIVITIES: CPT | Mod: GO

## 2024-02-28 PROCEDURE — 83735 ASSAY OF MAGNESIUM: CPT

## 2024-02-28 RX ORDER — MAGNESIUM SULFATE HEPTAHYDRATE 40 MG/ML
2 INJECTION, SOLUTION INTRAVENOUS ONCE
Status: COMPLETED | OUTPATIENT
Start: 2024-02-28 | End: 2024-02-28

## 2024-02-28 RX ADMIN — Medication 2000 UNITS: at 08:25

## 2024-02-28 RX ADMIN — ACETAMINOPHEN 975 MG: 325 TABLET ORAL at 15:39

## 2024-02-28 RX ADMIN — ESCITALOPRAM OXALATE 10 MG: 10 TABLET ORAL at 08:25

## 2024-02-28 RX ADMIN — DEXTROSE MONOHYDRATE 21 MMOL: 50 INJECTION, SOLUTION INTRAVENOUS at 17:21

## 2024-02-28 RX ADMIN — OXYCODONE HYDROCHLORIDE 5 MG: 5 TABLET ORAL at 20:56

## 2024-02-28 RX ADMIN — CLOPIDOGREL BISULFATE 75 MG: 75 TABLET ORAL at 08:25

## 2024-02-28 RX ADMIN — ACETAMINOPHEN 975 MG: 325 TABLET ORAL at 03:11

## 2024-02-28 RX ADMIN — GABAPENTIN 100 MG: 100 CAPSULE ORAL at 20:56

## 2024-02-28 RX ADMIN — GABAPENTIN 100 MG: 100 CAPSULE ORAL at 08:25

## 2024-02-28 RX ADMIN — GABAPENTIN 100 MG: 100 CAPSULE ORAL at 14:06

## 2024-02-28 RX ADMIN — OXYCODONE HYDROCHLORIDE 5 MG: 5 TABLET ORAL at 11:46

## 2024-02-28 RX ADMIN — Medication 1 TABLET: at 08:25

## 2024-02-28 RX ADMIN — IPRATROPIUM BROMIDE 2 SPRAY: 21 SPRAY, METERED NASAL at 20:58

## 2024-02-28 RX ADMIN — ASPIRIN 81 MG: 81 TABLET, COATED ORAL at 08:24

## 2024-02-28 RX ADMIN — FOLIC ACID 5 MG: 1 TABLET ORAL at 08:24

## 2024-02-28 RX ADMIN — MAGNESIUM SULFATE HEPTAHYDRATE 2 G: 40 INJECTION, SOLUTION INTRAVENOUS at 14:06

## 2024-02-28 RX ADMIN — POLYETHYLENE GLYCOL 3350 17 G: 17 POWDER, FOR SOLUTION ORAL at 08:27

## 2024-02-28 RX ADMIN — PIPERACILLIN SODIUM AND TAZOBACTAM SODIUM 2.25 G: 2; .25 INJECTION, SOLUTION INTRAVENOUS at 10:08

## 2024-02-28 RX ADMIN — ATENOLOL 50 MG: 50 TABLET ORAL at 20:56

## 2024-02-28 RX ADMIN — ACETAMINOPHEN 975 MG: 325 TABLET ORAL at 10:08

## 2024-02-28 RX ADMIN — AZELASTINE HYDROCHLORIDE 2 SPRAY: 137 SPRAY, METERED NASAL at 08:27

## 2024-02-28 RX ADMIN — Medication 10 MG: at 08:26

## 2024-02-28 RX ADMIN — AZELASTINE HYDROCHLORIDE 2 SPRAY: 137 SPRAY, METERED NASAL at 20:57

## 2024-02-28 RX ADMIN — IPRATROPIUM BROMIDE 2 SPRAY: 21 SPRAY, METERED NASAL at 08:33

## 2024-02-28 RX ADMIN — ATENOLOL 50 MG: 50 TABLET ORAL at 08:26

## 2024-02-28 RX ADMIN — OXYCODONE HYDROCHLORIDE 5 MG: 5 TABLET ORAL at 02:15

## 2024-02-28 RX ADMIN — ROSUVASTATIN CALCIUM 10 MG: 10 TABLET, FILM COATED ORAL at 20:56

## 2024-02-28 RX ADMIN — HYDROMORPHONE HYDROCHLORIDE 0.2 MG: 1 INJECTION, SOLUTION INTRAMUSCULAR; INTRAVENOUS; SUBCUTANEOUS at 04:23

## 2024-02-28 RX ADMIN — PIPERACILLIN SODIUM AND TAZOBACTAM SODIUM 2.25 G: 2; .25 INJECTION, SOLUTION INTRAVENOUS at 03:36

## 2024-02-28 ASSESSMENT — COGNITIVE AND FUNCTIONAL STATUS - GENERAL
MOVING TO AND FROM BED TO CHAIR: TOTAL
DRESSING REGULAR LOWER BODY CLOTHING: A LOT
EATING MEALS: A LITTLE
MOVING FROM LYING ON BACK TO SITTING ON SIDE OF FLAT BED WITH BEDRAILS: A LITTLE
PERSONAL GROOMING: A LOT
MOVING FROM LYING ON BACK TO SITTING ON SIDE OF FLAT BED WITH BEDRAILS: A LITTLE
DAILY ACTIVITIY SCORE: 16
MOBILITY SCORE: 10
HELP NEEDED FOR BATHING: A LITTLE
TURNING FROM BACK TO SIDE WHILE IN FLAT BAD: A LOT
TOILETING: TOTAL
DRESSING REGULAR UPPER BODY CLOTHING: A LITTLE
PERSONAL GROOMING: A LITTLE
STANDING UP FROM CHAIR USING ARMS: A LOT
MOBILITY SCORE: 12
STANDING UP FROM CHAIR USING ARMS: A LOT
DAILY ACTIVITIY SCORE: 14
WALKING IN HOSPITAL ROOM: TOTAL
CLIMB 3 TO 5 STEPS WITH RAILING: TOTAL
MOVING TO AND FROM BED TO CHAIR: A LOT
DRESSING REGULAR UPPER BODY CLOTHING: A LITTLE
TOILETING: TOTAL
DRESSING REGULAR LOWER BODY CLOTHING: A LITTLE
WALKING IN HOSPITAL ROOM: A LOT
HELP NEEDED FOR BATHING: A LOT
CLIMB 3 TO 5 STEPS WITH RAILING: TOTAL
TURNING FROM BACK TO SIDE WHILE IN FLAT BAD: A LOT

## 2024-02-28 ASSESSMENT — PAIN SCALES - GENERAL
PAINLEVEL_OUTOF10: 3
PAINLEVEL_OUTOF10: 6
PAINLEVEL_OUTOF10: 4
PAINLEVEL_OUTOF10: 4
PAINLEVEL_OUTOF10: 3
PAINLEVEL_OUTOF10: 6
PAINLEVEL_OUTOF10: 7

## 2024-02-28 ASSESSMENT — PAIN DESCRIPTION - DESCRIPTORS: DESCRIPTORS: POUNDING

## 2024-02-28 ASSESSMENT — PAIN - FUNCTIONAL ASSESSMENT: PAIN_FUNCTIONAL_ASSESSMENT: 0-10

## 2024-02-28 ASSESSMENT — ACTIVITIES OF DAILY LIVING (ADL): HOME_MANAGEMENT_TIME_ENTRY: 10

## 2024-02-28 NOTE — CONSULTS
Vancomycin Dosing by Pharmacy- Cessation of Therapy    Consult to pharmacy for vancomycin dosing has been discontinued by the prescriber, pharmacy will sign off at this time.    Please call pharmacy if there are further questions or re-enter a consult if vancomycin is resumed.     Annemarie Bai, PharmD

## 2024-02-28 NOTE — PROGRESS NOTES
Subjective   NAEON. Pt comfortable this morning. Denies f/c/n/v/cp/sob. POD#2. States she is not in any pain when she doesn't move her leg. Slight movement causes severe pain. States pain meds help, no change to regimen necessary. Denies any other symptoms or concerns this morning.     Objective   Patient Vitals for the past 24 hrs:   BP Temp Temp src Pulse Resp SpO2   02/28/24 0500 117/58 36.6 °C (97.9 °F) Temporal 72 18 97 %   02/27/24 2056 118/58 -- -- 73 16 93 %   02/27/24 1348 115/53 36.6 °C (97.9 °F) Temporal 70 18 95 %   02/27/24 1220 106/52 -- -- -- -- --   02/27/24 0937 (!) 108/40 -- -- 70 -- --   02/27/24 0811 (!) 124/49 36.6 °C (97.9 °F) -- 76 20 93 %     No results found for this or any previous visit (from the past 24 hour(s)).      Gen: well-appearing, NAD, thin  Head and neck: NCAT, neck supple without LAD  HEENT: MMM, normal nose without congestion  Pulm: normal WOB on RA  Ext: L BKA site wrapped in ACE bandage with no strikethrough, immobilizer in place  Neuro: alert and oriented x3, normal tone, face symmetric, moves all extremities spontaneously    Assessment/Plan   88yo with PMH HTN, HLD, CKD, CAD s/p CABG x3 1995, vertebral artery syndrome s/p L PICA coiling and stent, Afib (not on AC), aortic stenosis s/p AVR 2011, PAD s/p multiple interventions, the most recent of which was a L femoral endartecomy on 1/18/24 at [facility], chronic L foot wound (follows with wound care) who initially presented to [place] after mechanical fall at home found on imaging to have c/f L foot OM with diminished pulses of LLE DP on exam transferred to [facility] for further evaluation. Currently treating for OM with vanc/zosyn.  Vascular surgery and podiatry following, will need to determine the salvageability of the foot before proceeding with any surgical interventions most likely.  She appears well and is hemodynamically stable, no signs of sepsis.    Updates 2/28:  -Pending SNF placement  -Following Cr  -Dressing removed  POD#3, followed by initiation of DOAC w/ ASA, dc Plavix    Updates 2/27:  -Added dilaudid 0.2mg q3H for breakthrough pain  -Plan to dc abx 2/28  -Fluids for LUCRETIA (1L LR bolus)    #Chronic L foot wound c/f OM  #PAD s/p L femoral endarectomy on 1/18/24   #likely OM of L foot/ankle  ::MRI L foot and ankle: Evidence c/w osteomyelitis of 4th/5th metatarsal and calcaneal tuberosity, edema of distal soleus c/w myositis  ::Cultures NGTD  ::2/21: S/p LLE angiogram, s/p L EIA balloon angioplasty and stent, SFA balloon angioplasty and stent  ::S/p L BKA with vascular sx 2/26  -[facility] surgery following:   -Vascular surgery and podiatry discussed further procedures for this pt. Since the achilles tendon is not salvageable, pt would require removal of tendon and she would not be able to walk following procedure. D/t this, vascular sx will not operate on leg given recommendation for BKA   -off Abx  -DAPT    #LUCRETIA  -prerenal most likely  -continue fluid resuscitation, 1L LR bolus 2/27  -hold home antihypertensives (hydrochlorothiazide, ana, valsartan) for now for soft BP    #Afib  -CHADSVASc 5  -continue home atenolol  -reportedly not on AC at home, but dc summary from Sept notes warfarin. No warfarin on fill history on EMR or on dc summary from Jan admission. She did have high INR on arrival in Jan.  -Given valve is bovine and not mechanical, will start pt on DOAC following procedure, discussing with vascular sx  -Vasc surgery recommends once dressing taken down on POD#3, plan to dc Plavix, will be on ASA + DOAC    #Depression - c/w home meds    #Anemia  ::Requiring multiple units, likely 2/2 post op  -CTM CBCs, transfuse Hg<7    N: Cardiac diet  DVT Ppx: Heparin sc  GI Ppx: -  Access/Lines: PIV   Abx: Vanc/Zosyn  O2: None    Pain regimen: Tylenol prn  GI Laxative: Miralax   PT/OT: ARF     Code status: Full Code  Emergency contact: Vicenta Gordon (sister-in-law) [phone]    Arnoldo David, DO  Neurology PGY-1

## 2024-02-28 NOTE — PROGRESS NOTES
"Occupational Therapy    Occupational Therapy Treatment    Name: Maggi Gordon  MRN: [id number]  : 1935  Date: 24  Time Calculation  Start Time: 913  Stop Time: 955  Time Calculation (min): 42 min    Assessment:     Plan:  Treatment Interventions:  (ADL retraining; Functional transfer training; UE strengthening/ROM; Endurance training; Patient/family training; Equipment evaluation/education; Compensatory technique education)  OT Frequency: 4 times per week  OT Discharge Recommendations: High intensity level of continued care  Equipment Recommended upon Discharge:  (TBD)  OT - OK to Discharge: Yes    Subjective   Previous Visit Info:     General:  General  Reason for Referral: s/p (L) BKA 24  Past Medical History Relevant to Rehab: Admitted with (L) ankle OM and fall.  HTN, HLD, CKD, CAD s/p CABG, A-fib, aortic stenosis, PAD, vertebral artery syndrome.  Family/Caregiver Present: No  Prior to Session Communication: Bedside nurse  Patient Position Received: Bed, 2 rail up, Alarm on  Preferred Learning Style: visual, verbal  General Comment: PLEASANT AND FULLY RECEPTIVE TO PARTICIPATION DESPITE REPORTS OF REAL AND ANTICIPATED PAIN. WITH ENCOURGAEMENT, EDUCATION FOR PROPER POSITIONING OF HER RESIDUAL LIMB, PLB TO INCREASED O2 SAT, AND REST BREAKS SHE WAS TO COMPLETE FUNCTIONAL TASKS... \"I CAN'T BELIEVE THAT I WAS ABLE TO DO ALL OF THAT MOVING. THANK YOU FOR YOUR HELP; I WOULD REALLY LOVE TO DO IT AGAIN WITH YOU TOMORROW!\"  Precautions:  Hearing/Visual Limitations: [place]/ WEARS HEARING AID IN E (DOES NOT HAVE IN HOSPITAL); GLASSES  LE Weight Bearing Status: Left Non-Weight Bearing  Braces Applied: IMMOBILIZER DONNED ON L/RESIDUAL LIMB  Vitals:  Vital Signs  Heart Rate: 74  Heart Rate Source: Monitor  SpO2: 96 %  BP: (!) 117/44  MAP (mmHg): 68  BP Location: Right arm  BP Method: Automatic  Patient Position: Lying  Pain Assessment:  Pain Assessment  Pain Assessment: 0-10  Pain Score: 4  Pain Location: Leg " (L/RESIDUAL LIMB)  Pain Orientation: Left  Pain Descriptors: Pounding  Pain Frequency:  (DURING MOBILITY INITIATION)     Objective   Activities of Daily Living: Feeding  Feeding Level of Assistance: Modified independent    UE Dressing  UE Dressing Level of Assistance: Moderate assistance    LE Dressing  LE Dressing: Yes (TOTAL ASSIST TO DON/DOFF R/SOCK)     Bed Mobility/Transfers: Bed Mobility  Bed Mobility: Yes  Bed Mobility 1  Bed Mobility 1: Supine to sitting, Sitting to supine  Level of Assistance 1: Moderate assistance      Toilet Transfers  Toilet Transfers Comments: TOTAL ASSIST TO MANAGE PROTECIVE UNDERGARMENT  Sitting Balance:  Static Sitting Balance  Static Sitting-Balance Support: Bilateral upper extremity supported (R/LE)  Static Sitting-Level of Assistance:  (MOD A > SBA OF 1)  Static Sitting-Comment/Number of Minutes: TOLERATED 6.5 MINUTES  Dynamic Sitting Balance  Dynamic Sitting-Balance Support: Bilateral upper extremity supported  Dynamic Sitting-Balance:  (MOD A OF 1 FOR x7 REPS OF R/LATERAL SIDE SCOOTING TO HOB)    Outcome Measures:  [facility] Daily Activity  Putting on and taking off regular lower body clothing: A lot  Bathing (including washing, rinsing, drying): A lot  Putting on and taking off regular upper body clothing: A little  Toileting, which includes using toilet, bedpan or urinal: Total  Taking care of personal grooming such as brushing teeth: A little  Eating Meals: A little  Daily Activity - Total Score: 14         and Brief Confusion Assessment Method (bCAM)  CAM Result: CAM -    Education Documentation  Precautions, taught by Rachel Christopher OT at 2/28/2024 12:05 PM.  Learner: Patient  Readiness: Eager  Method: Explanation, Demonstration  Response: Verbalizes Understanding    ADL Training, taught by Rachel Christopher OT at 2/28/2024 12:05 PM.  Learner: Patient  Readiness: Eager  Method: Explanation, Demonstration  Response: Verbalizes Understanding    Education Comments  No  comments found.      Goals:  Encounter Problems       Encounter Problems (Active)       ADLs       Pt will complete UB /LB bathing tasks with supervision level of assistance while seated and /or standing.  (Progressing)       Start:  02/19/24    Expected End:  03/04/24            Pt will complete LB dressing with supervision level of assistance while seated and/or standing.   (Progressing)       Start:  02/19/24    Expected End:  03/04/24            Pt will complete grooming tasks while standing with supervision level of assistance.   (Progressing)       Start:  02/19/24    Expected End:  03/04/24            Pt will complete toilet hygiene while seated /standing with supervision level of assistance.   (Progressing)       Start:  02/19/24    Expected End:  03/04/24               BALANCE       Pt will demo improved dynamic standing balance while engaging in I/ADL task with standby level of assistance and no LOB.  (Progressing)       Start:  02/19/24    Expected End:  03/04/24               Balance       STG - Maintains supervision assistance dynamic standing balance with upper extremity support using a wheeled walker. (Progressing)       Start:  02/18/24    Expected End:  03/03/24            STG - Maintains supervision assistance static standing balance with upper extremity support using a wheeled walker. (Progressing)       Start:  02/18/24    Expected End:  03/03/24               COGNITION/SAFETY       Patient will recall and adhere to weight bearing restrictions with all ADL and functional mobility in order to promote healing and safety with functional tasks (Progressing)       Start:  02/19/24    Expected End:  03/04/24               Mobility       STG - Patient will ambulate 125ft using a wheeled walker with supervision assistance. (Not met)       Start:  02/18/24    Expected End:  03/03/24    Resolved:  02/27/24    Updated to: STG - Patient will ambulate  10 ft x 2 with whw and min aX1.    Update reason: revised          STG - Patient will ambulate  10 ft x 2 with whw and min aX1. (Progressing)       Start:  02/27/24    Expected End:  03/03/24                   TRANSFERS       Pt will complete functional transfers with standby level of assistance and LRAD.   (Progressing)       Start:  02/19/24    Expected End:  03/04/24               Transfers       STG - Transfer from bed to chair using a wheeled walker with supervision assistance. (Progressing)       Start:  02/18/24    Expected End:  03/03/24            STG - Patient to transfer to and from sit to supine with supervision assistance. (Progressing)       Start:  02/18/24    Expected End:  03/03/24            STG - Patient will transfer sit to and from stand using a wheeled walker with supervision assistance. (Progressing)       Start:  02/18/24    Expected End:  03/03/24

## 2024-02-28 NOTE — CARE PLAN
Pt calm and cooperative with care, states pain in Left leg, pain medications administered to help with pain, IV antibiotics administered without issue, L BKA dressing intact immobilizer on.    The patient's goals for the shift include  pain management    The clinical goals for the shift include Pt pain to be managed less than a 4 throughout the shift, pain was managed.     Problem: Skin  Goal: Prevent/manage excess moisture  Flowsheets (Taken 2/28/2024 0134)  Prevent/manage excess moisture: Moisturize dry skin       Over the shift, the patient did  make progress toward the following goals.

## 2024-02-28 NOTE — PROGRESS NOTES
[facility]  Vascular Surgery  Progress Note  Maggi Gordon  [id number]  1935     Subjective  Pain tolerable. In knee immobilizer this morning and comfortable    Objective:     Last Recorded Vitals  Vitals:    02/28/24 0500   BP: 117/58   Pulse: 72   Resp: 18   Temp: 36.6 °C (97.9 °F)   SpO2: 97%         I/O last 2 completed shifts:  In: 1533.3 (34.2 mL/kg) [IV Piggyback:1533.3]  Out: 225 (5 mL/kg) [Urine:225 (0.2 mL/kg/hr)]  Weight: 44.8 kg      Lying in bed. Appears comfortable.   NCAT, MMM  RRR  Breathing comfortably on RA  Abdomen soft, nontender  LLE BKA stump with dressing, no strikethrough. Knee immobilizer in place and seated well    Relevant Results  Lab Results   Component Value Date    WBC 10.2 02/27/2024    HGB 8.6 (L) 02/27/2024    HCT 28.5 (L) 02/27/2024    MCV 96 02/27/2024     02/27/2024     Lab Results   Component Value Date    GLUCOSE 98 02/27/2024    CALCIUM 8.3 (L) 02/27/2024     02/27/2024    K 4.9 02/27/2024    CO2 21 02/27/2024     02/27/2024    BUN 36 (H) 02/27/2024    CREATININE 1.45 (H) 02/27/2024          Assessment/Plan   Ms. Maggi Gordon is a 89 y.o. female with CLTI LLE, nonsalvagable LLE now s/p L BKA    Recovering well.  Doing well with knee immobilizer  Dressing to be taken down POD#3 (2/29/24)     Plan/ recommendations:  Vascular surgery to change OR dressing on POD3  Ok to work with PT/OT  NWB LLE with knee immobilizer in place    Discussed with attending surgeon Dr. Simon Cr MD  PGY5 - Integrated Vascular Surgery

## 2024-02-28 NOTE — PROGRESS NOTES
Per Sam Merrill, they are clinically able to discuss but would like to talk to family first. [facility] gave rep sister in law, Vicenta's info. Will continue to follow and will update medical team.

## 2024-02-28 NOTE — PROGRESS NOTES
Physical Therapy    Physical Therapy Treatment    Patient Name: Maggi Gordon  MRN: [id number]  Today's Date: 2/28/2024  Time Calculation  Start Time: 1220  Stop Time: 1248  Time Calculation (min): 28 min       Assessment/Plan   PT Assessment  End of Session Communication: Bedside nurse  Assessment Comment: Pt demo improvement in functional mobility this date & able to complete 2 STS with assist.  Motivated to participate in therapy & return home.  End of Session Patient Position: Bed, 3 rail up, Alarm on  PT Plan  Inpatient/Swing Bed or Outpatient: Inpatient  PT Plan  Treatment/Interventions: Bed mobility, Gait training, Transfer training, Balance training, Strengthening, Therapeutic exercise  PT Plan: Skilled PT  PT Frequency: 5 times per week  PT Discharge Recommendations: High intensity level of continued care  Equipment Recommended upon Discharge:  (none)  PT Recommended Transfer Status: Assist x1  PT - OK to Discharge: Yes      General Visit Information:   PT  Visit  PT Received On: 02/28/24  Response to Previous Treatment: Patient with no complaints from previous session.  General  Reason for Referral: s/p (L) BKA 2/26/24  Family/Caregiver Present: No  Prior to Session Communication: Bedside nurse  Patient Position Received: Bed, 3 rail up, Alarm on  General Comment: Pt pleasant & cooperative, willing to participate in therapy session.  Explains that she was able to sit up with OT earlier in the day.    Subjective   Precautions:  Precautions  LE Weight Bearing Status: Left Non-Weight Bearing  Braces Applied: immobilizer on LLE  Vital Signs:  Vital Signs  Heart Rate: 70  Heart Rate Source: Monitor  SpO2:  (>94 throughout session)  Patient Position: Sitting    Objective   Pain:  Pain Assessment  Pain Score: 4  Pain Type: Acute pain, Surgical pain  Pain Location: Leg  Pain Orientation: Left  Pain Interventions: Rest, Repositioned  Cognition:  Cognition  Overall Cognitive Status:  (mildly sleepy but grossly  WFL)  Postural Control:  Postural Control  Postural Control: Within Functional Limits  Static Sitting Balance  Static Sitting-Balance Support: Bilateral upper extremity supported (R foot supported)  Static Sitting-Level of Assistance: Close supervision  Static Standing Balance  Static Standing-Balance Support: Bilateral upper extremity supported  Static Standing-Level of Assistance: Moderate assistance  Static Standing-Comment/Number of Minutes: 60s, 30s (x2 trials)     Activity Tolerance:  Activity Tolerance  Endurance: Tolerates 10 - 20 min exercise with multiple rests  Treatments:     Balance/Neuromuscular Re-Education  Balance/Neuromuscular Re-Education Activity Performed: Yes  Balance/Neuromuscular Re-Education Activity 1: static standing at WW with modA - 60sec first trial, 30sec 2nd trial - verbal & tactile cueing for ant weight shifting to maintain midline with improvement in static standing balance to min-modA    Bed Mobility 1  Bed Mobility 1: Supine to sitting  Level of Assistance 1: Minimum assistance, Moderate verbal cues  Bed Mobility Comments 1: inc time with cueing for sequencing  Bed Mobility 2  Bed Mobility  2: Sitting to supine  Level of Assistance 2: Minimum assistance, Minimal verbal cues  Bed Mobility 3  Bed Mobility 3: Scooting  Level of Assistance 3: Moderate assistance, Moderate verbal cues  Bed Mobility Comments 3: scooting to EOB    Ambulation/Gait Training  Ambulation/Gait Training Performed: No  Transfers  Transfer: Yes  Transfer 1  Technique 1: Sit to stand, Stand to sit  Transfer Device 1: Walker  Transfer Level of Assistance 1: Maximum assistance, Moderate verbal cues, Moderate tactile cues  Trials/Comments 1: x2 trials    Outcome Measures:  [facility] Basic Mob[facility]  Turning from your back to your side while in a flat bed without using bedrails: A little  Moving from lying on your back to sitting on the side of a flat bed without using bedrails: A lot  Moving to and from bed to chair  (including a wheelchair): Total  Standing up from a chair using your arms (e.g. wheelchair or bedside chair): A lot  To walk in hospital room: Total  Climbing 3-5 steps with railing: Total  Basic Mobility - Total Score: 10    Education Documentation  Precautions, taught by Edna Bill, PT at 2/28/2024  1:57 PM.  Learner: Patient  Readiness: Acceptance  Method: Explanation  Response: Verbalizes Understanding, Needs Reinforcement    Body Mechanics, taught by Edna Bill, PT at 2/28/2024  1:57 PM.  Learner: Patient  Readiness: Acceptance  Method: Explanation  Response: Verbalizes Understanding, Needs Reinforcement    Mobility Training, taught by Edna Bill, PT at 2/28/2024  1:57 PM.  Learner: Patient  Readiness: Acceptance  Method: Explanation  Response: Verbalizes Understanding, Needs Reinforcement    Education Comments  No comments found.        OP EDUCATION:       Encounter Problems       Encounter Problems (Active)       Balance       STG - Maintains supervision assistance dynamic standing balance with upper extremity support using a wheeled walker. (Progressing)       Start:  02/18/24    Expected End:  03/03/24            STG - Maintains supervision assistance static standing balance with upper extremity support using a wheeled walker. (Progressing)       Start:  02/18/24    Expected End:  03/03/24               Mobility       STG - Patient will ambulate 125ft using a wheeled walker with supervision assistance. (Not met)       Start:  02/18/24    Expected End:  03/03/24    Resolved:  02/27/24    Updated to: STG - Patient will ambulate  10 ft x 2 with whw and min aX1.    Update reason: revised         STG - Patient will ambulate  10 ft x 2 with whw and min aX1. (Progressing)       Start:  02/27/24    Expected End:  03/03/24                   Pain - Adult          Transfers       STG - Transfer from bed to chair using a wheeled walker with supervision assistance. (Progressing)       Start:   02/18/24    Expected End:  03/03/24            STG - Patient to transfer to and from sit to supine with supervision assistance. (Progressing)       Start:  02/18/24    Expected End:  03/03/24            STG - Patient will transfer sit to and from stand using a wheeled walker with supervision assistance. (Progressing)       Start:  02/18/24    Expected End:  03/03/24 02/28/24 at 1:58 PM - Edna Bill, PT

## 2024-02-29 LAB
ALBUMIN SERPL BCP-MCNC: 2.7 G/DL (ref 3.4–5)
ANION GAP SERPL CALC-SCNC: 11 MMOL/L (ref 10–20)
BASOPHILS # BLD AUTO: 0.01 X10*3/UL (ref 0–0.1)
BASOPHILS NFR BLD AUTO: 0.1 %
BUN SERPL-MCNC: 27 MG/DL (ref 6–23)
CALCIUM SERPL-MCNC: 8.3 MG/DL (ref 8.6–10.6)
CHLORIDE SERPL-SCNC: 103 MMOL/L (ref 98–107)
CO2 SERPL-SCNC: 27 MMOL/L (ref 21–32)
CREAT SERPL-MCNC: 1.15 MG/DL (ref 0.5–1.05)
EGFRCR SERPLBLD CKD-EPI 2021: 46 ML/MIN/1.73M*2
EOSINOPHIL # BLD AUTO: 0.14 X10*3/UL (ref 0–0.4)
EOSINOPHIL NFR BLD AUTO: 1.5 %
ERYTHROCYTE [DISTWIDTH] IN BLOOD BY AUTOMATED COUNT: 21.3 % (ref 11.5–14.5)
GLUCOSE SERPL-MCNC: 102 MG/DL (ref 74–99)
HCT VFR BLD AUTO: 24.9 % (ref 36–46)
HGB BLD-MCNC: 7.4 G/DL (ref 12–16)
IMM GRANULOCYTES # BLD AUTO: 0.11 X10*3/UL (ref 0–0.5)
IMM GRANULOCYTES NFR BLD AUTO: 1.2 % (ref 0–0.9)
LYMPHOCYTES # BLD AUTO: 1.62 X10*3/UL (ref 0.8–3)
LYMPHOCYTES NFR BLD AUTO: 17.7 %
MAGNESIUM SERPL-MCNC: 2.25 MG/DL (ref 1.6–2.4)
MCH RBC QN AUTO: 29.7 PG (ref 26–34)
MCHC RBC AUTO-ENTMCNC: 29.7 G/DL (ref 32–36)
MCV RBC AUTO: 100 FL (ref 80–100)
MONOCYTES # BLD AUTO: 0.79 X10*3/UL (ref 0.05–0.8)
MONOCYTES NFR BLD AUTO: 8.7 %
NEUTROPHILS # BLD AUTO: 6.46 X10*3/UL (ref 1.6–5.5)
NEUTROPHILS NFR BLD AUTO: 70.8 %
NRBC BLD-RTO: 0.3 /100 WBCS (ref 0–0)
PHOSPHATE SERPL-MCNC: 2.6 MG/DL (ref 2.5–4.9)
PLATELET # BLD AUTO: 183 X10*3/UL (ref 150–450)
POLYCHROMASIA BLD QL SMEAR: NORMAL
POTASSIUM SERPL-SCNC: 4.3 MMOL/L (ref 3.5–5.3)
RBC # BLD AUTO: 2.49 X10*6/UL (ref 4–5.2)
RBC MORPH BLD: NORMAL
SODIUM SERPL-SCNC: 137 MMOL/L (ref 136–145)
WBC # BLD AUTO: 9.1 X10*3/UL (ref 4.4–11.3)

## 2024-02-29 PROCEDURE — 2500000001 HC RX 250 WO HCPCS SELF ADMINISTERED DRUGS (ALT 637 FOR MEDICARE OP)

## 2024-02-29 PROCEDURE — 85025 COMPLETE CBC W/AUTO DIFF WBC: CPT

## 2024-02-29 PROCEDURE — 97116 GAIT TRAINING THERAPY: CPT | Mod: GP

## 2024-02-29 PROCEDURE — 36415 COLL VENOUS BLD VENIPUNCTURE: CPT

## 2024-02-29 PROCEDURE — 2500000002 HC RX 250 W HCPCS SELF ADMINISTERED DRUGS (ALT 637 FOR MEDICARE OP, ALT 636 FOR OP/ED)

## 2024-02-29 PROCEDURE — 1100000001 HC PRIVATE ROOM DAILY

## 2024-02-29 PROCEDURE — 97530 THERAPEUTIC ACTIVITIES: CPT | Mod: GP

## 2024-02-29 PROCEDURE — 83735 ASSAY OF MAGNESIUM: CPT

## 2024-02-29 PROCEDURE — 80069 RENAL FUNCTION PANEL: CPT

## 2024-02-29 PROCEDURE — 99232 SBSQ HOSP IP/OBS MODERATE 35: CPT

## 2024-02-29 PROCEDURE — 97110 THERAPEUTIC EXERCISES: CPT | Mod: GP

## 2024-02-29 RX ORDER — GABAPENTIN 100 MG/1
100 CAPSULE ORAL 3 TIMES DAILY
Start: 2024-02-29

## 2024-02-29 RX ORDER — OXYCODONE HYDROCHLORIDE 5 MG/1
5 TABLET ORAL EVERY 6 HOURS PRN
Qty: 12 TABLET | Refills: 0
Start: 2024-02-29 | End: 2024-03-03

## 2024-02-29 RX ORDER — POLYETHYLENE GLYCOL 3350 17 G/17G
17 POWDER, FOR SOLUTION ORAL DAILY
Start: 2024-02-29

## 2024-02-29 RX ORDER — ACETAMINOPHEN 325 MG/1
975 TABLET ORAL EVERY 8 HOURS PRN
Start: 2024-02-29

## 2024-02-29 RX ORDER — ROSUVASTATIN CALCIUM 10 MG/1
10 TABLET, COATED ORAL NIGHTLY
Start: 2024-02-29

## 2024-02-29 RX ORDER — MULTIVIT-MIN/IRON FUM/FOLIC AC 7.5 MG-4
1 TABLET ORAL DAILY
Start: 2024-02-29

## 2024-02-29 RX ADMIN — GABAPENTIN 100 MG: 100 CAPSULE ORAL at 09:26

## 2024-02-29 RX ADMIN — GABAPENTIN 100 MG: 100 CAPSULE ORAL at 20:33

## 2024-02-29 RX ADMIN — ACETAMINOPHEN 975 MG: 325 TABLET ORAL at 20:32

## 2024-02-29 RX ADMIN — IPRATROPIUM BROMIDE 2 SPRAY: 21 SPRAY, METERED NASAL at 20:34

## 2024-02-29 RX ADMIN — ACETAMINOPHEN 975 MG: 325 TABLET ORAL at 15:53

## 2024-02-29 RX ADMIN — AZELASTINE HYDROCHLORIDE 2 SPRAY: 137 SPRAY, METERED NASAL at 20:34

## 2024-02-29 RX ADMIN — Medication 2000 UNITS: at 09:24

## 2024-02-29 RX ADMIN — ROSUVASTATIN CALCIUM 10 MG: 10 TABLET, FILM COATED ORAL at 20:33

## 2024-02-29 RX ADMIN — ESCITALOPRAM OXALATE 10 MG: 10 TABLET ORAL at 09:26

## 2024-02-29 RX ADMIN — ASPIRIN 81 MG: 81 TABLET, COATED ORAL at 09:25

## 2024-02-29 RX ADMIN — GABAPENTIN 100 MG: 100 CAPSULE ORAL at 15:53

## 2024-02-29 RX ADMIN — ATENOLOL 50 MG: 50 TABLET ORAL at 20:34

## 2024-02-29 RX ADMIN — ACETAMINOPHEN 975 MG: 325 TABLET ORAL at 09:26

## 2024-02-29 RX ADMIN — Medication 1 TABLET: at 09:24

## 2024-02-29 RX ADMIN — ACETAMINOPHEN 975 MG: 325 TABLET ORAL at 02:35

## 2024-02-29 RX ADMIN — OXYCODONE HYDROCHLORIDE 5 MG: 5 TABLET ORAL at 02:35

## 2024-02-29 RX ADMIN — AZELASTINE HYDROCHLORIDE 2 SPRAY: 137 SPRAY, METERED NASAL at 09:27

## 2024-02-29 RX ADMIN — APIXABAN 2.5 MG: 5 TABLET, FILM COATED ORAL at 11:54

## 2024-02-29 RX ADMIN — IPRATROPIUM BROMIDE 2 SPRAY: 21 SPRAY, METERED NASAL at 09:27

## 2024-02-29 RX ADMIN — FOLIC ACID 5 MG: 1 TABLET ORAL at 09:24

## 2024-02-29 RX ADMIN — APIXABAN 2.5 MG: 5 TABLET, FILM COATED ORAL at 22:39

## 2024-02-29 RX ADMIN — ATENOLOL 50 MG: 50 TABLET ORAL at 09:25

## 2024-02-29 ASSESSMENT — PAIN SCALES - WONG BAKER
WONGBAKER_NUMERICALRESPONSE: NO HURT
WONGBAKER_NUMERICALRESPONSE: NO HURT

## 2024-02-29 ASSESSMENT — PAIN SCALES - GENERAL
PAINLEVEL_OUTOF10: 5 - MODERATE PAIN
PAINLEVEL_OUTOF10: 0 - NO PAIN
PAINLEVEL_OUTOF10: 0 - NO PAIN
PAINLEVEL_OUTOF10: 4
PAINLEVEL_OUTOF10: 9

## 2024-02-29 ASSESSMENT — COGNITIVE AND FUNCTIONAL STATUS - GENERAL
PERSONAL GROOMING: A LITTLE
MOBILITY SCORE: 11
PERSONAL GROOMING: A LITTLE
EATING MEALS: A LITTLE
HELP NEEDED FOR BATHING: A LOT
DRESSING REGULAR UPPER BODY CLOTHING: A LITTLE
MOVING TO AND FROM BED TO CHAIR: TOTAL
MOVING FROM LYING ON BACK TO SITTING ON SIDE OF FLAT BED WITH BEDRAILS: A LITTLE
TURNING FROM BACK TO SIDE WHILE IN FLAT BAD: A LOT
MOBILITY SCORE: 11
CLIMB 3 TO 5 STEPS WITH RAILING: TOTAL
STANDING UP FROM CHAIR USING ARMS: A LOT
MOVING TO AND FROM BED TO CHAIR: A LOT
CLIMB 3 TO 5 STEPS WITH RAILING: TOTAL
DRESSING REGULAR UPPER BODY CLOTHING: A LOT
MOVING FROM LYING ON BACK TO SITTING ON SIDE OF FLAT BED WITH BEDRAILS: A LITTLE
TURNING FROM BACK TO SIDE WHILE IN FLAT BAD: A LOT
STANDING UP FROM CHAIR USING ARMS: A LOT
STANDING UP FROM CHAIR USING ARMS: A LOT
MOVING FROM LYING ON BACK TO SITTING ON SIDE OF FLAT BED WITH BEDRAILS: A LITTLE
CLIMB 3 TO 5 STEPS WITH RAILING: TOTAL
HELP NEEDED FOR BATHING: A LOT
WALKING IN HOSPITAL ROOM: TOTAL
WALKING IN HOSPITAL ROOM: TOTAL
MOBILITY SCORE: 10
DRESSING REGULAR LOWER BODY CLOTHING: A LOT
MOVING TO AND FROM BED TO CHAIR: A LOT
DRESSING REGULAR LOWER BODY CLOTHING: A LOT
TOILETING: TOTAL
EATING MEALS: A LITTLE
TURNING FROM BACK TO SIDE WHILE IN FLAT BAD: A LOT
DAILY ACTIVITIY SCORE: 14
WALKING IN HOSPITAL ROOM: TOTAL

## 2024-02-29 ASSESSMENT — PAIN - FUNCTIONAL ASSESSMENT: PAIN_FUNCTIONAL_ASSESSMENT: 0-10

## 2024-02-29 ASSESSMENT — PAIN DESCRIPTION - LOCATION: LOCATION: LEG

## 2024-02-29 NOTE — DISCHARGE INSTRUCTIONS
Ms. Gordon,    You were admitted to  with an infection to your foot. Vascular surgery and podiatry followed your case throughout the admission. Initially, vascular surgery placed stents to improve blood flow to your left leg. Podiatry evaluated your foot, ankle, and achilles and determined the achilles tendon was not salvageable. Because of this, the ultimate recommendation was to amputate the left foot. This achieved source control of the infection and we were able to take you off antibiotics. We supported you during the admission with a few units of blood to keep your hemoglobin levels up. Otherwise, you improved significantly, and ultimately will go to a skilled nursing facility to improve your strength. You will continue to follow up with vascular surgery in clinic.    It was a pleasure taking care of you,     [facility]

## 2024-02-29 NOTE — NURSING NOTE
Geriatric Nursing Rounds Summary  Ms Gordon is a 89 year old s/p L BKA for osteomyelitis was home alone  Age friendly  What matters full code comfort  Mentation cam+  Mobility up in chair pt  Meds tylenal gabapentin oxycodone seems to increase confusion?

## 2024-02-29 NOTE — PROGRESS NOTES
Physical Therapy    Physical Therapy Treatment    Patient Name: Maggi Gordon  MRN: [id number]  Today's Date: 2/29/2024  Time Calculation  Start Time: 0833  Stop Time: 0932  Time Calculation (min): 59 min       Assessment/Plan   PT Assessment  PT Assessment Results: Decreased strength, Impaired balance, Decreased mobility, Pain, Orthopedic restrictions  Rehab Prognosis: Good  Barriers to Discharge: none PT related  End of Session Communication: Bedside nurse  Assessment Comment: Pt able to complete pivoting steps to and from [facility] this session with assist, completed longer bout overall of activity and appears to tolerate well without increased pain.  Will continue to benefit from skilled PT to address all mobility deficits.  End of Session Patient Position: Bed, 3 rail up, Alarm on     PT Plan  Treatment/Interventions: Bed mobility, Gait training, Transfer training, Balance training, Strengthening, Therapeutic exercise  PT Plan: Skilled PT  PT Frequency: 5 times per week  PT Discharge Recommendations: High intensity level of continued care  Equipment Recommended upon Discharge:  (none)  PT Recommended Transfer Status: Assist x1  PT - OK to Discharge: Yes     02/29/24 0833   PT  Visit   PT Received On 02/29/24   General   Prior to Session Communication Bedside nurse   General Comment Pt agreeable to participate, slightly confused at times needing repetition and redirection as needed.  Incontinent of BM ? pre session vs during mobiltiy, transferred to and from [facility] with assist, completed hygiene with assist prn.  Will continue to follow.   Precautions   LE Weight Bearing Status Left Non-Weight Bearing  (stump protector on)   Pain Assessment   Pain Assessment 0-10   Pain Score 4   Pain Location Leg   Pain Orientation Left   Cognition   Arousal/Alertness Appropriate responses to stimuli   Following Commands Follows one step commands with repetition   Safety Judgment Decreased awareness of need for assistance   Postural  Control   Postural Control WFL   Static Sitting Balance   Static Sitting-Balance Support   ((R) foot supported)   Static Sitting-Level of Assistance Distant supervision   Static Standing Balance   Static Standing-Balance Support Bilateral upper extremity supported   Static Standing-Level of Assistance Minimum assistance   Static Standing-Comment/Number of Minutes withwhw   Dynamic Standing Balance   Dynamic Standing-Balance Support Bilateral upper extremity supported   Dynamic Standing-Comments mod Ax1 with whw   Therapeutic Exercise   Therapeutic Exercise Performed Yes   Therapeutic Exercise Activity 1 (R) LE: LAQ and heel slides x 12 reps each  (cues and demo, counting out loud to stay on task)   Therapeutic Activity   Therapeutic Activity Performed Yes   Therapeutic Activity 1 Pt completed hygiene as much as she could in sitting on commode, required verbal cues to complete as  unaware BM had traveled towards her front due to wearing a brief. PT assisted to finish up hygiene.   Bed Mobility   Bed Mobility Yes   Bed Mobility 1   Bed Mobility 1 Supine to sitting   Level of Assistance 1 Minimum assistance;Moderate verbal cues  (x1)   Bed Mobility Comments 1 HOB raised, use of draw sheet   Bed Mobility 2   Bed Mobility  2 Sitting to supine   Level of Assistance 2 Minimum assistance;Minimal verbal cues  (x1)   Ambulation/Gait Training   Ambulation/Gait Training Performed Yes   Ambulation/Gait Training 1   Surface 1 Level tile   Device 1 Rolling walker   Assistance 1 Moderate assistance;Moderate verbal cues  (x1)   Comments/Distance (ft) 1 pivoting steps to and from bedside commode on (R) LE, pt demos poor balance and technique, taking hands off walker to earlier, not squaring up to surface prior to sit   Transfers   Transfer Yes   Transfer 1   Transfer From 1 Bed to   Transfer to 1 Stand   Transfer Device 1 Walker   Transfer Level of Assistance 1 Moderate assistance;Moderate verbal cues  (x1)   Transfers 2   Transfer  From 2 Stand to   Transfer to 2 Commode-standard   Transfer Level of Assistance 2 Minimum assistance;Moderate verbal cues  (x1)   Transfers 3   Transfer From 3 Commode-standard to   Transfer to 3 Stand   Transfer Level of Assistance 3 Minimum assistance;Moderate verbal cues  (x1)   Transfers 4   Transfer From 4 Stand to   Transfer to 4 Bed   Transfer Level of Assistance 4 Minimum assistance;Moderate verbal cues  (x1)   Activity Tolerance   Endurance Tolerates 30 min exercise with multiple rests   PT Assessment   PT Assessment Results Decreased strength;Impaired balance;Decreased mobility;Pain;Orthopedic restrictions   End of Session Communication Bedside nurse   Assessment Comment Pt able to complete pivoting steps to and from [facility] this session with assist, completed longer bout overall of activity and appears to tolerate well without increased pain.  Will continue to benefit from skilled PT to address all mobility deficits.   End of Session Patient Position Bed, 3 rail up;Alarm on     Outcome Measures:  [facility] Basic Mobility  Turning from your back to your side while in a flat bed without using bedrails: A little  Moving from lying on your back to sitting on the side of a flat bed without using bedrails: A lot  Moving to and from bed to chair (including a wheelchair): A lot  Standing up from a chair using your arms (e.g. wheelchair or bedside chair): A lot  To walk in hospital room: Total  Climbing 3-5 steps with railing: Total  Basic Mobility - Total Score: 11                       EDUCATION:     Education Documentation  Precautions, taught by Anette Crum PT at 2/29/2024 11:01 AM.  Learner: Patient  Readiness: Acceptance  Method: Explanation, Demonstration  Response: Verbalizes Understanding, Needs Reinforcement  Comment: safety with whw and mobility    Mobility Training, taught by Anette Crum PT at 2/29/2024 11:01 AM.  Learner: Patient  Readiness: Acceptance  Method: Explanation,  Demonstration  Response: Verbalizes Understanding, Needs Reinforcement  Comment: safety with whw and mobility      GOALS:  Encounter Problems       Encounter Problems (Active)       Balance       STG - Maintains supervision assistance dynamic standing balance with upper extremity support using a wheeled walker. (Progressing)       Start:  02/18/24    Expected End:  03/03/24            STG - Maintains supervision assistance static standing balance with upper extremity support using a wheeled walker. (Progressing)       Start:  02/18/24    Expected End:  03/03/24               Mobility       STG - Patient will ambulate 125ft using a wheeled walker with supervision assistance. (Not met)       Start:  02/18/24    Expected End:  03/03/24    Resolved:  02/27/24    Updated to: STG - Patient will ambulate  10 ft x 2 with whw and min aX1.    Update reason: revised         STG - Patient will ambulate  10 ft x 2 with whw and min aX1. (Progressing)       Start:  02/27/24    Expected End:  03/03/24                            Transfers       STG - Transfer from bed to chair using a wheeled walker with supervision assistance. (Progressing)       Start:  02/18/24    Expected End:  03/03/24            STG - Patient to transfer to and from sit to supine with supervision assistance. (Progressing)       Start:  02/18/24    Expected End:  03/03/24            STG - Patient will transfer sit to and from stand using a wheeled walker with supervision assistance. (Progressing)       Start:  02/18/24    Expected End:  03/03/24 02/29/24 at 11:02 AM - Anette Crum, PT

## 2024-02-29 NOTE — PROGRESS NOTES
[facility]  Vascular Surgery  Progress Note  Maggi Gordon  [id number]  1935     Subjective  No overnight events.  No pain. Wearing stump protector from [employer].  A bit confused this morning, thought that her friend Sia was there.  Otherwise feeling okay.  Dressing taken down. Wound looks good.    Objective:     Last Recorded Vitals  Vitals:    02/29/24 0714   BP: 146/63   Pulse: 78   Resp: 18   Temp: 36.3 °C (97.3 °F)   SpO2: 97%     Lying in bed. Appears comfortable.   NCAT, MMM  RRR  Breathing comfortably on RA  Abdomen soft, nontender  LLE BKA stump with dressing, no strikethrough. Knee immobilizer in place and seated well; dressing taken down; incision well-approximated with monocryl suture; no bleeding or ecchymosis.    I/O last 2 completed shifts:  In: 737 (16.5 mL/kg) [P.O.:480; IV Piggyback:257]  Out: 450 (10 mL/kg) [Urine:450 (0.4 mL/kg/hr)]  Weight: 44.8 kg      Lying in bed. Appears comfortable.   NCAT, MMM  RRR  Breathing comfortably on RA  Abdomen soft, nontender  LLE BKA stump with dressing, no strikethrough. Knee immobilizer in place and seated well    Relevant Results  Lab Results   Component Value Date    WBC 9.1 02/29/2024    HGB 7.4 (L) 02/29/2024    HCT 24.9 (L) 02/29/2024     02/29/2024     02/29/2024     Lab Results   Component Value Date    GLUCOSE 180 (H) 02/28/2024    CALCIUM 8.3 (L) 02/28/2024     02/28/2024    K 4.1 02/28/2024    CO2 23 02/28/2024     02/28/2024    BUN 28 (H) 02/28/2024    CREATININE 1.36 (H) 02/28/2024     Assessment/Plan   Ms. Maggi Gordon is a 89 y.o. female with CLTI LLE, nonsalvagable LLE now s/p L BKA    Recovering well.  Doing well with knee immobilizer  Dressing taken down today (2/29) - would looks good. Is wearing  stump protector     Plan/ recommendations:  Ok to work with PT/OT.  NWB LLE with knee immobilizer in place.  Will schedule a follow up with our office in 2 weeks.  [place] for ASA and  DOAC.  For wound care, okay for Kerlix and ACE for dressing, change when soiled or daily.    Vascular surgery will sign off at this time.    Please page with questions    Discussed with attending surgeon Dr. Simon Carl MD  Vascular Surgery, PGY3  Team Pager: [phone]

## 2024-02-29 NOTE — PROGRESS NOTES
Subjective   NAEON. Pt comfortable this morning. Denies f/c/n/v/cp/sob. POD#3. States she is not in any pain when she doesn't move her leg. Slight movement causes severe pain. States pain meds help, no change to regimen necessary. Denies any other symptoms or concerns this morning.     Objective   Patient Vitals for the past 24 hrs:   BP Temp Temp src Pulse Resp SpO2   02/29/24 0714 146/63 36.3 °C (97.3 °F) Temporal 78 18 97 %   02/29/24 0535 122/69 35.9 °C (96.6 °F) Temporal 94 18 97 %   02/28/24 2036 (!) 114/49 36.4 °C (97.5 °F) Temporal 73 18 96 %   02/28/24 1300 (!) 94/40 36.9 °C (98.4 °F) Temporal 69 18 95 %   02/28/24 1220 -- -- -- 70 -- --   02/28/24 0913 (!) 117/44 -- -- 74 -- 96 %     Results for orders placed or performed during the hospital encounter of 02/17/24 (from the past 24 hour(s))   CBC and Auto Differential   Result Value Ref Range    WBC 10.5 4.4 - 11.3 x10*3/uL    nRBC 0.8 (H) 0.0 - 0.0 /100 WBCs    RBC 2.54 (L) 4.00 - 5.20 x10*6/uL    Hemoglobin 7.7 (L) 12.0 - 16.0 g/dL    Hematocrit 25.5 (L) 36.0 - 46.0 %     80 - 100 fL    MCH 30.3 26.0 - 34.0 pg    MCHC 30.2 (L) 32.0 - 36.0 g/dL    RDW 20.8 (H) 11.5 - 14.5 %    Platelets 172 150 - 450 x10*3/uL    Neutrophils % 80.0 40.0 - 80.0 %    Immature Granulocytes %, Automated 0.9 0.0 - 0.9 %    Lymphocytes % 10.4 13.0 - 44.0 %    Monocytes % 7.6 2.0 - 10.0 %    Eosinophils % 0.9 0.0 - 6.0 %    Basophils % 0.2 0.0 - 2.0 %    Neutrophils Absolute 8.37 (H) 1.60 - 5.50 x10*3/uL    Immature Granulocytes Absolute, Automated 0.09 0.00 - 0.50 x10*3/uL    Lymphocytes Absolute 1.09 0.80 - 3.00 x10*3/uL    Monocytes Absolute 0.80 0.05 - 0.80 x10*3/uL    Eosinophils Absolute 0.09 0.00 - 0.40 x10*3/uL    Basophils Absolute 0.02 0.00 - 0.10 x10*3/uL   Magnesium   Result Value Ref Range    Magnesium 1.85 1.60 - 2.40 mg/dL   Renal Function Panel   Result Value Ref Range    Glucose 180 (H) 74 - 99 mg/dL    Sodium 137 136 - 145 mmol/L    Potassium 4.1 3.5 - 5.3  mmol/L    Chloride 104 98 - 107 mmol/L    Bicarbonate 23 21 - 32 mmol/L    Anion Gap 14 10 - 20 mmol/L    Urea Nitrogen 28 (H) 6 - 23 mg/dL    Creatinine 1.36 (H) 0.50 - 1.05 mg/dL    eGFR 38 (L) >60 mL/min/1.73m*2    Calcium 8.3 (L) 8.6 - 10.6 mg/dL    Phosphorus 2.0 (L) 2.5 - 4.9 mg/dL    Albumin 2.7 (L) 3.4 - 5.0 g/dL   Type and Screen   Result Value Ref Range    ABO TYPE A     Rh TYPE POS     ANTIBODY SCREEN NEG    Morphology   Result Value Ref Range    RBC Morphology See Below     Ovalocytes Few     Murdock Cells Few     Basophilic Stippling Present    CBC and Auto Differential   Result Value Ref Range    WBC 9.1 4.4 - 11.3 x10*3/uL    nRBC 0.3 (H) 0.0 - 0.0 /100 WBCs    RBC 2.49 (L) 4.00 - 5.20 x10*6/uL    Hemoglobin 7.4 (L) 12.0 - 16.0 g/dL    Hematocrit 24.9 (L) 36.0 - 46.0 %     80 - 100 fL    MCH 29.7 26.0 - 34.0 pg    MCHC 29.7 (L) 32.0 - 36.0 g/dL    RDW 21.3 (H) 11.5 - 14.5 %    Platelets 183 150 - 450 x10*3/uL    Neutrophils %      Immature Granulocytes %, Automated      Lymphocytes %      Monocytes %      Eosinophils %      Basophils %      Neutrophils Absolute      Lymphocytes Absolute      Monocytes Absolute      Eosinophils Absolute      Basophils Absolute           Gen: well-appearing, NAD, thin  Head and neck: NCAT, neck supple without LAD  HEENT: MMM, normal nose without congestion  Pulm: normal WOB on RA  Ext: L BKA site wrapped in ACE bandage with no strikethrough, immobilizer in place  Neuro: alert and oriented x3, normal tone, face symmetric, moves all extremities spontaneously    Assessment/Plan   89yo with PMH HTN, HLD, CKD, CAD s/p CABG x3 1995, vertebral artery syndrome s/p L PICA coiling and stent, Afib (not on AC), aortic stenosis s/p AVR 2011, PAD s/p multiple interventions, the most recent of which was a L femoral endartecomy on 1/18/24 at [facility], chronic L foot wound (follows with wound care) who initially presented to [place] after mechanical fall at home found on imaging to  have c/f L foot OM with diminished pulses of LLE DP on exam transferred to [facility] for further evaluation. Currently treating for OM with vanc/zosyn.  Vascular surgery and podiatry following, will need to determine the salvageability of the foot before proceeding with any surgical interventions most likely.  She appears well and is hemodynamically stable, no signs of sepsis.    Updates 2/29:  -Dressing taken down by vascular, clean and intact, ok to start Eliquis 2.5mg BID and ASA, dc Plavix  -Plan for dc to SNF 3/1 if Hg stable    Updates 2/28:  -Pending SNF placement  -Following Cr  -Dressing removed POD#3, followed by initiation of DOAC w/ ASA, dc Plavix    #Chronic L foot wound c/f OM  #PAD s/p L femoral endarectomy on 1/18/24   #likely OM of L foot/ankle  ::MRI L foot and ankle: Evidence c/w osteomyelitis of 4th/5th metatarsal and calcaneal tuberosity, edema of distal soleus c/w myositis  ::Cultures NGTD  ::2/21: S/p LLE angiogram, s/p L EIA balloon angioplasty and stent, SFA balloon angioplasty and stent  ::S/p L BKA with vascular sx 2/26  -[facility] surgery following:   -Vascular surgery and podiatry discussed further procedures for this pt. Since the achilles tendon is not salvageable, pt would require removal of tendon and she would not be able to walk following procedure. D/t this, vascular sx will not operate on leg given recommendation for BKA   -off Abx  -Eliquis 2.5mg BID and ASA, dc Plavix    #LUCRETIA  -prerenal most likely  -continue fluid resuscitation, 1L LR bolus 2/27  -hold home antihypertensives (hydrochlorothiazide, ana, valsartan) for now for soft BP    #Afib  -CHADSVASc 5  -continue home atenolol  -reportedly not on AC at home, but dc summary from Sept notes warfarin. No warfarin on fill history on EMR or on dc summary from Jan admission. She did have high INR on arrival in Jan.  -Given valve is bovine and not mechanical, will start pt on DOAC following procedure, discussing with vascular sx  -Eliquis  2.5mg BID, ASA, dc Plavix    #Depression - c/w home meds    #Anemia  ::Requiring multiple units, likely 2/2 post op  -CTM CBCs, transfuse Hg<7    N: Cardiac diet  DVT Ppx: Heparin sc  GI Ppx: -  Access/Lines: PIV   Abx: Vanc/Zosyn  O2: None    Pain regimen: Tylenol prn  GI Laxative: Miralax   PT/OT: SNF     Code status: Full Code  Emergency contact: Vicenta Gordon (sister-in-law) [phone]    Arnoldo David,   Neurology PGY-1

## 2024-02-29 NOTE — PROGRESS NOTES
[facility] confirmed they can accept pt. [facility] set up transport for pt via [facility] on 3/1 for 1515. Medical team, pt's Vicenta KAUFMAN and pt's RN aware.

## 2024-03-01 VITALS
BODY MASS INDEX: 17.5 KG/M2 | DIASTOLIC BLOOD PRESSURE: 71 MMHG | OXYGEN SATURATION: 94 % | SYSTOLIC BLOOD PRESSURE: 147 MMHG | RESPIRATION RATE: 17 BRPM | WEIGHT: 98.77 LBS | HEART RATE: 82 BPM | TEMPERATURE: 97.5 F | HEIGHT: 63 IN

## 2024-03-01 LAB
ALBUMIN SERPL BCP-MCNC: 3 G/DL (ref 3.4–5)
ANION GAP SERPL CALC-SCNC: 15 MMOL/L (ref 10–20)
BASOPHILS # BLD AUTO: 0.03 X10*3/UL (ref 0–0.1)
BASOPHILS NFR BLD AUTO: 0.3 %
BUN SERPL-MCNC: 30 MG/DL (ref 6–23)
CALCIUM SERPL-MCNC: 8.8 MG/DL (ref 8.6–10.6)
CHLORIDE SERPL-SCNC: 105 MMOL/L (ref 98–107)
CO2 SERPL-SCNC: 25 MMOL/L (ref 21–32)
CREAT SERPL-MCNC: 1.01 MG/DL (ref 0.5–1.05)
EGFRCR SERPLBLD CKD-EPI 2021: 54 ML/MIN/1.73M*2
EOSINOPHIL # BLD AUTO: 0.13 X10*3/UL (ref 0–0.4)
EOSINOPHIL NFR BLD AUTO: 1.5 %
ERYTHROCYTE [DISTWIDTH] IN BLOOD BY AUTOMATED COUNT: 22.3 % (ref 11.5–14.5)
GLUCOSE SERPL-MCNC: 81 MG/DL (ref 74–99)
HCT VFR BLD AUTO: 30.4 % (ref 36–46)
HGB BLD-MCNC: 8.9 G/DL (ref 12–16)
IMM GRANULOCYTES # BLD AUTO: 0.1 X10*3/UL (ref 0–0.5)
IMM GRANULOCYTES NFR BLD AUTO: 1.1 % (ref 0–0.9)
LYMPHOCYTES # BLD AUTO: 1.4 X10*3/UL (ref 0.8–3)
LYMPHOCYTES NFR BLD AUTO: 15.7 %
MAGNESIUM SERPL-MCNC: 2.28 MG/DL (ref 1.6–2.4)
MCH RBC QN AUTO: 30.2 PG (ref 26–34)
MCHC RBC AUTO-ENTMCNC: 29.3 G/DL (ref 32–36)
MCV RBC AUTO: 103 FL (ref 80–100)
MONOCYTES # BLD AUTO: 0.59 X10*3/UL (ref 0.05–0.8)
MONOCYTES NFR BLD AUTO: 6.6 %
NEUTROPHILS # BLD AUTO: 6.69 X10*3/UL (ref 1.6–5.5)
NEUTROPHILS NFR BLD AUTO: 74.8 %
NRBC BLD-RTO: 0.3 /100 WBCS (ref 0–0)
PHOSPHATE SERPL-MCNC: 2.2 MG/DL (ref 2.5–4.9)
PLATELET # BLD AUTO: 236 X10*3/UL (ref 150–450)
POLYCHROMASIA BLD QL SMEAR: NORMAL
POTASSIUM SERPL-SCNC: 5.2 MMOL/L (ref 3.5–5.3)
RBC # BLD AUTO: 2.95 X10*6/UL (ref 4–5.2)
RBC MORPH BLD: NORMAL
SODIUM SERPL-SCNC: 140 MMOL/L (ref 136–145)
WBC # BLD AUTO: 8.9 X10*3/UL (ref 4.4–11.3)

## 2024-03-01 PROCEDURE — 2500000005 HC RX 250 GENERAL PHARMACY W/O HCPCS

## 2024-03-01 PROCEDURE — 99239 HOSP IP/OBS DSCHRG MGMT >30: CPT

## 2024-03-01 PROCEDURE — 97110 THERAPEUTIC EXERCISES: CPT | Mod: GP,CQ

## 2024-03-01 PROCEDURE — 2500000001 HC RX 250 WO HCPCS SELF ADMINISTERED DRUGS (ALT 637 FOR MEDICARE OP)

## 2024-03-01 PROCEDURE — 85025 COMPLETE CBC W/AUTO DIFF WBC: CPT

## 2024-03-01 PROCEDURE — 36415 COLL VENOUS BLD VENIPUNCTURE: CPT

## 2024-03-01 PROCEDURE — 83735 ASSAY OF MAGNESIUM: CPT

## 2024-03-01 PROCEDURE — 80069 RENAL FUNCTION PANEL: CPT

## 2024-03-01 PROCEDURE — 97530 THERAPEUTIC ACTIVITIES: CPT | Mod: GP,CQ

## 2024-03-01 RX ADMIN — ACETAMINOPHEN 975 MG: 325 TABLET ORAL at 15:05

## 2024-03-01 RX ADMIN — IPRATROPIUM BROMIDE 2 SPRAY: 21 SPRAY, METERED NASAL at 09:32

## 2024-03-01 RX ADMIN — APIXABAN 2.5 MG: 5 TABLET, FILM COATED ORAL at 09:31

## 2024-03-01 RX ADMIN — Medication 1 TABLET: at 09:27

## 2024-03-01 RX ADMIN — ATENOLOL 50 MG: 50 TABLET ORAL at 09:28

## 2024-03-01 RX ADMIN — LIDOCAINE 2 PATCH: 4 PATCH TOPICAL at 09:28

## 2024-03-01 RX ADMIN — GABAPENTIN 100 MG: 100 CAPSULE ORAL at 09:27

## 2024-03-01 RX ADMIN — AZELASTINE HYDROCHLORIDE 2 SPRAY: 137 SPRAY, METERED NASAL at 09:32

## 2024-03-01 RX ADMIN — FOLIC ACID 5 MG: 1 TABLET ORAL at 09:27

## 2024-03-01 RX ADMIN — ASPIRIN 81 MG: 81 TABLET, COATED ORAL at 09:27

## 2024-03-01 RX ADMIN — ESCITALOPRAM OXALATE 10 MG: 10 TABLET ORAL at 09:28

## 2024-03-01 RX ADMIN — Medication 10 MG: at 09:28

## 2024-03-01 RX ADMIN — ACETAMINOPHEN 975 MG: 325 TABLET ORAL at 09:27

## 2024-03-01 RX ADMIN — GABAPENTIN 100 MG: 100 CAPSULE ORAL at 14:28

## 2024-03-01 RX ADMIN — Medication 2000 UNITS: at 09:27

## 2024-03-01 ASSESSMENT — COGNITIVE AND FUNCTIONAL STATUS - GENERAL
CLIMB 3 TO 5 STEPS WITH RAILING: TOTAL
TURNING FROM BACK TO SIDE WHILE IN FLAT BAD: A LITTLE
WALKING IN HOSPITAL ROOM: TOTAL
DRESSING REGULAR LOWER BODY CLOTHING: A LITTLE
MOBILITY SCORE: 13
STANDING UP FROM CHAIR USING ARMS: A LOT
EATING MEALS: A LITTLE
DRESSING REGULAR UPPER BODY CLOTHING: A LITTLE
HELP NEEDED FOR BATHING: A LITTLE
MOVING TO AND FROM BED TO CHAIR: A LOT
MOVING TO AND FROM BED TO CHAIR: A LOT
CLIMB 3 TO 5 STEPS WITH RAILING: TOTAL
PERSONAL GROOMING: A LITTLE
TOILETING: A LOT
STANDING UP FROM CHAIR USING ARMS: A LOT
MOVING FROM LYING ON BACK TO SITTING ON SIDE OF FLAT BED WITH BEDRAILS: A LOT
DAILY ACTIVITIY SCORE: 17
WALKING IN HOSPITAL ROOM: A LOT
MOBILITY SCORE: 11
TURNING FROM BACK TO SIDE WHILE IN FLAT BAD: A LOT

## 2024-03-01 ASSESSMENT — PAIN - FUNCTIONAL ASSESSMENT: PAIN_FUNCTIONAL_ASSESSMENT: 0-10

## 2024-03-01 ASSESSMENT — PAIN SCALES - GENERAL
PAINLEVEL_OUTOF10: 8
PAINLEVEL_OUTOF10: 0 - NO PAIN

## 2024-03-01 NOTE — CARE PLAN
Problem: Fall/Injury  Goal: Not fall by end of shift  Outcome: Progressing  Goal: Be free from injury by end of the shift  Outcome: Progressing  Goal: Verbalize understanding of personal risk factors for fall in the hospital  Outcome: Progressing   The patient's goals for the shift include      The clinical goals for the shift include Pt will be free from fall/injuries during this shift

## 2024-03-01 NOTE — PROGRESS NOTES
Physical Therapy    Physical Therapy Treatment    Patient Name: Maggi Gordon  MRN: [id number]  Today's Date: 3/1/2024  Time Calculation  Start Time: 1220  Stop Time: 1245  Time Calculation (min): 25 min       Assessment/Plan   PT Assessment  End of Session Communication: Bedside nurse  Assessment Comment: Pt. appears to be slightly pleasantly confused as RN reports pt. may still be affected by anesthesia. Pt. complains of no pain and was able to tolerate therapy thru out.  PT Plan  Inpatient/Swing Bed or Outpatient: Inpatient  PT Plan  Treatment/Interventions: Bed mobility, Gait training, Transfer training, Balance training, Strengthening, Therapeutic exercise  PT Plan: Skilled PT  PT Frequency: 5 times per week  PT Discharge Recommendations: High intensity level of continued care  Equipment Recommended upon Discharge:  (none)  PT Recommended Transfer Status: Assist x1  PT - OK to Discharge: Yes      General Visit Information:   PT  Visit  PT Received On: 03/01/24  Response to Previous Treatment: Patient with no complaints from previous session.  General  Family/Caregiver Present: No  Patient Position Received: Bed, 3 rail up, Alarm on  General Comment: Pt. supine in bed leaning left at trunk and le's in the middle. Pt. is Ox 3 however pt. tends to have divergent conversations - off topic during the middle of the conversation. Pt. is pleasant and cooperative. Pt. willing to participate. Pt. should be leaving for continued PT later today per RN.    Subjective   Precautions:  Precautions  Prosthesis/Orthosis Used:  (Amputation bracing in extension on left le.)  Vital Signs:       Objective   Pain:  Pain Assessment  Pain Assessment: 0-10  Pain Score: 0 - No pain  Cognition:     Postural Control:  Static Sitting Balance  Static Sitting-Balance Support: Bilateral upper extremity supported  Static Sitting-Level of Assistance: Contact guard  Extremity/Trunk Assessments:                      Activity Tolerance:      Treatments:  Therapeutic Exercise  Therapeutic Exercise Performed: Yes  Therapeutic Exercise Activity 1: Supine bilat le ex QS, SLR'S, and ABD X 15 REPS. Right le AP'S, SAQ'S, AND HS X 15 REPS.    Therapeutic Activity  Therapeutic Activity Performed: Yes  Therapeutic Activity 1: Pt. sat EOB for approx. 15 min with cga as pt. unsteady in seated position.    Bed Mobility 1  Bed Mobility 1: Supine to sitting  Level of Assistance 1: Moderate assistance  Bed Mobility Comments 1: Assist at trunk and bilat le's. (Pt. struggles to position.)  Bed Mobility 2  Bed Mobility  2: Sitting to supine  Level of Assistance 2: Moderate assistance  Bed Mobility Comments 2: Mod assist as pt. required assist with trunk and bilat le's especially to position once in supine.    Outcome Measures:  [facility] Basic Mobility  Turning from your back to your side while in a flat bed without using bedrails: A lot  Moving from lying on your back to sitting on the side of a flat bed without using bedrails: A lot  Moving to and from bed to chair (including a wheelchair): A lot  Standing up from a chair using your arms (e.g. wheelchair or bedside chair): A lot  To walk in hospital room: A lot  Climbing 3-5 steps with railing: Total  Basic Mobility - Total Score: 11    Education Documentation  Precautions, taught by Casi Donovan PTA at 3/1/2024  1:16 PM.  Learner: Patient  Readiness: Acceptance  Method: Explanation, Demonstration  Response: Needs Reinforcement    Home Exercise Program, taught by Casi Donovan PTA at 3/1/2024  1:16 PM.  Learner: Patient  Readiness: Acceptance  Method: Explanation, Demonstration  Response: Needs Reinforcement    Education Comments  No comments found.        OP EDUCATION:       Encounter Problems       Encounter Problems (Active)       Balance       STG - Maintains supervision assistance dynamic standing balance with upper extremity support using a wheeled walker. (Progressing)       Start:  02/18/24    Expected End:   03/03/24            STG - Maintains supervision assistance static standing balance with upper extremity support using a wheeled walker. (Progressing)       Start:  02/18/24    Expected End:  03/03/24               Mobility       STG - Patient will ambulate 125ft using a wheeled walker with supervision assistance. (Not met)       Start:  02/18/24    Expected End:  03/03/24    Resolved:  02/27/24    Updated to: STG - Patient will ambulate  10 ft x 2 with whw and min aX1.    Update reason: revised         STG - Patient will ambulate  10 ft x 2 with whw and min aX1. (Progressing)       Start:  02/27/24    Expected End:  03/03/24                   Pain - Adult          Transfers       STG - Transfer from bed to chair using a wheeled walker with supervision assistance. (Progressing)       Start:  02/18/24    Expected End:  03/03/24            STG - Patient to transfer to and from sit to supine with supervision assistance. (Progressing)       Start:  02/18/24    Expected End:  03/03/24            STG - Patient will transfer sit to and from stand using a wheeled walker with supervision assistance. (Progressing)       Start:  02/18/24    Expected End:  03/03/24

## 2024-03-01 NOTE — PROGRESS NOTES
Subjective   NAEON. Pt comfortable this morning. Denies f/c/n/v/cp/sob. POD#3. States she is not in any pain when she doesn't move her leg. Slight movement causes severe pain. States pain meds help, no change to regimen necessary. Denies any other symptoms or concerns this morning.     Objective   Patient Vitals for the past 24 hrs:   BP Temp Temp src Pulse Resp SpO2   03/01/24 0526 147/71 36.4 °C (97.5 °F) -- 82 17 94 %   02/29/24 2042 129/59 37 °C (98.6 °F) Temporal 74 16 97 %   02/29/24 1300 (!) 108/49 36.5 °C (97.7 °F) Temporal 71 14 98 %     No results found for this or any previous visit (from the past 24 hour(s)).        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 Currently treating for OM with vanc/zosyn.  Vascular surgery and podiatry following, will need to determine the salvageability of the foot before proceeding with any surgical interventions most likely.  She appears well and is hemodynamically stable, no signs of sepsis.    Updates 3/1:   -Plan for dc today around 3PM  -Hg stable at 8.9    Updates 2/29:  -Dressing taken down by vascular, clean and intact, ok to start Eliquis 2.5mg BID and ASA, dc Plavix  -Plan for  dc to SNF 3/1 if Hg stable    #Chronic L foot wound c/f OM  #PAD s/p L femoral endarectomy on 1/18/24   #likely OM of L foot/ankle  ::MRI L foot and ankle: Evidence c/w osteomyelitis of 4th/5th metatarsal and calcaneal tuberosity, edema of distal soleus c/w myositis  ::Cultures NGTD  ::2/21: S/p LLE angiogram, s/p L EIA balloon angioplasty and stent, SFA balloon angioplasty and stent  ::S/p L BKA with vascular sx 2/26  -[facility] surgery following:   -Vascular surgery and podiatry discussed further procedures for this pt. Since the achilles tendon is not salvageable, pt would require removal of tendon and she would not be able to walk following procedure. D/t this, vascular sx will not operate on leg given recommendation for BKA   -off Abx  -Eliquis 2.5mg BID and ASA, dc Plavix    #LUCRETIA  -prerenal most likely  -continue fluid resuscitation, 1L LR bolus 2/27  -hold home antihypertensives (hydrochlorothiazide, ana, valsartan) for now for soft BP    #Afib  -CHADSVASc 5  -continue home atenolol  -reportedly not on AC at home, but dc summary from Sept notes warfarin. No warfarin on fill history on EMR or on dc summary from Jan admission. She did have high INR on arrival in Jan.  -Given valve is bovine and not mechanical, will start pt on DOAC following procedure, discussing with vascular sx  -Eliquis 2.5mg BID, ASA, dc Plavix    #Depression - c/w home meds    #Anemia  ::Requiring multiple units, likely 2/2 post op  -CTM CBCs, transfuse Hg<7    N: Cardiac diet  DVT Ppx: Heparin sc  GI Ppx: -  Access/Lines: PIV   Abx: Vanc/Zosyn  O2: None    Pain regimen: Tylenol prn  GI Laxative: Miralax   PT/OT: SNF     Code status: Full Code  Emergency contact: Vicenta Gordon (sister-in-law) [phone]    Arnoldo David, DO  Neurology PGY-1

## 2024-03-02 NOTE — DISCHARGE SUMMARY
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 Treated for OM with vanc/zosyn.  Vascular surgery and podiatry following, will need to determine the salvageability of the foot before proceeding with any surgical interventions most likely.  She appears well and is hemodynamically stable, no signs of sepsis.     MRI L foot and ankle with Evidence c/w osteomyelitis of 4th/5th metatarsal and calcaneal tuberosity, edema of distal soleus c/w myositis. Underwent angiogram with vascular surgery 2/21, with 2 stents placed in L EIA and SFA. Initially planned to undergo lithotripsy the following days later with ultimate debridement around achilles tendon with podiatry. Podiatry and vascular surgery discussed her case, determined patient will need to have achilles tendon removed given it is unsalvageable. Risks and benefits discussed, recommended pt gets BKA of L foot, given she would not be able to walk or use her foot without achilles. She will additionally be able to achieve source control with amputation. After discussion with family, patient elected to pursue amputation, which was successfully done on 2/26.  Antibiotics subsequently discontinued.    Course c/b anemia, 2/2 blood loss  from OR and lab draws. Received total of 4u PRBCs with resolution of Hg, no overt signs of bleeding noted. Pt with remote history of atrial fibrillation but warfarin recently dc'd at prior [facility] admission for unknown reason. Given her prosthetic aortic valve is bovine and not mechanical, she is able to be on a DOAC. Started Eliquis while admitted, continuing ASA, dc Plavix per vascular sx recs.      Surgical site wound care/activity:  *NWB NICHOLE MUÑOZ, okay to work w/ PT otherwise  *wound can be left to open air, can also do kerlex/ACE daily  *to be left in knee immobilizer AT ALL TIMES until vascular f/u    Home Medications     Medication List      START taking these medications     acetaminophen 325 mg tablet; Commonly known as: Tylenol; Take 3 tablets   (975 mg) by mouth every 8 hours if needed (Any pain).   apixaban 2.5 mg tablet; Commonly known as: Eliquis; Take 1 tablet (2.5   mg) by mouth every 12 hours.   gabapentin 100 mg capsule; Commonly known as: Neurontin; Take 1 capsule   (100 mg) by mouth 3 times a day.   oxyCODONE 5 mg immediate release tablet; Commonly known as: Roxicodone;   Take 1 tablet (5 mg) by mouth every 6 hours if needed for severe pain (7 -   10) for up to 3 days.   polyethylene glycol 17 gram packet; Commonly known as: Glycolax,   Miralax; Take 17 g by mouth once daily.     CHANGE how you take these medications     rosuvastatin 10 mg tablet; Commonly known as: Crestor; Take 1 tablet (10   mg) by mouth once daily at bedtime.; What changed: medication strength,   how much to take     CONTINUE taking these medications     alendronate 70 mg tablet; Commonly known as: Fosamax   aspirin 81 mg EC tablet   atenolol 50 mg tablet; Commonly known as: Tenormin   azelastine 137 mcg (0.1 %) nasal spray; Commonly known as: Astelin   biotin 10,000 mcg capsule   cholecalciferol 50 MCG (2000 UT) tablet; Commonly known as: Vitamin D-3   escitalopram 10 mg tablet; Commonly known as: Lexapro   ferrous sulfate (325 mg  ferrous sulfate) tablet   folic acid 1 mg tablet; Commonly known as: Folvite   ipratropium 21 mcg (0.03 %) nasal spray; Commonly known as: Atrovent   multivitamin with minerals tablet; Take 1 tablet by mouth once daily.   Praluent Pen 150 mg/mL pen injector; Generic drug: alirocumab; INJECT 1   MILLILITER INTO SKIN EVERY 2 WEEKS IN ABDOMEN, THIGH, OR UPPER ARM   ROTATING INJECTION SITES   WesTab Max 2.5-25-2 mg tablet; Generic drug: folic acid-vit B6-vit B12     STOP taking these medications     clopidogrel 75 mg tablet; Commonly known as: Plavix   clorazepate 7.5 mg tablet; Commonly known as: Tranxene   spironolacton-hydrochlorothiaz 25-25 mg tablet; Commonly known as:   Aldactazide   valsartan 160 mg tablet; Commonly known as: Diovan       Outpatient Follow-Up  Future Appointments   Date Time Provider Department [place]   3/4/2024  3:30 PM [address] WOUND, WOUNDCARE RESOURCE [id number] [facility]   3/13/2024  1:40 PM JACK Gaytan [id number] [facility]   5/21/2024  3:30 PM Zheng Hood DO [id number] [facility]   5/29/2024 10:00 AM JACK Thorne [id number] [facility]   10/22/2024  2:00 PM [facility] ROOM 1 [id number] [facility] CORP1   10/22/2024  3:00 PM Jose Weathers MD [address]       Arnoldo David DO  PGY-1 Neurology

## 2024-03-04 ENCOUNTER — APPOINTMENT (OUTPATIENT)
Dept: WOUND CARE | Facility: CLINIC | Age: 89
End: 2024-03-04
Payer: MEDICARE

## 2024-03-13 ENCOUNTER — APPOINTMENT (OUTPATIENT)
Dept: VASCULAR SURGERY | Facility: HOSPITAL | Age: 89
End: 2024-03-13
Payer: COMMERCIAL

## 2024-03-19 LAB
LABORATORY COMMENT REPORT: NORMAL
PATH REPORT.FINAL DX SPEC: NORMAL
PATH REPORT.GROSS SPEC: NORMAL
PATH REPORT.RELEVANT HX SPEC: NORMAL
PATH REPORT.TOTAL CANCER: NORMAL

## 2024-03-21 ENCOUNTER — OFFICE VISIT (OUTPATIENT)
Dept: VASCULAR SURGERY | Facility: CLINIC | Age: 89
End: 2024-03-21
Payer: MEDICARE

## 2024-03-21 VITALS — OXYGEN SATURATION: 97 % | HEART RATE: 75 BPM | DIASTOLIC BLOOD PRESSURE: 77 MMHG | SYSTOLIC BLOOD PRESSURE: 195 MMHG

## 2024-03-21 DIAGNOSIS — I73.9 PAD (PERIPHERAL ARTERY DISEASE) (CMS-HCC): Primary | ICD-10-CM

## 2024-03-21 PROCEDURE — 1159F MED LIST DOCD IN RCRD: CPT | Performed by: SURGERY

## 2024-03-21 PROCEDURE — 3077F SYST BP >= 140 MM HG: CPT | Performed by: SURGERY

## 2024-03-21 PROCEDURE — 99024 POSTOP FOLLOW-UP VISIT: CPT | Performed by: SURGERY

## 2024-03-21 PROCEDURE — 1036F TOBACCO NON-USER: CPT | Performed by: SURGERY

## 2024-03-21 PROCEDURE — 3078F DIAST BP <80 MM HG: CPT | Performed by: SURGERY

## 2024-03-21 PROCEDURE — 1160F RVW MEDS BY RX/DR IN RCRD: CPT | Performed by: SURGERY

## 2024-03-21 PROCEDURE — 1111F DSCHRG MED/CURRENT MED MERGE: CPT | Performed by: SURGERY

## 2024-03-21 PROCEDURE — 1125F AMNT PAIN NOTED PAIN PRSNT: CPT | Performed by: SURGERY

## 2024-03-21 ASSESSMENT — COLUMBIA-SUICIDE SEVERITY RATING SCALE - C-SSRS
2. HAVE YOU ACTUALLY HAD ANY THOUGHTS OF KILLING YOURSELF?: NO
1. IN THE PAST MONTH, HAVE YOU WISHED YOU WERE DEAD OR WISHED YOU COULD GO TO SLEEP AND NOT WAKE UP?: NO
6. HAVE YOU EVER DONE ANYTHING, STARTED TO DO ANYTHING, OR PREPARED TO DO ANYTHING TO END YOUR LIFE?: NO

## 2024-03-21 ASSESSMENT — PAIN SCALES - GENERAL: PAINLEVEL: 9

## 2024-03-21 ASSESSMENT — PATIENT HEALTH QUESTIONNAIRE - PHQ9
2. FEELING DOWN, DEPRESSED OR HOPELESS: NOT AT ALL
1. LITTLE INTEREST OR PLEASURE IN DOING THINGS: NOT AT ALL
SUM OF ALL RESPONSES TO PHQ9 QUESTIONS 1 AND 2: 0

## 2024-03-21 NOTE — PATIENT INSTRUCTIONS
- Continue dry dressing to right heel as well as offloading boot whenever in bed or resting  - Continue mepilex bandage to knee and left bka stump incision. Ok to wash. Please call if there are any issues with opening or drainage - [phone]  - Followup in 1 month with [facility] to evaluate status of PAD  - Continue stumpguard and prevention of knee contracture

## 2024-03-24 NOTE — PROGRESS NOTES
Vascular Surgery Clinic Note    CC: s/p L BKA    HPI:  Maggi Gordon is 89 y.o. female with history of LLE CLTI 5 s/p left BKA on 2/26/24. She presents for postoperative visit. Currently at a rehab facility and working with PT; has a stumpguard on. Left knee slightly contracted but able to extend knee fully with passive motion. Incision is healing. She does have some erythema along her anterior knee due to velcro from the stumpguard. She also has erythema along her right heel.     Past Vascular History:  2/26/24 (Montes) - left BKA    Past Vascular Testing:      Medical History:  Patient Active Problem List   Diagnosis    Acquired hypothyroidism    Atherosclerosis of CABG w/o angina pectoris    Benign neoplasm of skin of lower extremity, including hip    Bladder prolapse, female, acquired    Chronic kidney disease (CKD) stage G3a/A1, moderately decreased glomerular filtration rate (GFR) between 45-59 mL/min/1.73 square meter and albuminuria creatinine ratio less than 30 mg/g (CMS/HCC)    Constipation    Incontinence of feces    Essential hypertension    OLLIE (generalized anxiety disorder)    History of prosthetic aortic valve    Homocysteinemia    Left foot pain    Right foot pain    Macular degeneration    Memory loss    Mixed hyperlipidemia    Nontoxic multinodular goiter    Occlusion and stenosis of bilateral carotid arteries    Onychodystrophy    Onychomycosis    Osteopenia    Osteoporosis    Paroxysmal atrial fibrillation (CMS/HCC)    Perennial allergic rhinitis    Primary insomnia    Psychophysiologic insomnia    Pure hyperglyceridemia    Thoracic aortic aneurysm (TAA) (CMS/HCC)    Thyrotoxicosis with or without goiter    Claudication (CMS/HCC)    S/P AVR (aortic valve replacement)    Status post insertion of iliac artery stent    Anemia    Atelectasis    Bilateral carotid artery stenosis    Cellulitis    Herpes zoster    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    Dilated pancreatic duct    Fluid  overload    Hyperbilirubinemia    Vertebral artery syndrome    Peripheral vascular disease (CMS/HCC)    Acute lower limb ischemia    Fall    History of elevated lipids    History of hypertension    History of hypothyroidism    Premature menopause    Primary osteoarthritis of both knees    Syncope    Fall from standing    Blunt head trauma    Chronic anticoagulation    Open wound of left foot        Meds:   Current Outpatient Medications on File Prior to Visit   Medication Sig Dispense Refill    acetaminophen (Tylenol) 325 mg tablet Take 3 tablets (975 mg) by mouth every 8 hours if needed (Any pain).      alendronate (Fosamax) 70 mg tablet Take 1 tablet (70 mg) by mouth every 7 days. On Mondays      apixaban (Eliquis) 2.5 mg tablet Take 1 tablet (2.5 mg) by mouth every 12 hours.      aspirin 81 mg EC tablet Take 1 tablet (81 mg) by mouth once daily.      atenolol (Tenormin) 50 mg tablet Take 1 tablet (50 mg) by mouth 2 times a day.      azelastine (Astelin) 137 mcg (0.1 %) nasal spray Administer 2 sprays into each nostril 2 times a day.      biotin 10,000 mcg capsule Take 1 capsule (10 mg) by mouth once daily.      cholecalciferol (Vitamin D-3) 50 MCG (2000 UT) tablet Take 1 tablet (50 mcg) by mouth once daily.      escitalopram (Lexapro) 10 mg tablet 1 tablet (10 mg) once daily.      ferrous sulfate 325 (65 Fe) MG tablet Take 1 tablet by mouth once daily.      folic acid (Folvite) 1 mg tablet Take 5 tablets (5 mg) by mouth once daily.      folic acid-vit B6-vit B12 (WesTab Max) 2.5-25-2 mg tablet Take 1 tablet by mouth once daily.      gabapentin (Neurontin) 100 mg capsule Take 1 capsule (100 mg) by mouth 3 times a day.      ipratropium (Atrovent) 21 mcg (0.03 %) nasal spray Administer 2 sprays into each nostril 2 times a day.      multivitamin with minerals tablet Take 1 tablet by mouth once daily.      polyethylene glycol (Glycolax, Miralax) 17 gram packet Take 17 g by mouth once daily.      Praluent Pen 150 mg/mL  pen injector INJECT 1 MILLILITER INTO SKIN EVERY 2 WEEKS IN ABDOMEN, THIGH, OR UPPER ARM ROTATING INJECTION SITES 6 Pen 3    rosuvastatin (Crestor) 10 mg tablet Take 1 tablet (10 mg) by mouth once daily at bedtime.       No current facility-administered medications on file prior to visit.        Allergies:   Allergies   Allergen Reactions    Ezetimibe Unknown    Hydrocodone Hallucinations       SH:    Social Determinants of Health     Tobacco Use: Low Risk  (3/21/2024)    Patient History     Smoking Tobacco Use: Never     Smokeless Tobacco Use: Never     Passive Exposure: Never   Alcohol Use: Not At Risk (2/26/2024)    AUDIT-C     Frequency of Alcohol Consumption: Never     Average Number of Drinks: Patient does not drink     Frequency of Binge Drinking: Never   Financial Resource Strain: Low Risk  (2/26/2024)    Overall Financial Resource Strain (CARDIA)     Difficulty of Paying Living Expenses: Not very hard   Food Insecurity: Not on file   Transportation Needs: No Transportation Needs (2/26/2024)    PRAPARE - Transportation     Lack of Transportation (Medical): No     Lack of Transportation (Non-Medical): No   Physical Activity: Not on file   Stress: Not on file   Social Connections: Not on file   Intimate Partner Violence: Not on file   Depression: Not at risk (3/21/2024)    PHQ-2     PHQ-2 Score: 0   Housing Stability: Low Risk  (2/26/2024)    Housing Stability Vital Sign     Unable to Pay for Housing in the Last Year: No     Number of Places Lived in the Last Year: 1     Unstable Housing in the Last Year: No   Utilities: Not on file   Digital Equity: Not on file        FH:  No family history on file.     ROS:  All systems were reviewed and are negative except as per HPI.    Objective:  Vitals:  Vitals:    03/21/24 1301   BP: (!) 195/77   Pulse: 75   SpO2:         Exam:  Constitutional: normal, well appearing  HEENT: normocephalic  CV: regular rate  RESP: symmetric expansion, unlabored breathing  GI: soft,  nontender, nondistended  MSK: normal ROM  INT: no lesions  PSYCH: appropriate mood  NEURO: no deficits  VASC: Left knee erythema, right heel/ankle erythema. Left BKA stump healing, has mild eschar along lateral edges.    Assessment & Plan:  Maggi Gordon is 89 y.o. female with LLE CLTI 5 s/p left BKA on 2/26/24    - Continue dry dressing to right heel as well as offloading boot whenever in bed or resting  - Continue mepilex bandage to knee and left bka stump incision. Ok to wash.   - Followup in 1 month with [facility] to evaluate status of PAD  - Continue stumpguard and prevention of knee contracture    Meds  - Eliquis 2.5 mg BID  - Rosuvastatin 10 mg    Screening/Surveillance  - [facility] (April 2024)    Next Followup  - 1 month (Call daughter, Sia, on phone during visit)    Pura Montes MD

## 2024-04-24 ENCOUNTER — HOSPITAL ENCOUNTER (OUTPATIENT)
Dept: VASCULAR MEDICINE | Facility: HOSPITAL | Age: 89
Discharge: HOME | End: 2024-04-24
Payer: MEDICARE

## 2024-04-24 DIAGNOSIS — I73.9 PAD (PERIPHERAL ARTERY DISEASE) (CMS-HCC): ICD-10-CM

## 2024-04-24 PROCEDURE — 93923 UPR/LXTR ART STDY 3+ LVLS: CPT | Performed by: INTERNAL MEDICINE

## 2024-04-24 PROCEDURE — 93923 UPR/LXTR ART STDY 3+ LVLS: CPT

## 2024-04-26 PROBLEM — E11.9 TYPE 2 DIABETES MELLITUS (MULTI): Status: ACTIVE | Noted: 2024-02-17

## 2024-04-26 RX ORDER — AMINOPHYLLINE 25 MG/ML
125 INJECTION, SOLUTION INTRAVENOUS
COMMUNITY
Start: 2024-02-23

## 2024-04-29 ENCOUNTER — OFFICE VISIT (OUTPATIENT)
Dept: VASCULAR SURGERY | Facility: HOSPITAL | Age: 89
End: 2024-04-29
Payer: MEDICARE

## 2024-04-29 VITALS
SYSTOLIC BLOOD PRESSURE: 176 MMHG | DIASTOLIC BLOOD PRESSURE: 76 MMHG | BODY MASS INDEX: 18.22 KG/M2 | HEART RATE: 70 BPM | HEIGHT: 62 IN | OXYGEN SATURATION: 94 % | WEIGHT: 99 LBS

## 2024-04-29 DIAGNOSIS — S91.302S OPEN WOUND OF LEFT FOOT, SEQUELA: ICD-10-CM

## 2024-04-29 PROCEDURE — 1159F MED LIST DOCD IN RCRD: CPT | Performed by: NURSE PRACTITIONER

## 2024-04-29 PROCEDURE — 3077F SYST BP >= 140 MM HG: CPT | Performed by: NURSE PRACTITIONER

## 2024-04-29 PROCEDURE — 99024 POSTOP FOLLOW-UP VISIT: CPT | Performed by: NURSE PRACTITIONER

## 2024-04-29 PROCEDURE — 1160F RVW MEDS BY RX/DR IN RCRD: CPT | Performed by: NURSE PRACTITIONER

## 2024-04-29 PROCEDURE — 99214 OFFICE O/P EST MOD 30 MIN: CPT | Performed by: SURGERY

## 2024-04-29 PROCEDURE — 1126F AMNT PAIN NOTED NONE PRSNT: CPT | Performed by: NURSE PRACTITIONER

## 2024-04-29 PROCEDURE — 3078F DIAST BP <80 MM HG: CPT | Performed by: NURSE PRACTITIONER

## 2024-04-29 ASSESSMENT — PAIN SCALES - GENERAL: PAINLEVEL: 0-NO PAIN

## 2024-04-29 NOTE — PROGRESS NOTES
Vascular Surgery Clinic Note    CC: ***    History Of Present Illness:   Maggi Gordon is a 89 y.o. female here for ***.     Medical History:  Patient Active Problem List   Diagnosis    Acquired hypothyroidism    Atherosclerosis of CABG w/o angina pectoris    Benign neoplasm of skin of lower extremity, including hip    Bladder prolapse, female, acquired    Chronic kidney disease (CKD) stage G3a/A1, moderately decreased glomerular filtration rate (GFR) between 45-59 mL/min/1.73 square meter and albuminuria creatinine ratio less than 30 mg/g (CMS* (Multi)    Constipation    Incontinence of feces    Essential hypertension    OLLIE (generalized anxiety disorder)    History of prosthetic aortic valve    Homocysteinemia    Left foot pain    Right foot pain    Macular degeneration    Memory loss    Mixed hyperlipidemia    Nontoxic multinodular goiter    Occlusion and stenosis of bilateral carotid arteries    Onychodystrophy    Onychomycosis    Osteopenia    Osteoporosis    Paroxysmal atrial fibrillation (Multi)    Perennial allergic rhinitis    Primary insomnia    Psychophysiologic insomnia    Pure hyperglyceridemia    Thoracic aortic aneurysm (TAA) (CMS-HCC)    Thyrotoxicosis with or without goiter    Claudication (CMS-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    Dilated pancreatic duct ([facility]-HCC)    Fluid overload    Hyperbilirubinemia    Vertebral artery syndrome    Peripheral vascular disease (CMS-HCC)    Acute lower limb ischemia    Fall    History of elevated lipids    History of hypertension    History of hypothyroidism    Premature menopause    Primary osteoarthritis of both knees    Syncope    Fall from standing    Blunt head trauma    Chronic anticoagulation    Open wound of left foot    Type 2 diabetes mellitus (Multi)         SH:    Social Determinants of Health     Tobacco Use: Low Risk  (3/25/2024)    Patient History     Smoking Tobacco Use: Never     Smokeless Tobacco Use: Never     Passive Exposure: Never   Alcohol Use: Not At Risk (2/26/2024)    AUDIT-C     Frequency of Alcohol Consumption: Never     Average Number of Drinks: Patient does not drink     Frequency of Binge Drinking: Never   Financial Resource Strain: Low Risk  (2/26/2024)    Overall Financial Resource Strain (CARDIA)     Difficulty of Paying Living Expenses: Not very hard   Food Insecurity: Not on file   Transportation Needs: No Transportation Needs (2/26/2024)    PRAPARE - Transportation     Lack of Transportation (Medical): No     Lack of Transportation (Non-Medical): No   Physical Activity: Not on file   Stress: Not on file   Social Connections: Not on file   Intimate Partner Violence: Not on file   Depression: Not at risk (3/21/2024)    PHQ-2     PHQ-2 Score: 0   Housing Stability: Low Risk  (2/26/2024)    Housing Stability Vital Sign     Unable to Pay for Housing in the Last Year: No     Number of Places Lived in the Last Year: 1     Unstable Housing in the Last Year: No   Utilities: Not on file   Digital Equity: Not on file   Health Literacy: Not on file        FH:  No family history on file.     Allergies:   Allergies   Allergen Reactions    Ezetimibe Unknown    Hydrocodone Hallucinations       ROS:  All systems were reviewed and noted to be negative, other than described above.     Objective:  Last Recorded Vitals  There were no vitals taken for this visit.    Meds:   Current Outpatient Medications   Medication Instructions    acetaminophen (TYLENOL) 975 mg, oral, Every 8 hours PRN    alendronate (FOSAMAX) 70 mg, oral, Every 7 days, On Mondays    aminophylline 125 mg, intravenous    apixaban (ELIQUIS) 2.5 mg, oral, Every 12 hours    aspirin 81 mg, oral, Daily    atenolol (TENORMIN) 50 mg, oral, 2 times daily    azelastine (Astelin) 137 mcg (0.1 %) nasal  spray 2 sprays, Each Nostril, 2 times daily    biotin 10,000 mcg capsule 1 capsule, oral, Daily    cholecalciferol (VITAMIN D-3) 50 mcg, oral, Daily    escitalopram (LEXAPRO) 10 mg, Daily    ferrous sulfate 325 (65 Fe) MG tablet 65 mg of iron, oral, Daily    folic acid (Folvite) 1 mg tablet 5 tablets, oral, Daily    folic acid-vit B6-vit B12 (WesTab Max) 2.5-25-2 mg tablet 1 tablet, oral, Daily    gabapentin (NEURONTIN) 100 mg, oral, 3 times daily    ipratropium (Atrovent) 21 mcg (0.03 %) nasal spray 2 sprays, Each Nostril, 2 times daily    multivitamin with minerals tablet 1 tablet, oral, Daily    polyethylene glycol (GLYCOLAX, MIRALAX) 17 g, oral, Daily    Praluent Pen 150 mg/mL pen injector INJECT 1 MILLILITER INTO SKIN EVERY 2 WEEKS IN ABDOMEN, THIGH, OR UPPER ARM ROTATING INJECTION SITES    rosuvastatin (CRESTOR) 10 mg, oral, Nightly       Exam:  Constitutional: Well appearing, NAD   PSYCH: Appropriate mood and affect  Eyes: Sclera clear, EOMI   Neck: Supple, no carotid bruits noted.   CV: No tachycardia, RRR, *** murmurs  RESP: Unlabored breathing, clear to auscultation  GI: Soft, nontender, non-distended. No abdominal aortic aneurysm noted.   SKIN: No lesions  NEURO: No focal deficits noted. Motor/sensory intact.   EXTREMITIES: Warm & well perfused. No leg edema. No evidence of arterial ischemia. No evidence of venous insufficiency.   PULSES:     Imaging Reviewed:  PVR 2/24/2024 Demonstrated multivessel occlusive disease RLE.      Assessment & Plan:  No diagnosis found.      Orders:  No orders of the defined types were placed in this encounter.      Follow-up:  No follow-ups on file.    Liz Anne, APRN-CNP

## 2024-04-29 NOTE — PROGRESS NOTES
Vascular Surgery Clinic Note    CC: here for follow up wound check left BKA.    History Of Present Illness:   Maggi Gordon is a 89 y.o. 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 On last visit she also had a erythema of right heel. She comes accompanied by her son and daughter in law.     Medical History:  Patient Active Problem List   Diagnosis    Acquired hypothyroidism    Atherosclerosis of CABG w/o angina pectoris    Benign neoplasm of skin of lower extremity, including hip    Bladder prolapse, female, acquired    Chronic kidney disease (CKD) stage G3a/A1, moderately decreased glomerular filtration rate (GFR) between 45-59 mL/min/1.73 square meter and albuminuria creatinine ratio less than 30 mg/g (CMS* (Multi)    Constipation    Incontinence of feces    Essential hypertension    OLLIE (generalized anxiety disorder)    History of prosthetic aortic valve    Homocysteinemia    Left foot pain    Right foot pain    Macular degeneration    Memory loss    Mixed hyperlipidemia    Nontoxic multinodular goiter    Occlusion and stenosis of bilateral carotid arteries    Onychodystrophy    Onychomycosis    Osteopenia    Osteoporosis    Paroxysmal atrial fibrillation (Multi)    Perennial allergic rhinitis    Primary insomnia    Psychophysiologic insomnia    Pure hyperglyceridemia    Thoracic aortic aneurysm (TAA) (CMS-HCC)    Thyrotoxicosis with or without goiter    Claudication (CMS-HCC)    S/P AVR (aortic valve replacement)    Status post insertion of iliac artery stent    Anemia    Atelectasis    Bilateral carotid artery stenosis    Cellulitis    Herpes zoster    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    Dilated pancreatic duct ([facility]-HCC)    Fluid overload    Hyperbilirubinemia    Vertebral artery syndrome    Peripheral vascular disease (CMS-HCC)    Acute lower limb ischemia    Fall    History of elevated lipids    History of hypertension     "History of hypothyroidism    Premature menopause    Primary osteoarthritis of both knees    Syncope    Fall from standing    Blunt head trauma    Chronic anticoagulation    Open wound of left foot    Type 2 diabetes mellitus (Multi)        SH:    Social Determinants of Health     Tobacco Use: Low Risk  (4/29/2024)    Patient History     Smoking Tobacco Use: Never     Smokeless Tobacco Use: Never     Passive Exposure: Never   Alcohol Use: Not At Risk (2/26/2024)    AUDIT-C     Frequency of Alcohol Consumption: Never     Average Number of Drinks: Patient does not drink     Frequency of Binge Drinking: Never   Financial Resource Strain: Low Risk  (2/26/2024)    Overall Financial Resource Strain (CARDIA)     Difficulty of Paying Living Expenses: Not very hard   Food Insecurity: Not on file   Transportation Needs: No Transportation Needs (2/26/2024)    PRAPARE - Transportation     Lack of Transportation (Medical): No     Lack of Transportation (Non-Medical): No   Physical Activity: Not on file   Stress: Not on file   Social Connections: Not on file   Intimate Partner Violence: Not on file   Depression: Not at risk (3/21/2024)    PHQ-2     PHQ-2 Score: 0   Housing Stability: Low Risk  (2/26/2024)    Housing Stability Vital Sign     Unable to Pay for Housing in the Last Year: No     Number of Places Lived in the Last Year: 1     Unstable Housing in the Last Year: No   Utilities: Not on file   Digital Equity: Not on file   Health Literacy: Not on file        FH:  No family history on file.     Allergies:   Allergies   Allergen Reactions    Ezetimibe Unknown    Hydrocodone Hallucinations       ROS:  All systems were reviewed and noted to be negative, other than described above.     Objective:  Last Recorded Vitals  Blood pressure 176/70, pulse 70, height 1.575 m (5' 2\"), weight (!) 44.9 kg (99 lb), SpO2 94%.    Meds:   Current Outpatient Medications   Medication Instructions    acetaminophen (TYLENOL) 975 mg, oral, Every 8 " hours PRN    alendronate (FOSAMAX) 70 mg, oral, Every 7 days, On Mondays    aminophylline 125 mg, intravenous    apixaban (ELIQUIS) 2.5 mg, oral, Every 12 hours    aspirin 81 mg, oral, Daily    atenolol (TENORMIN) 50 mg, oral, 2 times daily    azelastine (Astelin) 137 mcg (0.1 %) nasal spray 2 sprays, Each Nostril, 2 times daily    biotin 10,000 mcg capsule 1 capsule, oral, Daily    cholecalciferol (VITAMIN D-3) 50 mcg, oral, Daily    escitalopram (LEXAPRO) 10 mg, Daily    ferrous sulfate 325 (65 Fe) MG tablet 65 mg of iron, oral, Daily    folic acid (Folvite) 1 mg tablet 5 tablets, oral, Daily    folic acid-vit B6-vit B12 (WesTab Max) 2.5-25-2 mg tablet 1 tablet, oral, Daily    gabapentin (NEURONTIN) 100 mg, oral, 3 times daily    ipratropium (Atrovent) 21 mcg (0.03 %) nasal spray 2 sprays, Each Nostril, 2 times daily    multivitamin with minerals tablet 1 tablet, oral, Daily    polyethylene glycol (GLYCOLAX, MIRALAX) 17 g, oral, Daily    Praluent Pen 150 mg/mL pen injector INJECT 1 MILLILITER INTO SKIN EVERY 2 WEEKS IN ABDOMEN, THIGH, OR UPPER ARM ROTATING INJECTION SITES    rosuvastatin (CRESTOR) 10 mg, oral, Nightly       Exam:  Constitutional: Well appearing, NAD   PSYCH: Appropriate mood and affect  SKIN: No lesions, right heel and foot rubrous without wounds. Left BKA surgical wound with wo small areas where delayed healing has occurred, dry and closed.   NEURO: No focal deficits noted. Motor/sensory intact.   EXTREMITIES: Warm & well perfused. No leg edema.   PULSES: right foot non palp, but foot warm    Imaging Reviewed:  PVR 4/24/24 showing right multivessel occlusive disease and diminished toe pressure 0.20      Assessment & Plan:  1. Open wound of left foot, sequela  Referral to Vascular Surgery            Orders:  No orders of the defined types were placed in this encounter.    Plan:  - continue dry dressing to left stump, will contact [facility] for stump .  - follow up as needed for any new  problems or wounds  - recommend continued skilled nursing with wound care and physical therapy.      Liz Anne, APRN-CNP

## 2024-04-29 NOTE — PATIENT INSTRUCTIONS
Please, daily dressing change- dry dressing, no creams, preparations or lotions. Wash as needed with soap and water, pat dry.   We will contact [facility] orthotics to come and supply stump  stockinette.   Follow up as needed for new skin wounds on either extremity.  Pura Montes MD  [phone]

## 2024-05-20 ENCOUNTER — TELEPHONE (OUTPATIENT)
Dept: VASCULAR SURGERY | Facility: HOSPITAL | Age: 89
End: 2024-05-20
Payer: COMMERCIAL

## 2024-05-20 NOTE — PROGRESS NOTES
Received message from nursing facility Friday afternoon regarding concern over drainage from BKA wound. Multiple attempts to contact nursing unit via phone made. Transferred and on hold for long periods of time x 3 attempts. I called and left message with my personal contact info with  for the nursing unit, no one called back.  Patient has appointment next week with vascular NP in Dr. Hood's office.  Liz Anne, APRN-CNP

## 2024-05-20 NOTE — TELEPHONE ENCOUNTER
Message received that a wound nurse called on 5/17     Patient had BKA with Dr. Waite   Area that is very dark, some leakage.  Nurse very concerned about it small opening also visible.     [facility] [phone]   You also speak with nurse darian Luz       5/20 spoke with a nurse Mary, she states the wound nurse saw the patient 5/17 with concerns of infection and the patient was started on Doxycycline 100mg BID x 7 days.

## 2024-05-21 RX ORDER — BISMUTH TRIBROMOPH/PETROLATUM 5"X9"
BANDAGE TOPICAL
Qty: 50 EACH | Refills: 0 | Status: SHIPPED | OUTPATIENT
Start: 2024-05-21

## 2024-05-29 ENCOUNTER — APPOINTMENT (OUTPATIENT)
Dept: VASCULAR SURGERY | Facility: CLINIC | Age: 89
End: 2024-05-29
Payer: COMMERCIAL

## 2024-05-31 RX ORDER — AMLODIPINE BESYLATE 5 MG/1
5 TABLET ORAL DAILY
COMMUNITY
Start: 2024-04-21

## 2024-05-31 RX ORDER — ATORVASTATIN CALCIUM 20 MG/1
20 TABLET, FILM COATED ORAL DAILY
COMMUNITY
Start: 2024-04-06

## 2024-06-03 ENCOUNTER — APPOINTMENT (OUTPATIENT)
Dept: VASCULAR SURGERY | Facility: HOSPITAL | Age: 89
End: 2024-06-03
Payer: MEDICARE

## 2024-06-17 ENCOUNTER — OFFICE VISIT (OUTPATIENT)
Dept: VASCULAR SURGERY | Facility: HOSPITAL | Age: 89
End: 2024-06-17
Payer: MEDICARE

## 2024-06-17 VITALS
SYSTOLIC BLOOD PRESSURE: 153 MMHG | BODY MASS INDEX: 17.89 KG/M2 | HEIGHT: 63 IN | DIASTOLIC BLOOD PRESSURE: 77 MMHG | OXYGEN SATURATION: 93 % | WEIGHT: 101 LBS | HEART RATE: 63 BPM

## 2024-06-17 DIAGNOSIS — S88.112D BELOW-KNEE AMPUTATION OF LEFT LOWER EXTREMITY, SUBSEQUENT ENCOUNTER (MULTI): Primary | ICD-10-CM

## 2024-06-17 PROCEDURE — 3078F DIAST BP <80 MM HG: CPT | Performed by: SURGERY

## 2024-06-17 PROCEDURE — 3077F SYST BP >= 140 MM HG: CPT | Performed by: SURGERY

## 2024-06-17 PROCEDURE — 99214 OFFICE O/P EST MOD 30 MIN: CPT | Performed by: SURGERY

## 2024-06-17 PROCEDURE — 1126F AMNT PAIN NOTED NONE PRSNT: CPT | Performed by: SURGERY

## 2024-06-17 PROCEDURE — 1159F MED LIST DOCD IN RCRD: CPT | Performed by: SURGERY

## 2024-06-17 ASSESSMENT — PAIN SCALES - GENERAL: PAINLEVEL: 0-NO PAIN

## 2024-07-15 ENCOUNTER — TELEMEDICINE (OUTPATIENT)
Dept: VASCULAR SURGERY | Facility: HOSPITAL | Age: 89
End: 2024-07-15
Payer: MEDICARE

## 2024-07-15 DIAGNOSIS — S88.112D: Primary | ICD-10-CM

## 2024-07-15 PROCEDURE — 99212 OFFICE O/P EST SF 10 MIN: CPT | Performed by: NURSE PRACTITIONER

## 2024-07-15 NOTE — PROGRESS NOTES
"                              Virtual visit: via telephone with patient's daughter.     Current Outpatient Medications:     acetaminophen (Tylenol) 325 mg tablet, Take 3 tablets (975 mg) by mouth every 8 hours if needed (Any pain)., Disp: , Rfl:     alendronate (Fosamax) 70 mg tablet, Take 1 tablet (70 mg) by mouth every 7 days. On Mondays, Disp: , Rfl:     aminophylline 250 mg/10 mL syringe, Infuse 5 mL (125 mg) into a venous catheter., Disp: , Rfl:     amLODIPine (Norvasc) 5 mg tablet, Take 1 tablet (5 mg) by mouth once daily., Disp: , Rfl:     apixaban (Eliquis) 2.5 mg tablet, Take 1 tablet (2.5 mg) by mouth every 12 hours., Disp: , Rfl:     aspirin 81 mg EC tablet, Take 1 tablet (81 mg) by mouth once daily., Disp: , Rfl:     atenolol (Tenormin) 50 mg tablet, Take 1 tablet (50 mg) by mouth 2 times a day., Disp: , Rfl:     atorvastatin (Lipitor) 20 mg tablet, Take 1 tablet (20 mg) by mouth once daily., Disp: , Rfl:     azelastine (Astelin) 137 mcg (0.1 %) nasal spray, Administer 2 sprays into each nostril 2 times a day., Disp: , Rfl:     biotin 10,000 mcg capsule, Take 1 capsule (10 mg) by mouth once daily., Disp: , Rfl:     bismuth tribrom-petrolatum,wh (Xeroform) 5 X 9 \" bandage, 1 XEROFORM CUT TO SIZE ON TOP OF WOUND DAILY, Disp: 10 each, Rfl: 0    cholecalciferol (Vitamin D-3) 50 MCG (2000 UT) tablet, Take 1 tablet (50 mcg) by mouth once daily., Disp: , Rfl:     escitalopram (Lexapro) 10 mg tablet, 1 tablet (10 mg) once daily., Disp: , Rfl:     ferrous sulfate 325 (65 Fe) MG tablet, Take 1 tablet by mouth once daily., Disp: , Rfl:     folic acid (Folvite) 1 mg tablet, Take 5 tablets (5 mg) by mouth once daily., Disp: , Rfl:     folic acid-vit B6-vit B12 (WesTab Max) 2.5-25-2 mg tablet, Take 1 tablet by mouth once daily., Disp: , Rfl:     gabapentin (Neurontin) 100 mg capsule, Take 1 capsule (100 mg) by mouth 3 times a day., Disp: , Rfl:     ipratropium (Atrovent) 21 mcg (0.03 %) nasal spray, Administer 2 " sprays into each nostril 2 times a day., Disp: , Rfl:     multivitamin with minerals tablet, Take 1 tablet by mouth once daily., Disp: , Rfl:     polyethylene glycol (Glycolax, Miralax) 17 gram packet, Take 17 g by mouth once daily., Disp: , Rfl:     Praluent Pen 150 mg/mL pen injector, INJECT 1 MILLILITER INTO SKIN EVERY 2 WEEKS IN ABDOMEN, THIGH, OR UPPER ARM ROTATING INJECTION SITES, Disp: 6 Pen, Rfl: 3    rosuvastatin (Crestor) 10 mg tablet, Take 1 tablet (10 mg) by mouth once daily at bedtime., Disp: , Rfl:         Objective   There were no vitals filed for this visit.    Extremities: photos of left BKA wound personally viewed by myself and Dr. Montes, small areas of scabbing appear much improved, no erythema or edema.       Assessment/Plan   The encounter diagnosis was Below-knee amputation of left lower extremity with complication, subsequent encounter (Multi).  Virtual visit: via telephone with patient's daughter.   Maggi Gordon is a 89 y.o. year old woman virtual visit for follow up virtual visit for left BKA wound check.  Her daughter sent photos of her wound via secure chat in EP[employer]. Her BKA wound is healing with some residual old scabbing, overall much improved from prior visit. She has been using her stump .   Plan:  - Ready to evaluate for prosthesis, will contact [facility] to coordinate with rehab.  - follow up with vascular surgery as needed, discussed signs of wound breakdown, infection.       Liz Anne, APRN-CNP     This note was created in part after personal review of documents in EMR including recent labs and available radiologic imaging. Total time spent in review of EMR, relevant imaging, time with patient and completion of this document is 20 minutes.

## 2024-07-27 ENCOUNTER — APPOINTMENT (OUTPATIENT)
Dept: RADIOLOGY | Facility: HOSPITAL | Age: 89
End: 2024-07-27
Payer: MEDICARE

## 2024-07-27 ENCOUNTER — HOSPITAL ENCOUNTER (INPATIENT)
Facility: HOSPITAL | Age: 89
End: 2024-07-27
Attending: EMERGENCY MEDICINE | Admitting: INTERNAL MEDICINE
Payer: MEDICARE

## 2024-07-27 DIAGNOSIS — E86.0 DEHYDRATION: ICD-10-CM

## 2024-07-27 DIAGNOSIS — R19.7 DIARRHEA, UNSPECIFIED TYPE: ICD-10-CM

## 2024-07-27 DIAGNOSIS — N17.9 AKI (ACUTE KIDNEY INJURY) (CMS-HCC): Primary | ICD-10-CM

## 2024-07-27 DIAGNOSIS — A04.72 C. DIFFICILE COLITIS: ICD-10-CM

## 2024-07-27 DIAGNOSIS — D72.829 LEUKOCYTOSIS, UNSPECIFIED TYPE: ICD-10-CM

## 2024-07-27 DIAGNOSIS — S91.302S OPEN WOUND OF LEFT FOOT, SEQUELA: ICD-10-CM

## 2024-07-27 PROBLEM — N28.89 NODULE OF KIDNEY: Status: ACTIVE | Noted: 2024-07-27

## 2024-07-27 PROBLEM — K51.00 PANCOLITIS (MULTI): Status: ACTIVE | Noted: 2024-07-27

## 2024-07-27 PROBLEM — I48.91 A-FIB (MULTI): Status: ACTIVE | Noted: 2023-09-17

## 2024-07-27 LAB
ALBUMIN SERPL BCP-MCNC: 2.5 G/DL (ref 3.4–5)
ALP SERPL-CCNC: 117 U/L (ref 33–136)
ALT SERPL W P-5'-P-CCNC: 22 U/L (ref 7–45)
ANION GAP BLDV CALCULATED.4IONS-SCNC: 12 MMOL/L (ref 10–25)
ANION GAP SERPL CALC-SCNC: 15 MMOL/L (ref 10–20)
AST SERPL W P-5'-P-CCNC: 29 U/L (ref 9–39)
BASE EXCESS BLDV CALC-SCNC: -10.1 MMOL/L (ref -2–3)
BASOPHILS # BLD MANUAL: 0 X10*3/UL (ref 0–0.1)
BASOPHILS NFR BLD MANUAL: 0 %
BILIRUB DIRECT SERPL-MCNC: 0 MG/DL (ref 0–0.3)
BILIRUB SERPL-MCNC: 0.4 MG/DL (ref 0–1.2)
BODY TEMPERATURE: 37 DEGREES CELSIUS
BUN SERPL-MCNC: 57 MG/DL (ref 6–23)
BURR CELLS BLD QL SMEAR: ABNORMAL
C DIF TOX TCDA+TCDB STL QL NAA+PROBE: DETECTED
CA-I BLDV-SCNC: 0.95 MMOL/L (ref 1.1–1.33)
CALCIUM SERPL-MCNC: 6.9 MG/DL (ref 8.6–10.3)
CHLORIDE BLDV-SCNC: 106 MMOL/L (ref 98–107)
CHLORIDE SERPL-SCNC: 106 MMOL/L (ref 98–107)
CO2 SERPL-SCNC: 18 MMOL/L (ref 21–32)
CREAT SERPL-MCNC: 2.24 MG/DL (ref 0.5–1.05)
EGFRCR SERPLBLD CKD-EPI 2021: 21 ML/MIN/1.73M*2
EOSINOPHIL # BLD MANUAL: 0 X10*3/UL (ref 0–0.4)
EOSINOPHIL NFR BLD MANUAL: 0 %
ERYTHROCYTE [DISTWIDTH] IN BLOOD BY AUTOMATED COUNT: 14.4 % (ref 11.5–14.5)
GLUCOSE BLDV-MCNC: 93 MG/DL (ref 74–99)
GLUCOSE SERPL-MCNC: 93 MG/DL (ref 74–99)
HCO3 BLDV-SCNC: 16.8 MMOL/L (ref 22–26)
HCT VFR BLD AUTO: 36.5 % (ref 36–46)
HCT VFR BLD EST: 32 % (ref 36–46)
HGB BLD-MCNC: 12.2 G/DL (ref 12–16)
HGB BLDV-MCNC: 10.5 G/DL (ref 12–16)
IMM GRANULOCYTES # BLD AUTO: 0.4 X10*3/UL (ref 0–0.5)
IMM GRANULOCYTES NFR BLD AUTO: 1.2 % (ref 0–0.9)
INHALED O2 CONCENTRATION: 21 %
LACTATE BLDV-SCNC: 1.3 MMOL/L (ref 0.4–2)
LIPASE SERPL-CCNC: 14 U/L (ref 9–82)
LYMPHOCYTES # BLD MANUAL: 1.01 X10*3/UL (ref 0.8–3)
LYMPHOCYTES NFR BLD MANUAL: 3 %
MCH RBC QN AUTO: 35 PG (ref 26–34)
MCHC RBC AUTO-ENTMCNC: 33.4 G/DL (ref 32–36)
MCV RBC AUTO: 105 FL (ref 80–100)
MONOCYTES # BLD MANUAL: 0.34 X10*3/UL (ref 0.05–0.8)
MONOCYTES NFR BLD MANUAL: 1 %
NEUTROPHILS # BLD MANUAL: 32.45 X10*3/UL (ref 1.6–5.5)
NEUTS BAND # BLD MANUAL: 2.37 X10*3/UL (ref 0–0.5)
NEUTS BAND NFR BLD MANUAL: 7 %
NEUTS SEG # BLD MANUAL: 30.08 X10*3/UL (ref 1.6–5)
NEUTS SEG NFR BLD MANUAL: 89 %
NRBC BLD-RTO: 0 /100 WBCS (ref 0–0)
OXYHGB MFR BLDV: 45 % (ref 45–75)
PCO2 BLDV: 40 MM HG (ref 41–51)
PH BLDV: 7.23 PH (ref 7.33–7.43)
PLATELET # BLD AUTO: 327 X10*3/UL (ref 150–450)
PO2 BLDV: 32 MM HG (ref 35–45)
POTASSIUM BLDV-SCNC: 3.8 MMOL/L (ref 3.5–5.3)
POTASSIUM SERPL-SCNC: 3.6 MMOL/L (ref 3.5–5.3)
PROT SERPL-MCNC: 5 G/DL (ref 6.4–8.2)
RBC # BLD AUTO: 3.49 X10*6/UL (ref 4–5.2)
RBC MORPH BLD: ABNORMAL
SAO2 % BLDV: 46 % (ref 45–75)
SODIUM BLDV-SCNC: 131 MMOL/L (ref 136–145)
SODIUM SERPL-SCNC: 135 MMOL/L (ref 136–145)
TOTAL CELLS COUNTED BLD: 100
WBC # BLD AUTO: 33.8 X10*3/UL (ref 4.4–11.3)

## 2024-07-27 PROCEDURE — 2500000004 HC RX 250 GENERAL PHARMACY W/ HCPCS (ALT 636 FOR OP/ED): Performed by: NURSE PRACTITIONER

## 2024-07-27 PROCEDURE — 76770 US EXAM ABDO BACK WALL COMP: CPT | Performed by: RADIOLOGY

## 2024-07-27 PROCEDURE — 87506 IADNA-DNA/RNA PROBE TQ 6-11: CPT | Mod: PORLAB | Performed by: NURSE PRACTITIONER

## 2024-07-27 PROCEDURE — 1200000002 HC GENERAL ROOM WITH TELEMETRY DAILY

## 2024-07-27 PROCEDURE — 83690 ASSAY OF LIPASE: CPT | Performed by: EMERGENCY MEDICINE

## 2024-07-27 PROCEDURE — 82248 BILIRUBIN DIRECT: CPT | Performed by: EMERGENCY MEDICINE

## 2024-07-27 PROCEDURE — 2500000005 HC RX 250 GENERAL PHARMACY W/O HCPCS: Performed by: EMERGENCY MEDICINE

## 2024-07-27 PROCEDURE — 36415 COLL VENOUS BLD VENIPUNCTURE: CPT | Performed by: EMERGENCY MEDICINE

## 2024-07-27 PROCEDURE — 85027 COMPLETE CBC AUTOMATED: CPT | Performed by: EMERGENCY MEDICINE

## 2024-07-27 PROCEDURE — 87040 BLOOD CULTURE FOR BACTERIA: CPT | Mod: PORLAB | Performed by: EMERGENCY MEDICINE

## 2024-07-27 PROCEDURE — 74176 CT ABD & PELVIS W/O CONTRAST: CPT | Mod: FOREIGN READ | Performed by: RADIOLOGY

## 2024-07-27 PROCEDURE — 96360 HYDRATION IV INFUSION INIT: CPT

## 2024-07-27 PROCEDURE — 96365 THER/PROPH/DIAG IV INF INIT: CPT | Mod: 59

## 2024-07-27 PROCEDURE — 85007 BL SMEAR W/DIFF WBC COUNT: CPT | Performed by: EMERGENCY MEDICINE

## 2024-07-27 PROCEDURE — 99285 EMERGENCY DEPT VISIT HI MDM: CPT | Mod: 25

## 2024-07-27 PROCEDURE — 87328 CRYPTOSPORIDIUM AG IA: CPT | Performed by: NURSE PRACTITIONER

## 2024-07-27 PROCEDURE — 99223 1ST HOSP IP/OBS HIGH 75: CPT | Performed by: NURSE PRACTITIONER

## 2024-07-27 PROCEDURE — 87324 CLOSTRIDIUM AG IA: CPT | Mod: 59,PORLAB | Performed by: NURSE PRACTITIONER

## 2024-07-27 PROCEDURE — 2500000001 HC RX 250 WO HCPCS SELF ADMINISTERED DRUGS (ALT 637 FOR MEDICARE OP): Performed by: NURSE PRACTITIONER

## 2024-07-27 PROCEDURE — 76770 US EXAM ABDO BACK WALL COMP: CPT

## 2024-07-27 PROCEDURE — 87493 C DIFF AMPLIFIED PROBE: CPT | Performed by: NURSE PRACTITIONER

## 2024-07-27 PROCEDURE — 84132 ASSAY OF SERUM POTASSIUM: CPT | Performed by: EMERGENCY MEDICINE

## 2024-07-27 PROCEDURE — 71045 X-RAY EXAM CHEST 1 VIEW: CPT

## 2024-07-27 PROCEDURE — 87329 GIARDIA AG IA: CPT | Performed by: NURSE PRACTITIONER

## 2024-07-27 PROCEDURE — 2500000002 HC RX 250 W HCPCS SELF ADMINISTERED DRUGS (ALT 637 FOR MEDICARE OP, ALT 636 FOR OP/ED): Performed by: NURSE PRACTITIONER

## 2024-07-27 PROCEDURE — 71045 X-RAY EXAM CHEST 1 VIEW: CPT | Mod: FOREIGN READ | Performed by: RADIOLOGY

## 2024-07-27 PROCEDURE — 83630 LACTOFERRIN FECAL (QUAL): CPT | Performed by: NURSE PRACTITIONER

## 2024-07-27 PROCEDURE — 2500000004 HC RX 250 GENERAL PHARMACY W/ HCPCS (ALT 636 FOR OP/ED): Performed by: EMERGENCY MEDICINE

## 2024-07-27 PROCEDURE — 74176 CT ABD & PELVIS W/O CONTRAST: CPT

## 2024-07-27 RX ORDER — VANCOMYCIN HYDROCHLORIDE 125 MG/1
125 CAPSULE ORAL 4 TIMES DAILY
Status: DISCONTINUED | OUTPATIENT
Start: 2024-07-27 | End: 2024-07-28

## 2024-07-27 RX ORDER — PANTOPRAZOLE SODIUM 40 MG/10ML
40 INJECTION, POWDER, LYOPHILIZED, FOR SOLUTION INTRAVENOUS
Status: DISCONTINUED | OUTPATIENT
Start: 2024-07-28 | End: 2024-08-01 | Stop reason: HOSPADM

## 2024-07-27 RX ORDER — AMLODIPINE BESYLATE 5 MG/1
5 TABLET ORAL DAILY
Status: DISCONTINUED | OUTPATIENT
Start: 2024-07-27 | End: 2024-07-28

## 2024-07-27 RX ORDER — ONDANSETRON 4 MG/1
4 TABLET, ORALLY DISINTEGRATING ORAL EVERY 8 HOURS PRN
Status: DISCONTINUED | OUTPATIENT
Start: 2024-07-27 | End: 2024-08-01 | Stop reason: HOSPADM

## 2024-07-27 RX ORDER — VANCOMYCIN HCL 50 MG/ML
125 SOLUTION, RECONSTITUTED, ORAL ORAL ONCE
Status: COMPLETED | OUTPATIENT
Start: 2024-07-27 | End: 2024-07-27

## 2024-07-27 RX ORDER — TALC
3 POWDER (GRAM) TOPICAL NIGHTLY PRN
Status: DISCONTINUED | OUTPATIENT
Start: 2024-07-27 | End: 2024-08-01 | Stop reason: HOSPADM

## 2024-07-27 RX ORDER — PANTOPRAZOLE SODIUM 40 MG/1
40 TABLET, DELAYED RELEASE ORAL
Status: DISCONTINUED | OUTPATIENT
Start: 2024-07-28 | End: 2024-08-01 | Stop reason: HOSPADM

## 2024-07-27 RX ORDER — ATENOLOL 50 MG/1
50 TABLET ORAL 2 TIMES DAILY
Status: DISCONTINUED | OUTPATIENT
Start: 2024-07-27 | End: 2024-07-28

## 2024-07-27 RX ORDER — ONDANSETRON HYDROCHLORIDE 2 MG/ML
4 INJECTION, SOLUTION INTRAVENOUS EVERY 8 HOURS PRN
Status: DISCONTINUED | OUTPATIENT
Start: 2024-07-27 | End: 2024-08-01 | Stop reason: HOSPADM

## 2024-07-27 RX ORDER — SODIUM CHLORIDE, SODIUM LACTATE, POTASSIUM CHLORIDE, CALCIUM CHLORIDE 600; 310; 30; 20 MG/100ML; MG/100ML; MG/100ML; MG/100ML
100 INJECTION, SOLUTION INTRAVENOUS CONTINUOUS
Status: ACTIVE | OUTPATIENT
Start: 2024-07-27 | End: 2024-07-28

## 2024-07-27 RX ORDER — ROSUVASTATIN CALCIUM 10 MG/1
10 TABLET, COATED ORAL NIGHTLY
Status: DISCONTINUED | OUTPATIENT
Start: 2024-07-27 | End: 2024-08-01 | Stop reason: HOSPADM

## 2024-07-27 RX ORDER — BISACODYL 5 MG
10 TABLET, DELAYED RELEASE (ENTERIC COATED) ORAL DAILY PRN
Status: DISCONTINUED | OUTPATIENT
Start: 2024-07-27 | End: 2024-08-01 | Stop reason: HOSPADM

## 2024-07-27 RX ORDER — ASPIRIN 81 MG/1
81 TABLET ORAL DAILY
Status: DISCONTINUED | OUTPATIENT
Start: 2024-07-27 | End: 2024-08-01 | Stop reason: HOSPADM

## 2024-07-27 RX ORDER — GUAIFENESIN 600 MG/1
600 TABLET, EXTENDED RELEASE ORAL EVERY 12 HOURS PRN
Status: DISCONTINUED | OUTPATIENT
Start: 2024-07-27 | End: 2024-08-01 | Stop reason: HOSPADM

## 2024-07-27 RX ADMIN — SODIUM CHLORIDE, POTASSIUM CHLORIDE, SODIUM LACTATE AND CALCIUM CHLORIDE 100 ML/HR: 600; 310; 30; 20 INJECTION, SOLUTION INTRAVENOUS at 20:12

## 2024-07-27 RX ADMIN — Medication 125 MG: at 16:05

## 2024-07-27 RX ADMIN — ATENOLOL 50 MG: 50 TABLET ORAL at 22:33

## 2024-07-27 RX ADMIN — APIXABAN 2.5 MG: 2.5 TABLET, FILM COATED ORAL at 22:33

## 2024-07-27 RX ADMIN — ROSUVASTATIN 10 MG: 10 TABLET, FILM COATED ORAL at 22:33

## 2024-07-27 RX ADMIN — SODIUM CHLORIDE, POTASSIUM CHLORIDE, SODIUM LACTATE AND CALCIUM CHLORIDE 1000 ML: 600; 310; 30; 20 INJECTION, SOLUTION INTRAVENOUS at 15:07

## 2024-07-27 RX ADMIN — PIPERACILLIN SODIUM AND TAZOBACTAM SODIUM 4.5 G: 4; .5 INJECTION, SOLUTION INTRAVENOUS at 16:04

## 2024-07-27 SDOH — SOCIAL STABILITY: SOCIAL INSECURITY: ABUSE: ADULT

## 2024-07-27 SDOH — SOCIAL STABILITY: SOCIAL INSECURITY: HAVE YOU HAD ANY THOUGHTS OF HARMING ANYONE ELSE?: NO

## 2024-07-27 SDOH — SOCIAL STABILITY: SOCIAL INSECURITY: DO YOU FEEL UNSAFE GOING BACK TO THE PLACE WHERE YOU ARE LIVING?: NO

## 2024-07-27 SDOH — SOCIAL STABILITY: SOCIAL INSECURITY: WERE YOU ABLE TO COMPLETE ALL THE BEHAVIORAL HEALTH SCREENINGS?: YES

## 2024-07-27 SDOH — SOCIAL STABILITY: SOCIAL INSECURITY: HAS ANYONE EVER THREATENED TO HURT YOUR FAMILY OR YOUR PETS?: NO

## 2024-07-27 SDOH — SOCIAL STABILITY: SOCIAL INSECURITY: DO YOU FEEL ANYONE HAS EXPLOITED OR TAKEN ADVANTAGE OF YOU FINANCIALLY OR OF YOUR PERSONAL PROPERTY?: NO

## 2024-07-27 SDOH — SOCIAL STABILITY: SOCIAL INSECURITY: DOES ANYONE TRY TO KEEP YOU FROM HAVING/CONTACTING OTHER FRIENDS OR DOING THINGS OUTSIDE YOUR HOME?: NO

## 2024-07-27 SDOH — SOCIAL STABILITY: SOCIAL INSECURITY: ARE YOU OR HAVE YOU BEEN THREATENED OR ABUSED PHYSICALLY, EMOTIONALLY, OR SEXUALLY BY ANYONE?: NO

## 2024-07-27 SDOH — SOCIAL STABILITY: SOCIAL INSECURITY: HAVE YOU HAD THOUGHTS OF HARMING ANYONE ELSE?: NO

## 2024-07-27 SDOH — SOCIAL STABILITY: SOCIAL INSECURITY: ARE THERE ANY APPARENT SIGNS OF INJURIES/BEHAVIORS THAT COULD BE RELATED TO ABUSE/NEGLECT?: NO

## 2024-07-27 ASSESSMENT — ENCOUNTER SYMPTOMS
BRUISES/BLEEDS EASILY: 0
CHOKING: 0
VOMITING: 1
CHEST TIGHTNESS: 0
NAUSEA: 1
DIFFICULTY URINATING: 0
SPEECH DIFFICULTY: 0
DIZZINESS: 0
ABDOMINAL DISTENTION: 0
COUGH: 0
TREMORS: 0
SORE THROAT: 0
ABDOMINAL PAIN: 1
CHILLS: 0
NECK PAIN: 0
EYE PAIN: 0
SINUS PAIN: 0
SLEEP DISTURBANCE: 0
WOUND: 0
PHOTOPHOBIA: 0
HEADACHES: 0
NUMBNESS: 0
CONFUSION: 0
CONSTIPATION: 0
APPETITE CHANGE: 0
FEVER: 0
WHEEZING: 0
FLANK PAIN: 0
NECK STIFFNESS: 0
EYE DISCHARGE: 0
UNEXPECTED WEIGHT CHANGE: 0
FREQUENCY: 0
APNEA: 0
TROUBLE SWALLOWING: 0
DYSURIA: 0
POLYPHAGIA: 0
BACK PAIN: 0
LIGHT-HEADEDNESS: 0
DYSPHORIC MOOD: 0
EYE ITCHING: 0
HEMATURIA: 0
NERVOUS/ANXIOUS: 0
FATIGUE: 0
BLOOD IN STOOL: 0
MYALGIAS: 0
VOICE CHANGE: 0
WEAKNESS: 0
ARTHRALGIAS: 0
COLOR CHANGE: 0
POLYDIPSIA: 0
SEIZURES: 0
SHORTNESS OF BREATH: 0
DIARRHEA: 1
HALLUCINATIONS: 0
ADENOPATHY: 0
PALPITATIONS: 0

## 2024-07-27 ASSESSMENT — COGNITIVE AND FUNCTIONAL STATUS - GENERAL
MOBILITY SCORE: 13
DRESSING REGULAR LOWER BODY CLOTHING: A LOT
CLIMB 3 TO 5 STEPS WITH RAILING: TOTAL
DAILY ACTIVITIY SCORE: 16
STANDING UP FROM CHAIR USING ARMS: A LOT
DRESSING REGULAR UPPER BODY CLOTHING: A LITTLE
PATIENT BASELINE BEDBOUND: NO
WALKING IN HOSPITAL ROOM: TOTAL
HELP NEEDED FOR BATHING: A LOT
PERSONAL GROOMING: A LITTLE
TOILETING: A LOT
TURNING FROM BACK TO SIDE WHILE IN FLAT BAD: A LITTLE
MOVING TO AND FROM BED TO CHAIR: A LOT

## 2024-07-27 ASSESSMENT — ACTIVITIES OF DAILY LIVING (ADL)
WALKS IN HOME: DEPENDENT
ADEQUATE_TO_COMPLETE_ADL: YES
LACK_OF_TRANSPORTATION: NO
ASSISTIVE_DEVICE: WHEELCHAIR
TOILETING: NEEDS ASSISTANCE
HEARING - LEFT EAR: DIFFICULTY WITH NOISE
HEARING - RIGHT EAR: DIFFICULTY WITH NOISE
JUDGMENT_ADEQUATE_SAFELY_COMPLETE_DAILY_ACTIVITIES: YES
GROOMING: NEEDS ASSISTANCE
FEEDING YOURSELF: INDEPENDENT
DRESSING YOURSELF: NEEDS ASSISTANCE
PATIENT'S MEMORY ADEQUATE TO SAFELY COMPLETE DAILY ACTIVITIES?: YES
BATHING: NEEDS ASSISTANCE

## 2024-07-27 ASSESSMENT — LIFESTYLE VARIABLES
TOTAL SCORE: 0
EVER HAD A DRINK FIRST THING IN THE MORNING TO STEADY YOUR NERVES TO GET RID OF A HANGOVER: NO
HOW OFTEN DO YOU HAVE 6 OR MORE DRINKS ON ONE OCCASION: NEVER
HOW OFTEN DO YOU HAVE A DRINK CONTAINING ALCOHOL: NEVER
HAVE YOU EVER FELT YOU SHOULD CUT DOWN ON YOUR DRINKING: NO
AUDIT-C TOTAL SCORE: 0
EVER FELT BAD OR GUILTY ABOUT YOUR DRINKING: NO
AUDIT-C TOTAL SCORE: 0
HAVE PEOPLE ANNOYED YOU BY CRITICIZING YOUR DRINKING: NO
HOW MANY STANDARD DRINKS CONTAINING ALCOHOL DO YOU HAVE ON A TYPICAL DAY: PATIENT DOES NOT DRINK
SKIP TO QUESTIONS 9-10: 1

## 2024-07-27 ASSESSMENT — PAIN - FUNCTIONAL ASSESSMENT
PAIN_FUNCTIONAL_ASSESSMENT: 0-10
PAIN_FUNCTIONAL_ASSESSMENT: 0-10

## 2024-07-27 ASSESSMENT — PAIN SCALES - GENERAL
PAINLEVEL_OUTOF10: 0 - NO PAIN
PAINLEVEL_OUTOF10: 0 - NO PAIN

## 2024-07-27 NOTE — ED PROVIDER NOTES
HPI   Chief Complaint   Patient presents with    Diarrhea       HPI:  89 y/o F sent from nursing home with reported history of 3 weeks of diarrhea. Also having nausea with occassional vomiting and crampy abdominal pain.  According to nursing home report also with seemed confused and off of from her baseline mental status self.    On review of the patient's nursing home med rec she has history of a left BKA with chronic left leg pain on oxycodone hypothyroidism hypertension chronic kidney disease    Limitations to history: None  Independent Historians: None  External Records Reviewed: EMR records, nursing home records  ------------------------------------------------------------------------------------------------------------------------------------------  ROS: a ten point review of systems was performed and negative except as per HPI.  ------------------------------------------------------------------------------------------------------------------------------------------  PMH / PSH: as per HPI, reviewed in EMR and discussed with the patient []  MEDS:  reviewed in EMR and discussed with the patient []  ALLERGIES: reviewed in EMR[]  SocH:  as per HPI, otherwise reviewed in EMR []  FH:  as per HPI, otherwise reviewed in EMR []  ------------------------------------------------------------------------------------------------------------------------------------------  Physical Exam:  VS: As documented in the triage note and EMR flowsheet from this visit was reviewed  General: Well appearing. No acute distress.  Thin and frail appearing  Eyes:  Extraocular movements grossly intact. No scleral icterus.   HEENT: Atraumatic. Normocephalic.    Neck: Supple. No gross masses  CV: RRR, audible S1/S2, 2+ symmetric peripheral pulses  Resp: Clear to auscultation bilaterally. No respiratory distress.  Non-labored respirations  GI: Soft, non-tender, mildly distended, no rebound or gaurding  MSK: Symmetric muscle bulk. No gross step  offs or deformities. S/p left BKA  Skin: Warm, dry, no obvious rash.  Neuro: Speech fluent. Awake. Alert. Appropriate conversation.  Psych: Appropriate mood and affect for situation  ------------------------------------------------------------------------------------------------------------------------------------------  Hospital Course / Medical Decision Makin-year-old female sent in from the nursing home due to confusion and 3 weeks worth of diarrhea.  On physical exam and arrival to the emergency department she is awake alert and oriented able to provide a history and has not seemed confused.  She has no complaints of headache or blurred vision.  Her abdominal exam was soft and benign.  She just says that she has been having multiple episodes of diarrhea daily and has never had anything like this before she denies being on any recent antibiotics.  Nursing home report they did denied any concern for C. difficile.  She was hooked up to the cardiac monitor peripheral IV access obtained labs drawn.  Blood work notable for leukocytosis that was elevated at 33 with bandemia and segmented neutrophils.  Lactate was normal however blood pressure borderline soft low she clinically looks dehydrated she was given IV fluids for rehydration blood culture was drawn including UA and urine culture.  Noncontrast CT abdomen and pelvis shows pancolitis she had orders for stool studies and C. difficile toxin however has yet to provide a urine's stool specimen while here in the emergency department.  Will I will empirically cover her for presumed C. difficile she received oral Vanco and a dose of Zosyn given her profound leukocytosis.  She has a significant LUCRETIA as well on top of her Chronic kidney disease she needs IV fluids and close monitoring she was admitted to the hospitalist service who agreed with the plan for admission        Patient care discussed with:   Social Determinants affecting care:     Final diagnosis and  disposition: Acute kidney injury, dehydration, diarrhea, pancolitis            Sunshine Barraza MD                          Patient History   Past Medical History:   Diagnosis Date    Asymptomatic premature menopause     Premature menopause    Atherosclerosis of coronary artery bypass graft(s) without angina pectoris 10/11/2021    Atherosclerosis of CABG w/o angina pectoris    Occlusion and stenosis of bilateral carotid arteries 01/09/2020    Occlusion and stenosis of bilateral carotid arteries    Personal history of other diseases of the circulatory system     History of coronary artery disease    Personal history of other diseases of the circulatory system     History of hypertension    Personal history of other diseases of the circulatory system     History of stenosis of renal artery    Personal history of other endocrine, nutritional and metabolic disease     History of hypothyroidism    Personal history of other endocrine, nutritional and metabolic disease     History of hyperlipidemia    Personal history of other endocrine, nutritional and metabolic disease     History of goiter    S/P AVR (aortic valve replacement) 10/18/2023    #21 CE AVR     Past Surgical History:   Procedure Laterality Date    CT AORTA AND BILATERAL ILIOFEMORAL RUNOFF ANGIOGRAM W AND/OR WO IV CONTRAST  7/13/2023    CT AORTA AND BILATERAL ILIOFEMORAL RUNOFF ANGIOGRAM W AND/OR WO IV CONTRAST 7/13/2023 [facility] CT    OTHER SURGICAL HISTORY  12/03/2018    Cataract surgery    OTHER SURGICAL HISTORY  12/03/2018    Coronary artery bypass graft    OTHER SURGICAL HISTORY  12/03/2018    Aortic valve replacement    OTHER SURGICAL HISTORY  12/03/2018    Eye surgery    OTHER SURGICAL HISTORY  12/03/2018    Surgery     No family history on file.  Social History     Tobacco Use    Smoking status: Never     Passive exposure: Never    Smokeless tobacco: Never   Vaping Use    Vaping status: Never Used   Substance Use Topics    Alcohol use: Defer    Drug use:  Never       Physical Exam   ED Triage Vitals [07/27/24 1150]   Temperature Heart Rate Respirations BP   36.3 °C (97.4 °F) 59 16 (!) 110/48      Pulse Ox Temp Source Heart Rate Source Patient Position   97 % Tympanic Monitor --      BP Location FiO2 (%)     -- --       Physical Exam      ED Course & MDM   Diagnoses as of 07/27/24 1653   LUCRETIA (acute kidney injury) (CMS-[facility])   Dehydration   Diarrhea, unspecified type   Leukocytosis, unspecified type                       No data recorded                      Medical Decision Making      Procedure  Procedures     Sunshine Barraza MD  07/27/24 5850

## 2024-07-27 NOTE — ED NOTES
Pt arrives to ED via ems from nursing home    Code Status:  Full Code    HPI     Chief Complaint   Patient presents with    Diarrhea       BP (!) 105/49   Pulse 73   Temp 36.3 °C (97.4 °F) (Tympanic)   Resp 16   Wt 45.8 kg (101 lb)   SpO2 97%     No data recorded    LDA:   Peripheral IV 22 G Left Antecubital (Active)   No placement date or time found.   Placed by External Staff?: Nursing home  Size (Gauge): 22 G  Orientation: Left  Location: Antecubital   Number of days:        Peripheral IV 07/27/24 20 G Right Antecubital (Active)   Placement Date/Time: 07/27/24 1545   Placed by External Staff?: Clinic  Hand Hygiene Completed: Yes  Size (Gauge): 20 G  Orientation: Right  Location: Antecubital   Number of days: 0        BACKGROUND  Past Medical History:   Diagnosis Date    Asymptomatic premature menopause     Premature menopause    Atherosclerosis of coronary artery bypass graft(s) without angina pectoris 10/11/2021    Atherosclerosis of CABG w/o angina pectoris    Occlusion and stenosis of bilateral carotid arteries 01/09/2020    Occlusion and stenosis of bilateral carotid arteries    Personal history of other diseases of the circulatory system     History of coronary artery disease    Personal history of other diseases of the circulatory system     History of hypertension    Personal history of other diseases of the circulatory system     History of stenosis of renal artery    Personal history of other endocrine, nutritional and metabolic disease     History of hypothyroidism    Personal history of other endocrine, nutritional and metabolic disease     History of hyperlipidemia    Personal history of other endocrine, nutritional and metabolic disease     History of goiter    S/P AVR (aortic valve replacement) 10/18/2023    #21 CE AVR     Past Surgical History:   Procedure Laterality Date    CT AORTA AND BILATERAL ILIOFEMORAL RUNOFF ANGIOGRAM W AND/OR WO IV CONTRAST  7/13/2023    CT AORTA AND BILATERAL  "ILIOFEMORAL RUNOFF ANGIOGRAM W AND/OR WO IV CONTRAST 7/13/2023 [facility] CT    OTHER SURGICAL HISTORY  12/03/2018    Cataract surgery    OTHER SURGICAL HISTORY  12/03/2018    Coronary artery bypass graft    OTHER SURGICAL HISTORY  12/03/2018    Aortic valve replacement    OTHER SURGICAL HISTORY  12/03/2018    Eye surgery    OTHER SURGICAL HISTORY  12/03/2018    Surgery     No current facility-administered medications on file prior to encounter.     Current Outpatient Medications on File Prior to Encounter   Medication Sig Dispense Refill    acetaminophen (Tylenol) 325 mg tablet Take 3 tablets (975 mg) by mouth every 8 hours if needed (Any pain).      alendronate (Fosamax) 70 mg tablet Take 1 tablet (70 mg) by mouth every 7 days. On Mondays      aminophylline 250 mg/10 mL syringe Infuse 5 mL (125 mg) into a venous catheter.      amLODIPine (Norvasc) 5 mg tablet Take 1 tablet (5 mg) by mouth once daily.      apixaban (Eliquis) 2.5 mg tablet Take 1 tablet (2.5 mg) by mouth every 12 hours.      aspirin 81 mg EC tablet Take 1 tablet (81 mg) by mouth once daily.      atenolol (Tenormin) 50 mg tablet Take 1 tablet (50 mg) by mouth 2 times a day.      atorvastatin (Lipitor) 20 mg tablet Take 1 tablet (20 mg) by mouth once daily.      azelastine (Astelin) 137 mcg (0.1 %) nasal spray Administer 2 sprays into each nostril 2 times a day.      biotin 10,000 mcg capsule Take 1 capsule (10 mg) by mouth once daily.      bismuth tribrom-petrolatum,wh (Xeroform) 5 X 9 \" bandage 1 XEROFORM CUT TO SIZE ON TOP OF WOUND DAILY 10 each 0    cholecalciferol (Vitamin D-3) 50 MCG (2000 UT) tablet Take 1 tablet (50 mcg) by mouth once daily.      escitalopram (Lexapro) 10 mg tablet 1 tablet (10 mg) once daily.      ferrous sulfate 325 (65 Fe) MG tablet Take 1 tablet by mouth once daily.      folic acid (Folvite) 1 mg tablet Take 5 tablets (5 mg) by mouth once daily.      folic acid-vit B6-vit B12 (WesTab Max) 2.5-25-2 mg tablet Take 1 tablet by mouth " once daily.      gabapentin (Neurontin) 100 mg capsule Take 1 capsule (100 mg) by mouth 3 times a day.      ipratropium (Atrovent) 21 mcg (0.03 %) nasal spray Administer 2 sprays into each nostril 2 times a day.      multivitamin with minerals tablet Take 1 tablet by mouth once daily.      polyethylene glycol (Glycolax, Miralax) 17 gram packet Take 17 g by mouth once daily.      Praluent Pen 150 mg/mL pen injector INJECT 1 MILLILITER INTO SKIN EVERY 2 WEEKS IN ABDOMEN, THIGH, OR UPPER ARM ROTATING INJECTION SITES 6 Pen 3    rosuvastatin (Crestor) 10 mg tablet Take 1 tablet (10 mg) by mouth once daily at bedtime.          ASSESSMENT  Diagnoses as of 07/27/24 1623   LUCRETIA (acute kidney injury) (CMS-[facility])   Dehydration   Diarrhea, unspecified type   Leukocytosis, unspecified type       Medications Currently Running:        Medications Given:  ED Medication Administration from 07/27/2024 1136 to 07/27/2024 1623         Date/Time Order Dose Route Action Action by     07/27/2024 1507 EDT lactated Ringer's bolus 1,000 mL 1,000 mL intravenous New Bag Jose, LOC     07/27/2024 1604 EDT piperacillin-tazobactam (Zosyn) 4.5 g in dextrose (iso)  mL 4.5 g intravenous New Bag Jose, J     07/27/2024 1605 EDT vancomycin (Firvanq) solution 125 mg 125 mg oral Given LOC Garcia                 RESULTS    Imaging:  CT abdomen pelvis wo IV contrast   Final Result   Pancolitis.   Small bowel enteritis.   Hypodense nodule left kidney.  Recommend follow-up ultrasound for   further evaluation.   Signed by Koko Fernandez, DO      XR chest 1 view    (Results Pending)      }  Labs ::99  Abnormal Labs Reviewed   CBC WITH AUTO DIFFERENTIAL - Abnormal; Notable for the following components:       Result Value    WBC 33.8 (*)     RBC 3.49 (*)      (*)     MCH 35.0 (*)     Immature Granulocytes %, Automated 1.2 (*)     All other components within normal limits    Narrative:     The previously reported component Neutrophils % is no longer  being reported.  The previously reported component Lymphocytes % is no longer being reported.  The previously reported component Monocytes % is no longer being reported.  The previously                   reported component Eosinophils % is no longer being reported.  The previously reported component Basophils % is no longer being reported.  The previously reported component Absolute Neutrophils is no longer being reported.  The previously reported                   component Absolute Lymphocytes is no longer being reported.  The previously reported component Absolute Monocytes is no longer being reported.  The previously reported component Absolute Eosinophils is no longer being reported.  The previously reported                   component Absolute Basophils is no longer being reported.   BASIC METABOLIC PANEL - Abnormal; Notable for the following components:    Sodium 135 (*)     Bicarbonate 18 (*)     Urea Nitrogen 57 (*)     Creatinine 2.24 (*)     eGFR 21 (*)     Calcium 6.9 (*)     All other components within normal limits   HEPATIC FUNCTION PANEL - Abnormal; Notable for the following components:    Albumin 2.5 (*)     Total Protein 5.0 (*)     All other components within normal limits   BLOOD GAS VENOUS FULL PANEL - Abnormal; Notable for the following components:    POCT pH, Venous 7.23 (*)     POCT pCO2, Venous 40 (*)     POCT pO2, Venous 32 (*)     POCT Hematocrit Calculated, Venous 32.0 (*)     POCT Sodium, Venous 131 (*)     POCT Ionized Calicum, Venous 0.95 (*)     POCT Base Excess, Venous -10.1 (*)     POCT HCO3 Calculated, Venous 16.8 (*)     POCT Hemoglobin, Venous 10.5 (*)     All other components within normal limits   MANUAL DIFFERENTIAL - Abnormal; Notable for the following components:    Seg Neutrophils Absolute, Manual 30.08 (*)     Bands Absolute, Manual 2.37 (*)     Neutrophils Absolute, Manual 32.45 (*)     All other components within normal limits                 Salud Garcia RN  07/27/24  [id number]

## 2024-07-27 NOTE — NURSING NOTE
Patient admitted in to room 2307.   A&Ox4, oriented to room and call light.   Admission paperwork completed.

## 2024-07-27 NOTE — H&P
History Obtained From: patient    History Of Present Illness:  Maggi Gordon is a 89 y.o. female with PMHx s/f left BKA, coronary artery disease, aortic valve replacement, A-fib, peripheral arterial disease, chronic kidney disease, hypertension presenting with leukocytosis nausea vomiting diarrhea anorexia abdominal discomfort.  Patient was sent from [facility] she is resident due to confusion and complaints of diarrhea.  Evidently patient had multiple loose stools anorexia nausea vomiting without fevers chills or rigors she does have some stomach discomfort.  Patient did have some antibiotics for a tooth abscess up to a couple months ago.  She also had a dose of erythromycin yesterday.  On presenting to the emergency department patient had temperature 97.4 heart rate 59 respiratory rate 16 blood pressure 110/48 SpO2 97% labs show sodium 135 potassium 3.6 chloride 106 bicarb 18 BUN 57 creatinine 2.24 glucose 93 albumin 2.5 other liver enzymes within normal limits CBC shows white blood cell count 33.8 hemoglobin 12.2 hematocrit 2.36.5 platelets 327 chest x-ray suggest there is question of atelectasis versus a possible small infiltrate to the left base.  CT of the abdomen pelvis shows basilar atelectasis small hiatal hernia pancolitis 1.4 x 1.1 cm hyperdense nodule midpole left kidney patient was given empiric dose of Zosyn due to her severe leukocytosis and subsequently started on vancomycin in light of her colitis marked leukocytosis anorexia.  Patient will be admitted for IV hydration further workup and treatment for her pancolitis        ED Course:  Diagnoses as of 07/27/24 1702   LUCRETIA (acute kidney injury) (CMS-[facility])   Dehydration   Diarrhea, unspecified type   Leukocytosis, unspecified type     Relevant Results  Results for orders placed or performed during the hospital encounter of 07/27/24 (from the past 24 hour(s))   CBC and Auto Differential   Result Value Ref Range    WBC 33.8 (H)  4.4 - 11.3 x10*3/uL    nRBC 0.0 0.0 - 0.0 /100 WBCs    RBC 3.49 (L) 4.00 - 5.20 x10*6/uL    Hemoglobin 12.2 12.0 - 16.0 g/dL    Hematocrit 36.5 36.0 - 46.0 %     (H) 80 - 100 fL    MCH 35.0 (H) 26.0 - 34.0 pg    MCHC 33.4 32.0 - 36.0 g/dL    RDW 14.4 11.5 - 14.5 %    Platelets 327 150 - 450 x10*3/uL    Immature Granulocytes %, Automated 1.2 (H) 0.0 - 0.9 %    Immature Granulocytes Absolute, Automated 0.40 0.00 - 0.50 x10*3/uL   Basic metabolic panel   Result Value Ref Range    Glucose 93 74 - 99 mg/dL    Sodium 135 (L) 136 - 145 mmol/L    Potassium 3.6 3.5 - 5.3 mmol/L    Chloride 106 98 - 107 mmol/L    Bicarbonate 18 (L) 21 - 32 mmol/L    Anion Gap 15 10 - 20 mmol/L    Urea Nitrogen 57 (H) 6 - 23 mg/dL    Creatinine 2.24 (H) 0.50 - 1.05 mg/dL    eGFR 21 (L) >60 mL/min/1.73m*2    Calcium 6.9 (L) 8.6 - 10.3 mg/dL   Hepatic function panel   Result Value Ref Range    Albumin 2.5 (L) 3.4 - 5.0 g/dL    Bilirubin, Total 0.4 0.0 - 1.2 mg/dL    Bilirubin, Direct 0.0 0.0 - 0.3 mg/dL    Alkaline Phosphatase 117 33 - 136 U/L    ALT 22 7 - 45 U/L    AST 29 9 - 39 U/L    Total Protein 5.0 (L) 6.4 - 8.2 g/dL   Lipase   Result Value Ref Range    Lipase 14 9 - 82 U/L   Blood Gas Venous Full Panel   Result Value Ref Range    POCT pH, Venous 7.23 (LL) 7.33 - 7.43 pH    POCT pCO2, Venous 40 (L) 41 - 51 mm Hg    POCT pO2, Venous 32 (L) 35 - 45 mm Hg    POCT SO2, Venous 46 45 - 75 %    POCT Oxy Hemoglobin, Venous 45.0 45.0 - 75.0 %    POCT Hematocrit Calculated, Venous 32.0 (L) 36.0 - 46.0 %    POCT Sodium, Venous 131 (L) 136 - 145 mmol/L    POCT Potassium, Venous 3.8 3.5 - 5.3 mmol/L    POCT Chloride, Venous 106 98 - 107 mmol/L    POCT Ionized Calicum, Venous 0.95 (L) 1.10 - 1.33 mmol/L    POCT Glucose, Venous 93 74 - 99 mg/dL    POCT Lactate, Venous 1.3 0.4 - 2.0 mmol/L    POCT Base Excess, Venous -10.1 (L) -2.0 - 3.0 mmol/L    POCT HCO3 Calculated, Venous 16.8 (L) 22.0 - 26.0 mmol/L    POCT Hemoglobin, Venous 10.5 (L) 12.0 -  16.0 g/dL    POCT Anion Gap, Venous 12.0 10.0 - 25.0 mmol/L    Patient Temperature 37.0 degrees Celsius    FiO2 21 %   Manual Differential   Result Value Ref Range    Neutrophils %, Manual 89.0 40.0 - 80.0 %    Bands %, Manual 7.0 0.0 - 5.0 %    Lymphocytes %, Manual 3.0 13.0 - 44.0 %    Monocytes %, Manual 1.0 2.0 - 10.0 %    Eosinophils %, Manual 0.0 0.0 - 6.0 %    Basophils %, Manual 0.0 0.0 - 2.0 %    Seg Neutrophils Absolute, Manual 30.08 (H) 1.60 - 5.00 x10*3/uL    Bands Absolute, Manual 2.37 (H) 0.00 - 0.50 x10*3/uL    Lymphocytes Absolute, Manual 1.01 0.80 - 3.00 x10*3/uL    Monocytes Absolute, Manual 0.34 0.05 - 0.80 x10*3/uL    Eosinophils Absolute, Manual 0.00 0.00 - 0.40 x10*3/uL    Basophils Absolute, Manual 0.00 0.00 - 0.10 x10*3/uL    Total Cells Counted 100     Neutrophils Absolute, Manual 32.45 (H) 1.60 - 5.50 x10*3/uL    RBC Morphology See Below     Tova Cells Few       XR chest 1 view    Result Date: 7/27/2024  STUDY: Chest Radiograph;  7/27/2024 at 15:22 INDICATION: Weakness. COMPARISON: XR chest 1/16/2024 ACCESSION NUMBER(S): [id number] ORDERING CLINICIAN: MEGAN GOMEZ TECHNIQUE:  Frontal chest was obtained at 15:22 hours. FINDINGS: CARDIOMEDIASTINAL SILHOUETTE: Heart is at the upper limits of normal in size on today's exam.  There are sternal wire sutures but there does not appear to be any definite vascular congestion or edema.  ..  LUNGS: There is question of atelectasis or possible small infiltrate at the left base and there is a questionable small left effusion but the lungs otherwise are clear.  ABDOMEN: No remarkable upper abdominal findings.  BONES: No acute osseous changes.    There is mild cardiomegaly but no definite vascular congestion.  There is question of small infiltrate and/or atelectasis at the left base with a possible small left effusion.  The lungs otherwise are clear.. Signed by Brendan Zamora MD    CT abdomen pelvis wo IV contrast    Result Date: 7/27/2024  STUDY:  CT Abdomen and Pelvis without IV Contrast; 7/27/2024 at 2:53 PM INDICATION: Abdominal pain. COMPARISON: CTA abdominal aorta 7/13/2023. ACCESSION NUMBER(S): [id number] ORDERING CLINICIAN: MEGAN GOMEZ TECHNIQUE: CT of the abdomen and pelvis was performed.  Contiguous axial images were obtained at 3 mm slice thickness through the abdomen and pelvis. Coronal and sagittal reconstructions at 3 mm slice thickness were performed. No intravenous contrast was administered.  Automated mA/kV exposure control was utilized and patient examination was performed in strict accordance with principles of ALARA. FINDINGS: Please note that the evaluation of vessels, lymph nodes and organs is limited without intravenous contrast.  LOWER CHEST: No cardiomegaly.  No pericardial effusion.  Patient is status post median sternotomy.  Lung bases demonstrate chronic changes with mild basilar atelectasis.  There is elevation of the right hemidiaphragm. There is a small hiatal hernia.  ABDOMEN:  LIVER: No hepatomegaly.  Smooth surface contour.  Liver demonstrates fatty morphology.  BILE DUCTS: No intrahepatic or extrahepatic biliary ductal dilatation.  GALLBLADDER: The gallbladder is prominent without adjacent inflammatory changes. STOMACH: No abnormalities identified.  PANCREAS: No masses or ductal dilatation.  SPLEEN: No splenomegaly or focal splenic lesion.  ADRENAL GLANDS: No thickening or nodules.  KIDNEYS AND URETERS: Kidneys are normal in size and location.  No renal or ureteral calculi.  There is hyperdense nodule mid pole left kidney measuring 1.4 x 1.1 cm and measuring 96 Hounsfield units.  PELVIS:  BLADDER: No abnormalities identified.  REPRODUCTIVE ORGANS: Uterus is unremarkable. BOWEL: There is bowel wall thickening and inflammatory changes of the colon extending from the cecum to the rectum.  There are mildly prominent small bowel loops suggesting component of enteritis.  There is moderate sigmoid colon diverticulosis.   VESSELS: No abnormalities identified.  Abdominal aorta is normal in caliber.  PERITONEUM/RETROPERITONEUM/LYMPH NODES: No free fluid.  No pneumoperitoneum. No lymphadenopathy.  ABDOMINAL WALL: No abnormalities identified. SOFT TISSUES: No abnormalities identified.  BONES: No acute fracture or aggressive osseous lesion.  There is disc space narrowing and endplate degenerative changes with posterior disc osteophyte formation at L2-3 and L3-4.    Pancolitis. Small bowel enteritis. Hypodense nodule left kidney.  Recommend follow-up ultrasound for further evaluation. Signed by Koko Fernandez DO     Scheduled medications:    Continuous medications:    PRN medications:        Past Medical History  She has a past medical history of Asymptomatic premature menopause, Atherosclerosis of coronary artery bypass graft(s) without angina pectoris (10/11/2021), Occlusion and stenosis of bilateral carotid arteries (01/09/2020), Personal history of other diseases of the circulatory system, Personal history of other diseases of the circulatory system, Personal history of other diseases of the circulatory system, Personal history of other endocrine, nutritional and metabolic disease, Personal history of other endocrine, nutritional and metabolic disease, Personal history of other endocrine, nutritional and metabolic disease, and S/P AVR (aortic valve replacement) (10/18/2023).    Surgical History  She has a past surgical history that includes Other surgical history (12/03/2018); Other surgical history (12/03/2018); Other surgical history (12/03/2018); Other surgical history (12/03/2018); Other surgical history (12/03/2018); and CT angio aorta and bilateral iliofemoral runoff w and or wo IV contrast (7/13/2023).     Social History  She reports that she has never smoked. She has never been exposed to tobacco smoke. She has never used smokeless tobacco. Alcohol use questions deferred to the physician. She reports that she does not use  drugs.    Family History  No family history on file.     Allergies  Ezetimibe and Hydrocodone    Code Status  Full Code     Review of Systems   Constitutional:  Negative for appetite change, chills, fatigue, fever and unexpected weight change.   HENT:  Negative for congestion, ear discharge, ear pain, mouth sores, nosebleeds, sinus pain, sore throat, trouble swallowing and voice change.    Eyes:  Negative for photophobia, pain, discharge, itching and visual disturbance.   Respiratory:  Negative for apnea, cough, choking, chest tightness, shortness of breath and wheezing.    Cardiovascular:  Negative for chest pain, palpitations and leg swelling.   Gastrointestinal:  Positive for abdominal pain, diarrhea, nausea and vomiting. Negative for abdominal distention, blood in stool and constipation.   Endocrine: Negative for cold intolerance, heat intolerance, polydipsia, polyphagia and polyuria.   Genitourinary:  Negative for decreased urine volume, difficulty urinating, dysuria, flank pain, frequency, hematuria and urgency.   Musculoskeletal:  Negative for arthralgias, back pain, gait problem, myalgias, neck pain and neck stiffness.   Skin:  Negative for color change, pallor and wound.   Allergic/Immunologic: Negative for food allergies and immunocompromised state.   Neurological:  Negative for dizziness, tremors, seizures, syncope, speech difficulty, weakness, light-headedness, numbness and headaches.   Hematological:  Negative for adenopathy. Does not bruise/bleed easily.   Psychiatric/Behavioral:  Negative for confusion, dysphoric mood, hallucinations, sleep disturbance and suicidal ideas. The patient is not nervous/anxious.        Last Recorded Vitals  BP (!) 105/49   Pulse 73   Temp 36.3 °C (97.4 °F) (Tympanic)   Resp 16   Wt 45.8 kg (101 lb)   SpO2 97%      Physical Exam  Vitals reviewed.   Constitutional:       General: She is not in acute distress.  HENT:      Head: Normocephalic and atraumatic.      Right  Ear: External ear normal.      Left Ear: External ear normal.      Nose: Nose normal.      Mouth/Throat:      Mouth: Mucous membranes are dry.      Pharynx: Oropharynx is clear.   Eyes:      Extraocular Movements: Extraocular movements intact.      Conjunctiva/sclera: Conjunctivae normal.      Pupils: Pupils are equal, round, and reactive to light.   Cardiovascular:      Rate and Rhythm: Normal rate and regular rhythm.      Pulses: Normal pulses.      Heart sounds: Normal heart sounds.   Pulmonary:      Effort: Pulmonary effort is normal. No respiratory distress.      Breath sounds: Normal breath sounds. No wheezing, rhonchi or rales.   Chest:      Chest wall: No tenderness.   Abdominal:      General: Bowel sounds are normal. There is no distension.      Palpations: Abdomen is soft. There is no mass.      Tenderness: There is abdominal tenderness. There is no rebound.   Musculoskeletal:         General: No swelling or deformity. Normal range of motion.      Cervical back: Normal range of motion.      Right lower leg: No edema.      Comments: LBKA   Skin:     General: Skin is warm and dry.      Capillary Refill: Capillary refill takes less than 2 seconds.      Coloration: Skin is not jaundiced or pale.   Neurological:      General: No focal deficit present.      Mental Status: She is alert and oriented to person, place, and time.      Cranial Nerves: No cranial nerve deficit.      Sensory: No sensory deficit.   Psychiatric:         Mood and Affect: Mood normal.         Behavior: Behavior normal.         Assessment/Plan   Principal Problem:    LUCRETIA (acute kidney injury) (CMS-[facility])    Pancolitis with marked leukocytosis  Patient with complaints of intractable diarrhea nausea vomiting anorexia prior to admission  Will obtain stool studies including stool for C. difficile, start patient on oral vancomycin therapy  Clear liquid diet    Dehydration with acute kidney injury  Patient has chronic kidney disease but creatinine  has more than doubled in 4 months from 1.01-2.24.  Will continue IV hydration with lactated Ringer's  Consult nephrology    Left kidney nodule  Will request ultrasound of the kidneys    Atrial fibrillation  Patient with history of aortic valve replacement  I do not have her official medication list but I suspect patient is on warfarin will check INR  Resume medications for reconciliation process, patient's heart rate is currently stable    Coronary artery disease hx cabg, hx avr  Review and continue patient's medications as appropriate  Will hold antihypertensive medications till patient's blood pressure improves    PAD hx carotid artery disease, LBKA  Continue statin and antiplatelet agents through reconciliation                No new Assessment & Plan notes have been filed under this hospital service since the last note was generated.  Service: Internal Medicine          Brendan Anderson, APRN-CNP    Dragon dictation software was used to dictate this note and thus there may be minor errors in translation/transcription including garbled speech or misspellings. Please contact for clarification if needed.

## 2024-07-28 VITALS
TEMPERATURE: 98.6 F | DIASTOLIC BLOOD PRESSURE: 48 MMHG | SYSTOLIC BLOOD PRESSURE: 104 MMHG | WEIGHT: 117.9 LBS | HEIGHT: 63 IN | BODY MASS INDEX: 20.89 KG/M2 | OXYGEN SATURATION: 90 % | RESPIRATION RATE: 16 BRPM | HEART RATE: 68 BPM

## 2024-07-28 LAB
ANION GAP SERPL CALC-SCNC: 14 MMOL/L (ref 10–20)
APPEARANCE UR: CLEAR
BACTERIA BLD CULT: NORMAL
BASOPHILS # BLD AUTO: 0.09 X10*3/UL (ref 0–0.1)
BASOPHILS NFR BLD AUTO: 0.3 %
BILIRUB UR STRIP.AUTO-MCNC: NEGATIVE MG/DL
BUN SERPL-MCNC: 59 MG/DL (ref 6–23)
C COLI+JEJ+UPSA DNA STL QL NAA+PROBE: NOT DETECTED
CALCIUM SERPL-MCNC: 6.6 MG/DL (ref 8.6–10.3)
CHLORIDE SERPL-SCNC: 109 MMOL/L (ref 98–107)
CO2 SERPL-SCNC: 16 MMOL/L (ref 21–32)
COLOR UR: YELLOW
CREAT SERPL-MCNC: 1.87 MG/DL (ref 0.5–1.05)
EC STX1 GENE STL QL NAA+PROBE: NOT DETECTED
EC STX2 GENE STL QL NAA+PROBE: NOT DETECTED
EGFRCR SERPLBLD CKD-EPI 2021: 26 ML/MIN/1.73M*2
EOSINOPHIL # BLD AUTO: 0.08 X10*3/UL (ref 0–0.4)
EOSINOPHIL NFR BLD AUTO: 0.3 %
ERYTHROCYTE [DISTWIDTH] IN BLOOD BY AUTOMATED COUNT: 14.5 % (ref 11.5–14.5)
GLUCOSE SERPL-MCNC: 79 MG/DL (ref 74–99)
GLUCOSE UR STRIP.AUTO-MCNC: NORMAL MG/DL
HCT VFR BLD AUTO: 28.9 % (ref 36–46)
HGB BLD-MCNC: 9.8 G/DL (ref 12–16)
HOLD SPECIMEN: NORMAL
HYALINE CASTS #/AREA URNS AUTO: ABNORMAL /LPF
IMM GRANULOCYTES # BLD AUTO: 0.32 X10*3/UL (ref 0–0.5)
IMM GRANULOCYTES NFR BLD AUTO: 1.2 % (ref 0–0.9)
INR PPP: 1.9 (ref 0.9–1.1)
KETONES UR STRIP.AUTO-MCNC: NEGATIVE MG/DL
LEUKOCYTE ESTERASE UR QL STRIP.AUTO: ABNORMAL
LYMPHOCYTES # BLD AUTO: 0.94 X10*3/UL (ref 0.8–3)
LYMPHOCYTES NFR BLD AUTO: 3.6 %
MAGNESIUM SERPL-MCNC: 1.54 MG/DL (ref 1.6–2.4)
MCH RBC QN AUTO: 35 PG (ref 26–34)
MCHC RBC AUTO-ENTMCNC: 33.9 G/DL (ref 32–36)
MCV RBC AUTO: 103 FL (ref 80–100)
MONOCYTES # BLD AUTO: 1.08 X10*3/UL (ref 0.05–0.8)
MONOCYTES NFR BLD AUTO: 4.1 %
NEUTROPHILS # BLD AUTO: 23.96 X10*3/UL (ref 1.6–5.5)
NEUTROPHILS NFR BLD AUTO: 90.5 %
NITRITE UR QL STRIP.AUTO: NEGATIVE
NOROVIRUS GI + GII RNA STL NAA+PROBE: NOT DETECTED
NRBC BLD-RTO: 0 /100 WBCS (ref 0–0)
PH UR STRIP.AUTO: 5.5 [PH]
PLATELET # BLD AUTO: 293 X10*3/UL (ref 150–450)
POTASSIUM SERPL-SCNC: 2.8 MMOL/L (ref 3.5–5.3)
PROT UR STRIP.AUTO-MCNC: ABNORMAL MG/DL
PROTHROMBIN TIME: 21.8 SECONDS (ref 9.8–12.8)
RBC # BLD AUTO: 2.8 X10*6/UL (ref 4–5.2)
RBC # UR STRIP.AUTO: ABNORMAL /UL
RBC #/AREA URNS AUTO: ABNORMAL /HPF
RBC MORPH BLD: NORMAL
RV RNA STL NAA+PROBE: NOT DETECTED
SALMONELLA DNA STL QL NAA+PROBE: NOT DETECTED
SHIGELLA DNA SPEC QL NAA+PROBE: NOT DETECTED
SODIUM SERPL-SCNC: 136 MMOL/L (ref 136–145)
SP GR UR STRIP.AUTO: 1.02
SQUAMOUS #/AREA URNS AUTO: ABNORMAL /HPF
UROBILINOGEN UR STRIP.AUTO-MCNC: NORMAL MG/DL
V CHOLERAE DNA STL QL NAA+PROBE: NOT DETECTED
WBC # BLD AUTO: 26.5 X10*3/UL (ref 4.4–11.3)
WBC #/AREA URNS AUTO: ABNORMAL /HPF
Y ENTEROCOL DNA STL QL NAA+PROBE: NOT DETECTED

## 2024-07-28 PROCEDURE — 2500000002 HC RX 250 W HCPCS SELF ADMINISTERED DRUGS (ALT 637 FOR MEDICARE OP, ALT 636 FOR OP/ED): Performed by: STUDENT IN AN ORGANIZED HEALTH CARE EDUCATION/TRAINING PROGRAM

## 2024-07-28 PROCEDURE — 80048 BASIC METABOLIC PNL TOTAL CA: CPT | Performed by: NURSE PRACTITIONER

## 2024-07-28 PROCEDURE — 97162 PT EVAL MOD COMPLEX 30 MIN: CPT | Mod: GP | Performed by: PHYSICAL THERAPIST

## 2024-07-28 PROCEDURE — 2500000001 HC RX 250 WO HCPCS SELF ADMINISTERED DRUGS (ALT 637 FOR MEDICARE OP): Performed by: INTERNAL MEDICINE

## 2024-07-28 PROCEDURE — 2500000001 HC RX 250 WO HCPCS SELF ADMINISTERED DRUGS (ALT 637 FOR MEDICARE OP): Performed by: NURSE PRACTITIONER

## 2024-07-28 PROCEDURE — 87086 URINE CULTURE/COLONY COUNT: CPT | Mod: PORLAB | Performed by: EMERGENCY MEDICINE

## 2024-07-28 PROCEDURE — 1200000002 HC GENERAL ROOM WITH TELEMETRY DAILY

## 2024-07-28 PROCEDURE — 2500000004 HC RX 250 GENERAL PHARMACY W/ HCPCS (ALT 636 FOR OP/ED): Performed by: NURSE PRACTITIONER

## 2024-07-28 PROCEDURE — 2500000004 HC RX 250 GENERAL PHARMACY W/ HCPCS (ALT 636 FOR OP/ED): Performed by: STUDENT IN AN ORGANIZED HEALTH CARE EDUCATION/TRAINING PROGRAM

## 2024-07-28 PROCEDURE — 2500000002 HC RX 250 W HCPCS SELF ADMINISTERED DRUGS (ALT 637 FOR MEDICARE OP, ALT 636 FOR OP/ED): Performed by: NURSE PRACTITIONER

## 2024-07-28 PROCEDURE — 85025 COMPLETE CBC W/AUTO DIFF WBC: CPT | Performed by: NURSE PRACTITIONER

## 2024-07-28 PROCEDURE — 99233 SBSQ HOSP IP/OBS HIGH 50: CPT | Performed by: INTERNAL MEDICINE

## 2024-07-28 PROCEDURE — 36415 COLL VENOUS BLD VENIPUNCTURE: CPT | Performed by: NURSE PRACTITIONER

## 2024-07-28 PROCEDURE — 81001 URINALYSIS AUTO W/SCOPE: CPT | Performed by: EMERGENCY MEDICINE

## 2024-07-28 PROCEDURE — 83735 ASSAY OF MAGNESIUM: CPT | Performed by: NURSE PRACTITIONER

## 2024-07-28 PROCEDURE — 85610 PROTHROMBIN TIME: CPT | Performed by: NURSE PRACTITIONER

## 2024-07-28 RX ORDER — MAGNESIUM SULFATE HEPTAHYDRATE 40 MG/ML
2 INJECTION, SOLUTION INTRAVENOUS ONCE
Status: COMPLETED | OUTPATIENT
Start: 2024-07-28 | End: 2024-07-28

## 2024-07-28 RX ORDER — FOLIC ACID 1 MG/1
5 TABLET ORAL DAILY
Status: DISCONTINUED | OUTPATIENT
Start: 2024-07-28 | End: 2024-08-01 | Stop reason: HOSPADM

## 2024-07-28 RX ORDER — GABAPENTIN 100 MG/1
100 CAPSULE ORAL 3 TIMES DAILY
Status: DISCONTINUED | OUTPATIENT
Start: 2024-07-28 | End: 2024-07-28

## 2024-07-28 RX ORDER — AZELASTINE 1 MG/ML
2 SPRAY, METERED NASAL 2 TIMES DAILY
Status: DISCONTINUED | OUTPATIENT
Start: 2024-07-28 | End: 2024-08-01 | Stop reason: HOSPADM

## 2024-07-28 RX ORDER — GABAPENTIN 100 MG/1
100 CAPSULE ORAL DAILY
Status: DISCONTINUED | OUTPATIENT
Start: 2024-07-28 | End: 2024-08-01 | Stop reason: HOSPADM

## 2024-07-28 RX ORDER — FERROUS SULFATE 325(65) MG
65 TABLET ORAL DAILY
Status: DISCONTINUED | OUTPATIENT
Start: 2024-07-28 | End: 2024-08-01 | Stop reason: HOSPADM

## 2024-07-28 RX ORDER — ACETAMINOPHEN AND PHENYLEPHRINE HCL 325; 5 MG/1; MG/1
10 TABLET ORAL DAILY
Status: DISCONTINUED | OUTPATIENT
Start: 2024-07-28 | End: 2024-07-28 | Stop reason: RX

## 2024-07-28 RX ORDER — ESCITALOPRAM OXALATE 10 MG/1
10 TABLET ORAL DAILY
Status: DISCONTINUED | OUTPATIENT
Start: 2024-07-28 | End: 2024-08-01 | Stop reason: HOSPADM

## 2024-07-28 RX ORDER — CHOLECALCIFEROL (VITAMIN D3) 25 MCG
2000 TABLET ORAL DAILY
Status: DISCONTINUED | OUTPATIENT
Start: 2024-07-28 | End: 2024-08-01 | Stop reason: HOSPADM

## 2024-07-28 RX ORDER — POTASSIUM CHLORIDE 14.9 MG/ML
20 INJECTION INTRAVENOUS
Status: COMPLETED | OUTPATIENT
Start: 2024-07-28 | End: 2024-07-28

## 2024-07-28 RX ADMIN — POTASSIUM CHLORIDE 20 MEQ: 14.9 INJECTION, SOLUTION INTRAVENOUS at 06:17

## 2024-07-28 RX ADMIN — ONDANSETRON 4 MG: 2 INJECTION INTRAMUSCULAR; INTRAVENOUS at 14:45

## 2024-07-28 RX ADMIN — AZELASTINE HYDROCHLORIDE 2 SPRAY: 137 SPRAY, METERED NASAL at 21:11

## 2024-07-28 RX ADMIN — PANTOPRAZOLE SODIUM 40 MG: 40 TABLET, DELAYED RELEASE ORAL at 06:03

## 2024-07-28 RX ADMIN — SODIUM CHLORIDE, POTASSIUM CHLORIDE, SODIUM LACTATE AND CALCIUM CHLORIDE 100 ML/HR: 600; 310; 30; 20 INJECTION, SOLUTION INTRAVENOUS at 06:04

## 2024-07-28 RX ADMIN — APIXABAN 2.5 MG: 2.5 TABLET, FILM COATED ORAL at 08:01

## 2024-07-28 RX ADMIN — ROSUVASTATIN 10 MG: 10 TABLET, FILM COATED ORAL at 21:11

## 2024-07-28 RX ADMIN — GABAPENTIN 100 MG: 100 CAPSULE ORAL at 12:06

## 2024-07-28 RX ADMIN — FOLIC ACID 5 MG: 1 TABLET ORAL at 12:06

## 2024-07-28 RX ADMIN — MAGNESIUM SULFATE HEPTAHYDRATE 2 G: 40 INJECTION, SOLUTION INTRAVENOUS at 06:17

## 2024-07-28 RX ADMIN — Medication 2000 UNITS: at 12:06

## 2024-07-28 RX ADMIN — FERROUS SULFATE TAB 325 MG (65 MG ELEMENTAL FE) 1 TABLET: 325 (65 FE) TAB at 12:06

## 2024-07-28 RX ADMIN — FIDAXOMICIN 200 MG: 200 TABLET, FILM COATED ORAL at 21:11

## 2024-07-28 RX ADMIN — ASPIRIN 81 MG: 81 TABLET, COATED ORAL at 08:01

## 2024-07-28 RX ADMIN — ATENOLOL 50 MG: 50 TABLET ORAL at 08:01

## 2024-07-28 RX ADMIN — VANCOMYCIN HYDROCHLORIDE 125 MG: 125 CAPSULE ORAL at 06:03

## 2024-07-28 RX ADMIN — POTASSIUM CHLORIDE 20 MEQ: 14.9 INJECTION, SOLUTION INTRAVENOUS at 08:01

## 2024-07-28 RX ADMIN — APIXABAN 2.5 MG: 2.5 TABLET, FILM COATED ORAL at 21:11

## 2024-07-28 RX ADMIN — ESCITALOPRAM OXALATE 10 MG: 10 TABLET ORAL at 12:06

## 2024-07-28 RX ADMIN — VANCOMYCIN HYDROCHLORIDE 125 MG: 125 CAPSULE ORAL at 12:06

## 2024-07-28 ASSESSMENT — COGNITIVE AND FUNCTIONAL STATUS - GENERAL
MOVING TO AND FROM BED TO CHAIR: A LOT
HELP NEEDED FOR BATHING: A LOT
TURNING FROM BACK TO SIDE WHILE IN FLAT BAD: A LITTLE
MOVING TO AND FROM BED TO CHAIR: A LITTLE
PERSONAL GROOMING: A LITTLE
MOBILITY SCORE: 13
DAILY ACTIVITIY SCORE: 16
WALKING IN HOSPITAL ROOM: TOTAL
CLIMB 3 TO 5 STEPS WITH RAILING: TOTAL
WALKING IN HOSPITAL ROOM: TOTAL
HELP NEEDED FOR BATHING: A LOT
DRESSING REGULAR UPPER BODY CLOTHING: A LITTLE
DRESSING REGULAR LOWER BODY CLOTHING: A LOT
DAILY ACTIVITIY SCORE: 15
DRESSING REGULAR UPPER BODY CLOTHING: A LITTLE
DRESSING REGULAR LOWER BODY CLOTHING: A LOT
STANDING UP FROM CHAIR USING ARMS: A LOT
MOBILITY SCORE: 15
TOILETING: A LOT
CLIMB 3 TO 5 STEPS WITH RAILING: TOTAL
STANDING UP FROM CHAIR USING ARMS: A LOT
WALKING IN HOSPITAL ROOM: TOTAL
CLIMB 3 TO 5 STEPS WITH RAILING: TOTAL
PERSONAL GROOMING: A LITTLE
STANDING UP FROM CHAIR USING ARMS: A LOT
TURNING FROM BACK TO SIDE WHILE IN FLAT BAD: A LITTLE
EATING MEALS: A LITTLE
TOILETING: A LOT
MOBILITY SCORE: 13
MOVING TO AND FROM BED TO CHAIR: A LOT

## 2024-07-28 ASSESSMENT — PAIN SCALES - GENERAL
PAINLEVEL_OUTOF10: 0 - NO PAIN

## 2024-07-28 ASSESSMENT — PAIN - FUNCTIONAL ASSESSMENT
PAIN_FUNCTIONAL_ASSESSMENT: 0-10

## 2024-07-28 NOTE — PROGRESS NOTES
Social work consult placed for discharge planning. SW reviewed pt's chart, pt from [facility]. No SW needs foreseen at this time. SW signing off; available upon request.    Lul Li, MSW, LSW ([id number])   Care Transitions

## 2024-07-28 NOTE — PROGRESS NOTES
Maggi Gordon is a 89 y.o. female on day 1 of admission presenting with LUCRETIA (acute kidney injury) (CMS-[facility]).      Subjective   Maggi Gordon is a 89 y.o. female with PMHx s/f left BKA, coronary artery disease, aortic valve replacement, A-fib, peripheral arterial disease, chronic kidney disease, hypertension presenting with leukocytosis nausea vomiting diarrhea anorexia abdominal discomfort.  Patient was sent from [facility] where she is resident due to confusion and complaints of diarrhea.  Evidently patient had multiple loose stools anorexia nausea vomiting without fevers chills or rigors she does have some stomach discomfort.  Patient did have some antibiotics for a tooth abscess up to a couple months ago.  She also had a dose of erythromycin yesterday.  On presenting to the emergency department patient had temperature 97.4 heart rate 59 respiratory rate 16 blood pressure 110/48 SpO2 97% labs show sodium 135 potassium 3.6 chloride 106 bicarb 18 BUN 57 creatinine 2.24 glucose 93 albumin 2.5 other liver enzymes within normal limits CBC shows white blood cell count 33.8 hemoglobin 12.2 hematocrit 2.36.5 platelets 327 chest x-ray suggest there is question of atelectasis versus a possible small infiltrate to the left base.  CT of the abdomen pelvis shows basilar atelectasis small hiatal hernia pancolitis 1.4 x 1.1 cm hyperdense nodule midpole left kidney patient was given empiric dose of Zosyn due to her severe leukocytosis and subsequently started on vancomycin in light of her colitis marked leukocytosis anorexia.  Patient will be admitted for IV hydration further workup and treatment for her pancolitis     07/28: Patient was evaluated this morning, continues to have diarrhea, abdominal pain is improving, no more nausea or vomiting.       Objective     Last Recorded Vitals  BP (!) 93/44   Pulse 68   Temp 36.4 °C (97.5 °F)   Resp 16   Wt 53.5 kg (117 lb 14.4 oz)   SpO2 92%   Intake/Output last  3 Shifts:    Intake/Output Summary (Last 24 hours) at 7/28/2024 1148  Last data filed at 7/28/2024 1016  Gross per 24 hour   Intake 1906.66 ml   Output 400 ml   Net 1506.66 ml       Admission Weight  Weight: 45.8 kg (101 lb) (07/27/24 1150)    Daily Weight  07/27/24 : 53.5 kg (117 lb 14.4 oz)    Image Results  US renal complete  Narrative: Interpreted By:  Maribel Vicente,   STUDY:  US RENAL COMPLETE; 7/27/2024 5:35 pm      INDICATION:  Signs/Symptoms:Renal nodule seen on CT scan.      COMPARISON:  None.      ACCESSION NUMBER(S):  [id number]      ORDERING CLINICIAN:  ANGELO APODACA      TECHNIQUE:  Grayscale and color Doppler imaging of the kidneys.      FINDINGS:  The right kidney measures 8.9 cm. Right renal cortical thickness of 8  mm.      The left kidney measures 8.1 cm. In the mid left renal cortex there  is a 1.5 cm cyst and a 1 cm cyst. Left renal cortical thickness of 9  mm.      There is no shadowing calculus, hydronephrosis, or solid mass  identified. The renal cortical thickness and echogenicity is normal.      Limited bladder evaluation: No stones or debris seen in the urinary  bladder.      Incidental note is made of sludge in the gallbladder.      Impression: 1. Two cysts are identified in the mid left renal cortex  corresponding to the hyperdense lesion and the hypodense lesion seen  on the recent CT.  2. No hydronephrosis  3. Sludge noted in the gallbladder      .      MACRO:  None.      Signed by: Maribel Vicente 7/27/2024 6:00 PM  Dictation workstation:   [id number]  XR chest 1 view  Narrative: STUDY:  Chest Radiograph;  7/27/2024 at 15:22  INDICATION:  Weakness.  COMPARISON:  XR chest 1/16/2024  ACCESSION NUMBER(S):  [id number]  ORDERING CLINICIAN:  MEGAN GOMEZ  TECHNIQUE:  Frontal chest was obtained at 15:22 hours.  FINDINGS:  CARDIOMEDIASTINAL SILHOUETTE:  Heart is at the upper limits of normal in size on today's exam.  There  are sternal wire sutures but there does not appear to be any  definite  vascular congestion or edema.  ..     LUNGS:  There is question of atelectasis or possible small infiltrate at the  left base and there is a questionable small left effusion but the  lungs otherwise are clear.     ABDOMEN:  No remarkable upper abdominal findings.     BONES:  No acute osseous changes.  Impression: There is mild cardiomegaly but no definite vascular congestion.  There  is question of small infiltrate and/or atelectasis at the left base  with a possible small left effusion.  The lungs otherwise are clear..  Signed by Brendan Zamora MD  CT abdomen pelvis wo IV contrast  Narrative: STUDY:  CT Abdomen and Pelvis without IV Contrast; 7/27/2024 at 2:53 PM  INDICATION:  Abdominal pain.  COMPARISON:  CTA abdominal aorta 7/13/2023.  ACCESSION NUMBER(S):  [id number]  ORDERING CLINICIAN:  MEGAN GOMEZ  TECHNIQUE:  CT of the abdomen and pelvis was performed.  Contiguous axial images  were obtained at 3 mm slice thickness through the abdomen and pelvis.   Coronal and sagittal reconstructions at 3 mm slice thickness were  performed. No intravenous contrast was administered.    Automated mA/kV exposure control was utilized and patient examination  was performed in strict accordance with principles of ALARA.  FINDINGS:  Please note that the evaluation of vessels, lymph nodes and organs is  limited without intravenous contrast.      LOWER CHEST:  No cardiomegaly.  No pericardial effusion.  Patient is status post  median sternotomy.  Lung bases demonstrate chronic changes with mild  basilar atelectasis.  There is elevation of the right hemidiaphragm.   There is a small hiatal hernia.     ABDOMEN:     LIVER:  No hepatomegaly.  Smooth surface contour.  Liver demonstrates fatty  morphology.     BILE DUCTS:  No intrahepatic or extrahepatic biliary ductal dilatation.     GALLBLADDER:  The gallbladder is prominent without adjacent inflammatory changes.  STOMACH:  No abnormalities identified.      PANCREAS:  No masses or ductal dilatation.     SPLEEN:  No splenomegaly or focal splenic lesion.     ADRENAL GLANDS:  No thickening or nodules.     KIDNEYS AND URETERS:  Kidneys are normal in size and location.  No renal or ureteral  calculi.  There is hyperdense nodule mid pole left kidney measuring  1.4 x 1.1 cm and measuring 96 Hounsfield units.     PELVIS:     BLADDER:  No abnormalities identified.     REPRODUCTIVE ORGANS:  Uterus is unremarkable.  BOWEL:  There is bowel wall thickening and inflammatory changes of the colon  extending from the cecum to the rectum.  There are mildly prominent  small bowel loops suggesting component of enteritis.  There is  moderate sigmoid colon diverticulosis.     VESSELS:  No abnormalities identified.  Abdominal aorta is normal in caliber.      PERITONEUM/RETROPERITONEUM/LYMPH NODES:  No free fluid.  No pneumoperitoneum.  No lymphadenopathy.     ABDOMINAL WALL:  No abnormalities identified.  SOFT TISSUES:   No abnormalities identified.     BONES:  No acute fracture or aggressive osseous lesion.  There is disc space  narrowing and endplate degenerative changes with posterior disc  osteophyte formation at L2-3 and L3-4.  Impression: Pancolitis.  Small bowel enteritis.  Hypodense nodule left kidney.  Recommend follow-up ultrasound for  further evaluation.  Signed by Koko Fernandez,       Physical Exam  Patient is awake and orient, not in apparent distress  Eyes: PERRLA, no conjunctival congestion  Chest: Bilateral Air entry, no crackles or wheezing  Heart: s1S2 regular, no murmur  Abdomen: Soft, non tender, BS present  Ext: Status post left BKA  Relevant Results               Assessment/Plan        1.  Sepsis present on admission with organ dysfunction of LUCRETIA  Continue on IV fluid as well as oral vancomycin  Monitor  Follow cultures  ID consulted    2.  Severe C. difficile colitis  On oral vancomycin  ID on board  Monitor    3.  LUCRETIA on CKD stage III with metabolic  acidosis  Continue on IV fluid  Monitor renal function  Renal ultrasound-no hydronephrosis  Nephrology on board      4.  Hypokalemia/hypomagnesemia  Replace and monitor    5.  A-fib  Rate fairly controlled  On Eliquis for anticoagulation    6.  Left kidney nodule  Follow ultrasound report-suggesting renal cyst    7.  PAD status post left BKA               Yaakov Fitch MD

## 2024-07-28 NOTE — CARE PLAN
The clinical goals for the shift include Pt will remain free from injury and falls overnight      Problem: Pain - Adult  Goal: Verbalizes/displays adequate comfort level or baseline comfort level  7/28/2024 0240 by Montse William RN  Outcome: Progressing  7/28/2024 0240 by Montse William RN  Outcome: Progressing     Problem: Safety - Adult  Goal: Free from fall injury  7/28/2024 0240 by Montse William RN  Outcome: Progressing  7/28/2024 0240 by Montse William RN  Outcome: Progressing     Problem: Discharge Planning  Goal: Discharge to home or other facility with appropriate resources  7/28/2024 0240 by Montse William RN  Outcome: Progressing  7/28/2024 0240 by Montse William RN  Outcome: Progressing     Problem: Chronic Conditions and Co-morbidities  Goal: Patient's chronic conditions and co-morbidity symptoms are monitored and maintained or improved  7/28/2024 0240 by Montse William RN  Outcome: Progressing  7/28/2024 0240 by Montse William RN  Outcome: Progressing     Problem: Skin  Goal: Decreased wound size/increased tissue granulation at next dressing change  7/28/2024 0240 by Montse William RN  Outcome: Progressing  7/28/2024 0240 by Montse William RN  Outcome: Progressing  Goal: Participates in plan/prevention/treatment measures  7/28/2024 0240 by Montse William RN  Outcome: Progressing  7/28/2024 0240 by Montse William RN  Outcome: Progressing  Goal: Prevent/manage excess moisture  7/28/2024 0240 by Montse William RN  Outcome: Progressing  7/28/2024 0240 by Montse William RN  Outcome: Progressing  Goal: Prevent/minimize sheer/friction injuries  7/28/2024 0240 by Montse William RN  Outcome: Progressing  7/28/2024 0240 by Montse William RN  Outcome: Progressing  Goal: Promote/optimize nutrition  7/28/2024 0240 by Montse William RN  Outcome: Progressing  7/28/2024 0240 by Montse William RN  Outcome: Progressing  Goal: Promote skin healing  7/28/2024 0240 by Montse William,  RN  Outcome: Progressing  7/28/2024 0240 by Montse William RN  Outcome: Progressing  Flowsheets (Taken 7/28/2024 0240)  Promote skin healing: Turn/reposition every 2 hours/use positioning/transfer devices     Problem: Fall/Injury  Goal: Not fall by end of shift  7/28/2024 0240 by Montse William RN  Outcome: Progressing  7/28/2024 0240 by Montse William RN  Outcome: Progressing  Goal: Be free from injury by end of the shift  7/28/2024 0240 by Montse William RN  Outcome: Progressing  7/28/2024 0240 by Montse William RN  Outcome: Progressing  Goal: Verbalize understanding of personal risk factors for fall in the hospital  7/28/2024 0240 by Montse William RN  Outcome: Progressing  7/28/2024 0240 by Montse William RN  Outcome: Progressing  Goal: Verbalize understanding of risk factor reduction measures to prevent injury from fall in the home  7/28/2024 0240 by Montse William RN  Outcome: Progressing  7/28/2024 0240 by Montse William RN  Outcome: Progressing  Goal: Use assistive devices by end of the shift  7/28/2024 0240 by Montse William RN  Outcome: Progressing  7/28/2024 0240 by Montse William RN  Outcome: Progressing  Goal: Pace activities to prevent fatigue by end of the shift  7/28/2024 0240 by Montse William RN  Outcome: Progressing  7/28/2024 0240 by Montse William RN  Outcome: Progressing

## 2024-07-28 NOTE — PROGRESS NOTES
"Physical Therapy    Physical Therapy Evaluation    Patient Name: Maggi Gordon  MRN: [id number]  Today's Date: 7/28/2024   Time Calculation  Start Time: 1606  Stop Time: 1638  Time Calculation (min): 32 min  2307/2307-A    Assessment/Plan   PT Assessment  PT Assessment Results: Decreased strength, Orthopedic restrictions (L BKA since Feb 2024)  Rehab Prognosis: Fair  Barriers to Discharge: none  Evaluation/Treatment Tolerance: Patient tolerated treatment well  End of Session Communication: Bedside nurse  Assessment Comment: pt demos pivot transfers with minimal staff assist (pt has already been assisted bed<>BSC per nursing staff). pt disinterested in working with inpatient PT on gait, pt requested to defer PT to facility.  End of Session Patient Position: Up in chair, Alarm on  IP OR SWING BED PT PLAN  Inpatient or Swing Bed: Inpatient  PT Plan  PT Plan: PT Eval only, OT consult  PT Eval Only Reason: At baseline function  PT Frequency: PT eval only  PT Discharge Recommendations: Other (comment) (RESUME PT AT DISCRETION OF FACILITY)  PT Recommended Transfer Status: Assist x1  PT - OK to Discharge: Yes    Subjective     Current Problem:  1. LUCRETIA (acute kidney injury) (CMS-HCC)        2. Dehydration        3. Diarrhea, unspecified type        4. Leukocytosis, unspecified type          General Visit Information:  General  Reason for Referral: N/V/D, confusion, anorexia. DX: severe CDIFF COLITIS, hypokalemia, hypomag. LUCRETIA.  Referred By: LOC APODACA. PT FOR IMPAIRED MOBILITY  Past Medical History Relevant to Rehab: Feb 2024 L BKA after OM to ankle.  Family/Caregiver Present: No  Prior to Session Communication: Bedside nurse (approved PT, discussed BP)  Patient Position Received: Bed, 3 rail up, Alarm on  General Comment: pt alert, pleasant, gave max effort for PT. pt admits she has not been eating much \"Im afraid I'll have diarrhea\". pt describes diarrhea for weeks. pt speech sometimes difficult to understand (slurs)    Home " "Living:  Home Living  Type of Home: Long Term Care facility  Home Adaptive Equipment: Wheelchair-manual  Home Layout: One level  Home Access: No concerns  Home Living Comments: [facility]    Prior Level of Function:  Prior Function Per Pt/Caregiver Report  Level of [place]: Independent with ADLs and functional transfers  Ambulatory Assistance: Needs assistance  Transfers: Other (Comment) (modified indep: PIVOT TRANSFERS bed<>wheelchair and wheelchair<>toilet)  Gait: Other (Comment) (non-ambulatory except with PT (uses walker to hop on RLE))  Prior Function Comments: pt states PT was discontinued at facility    Precautions:  Precautions  Medical Precautions: Fall precautions, Infection precautions (CDIFF ISOLATION (CONTACT PLUS))  Prosthesis/Orthosis Used: Other (comment) (when asked if pt has prosthesis she replies \"not yet\")     Objective     Pain:  Pain Assessment  Pain Assessment: 0-10  0-10 (Numeric) Pain Score: 0 - No pain    Cognition:  Cognition  Overall Cognitive Status: Within Functional Limits  Orientation Level: Oriented X4 (adm with confusion but did not seem confused on PT EVAL)    General Assessments:  General Observation  General Observation: transfer OOB, PIVOT TRANSFER bed->chair. pt opted to stay in chair, assisted with supper order.   Activity Tolerance  Endurance: Decreased tolerance for upright activites  Activity Tolerance Comments: asymptomatic throughout despite lower BP     Dynamic Sitting Balance  Dynamic Sitting-Comments: good  Dynamic Standing Balance  Dynamic Standing-Comments: fair-    Functional Assessments:     Bed Mobility 1  Bed Mobility 1: Supine to sitting  Level of Assistance 1: Close supervision  Transfer 1  Transfer From 1: Bed to  Transfer to 1: Chair with arms  Technique 1: Squat pivot, To left  Transfer Device 1:  (none)  Transfer Level of Assistance 1: Minimum assistance  Ambulation/Gait Training  Ambulation/Gait Training Performed: No (pt declined)      "     Extremity/Trunk Assessments:        RLE   RLE : Exceptions to WFL  AROM RLE (degrees)  RLE AROM Comment: WFL with rigidity noted  Strength RLE  RLE Overall Strength: Greater than or equal to 3/5 as evidenced by functional mobility (muscle atrophy diffuse)  LLE   LLE : Exceptions to WFL  AROM LLE (degrees)  LLE AROM Comment: residual limb WFL  Strength LLE  LLE Overall Strength: Greater than or equal to 3/5 as evidenced by functional mobility (residual limb)    Outcome Measures:     [facility] Basic Mobility  Turning from your back to your side while in a flat bed without using bedrails: None  Moving from lying on your back to sitting on the side of a flat bed without using bedrails: None  Moving to and from bed to chair (including a wheelchair): A little  Standing up from a chair using your arms (e.g. wheelchair or bedside chair): A lot  To walk in hospital room: Total  Climbing 3-5 steps with railing: Total  Basic Mobility - Total Score: 15       Education Documentation  Mobility Training, taught by Maribel Head PT at 7/28/2024  5:44 PM.  Learner: Patient  Readiness: Acceptance  Method: Explanation, Demonstration  Response: Verbalizes Understanding, Demonstrated Understanding    Education Comments  No comments found.

## 2024-07-28 NOTE — CONSULTS
Infectious Disease Inpatient Consult    Inpatient consult to Infectious Diseases  Consult performed by: Andrés Lugo MD  Consult ordered by: Yaakov Fitch MD      Primary MD: Koko Valentin MD PhD    Reason For Consult  Severe C. difficile colitis    History Of Present Illness  Maggi Gordon is a 89 y.o. female presenting with PMHx s/f left BKA, coronary artery disease, aortic valve replacement, A-fib, peripheral arterial disease, chronic kidney disease, hypertension presenting with leukocytosis nausea vomiting diarrhea anorexia abdominal discomfort.  Patient was sent from [facility] where she is resident due to confusion and complaints of diarrhea.  Evidently patient had multiple loose stools anorexia nausea vomiting without fevers chills or rigors she does have some stomach discomfort.  Patient did have some antibiotics for a tooth abscess up to a couple months ago.  She also had a dose of erythromycin yesterday.  On presenting to the emergency department patient had temperature 97.4 heart rate 59 respiratory rate 16 blood pressure 110/48 SpO2 97% labs show sodium 135 potassium 3.6 chloride 106 bicarb 18 BUN 57 creatinine 2.24 glucose 93 albumin 2.5 other liver enzymes within normal limits CBC shows white blood cell count 33.8 hemoglobin 12.2 hematocrit 2.36.5 platelets 327 chest x-ray suggest there is question of atelectasis versus a possible small infiltrate to the left base.  CT of the abdomen pelvis shows basilar atelectasis small hiatal hernia pancolitis 1.4 x 1.1 cm hyperdense nodule midpole left kidney patient was given empiric dose of Zosyn due to her severe leukocytosis and subsequently started on vancomycin in light of her colitis marked leukocytosis anorexia.  Patient will be admitted for IV hydration further workup and treatment for her pancolitis .     Past Medical History  She has a past medical history of Asymptomatic premature menopause, Atherosclerosis of  coronary artery bypass graft(s) without angina pectoris (10/11/2021), Occlusion and stenosis of bilateral carotid arteries (01/09/2020), Personal history of other diseases of the circulatory system, Personal history of other diseases of the circulatory system, Personal history of other diseases of the circulatory system, Personal history of other endocrine, nutritional and metabolic disease, Personal history of other endocrine, nutritional and metabolic disease, Personal history of other endocrine, nutritional and metabolic disease, and S/P AVR (aortic valve replacement) (10/18/2023).    Surgical History  She has a past surgical history that includes Other surgical history (12/03/2018); Other surgical history (12/03/2018); Other surgical history (12/03/2018); Other surgical history (12/03/2018); Other surgical history (12/03/2018); and CT angio aorta and bilateral iliofemoral runoff w and or wo IV contrast (7/13/2023).     Social History     Occupational History    Not on file   Tobacco Use    Smoking status: Never     Passive exposure: Never    Smokeless tobacco: Never   Vaping Use    Vaping status: Never Used   Substance and Sexual Activity    Alcohol use: Defer    Drug use: Never    Sexual activity: Defer     Travel History   Travel since 06/28/24    No documented travel since 06/28/24          Family History  No family history on file.  Allergies  Ezetimibe and Hydrocodone     Immunization History   Administered Date(s) Administered    Influenza, High Dose Seasonal, Preservative Free 09/26/2016, 10/03/2017, 09/13/2018, 09/17/2019, 11/03/2020, 10/19/2021, 10/14/2022    Influenza, Seasonal, Quadrivalent, Adjuvanted 10/03/2023    Influenza, seasonal, injectable 08/26/2015    Moderna COVID-19 vaccine, bivalent, blue cap/gray label *Check age/dose* 10/27/2022    Moderna SARS-CoV-2 Vaccination 01/23/2021, 02/20/2021, 01/25/2022, 05/24/2022    Pneumococcal conjugate vaccine, 13-valent (PREVNAR 13) 03/10/2016    Td  "vaccine, age 7 years and older (TENIVAC) 08/22/2016    Tetanus toxoid, adsorbed 05/11/2000    Zoster, live 09/20/2015, 06/01/2017     Medications  Home medications:  Medications Prior to Admission   Medication Sig Dispense Refill Last Dose    acetaminophen (Tylenol) 325 mg tablet Take 3 tablets (975 mg) by mouth every 8 hours if needed (Any pain).       alendronate (Fosamax) 70 mg tablet Take 1 tablet (70 mg) by mouth every 7 days. On Mondays       aminophylline 250 mg/10 mL syringe Infuse 5 mL (125 mg) into a venous catheter.       amLODIPine (Norvasc) 5 mg tablet Take 1 tablet (5 mg) by mouth once daily.       apixaban (Eliquis) 2.5 mg tablet Take 1 tablet (2.5 mg) by mouth every 12 hours.       aspirin 81 mg EC tablet Take 1 tablet (81 mg) by mouth once daily.       atenolol (Tenormin) 50 mg tablet Take 1 tablet (50 mg) by mouth 2 times a day.       atorvastatin (Lipitor) 20 mg tablet Take 1 tablet (20 mg) by mouth once daily.       azelastine (Astelin) 137 mcg (0.1 %) nasal spray Administer 2 sprays into each nostril 2 times a day.       biotin 10,000 mcg capsule Take 1 capsule (10 mg) by mouth once daily.       bismuth tribrom-petrolatum,wh (Xeroform) 5 X 9 \" bandage 1 XEROFORM CUT TO SIZE ON TOP OF WOUND DAILY 10 each 0     cholecalciferol (Vitamin D-3) 50 MCG (2000 UT) tablet Take 1 tablet (50 mcg) by mouth once daily.       escitalopram (Lexapro) 10 mg tablet 1 tablet (10 mg) once daily.       ferrous sulfate 325 (65 Fe) MG tablet Take 1 tablet by mouth once daily.       folic acid (Folvite) 1 mg tablet Take 5 tablets (5 mg) by mouth once daily.       folic acid-vit B6-vit B12 (WesTab Max) 2.5-25-2 mg tablet Take 1 tablet by mouth once daily.       gabapentin (Neurontin) 100 mg capsule Take 1 capsule (100 mg) by mouth 3 times a day.       ipratropium (Atrovent) 21 mcg (0.03 %) nasal spray Administer 2 sprays into each nostril 2 times a day.       multivitamin with minerals tablet Take 1 tablet by mouth once " "daily.       polyethylene glycol (Glycolax, Miralax) 17 gram packet Take 17 g by mouth once daily.       Praluent Pen 150 mg/mL pen injector INJECT 1 MILLILITER INTO SKIN EVERY 2 WEEKS IN ABDOMEN, THIGH, OR UPPER ARM ROTATING INJECTION SITES 6 Pen 3     rosuvastatin (Crestor) 10 mg tablet Take 1 tablet (10 mg) by mouth once daily at bedtime.        Current medications:  Scheduled medications  apixaban, 2.5 mg, oral, q12h  aspirin, 81 mg, oral, Daily  azelastine, 2 spray, Each Nostril, BID  cholecalciferol, 2,000 Units, oral, Daily  escitalopram, 10 mg, oral, Daily  ferrous sulfate (325 mg ferrous sulfate), 65 mg of iron, oral, Daily  folic acid, 5 mg, oral, Daily  gabapentin, 100 mg, oral, Daily  pantoprazole, 40 mg, oral, Daily before breakfast   Or  pantoprazole, 40 mg, intravenous, Daily before breakfast  rosuvastatin, 10 mg, oral, Nightly  vancomycin, 125 mg, oral, 4x daily      Continuous medications  lactated Ringer's, 100 mL/hr, Last Rate: 100 mL/hr (24 1016)      PRN medications  PRN medications: bisacodyl, guaiFENesin, melatonin, ondansetron ODT **OR** ondansetron    Review of Systems     Objective  Range of Vitals (last 24 hours)  Heart Rate:  [68-87]   Temp:  [36.4 °C (97.5 °F)-37.1 °C (98.8 °F)]   Resp:  [16-17]   BP: ()/(41-57)   Weight:  [53.5 kg (117 lb 14.4 oz)]   SpO2:  [91 %-97 %]   Daily Weight  24 : 53.5 kg (117 lb 14.4 oz)    Body mass index is 20.89 kg/m².     Physical Exam  BP (!) 101/49   Pulse 79   Temp 36.7 °C (98.1 °F)   Resp 16   Ht 1.6 m (5' 3\")   Wt 53.5 kg (117 lb 14.4 oz)   SpO2 93%   BMI 20.89 kg/m²   Temp (24hrs), Av.7 °C (98 °F), Min:36.4 °C (97.5 °F), Max:37.1 °C (98.8 °F)      General: alert, oriented, NAD  Lungs: bilaterally clear to auscultation  Heart: regular rate and rhythm  Abdomen: soft, non tender, non distended, BS+  Extremities: no edema  No rashes  No joint inflammation  Neck supple  Lines ok  No CVAT     Relevant Results    Labs  Results " "from last 72 hours   Lab Units 07/28/24  0358 07/27/24  1308   WBC AUTO x10*3/uL 26.5* 33.8*   HEMOGLOBIN g/dL 9.8* 12.2   HEMATOCRIT % 28.9* 36.5   PLATELETS AUTO x10*3/uL 293 327   NEUTROS PCT AUTO % 90.5  --    LYMPHO PCT MAN %  --  3.0   LYMPHS PCT AUTO % 3.6  --    MONO PCT MAN %  --  1.0   MONOS PCT AUTO % 4.1  --    EOSINO PCT MAN %  --  0.0   EOS PCT AUTO % 0.3  --      Results from last 72 hours   Lab Units 07/28/24  0358 07/27/24  1308   SODIUM mmol/L 136 135*   POTASSIUM mmol/L 2.8* 3.6   CHLORIDE mmol/L 109* 106   CO2 mmol/L 16* 18*   BUN mg/dL 59* 57*   CREATININE mg/dL 1.87* 2.24*   GLUCOSE mg/dL 79 93   CALCIUM mg/dL 6.6* 6.9*   ANION GAP mmol/L 14 15   EGFR mL/min/1.73m*2 26* 21*     Results from last 72 hours   Lab Units 07/27/24  1308   ALK PHOS U/L 117   BILIRUBIN TOTAL mg/dL 0.4   BILIRUBIN DIRECT mg/dL 0.0   PROTEIN TOTAL g/dL 5.0*   ALT U/L 22   AST U/L 29   ALBUMIN g/dL 2.5*     Estimated Creatinine Clearance: 17.5 mL/min (A) (by C-G formula based on SCr of 1.87 mg/dL (H)).  C-Reactive Protein   Date Value Ref Range Status   02/16/2024 3.15 (H) <1.00 mg/dL Final     Sedimentation Rate   Date Value Ref Range Status   02/16/2024 68 (H) 0 - 30 mm/h Final     No results found for: \"HIV1X2\", \"HIVCONF\", \"ZGXWMG2LU\"  No results found for: \"HEPCABINIT\", \"HEPCAB\", \"HCVPCRQUANT\"  Microbiology  No results found for the last 100 days.          No lab exists for component: \"AGALPCRNB\"                        Imaging  US renal complete    Result Date: 7/27/2024  Interpreted By:  Maribel Vicente, STUDY: US RENAL COMPLETE; 7/27/2024 5:35 pm   INDICATION: Signs/Symptoms:Renal nodule seen on CT scan.   COMPARISON: None.   ACCESSION NUMBER(S): [id number]   ORDERING CLINICIAN: ANGELO APODACA   TECHNIQUE: Grayscale and color Doppler imaging of the kidneys.   FINDINGS: The right kidney measures 8.9 cm. Right renal cortical thickness of 8 mm.   The left kidney measures 8.1 cm. In the mid left renal cortex there is a " 1.5 cm cyst and a 1 cm cyst. Left renal cortical thickness of 9 mm.   There is no shadowing calculus, hydronephrosis, or solid mass identified. The renal cortical thickness and echogenicity is normal.   Limited bladder evaluation: No stones or debris seen in the urinary bladder.   Incidental note is made of sludge in the gallbladder.       1. Two cysts are identified in the mid left renal cortex corresponding to the hyperdense lesion and the hypodense lesion seen on the recent CT. 2. No hydronephrosis 3. Sludge noted in the gallbladder   .   MACRO: None.   Signed by: Maribel Vicente 7/27/2024 6:00 PM Dictation workstation:   [id number]    XR chest 1 view    Result Date: 7/27/2024  STUDY: Chest Radiograph;  7/27/2024 at 15:22 INDICATION: Weakness. COMPARISON: XR chest 1/16/2024 ACCESSION NUMBER(S): [id number] ORDERING CLINICIAN: MEGAN GOMEZ TECHNIQUE:  Frontal chest was obtained at 15:22 hours. FINDINGS: CARDIOMEDIASTINAL SILHOUETTE: Heart is at the upper limits of normal in size on today's exam.  There are sternal wire sutures but there does not appear to be any definite vascular congestion or edema.  ..  LUNGS: There is question of atelectasis or possible small infiltrate at the left base and there is a questionable small left effusion but the lungs otherwise are clear.  ABDOMEN: No remarkable upper abdominal findings.  BONES: No acute osseous changes.    There is mild cardiomegaly but no definite vascular congestion.  There is question of small infiltrate and/or atelectasis at the left base with a possible small left effusion.  The lungs otherwise are clear.. Signed by Brendan Zamora MD    CT abdomen pelvis wo IV contrast    Result Date: 7/27/2024  STUDY: CT Abdomen and Pelvis without IV Contrast; 7/27/2024 at 2:53 PM INDICATION: Abdominal pain. COMPARISON: CTA abdominal aorta 7/13/2023. ACCESSION NUMBER(S): [id number] ORDERING CLINICIAN: MEGAN GOMEZ TECHNIQUE: CT of the abdomen and pelvis was  performed.  Contiguous axial images were obtained at 3 mm slice thickness through the abdomen and pelvis. Coronal and sagittal reconstructions at 3 mm slice thickness were performed. No intravenous contrast was administered.  Automated mA/kV exposure control was utilized and patient examination was performed in strict accordance with principles of ALARA. FINDINGS: Please note that the evaluation of vessels, lymph nodes and organs is limited without intravenous contrast.  LOWER CHEST: No cardiomegaly.  No pericardial effusion.  Patient is status post median sternotomy.  Lung bases demonstrate chronic changes with mild basilar atelectasis.  There is elevation of the right hemidiaphragm. There is a small hiatal hernia.  ABDOMEN:  LIVER: No hepatomegaly.  Smooth surface contour.  Liver demonstrates fatty morphology.  BILE DUCTS: No intrahepatic or extrahepatic biliary ductal dilatation.  GALLBLADDER: The gallbladder is prominent without adjacent inflammatory changes. STOMACH: No abnormalities identified.  PANCREAS: No masses or ductal dilatation.  SPLEEN: No splenomegaly or focal splenic lesion.  ADRENAL GLANDS: No thickening or nodules.  KIDNEYS AND URETERS: Kidneys are normal in size and location.  No renal or ureteral calculi.  There is hyperdense nodule mid pole left kidney measuring 1.4 x 1.1 cm and measuring 96 Hounsfield units.  PELVIS:  BLADDER: No abnormalities identified.  REPRODUCTIVE ORGANS: Uterus is unremarkable. BOWEL: There is bowel wall thickening and inflammatory changes of the colon extending from the cecum to the rectum.  There are mildly prominent small bowel loops suggesting component of enteritis.  There is moderate sigmoid colon diverticulosis.  VESSELS: No abnormalities identified.  Abdominal aorta is normal in caliber.  PERITONEUM/RETROPERITONEUM/LYMPH NODES: No free fluid.  No pneumoperitoneum. No lymphadenopathy.  ABDOMINAL WALL: No abnormalities identified. SOFT TISSUES: No abnormalities  identified.  BONES: No acute fracture or aggressive osseous lesion.  There is disc space narrowing and endplate degenerative changes with posterior disc osteophyte formation at L2-3 and L3-4.    Pancolitis. Small bowel enteritis. Hypodense nodule left kidney.  Recommend follow-up ultrasound for further evaluation. Signed by Koko Fernandez, DO      Echo  No results found for this or any previous visit.    Assessment   Sepsis, present on admission  Severe C. difficile colitis  Acute renal failure on chronic kidney disease stage III  Atrial fibrillation  Left renal nodule  Peripheral vascular disease with left BKA  Coronary artery disease  History of aortic valve replacement    Plan   Stop oral vancomycin  Start the patient on oral Dificid as she meets criteria for risk factors for recurrent/severe C. Difficile  Stool log  Serial abdominal examinations  May need to send a prescription for Dificid for 9 days to outpatient pharmacy to screen for insurance coverage and feasibility of outpatient continuation of the Dificid    I spent 32 minutes in the professional and overall care of this patient.      Andrés Lugo MD

## 2024-07-28 NOTE — CONSULTS
Reason For Consult  LUCRETIA    History Of Present Illness  Maggi Gordon is a 89 y.o. female presented with Confusion, diarrhea and nausea from [facility].   Admitted 7/27.   Diarrhea x 2 weeks, loose. Associated with nausea and poor po intake.   Diagnosed with pancolitis on admission   Cr 2.2 on admission along with low bicarb.   I am consulted for eval and management of severe LUCRETIA.   Sees my partner Dr. Ewing outpatient.   Baseline Cr 1.1 to 1.2  BP has been low.   More awake and alert today.   H/o left BKA.      Past Medical History  She has a past medical history of Asymptomatic premature menopause, Atherosclerosis of coronary artery bypass graft(s) without angina pectoris (10/11/2021), Occlusion and stenosis of bilateral carotid arteries (01/09/2020), Personal history of other diseases of the circulatory system, Personal history of other diseases of the circulatory system, Personal history of other diseases of the circulatory system, Personal history of other endocrine, nutritional and metabolic disease, Personal history of other endocrine, nutritional and metabolic disease, Personal history of other endocrine, nutritional and metabolic disease, and S/P AVR (aortic valve replacement) (10/18/2023).    Surgical History  She has a past surgical history that includes Other surgical history (12/03/2018); Other surgical history (12/03/2018); Other surgical history (12/03/2018); Other surgical history (12/03/2018); Other surgical history (12/03/2018); and CT angio aorta and bilateral iliofemoral runoff w and or wo IV contrast (7/13/2023).     Social History  She reports that she has never smoked. She has never been exposed to tobacco smoke. She has never used smokeless tobacco. Alcohol use questions deferred to the physician. She reports that she does not use drugs.    Family History  No family history on file.     Allergies  Ezetimibe and Hydrocodone    Review of Systems  All 12 systems reviewed and are negative  except for what is mentioned in HPI.      Scheduled medications  apixaban, 2.5 mg, oral, q12h  aspirin, 81 mg, oral, Daily  azelastine, 2 spray, Each Nostril, BID  cholecalciferol, 2,000 Units, oral, Daily  escitalopram, 10 mg, oral, Daily  ferrous sulfate (325 mg ferrous sulfate), 65 mg of iron, oral, Daily  folic acid, 5 mg, oral, Daily  gabapentin, 100 mg, oral, Daily  pantoprazole, 40 mg, oral, Daily before breakfast   Or  pantoprazole, 40 mg, intravenous, Daily before breakfast  rosuvastatin, 10 mg, oral, Nightly  vancomycin, 125 mg, oral, 4x daily      Continuous medications  lactated Ringer's, 100 mL/hr, Last Rate: 100 mL/hr (07/28/24 1016)      PRN medications  PRN medications: bisacodyl, guaiFENesin, melatonin, ondansetron ODT **OR** ondansetron   Physical Exam    Constitutional:       General: Frail, awake, cooperative.   HENT:      Head: Normocephalic and atraumatic.      Right Ear: External ear normal.      Left Ear: External ear normal.      Nose: Nose normal.      Mouth/Throat:      Mouth: Mucous membranes are dry.      Pharynx: Oropharynx is clear.   Eyes:      Extraocular Movements: Extraocular movements intact.      Conjunctiva/sclera: Conjunctivae normal.      Pupils: Pupils are equal, round, and reactive to light.   Cardiovascular:      Rate and Rhythm: Normal rate and regular rhythm.      Pulses: Normal pulses.      Heart sounds: Normal heart sounds.   Pulmonary:      Effort: Pulmonary effort is normal. No respiratory distress.      Breath sounds: Normal breath sounds. No wheezing, rhonchi or rales.   Chest:      Chest wall: No tenderness.   Abdominal:      General: Bowel sounds are normal. There is no distension.      Palpations: Abdomen is soft. There is no mass.      Tenderness: There is abdominal tenderness. There is no rebound.   Musculoskeletal:         General: No swelling or deformity. Normal range of motion.      Cervical back: Normal range of motion.      Right lower leg: No edema.       "Comments: VLAD   Skin:     General: Skin is warm and dry.      Capillary Refill: Capillary refill takes less than 2 seconds.      Coloration: Skin is not jaundiced or pale.   Neurological:      General: No focal deficit present.      Mental Status: She is alert and oriented to person, place, and time.      Cranial Nerves: No cranial nerve deficit.      Sensory: No sensory deficit.   Psychiatric:         Mood and Affect: Mood normal.         Behavior: Behavior normal.              I&O 24HR    Intake/Output Summary (Last 24 hours) at 7/28/2024 1109  Last data filed at 7/28/2024 1016  Gross per 24 hour   Intake 1906.66 ml   Output 400 ml   Net 1506.66 ml       Vitals 24HR  Heart Rate:  [59-87]   Temp:  [36.3 °C (97.4 °F)-37.1 °C (98.8 °F)]   Resp:  [16-17]   BP: ()/(41-57)   Height:  [160 cm (5' 3\")]   Weight:  [45.8 kg (101 lb)-53.5 kg (117 lb 14.4 oz)]   SpO2:  [91 %-97 %]     Results for orders placed or performed during the hospital encounter of 07/27/24 (from the past 24 hour(s))   CBC and Auto Differential   Result Value Ref Range    WBC 33.8 (H) 4.4 - 11.3 x10*3/uL    nRBC 0.0 0.0 - 0.0 /100 WBCs    RBC 3.49 (L) 4.00 - 5.20 x10*6/uL    Hemoglobin 12.2 12.0 - 16.0 g/dL    Hematocrit 36.5 36.0 - 46.0 %     (H) 80 - 100 fL    MCH 35.0 (H) 26.0 - 34.0 pg    MCHC 33.4 32.0 - 36.0 g/dL    RDW 14.4 11.5 - 14.5 %    Platelets 327 150 - 450 x10*3/uL    Immature Granulocytes %, Automated 1.2 (H) 0.0 - 0.9 %    Immature Granulocytes Absolute, Automated 0.40 0.00 - 0.50 x10*3/uL   Basic metabolic panel   Result Value Ref Range    Glucose 93 74 - 99 mg/dL    Sodium 135 (L) 136 - 145 mmol/L    Potassium 3.6 3.5 - 5.3 mmol/L    Chloride 106 98 - 107 mmol/L    Bicarbonate 18 (L) 21 - 32 mmol/L    Anion Gap 15 10 - 20 mmol/L    Urea Nitrogen 57 (H) 6 - 23 mg/dL    Creatinine 2.24 (H) 0.50 - 1.05 mg/dL    eGFR 21 (L) >60 mL/min/1.73m*2    Calcium 6.9 (L) 8.6 - 10.3 mg/dL   Hepatic function panel   Result Value Ref " Range    Albumin 2.5 (L) 3.4 - 5.0 g/dL    Bilirubin, Total 0.4 0.0 - 1.2 mg/dL    Bilirubin, Direct 0.0 0.0 - 0.3 mg/dL    Alkaline Phosphatase 117 33 - 136 U/L    ALT 22 7 - 45 U/L    AST 29 9 - 39 U/L    Total Protein 5.0 (L) 6.4 - 8.2 g/dL   Lipase   Result Value Ref Range    Lipase 14 9 - 82 U/L   Blood Gas Venous Full Panel   Result Value Ref Range    POCT pH, Venous 7.23 (LL) 7.33 - 7.43 pH    POCT pCO2, Venous 40 (L) 41 - 51 mm Hg    POCT pO2, Venous 32 (L) 35 - 45 mm Hg    POCT SO2, Venous 46 45 - 75 %    POCT Oxy Hemoglobin, Venous 45.0 45.0 - 75.0 %    POCT Hematocrit Calculated, Venous 32.0 (L) 36.0 - 46.0 %    POCT Sodium, Venous 131 (L) 136 - 145 mmol/L    POCT Potassium, Venous 3.8 3.5 - 5.3 mmol/L    POCT Chloride, Venous 106 98 - 107 mmol/L    POCT Ionized Calicum, Venous 0.95 (L) 1.10 - 1.33 mmol/L    POCT Glucose, Venous 93 74 - 99 mg/dL    POCT Lactate, Venous 1.3 0.4 - 2.0 mmol/L    POCT Base Excess, Venous -10.1 (L) -2.0 - 3.0 mmol/L    POCT HCO3 Calculated, Venous 16.8 (L) 22.0 - 26.0 mmol/L    POCT Hemoglobin, Venous 10.5 (L) 12.0 - 16.0 g/dL    POCT Anion Gap, Venous 12.0 10.0 - 25.0 mmol/L    Patient Temperature 37.0 degrees Celsius    FiO2 21 %   Manual Differential   Result Value Ref Range    Neutrophils %, Manual 89.0 40.0 - 80.0 %    Bands %, Manual 7.0 0.0 - 5.0 %    Lymphocytes %, Manual 3.0 13.0 - 44.0 %    Monocytes %, Manual 1.0 2.0 - 10.0 %    Eosinophils %, Manual 0.0 0.0 - 6.0 %    Basophils %, Manual 0.0 0.0 - 2.0 %    Seg Neutrophils Absolute, Manual 30.08 (H) 1.60 - 5.00 x10*3/uL    Bands Absolute, Manual 2.37 (H) 0.00 - 0.50 x10*3/uL    Lymphocytes Absolute, Manual 1.01 0.80 - 3.00 x10*3/uL    Monocytes Absolute, Manual 0.34 0.05 - 0.80 x10*3/uL    Eosinophils Absolute, Manual 0.00 0.00 - 0.40 x10*3/uL    Basophils Absolute, Manual 0.00 0.00 - 0.10 x10*3/uL    Total Cells Counted 100     Neutrophils Absolute, Manual 32.45 (H) 1.60 - 5.50 x10*3/uL    RBC Morphology See Below      Gratis Cells Few    Blood Culture    Specimen: Peripheral Venipuncture; Blood culture   Result Value Ref Range    Blood Culture Loaded on Instrument - Culture in progress    Blood Culture    Specimen: Peripheral Venipuncture; Blood culture   Result Value Ref Range    Blood Culture Loaded on Instrument - Culture in progress    Blood Culture    Specimen: Peripheral Venipuncture; Blood culture   Result Value Ref Range    Blood Culture Loaded on Instrument - Culture in progress    Blood Culture    Specimen: Peripheral Venipuncture; Blood culture   Result Value Ref Range    Blood Culture Loaded on Instrument - Culture in progress    C. difficile, PCR    Specimen: Stool   Result Value Ref Range    C. difficile, PCR Detected (A) Not Detected   Protime-INR   Result Value Ref Range    Protime 21.8 (H) 9.8 - 12.8 seconds    INR 1.9 (H) 0.9 - 1.1   Basic Metabolic Panel   Result Value Ref Range    Glucose 79 74 - 99 mg/dL    Sodium 136 136 - 145 mmol/L    Potassium 2.8 (LL) 3.5 - 5.3 mmol/L    Chloride 109 (H) 98 - 107 mmol/L    Bicarbonate 16 (L) 21 - 32 mmol/L    Anion Gap 14 10 - 20 mmol/L    Urea Nitrogen 59 (H) 6 - 23 mg/dL    Creatinine 1.87 (H) 0.50 - 1.05 mg/dL    eGFR 26 (L) >60 mL/min/1.73m*2    Calcium 6.6 (L) 8.6 - 10.3 mg/dL   CBC and Auto Differential   Result Value Ref Range    WBC 26.5 (H) 4.4 - 11.3 x10*3/uL    nRBC 0.0 0.0 - 0.0 /100 WBCs    RBC 2.80 (L) 4.00 - 5.20 x10*6/uL    Hemoglobin 9.8 (L) 12.0 - 16.0 g/dL    Hematocrit 28.9 (L) 36.0 - 46.0 %     (H) 80 - 100 fL    MCH 35.0 (H) 26.0 - 34.0 pg    MCHC 33.9 32.0 - 36.0 g/dL    RDW 14.5 11.5 - 14.5 %    Platelets 293 150 - 450 x10*3/uL    Neutrophils % 90.5 40.0 - 80.0 %    Immature Granulocytes %, Automated 1.2 (H) 0.0 - 0.9 %    Lymphocytes % 3.6 13.0 - 44.0 %    Monocytes % 4.1 2.0 - 10.0 %    Eosinophils % 0.3 0.0 - 6.0 %    Basophils % 0.3 0.0 - 2.0 %    Neutrophils Absolute 23.96 (H) 1.60 - 5.50 x10*3/uL    Immature Granulocytes Absolute,  Automated 0.32 0.00 - 0.50 x10*3/uL    Lymphocytes Absolute 0.94 0.80 - 3.00 x10*3/uL    Monocytes Absolute 1.08 (H) 0.05 - 0.80 x10*3/uL    Eosinophils Absolute 0.08 0.00 - 0.40 x10*3/uL    Basophils Absolute 0.09 0.00 - 0.10 x10*3/uL   Magnesium   Result Value Ref Range    Magnesium 1.54 (L) 1.60 - 2.40 mg/dL   Morphology   Result Value Ref Range    RBC Morphology No significant RBC morphology present    Urinalysis with Reflex Culture and Microscopic   Result Value Ref Range    Color, Urine Yellow Light-Yellow, Yellow, Dark-Yellow    Appearance, Urine Clear Clear    Specific Gravity, Urine 1.024 1.005 - 1.035    pH, Urine 5.5 5.0, 5.5, 6.0, 6.5, 7.0, 7.5, 8.0    Protein, Urine 30 (1+) (A) NEGATIVE, 10 (TRACE), 20 (TRACE) mg/dL    Glucose, Urine Normal Normal mg/dL    Blood, Urine 0.03 (TRACE) (A) NEGATIVE    Ketones, Urine NEGATIVE NEGATIVE mg/dL    Bilirubin, Urine NEGATIVE NEGATIVE    Urobilinogen, Urine Normal Normal mg/dL    Nitrite, Urine NEGATIVE NEGATIVE    Leukocyte Esterase, Urine 25 Albert/µL (A) NEGATIVE   Microscopic Only, Urine   Result Value Ref Range    WBC, Urine 6-10 (A) 1-5, NONE /HPF    RBC, Urine 1-2 NONE, 1-2, 3-5 /HPF    Squamous Epithelial Cells, Urine 1-9 (SPARSE) Reference range not established. /HPF    Hyaline Casts, Urine 2+ (A) NONE /LPF           Assessment/Plan     LUCRETIA 2/2 ATN /volume depletion N17.0  CKD IIIA N18.31  Hypokalemia. E87.6  Acute metabolic acidosis E87.21  Volume depletion E 86.9  Pancolitis    Cr starting to improve with hydration.   C/w LR   Can use bicarb drip if acidosis worsens.   Acidosis likely from diarrhea, is non gap.   Replete K.   DC Amlodipine and Atenolol as BP is on the lower side.   Need SBP >100 for adequate renal perfusion.   Renal USG negative for hydronephrosis.   Avoid IV contrast.   Will follow.     Principal Problem:    LUCRETIA (acute kidney injury) (CMS-HCC)  Active Problems:    Coronary artery disease involving coronary bypass graft    A-fib (Multi)     Peripheral arterial disease (CMS-HCC)    Pancolitis (Multi)    Dehydration    Nodule of kidney        Thaddeus Trejo MD

## 2024-07-28 NOTE — CARE PLAN
Problem: Pain - Adult  Goal: Verbalizes/displays adequate comfort level or baseline comfort level  Outcome: Progressing     Problem: Safety - Adult  Goal: Free from fall injury  Outcome: Progressing     Problem: Discharge Planning  Goal: Discharge to home or other facility with appropriate resources  Outcome: Progressing     Problem: Chronic Conditions and Co-morbidities  Goal: Patient's chronic conditions and co-morbidity symptoms are monitored and maintained or improved  Outcome: Progressing     Problem: Skin  Goal: Decreased wound size/increased tissue granulation at next dressing change  Outcome: Progressing  Flowsheets (Taken 7/28/2024 0914)  Decreased wound size/increased tissue granulation at next dressing change: Promote sleep for wound healing  Goal: Participates in plan/prevention/treatment measures  Outcome: Progressing  Flowsheets (Taken 7/28/2024 0914)  Participates in plan/prevention/treatment measures:   Discuss with provider PT/OT consult   Increase activity/out of bed for meals  Goal: Prevent/manage excess moisture  Outcome: Progressing  Flowsheets (Taken 7/28/2024 0914)  Prevent/manage excess moisture: Cleanse incontinence/protect with barrier cream  Goal: Prevent/minimize sheer/friction injuries  Outcome: Progressing  Flowsheets (Taken 7/28/2024 0914)  Prevent/minimize sheer/friction injuries:   HOB 30 degrees or less   Increase activity/out of bed for meals   Turn/reposition every 2 hours/use positioning/transfer devices   Use pull sheet  Goal: Promote/optimize nutrition  Outcome: Progressing  Flowsheets (Taken 7/28/2024 0914)  Promote/optimize nutrition:   Offer water/supplements/favorite foods   Consume > 50% meals/supplements   Monitor/record intake including meals  Goal: Promote skin healing  Outcome: Progressing     Problem: Fall/Injury  Goal: Not fall by end of shift  Outcome: Progressing  Goal: Be free from injury by end of the shift  Outcome: Progressing  Goal: Verbalize understanding  of personal risk factors for fall in the hospital  Outcome: Progressing  Goal: Verbalize understanding of risk factor reduction measures to prevent injury from fall in the home  Outcome: Progressing  Goal: Use assistive devices by end of the shift  Outcome: Progressing  Goal: Pace activities to prevent fatigue by end of the shift  Outcome: Progressing       The clinical goals for the shift include patient will have no falls or injuries throughout this shift.

## 2024-07-29 LAB
ANION GAP SERPL CALC-SCNC: 12 MMOL/L (ref 10–20)
BUN SERPL-MCNC: 53 MG/DL (ref 6–23)
C DIFF TOX A+B STL QL IA: POSITIVE
CALCIUM SERPL-MCNC: 7.5 MG/DL (ref 8.6–10.3)
CHLORIDE SERPL-SCNC: 108 MMOL/L (ref 98–107)
CO2 SERPL-SCNC: 19 MMOL/L (ref 21–32)
CREAT SERPL-MCNC: 1.73 MG/DL (ref 0.5–1.05)
EGFRCR SERPLBLD CKD-EPI 2021: 28 ML/MIN/1.73M*2
ERYTHROCYTE [DISTWIDTH] IN BLOOD BY AUTOMATED COUNT: 14.5 % (ref 11.5–14.5)
GLUCOSE SERPL-MCNC: 104 MG/DL (ref 74–99)
HCT VFR BLD AUTO: 31.7 % (ref 36–46)
HGB BLD-MCNC: 10.8 G/DL (ref 12–16)
MAGNESIUM SERPL-MCNC: 2.32 MG/DL (ref 1.6–2.4)
MCH RBC QN AUTO: 35.4 PG (ref 26–34)
MCHC RBC AUTO-ENTMCNC: 34.1 G/DL (ref 32–36)
MCV RBC AUTO: 104 FL (ref 80–100)
NRBC BLD-RTO: 0 /100 WBCS (ref 0–0)
PLATELET # BLD AUTO: 337 X10*3/UL (ref 150–450)
POTASSIUM SERPL-SCNC: 3.1 MMOL/L (ref 3.5–5.3)
RBC # BLD AUTO: 3.05 X10*6/UL (ref 4–5.2)
SODIUM SERPL-SCNC: 136 MMOL/L (ref 136–145)
WBC # BLD AUTO: 27.2 X10*3/UL (ref 4.4–11.3)

## 2024-07-29 PROCEDURE — 2500000001 HC RX 250 WO HCPCS SELF ADMINISTERED DRUGS (ALT 637 FOR MEDICARE OP): Performed by: INTERNAL MEDICINE

## 2024-07-29 PROCEDURE — 85027 COMPLETE CBC AUTOMATED: CPT | Performed by: INTERNAL MEDICINE

## 2024-07-29 PROCEDURE — 1200000002 HC GENERAL ROOM WITH TELEMETRY DAILY

## 2024-07-29 PROCEDURE — 97165 OT EVAL LOW COMPLEX 30 MIN: CPT | Mod: GO

## 2024-07-29 PROCEDURE — 2500000002 HC RX 250 W HCPCS SELF ADMINISTERED DRUGS (ALT 637 FOR MEDICARE OP, ALT 636 FOR OP/ED): Performed by: NURSE PRACTITIONER

## 2024-07-29 PROCEDURE — 80048 BASIC METABOLIC PNL TOTAL CA: CPT | Performed by: INTERNAL MEDICINE

## 2024-07-29 PROCEDURE — 2500000001 HC RX 250 WO HCPCS SELF ADMINISTERED DRUGS (ALT 637 FOR MEDICARE OP): Performed by: NURSE PRACTITIONER

## 2024-07-29 PROCEDURE — 36415 COLL VENOUS BLD VENIPUNCTURE: CPT | Performed by: INTERNAL MEDICINE

## 2024-07-29 PROCEDURE — 99233 SBSQ HOSP IP/OBS HIGH 50: CPT | Performed by: INTERNAL MEDICINE

## 2024-07-29 PROCEDURE — 83735 ASSAY OF MAGNESIUM: CPT | Performed by: INTERNAL MEDICINE

## 2024-07-29 RX ORDER — VALSARTAN 40 MG/1
40 TABLET ORAL 2 TIMES DAILY
COMMUNITY

## 2024-07-29 RX ORDER — ADHESIVE BANDAGE
30 BANDAGE TOPICAL DAILY PRN
COMMUNITY

## 2024-07-29 RX ORDER — PYRIDOXINE HCL (VITAMIN B6) 25 MG
25 TABLET ORAL DAILY
COMMUNITY

## 2024-07-29 RX ORDER — MENTHOL 40 MG/ML
1 GEL TOPICAL 3 TIMES DAILY
COMMUNITY

## 2024-07-29 RX ORDER — POTASSIUM CHLORIDE 20 MEQ/1
20 TABLET, EXTENDED RELEASE ORAL ONCE
Status: DISCONTINUED | OUTPATIENT
Start: 2024-07-29 | End: 2024-07-29

## 2024-07-29 RX ORDER — POTASSIUM CITRATE 10 MEQ/1
10 TABLET, EXTENDED RELEASE ORAL
Status: DISCONTINUED | OUTPATIENT
Start: 2024-07-29 | End: 2024-08-01 | Stop reason: HOSPADM

## 2024-07-29 RX ORDER — BISACODYL 10 MG/1
10 SUPPOSITORY RECTAL DAILY PRN
COMMUNITY

## 2024-07-29 RX ORDER — OXYCODONE HYDROCHLORIDE 5 MG/1
5 TABLET ORAL EVERY 6 HOURS PRN
Status: ON HOLD | COMMUNITY
End: 2024-08-01

## 2024-07-29 RX ORDER — DEXTROMETHORPHAN POLISTIREX 30 MG/5 ML
1 SUSPENSION, EXTENDED RELEASE 12 HR ORAL AS NEEDED
COMMUNITY

## 2024-07-29 RX ORDER — GABAPENTIN 100 MG/1
200 CAPSULE ORAL NIGHTLY
COMMUNITY

## 2024-07-29 RX ORDER — MELATONIN 3 MG
3 CAPSULE ORAL NIGHTLY
COMMUNITY

## 2024-07-29 RX ADMIN — GABAPENTIN 100 MG: 100 CAPSULE ORAL at 09:11

## 2024-07-29 RX ADMIN — ESCITALOPRAM OXALATE 10 MG: 10 TABLET ORAL at 09:11

## 2024-07-29 RX ADMIN — PANTOPRAZOLE SODIUM 40 MG: 40 TABLET, DELAYED RELEASE ORAL at 06:17

## 2024-07-29 RX ADMIN — AZELASTINE HYDROCHLORIDE 2 SPRAY: 137 SPRAY, METERED NASAL at 09:12

## 2024-07-29 RX ADMIN — Medication 2000 UNITS: at 09:11

## 2024-07-29 RX ADMIN — POTASSIUM CITRATE 10 MEQ: 10 TABLET, EXTENDED RELEASE ORAL at 17:08

## 2024-07-29 RX ADMIN — ASPIRIN 81 MG: 81 TABLET, COATED ORAL at 09:11

## 2024-07-29 RX ADMIN — FOLIC ACID 5 MG: 1 TABLET ORAL at 09:11

## 2024-07-29 RX ADMIN — FIDAXOMICIN 200 MG: 200 TABLET, FILM COATED ORAL at 20:28

## 2024-07-29 RX ADMIN — FERROUS SULFATE TAB 325 MG (65 MG ELEMENTAL FE) 1 TABLET: 325 (65 FE) TAB at 09:00

## 2024-07-29 RX ADMIN — ROSUVASTATIN 10 MG: 10 TABLET, FILM COATED ORAL at 20:28

## 2024-07-29 RX ADMIN — FIDAXOMICIN 200 MG: 200 TABLET, FILM COATED ORAL at 09:11

## 2024-07-29 RX ADMIN — APIXABAN 2.5 MG: 2.5 TABLET, FILM COATED ORAL at 20:28

## 2024-07-29 RX ADMIN — APIXABAN 2.5 MG: 2.5 TABLET, FILM COATED ORAL at 09:11

## 2024-07-29 RX ADMIN — AZELASTINE HYDROCHLORIDE 2 SPRAY: 137 SPRAY, METERED NASAL at 22:08

## 2024-07-29 ASSESSMENT — COGNITIVE AND FUNCTIONAL STATUS - GENERAL
CLIMB 3 TO 5 STEPS WITH RAILING: TOTAL
DRESSING REGULAR LOWER BODY CLOTHING: A LITTLE
DRESSING REGULAR LOWER BODY CLOTHING: A LOT
DAILY ACTIVITIY SCORE: 15
DRESSING REGULAR UPPER BODY CLOTHING: A LOT
EATING MEALS: A LITTLE
HELP NEEDED FOR BATHING: A LOT
TOILETING: A LOT
WALKING IN HOSPITAL ROOM: TOTAL
HELP NEEDED FOR BATHING: A LOT
TURNING FROM BACK TO SIDE WHILE IN FLAT BAD: A LITTLE
PERSONAL GROOMING: A LITTLE
MOBILITY SCORE: 13
DAILY ACTIVITIY SCORE: 15
EATING MEALS: A LITTLE
TOILETING: A LOT
STANDING UP FROM CHAIR USING ARMS: A LOT
MOVING TO AND FROM BED TO CHAIR: A LOT
PERSONAL GROOMING: A LITTLE
DRESSING REGULAR UPPER BODY CLOTHING: A LITTLE

## 2024-07-29 ASSESSMENT — PAIN - FUNCTIONAL ASSESSMENT
PAIN_FUNCTIONAL_ASSESSMENT: 0-10
PAIN_FUNCTIONAL_ASSESSMENT: 0-10

## 2024-07-29 ASSESSMENT — PAIN SCALES - GENERAL: PAINLEVEL_OUTOF10: 0 - NO PAIN

## 2024-07-29 ASSESSMENT — ACTIVITIES OF DAILY LIVING (ADL)
BATHING_ASSISTANCE: MAXIMAL
LACK_OF_TRANSPORTATION: NO
ADL_ASSISTANCE: INDEPENDENT

## 2024-07-29 NOTE — PROGRESS NOTES
Maggi Gordon is a 89 y.o. female on day 2 of admission presenting with LUCRETIA (acute kidney injury) (CMS-[facility]).      Subjective   Maggi Gordon is a 89 y.o. female with PMHx s/f left BKA, coronary artery disease, aortic valve replacement, A-fib, peripheral arterial disease, chronic kidney disease, hypertension presenting with leukocytosis nausea vomiting diarrhea anorexia abdominal discomfort.  Patient was sent from [facility] where she is resident due to confusion and complaints of diarrhea.  Evidently patient had multiple loose stools anorexia nausea vomiting without fevers chills or rigors she does have some stomach discomfort.  Patient did have some antibiotics for a tooth abscess up to a couple months ago.  She also had a dose of erythromycin yesterday.  On presenting to the emergency department patient had temperature 97.4 heart rate 59 respiratory rate 16 blood pressure 110/48 SpO2 97% labs show sodium 135 potassium 3.6 chloride 106 bicarb 18 BUN 57 creatinine 2.24 glucose 93 albumin 2.5 other liver enzymes within normal limits CBC shows white blood cell count 33.8 hemoglobin 12.2 hematocrit 2.36.5 platelets 327 chest x-ray suggest there is question of atelectasis versus a possible small infiltrate to the left base.  CT of the abdomen pelvis shows basilar atelectasis small hiatal hernia pancolitis 1.4 x 1.1 cm hyperdense nodule midpole left kidney patient was given empiric dose of Zosyn due to her severe leukocytosis and subsequently started on vancomycin in light of her colitis marked leukocytosis anorexia.  Patient will be admitted for IV hydration further workup and treatment for her pancolitis     07/28: Patient was evaluated this morning, continues to have diarrhea, abdominal pain is improving, no more nausea or vomiting.  07/29: Patient was evaluated this morning, continues to have diarrhea but slowing down, no nausea or vomiting.  No fever or chills       Objective     Last Recorded  Vitals  /58   Pulse 80   Temp 36.1 °C (97 °F)   Resp 16   Wt 53.5 kg (117 lb 14.4 oz)   SpO2 93%   Intake/Output last 3 Shifts:    Intake/Output Summary (Last 24 hours) at 7/29/2024 1318  Last data filed at 7/29/2024 0400  Gross per 24 hour   Intake 1181.67 ml   Output --   Net 1181.67 ml       Admission Weight  Weight: 45.8 kg (101 lb) (07/27/24 1150)    Daily Weight  07/27/24 : 53.5 kg (117 lb 14.4 oz)    Image Results  US renal complete  Narrative: Interpreted By:  Maribel Vicente,   STUDY:  US RENAL COMPLETE; 7/27/2024 5:35 pm      INDICATION:  Signs/Symptoms:Renal nodule seen on CT scan.      COMPARISON:  None.      ACCESSION NUMBER(S):  [id number]      ORDERING CLINICIAN:  ANGELO APODACA      TECHNIQUE:  Grayscale and color Doppler imaging of the kidneys.      FINDINGS:  The right kidney measures 8.9 cm. Right renal cortical thickness of 8  mm.      The left kidney measures 8.1 cm. In the mid left renal cortex there  is a 1.5 cm cyst and a 1 cm cyst. Left renal cortical thickness of 9  mm.      There is no shadowing calculus, hydronephrosis, or solid mass  identified. The renal cortical thickness and echogenicity is normal.      Limited bladder evaluation: No stones or debris seen in the urinary  bladder.      Incidental note is made of sludge in the gallbladder.      Impression: 1. Two cysts are identified in the mid left renal cortex  corresponding to the hyperdense lesion and the hypodense lesion seen  on the recent CT.  2. No hydronephrosis  3. Sludge noted in the gallbladder      .      MACRO:  None.      Signed by: Maribel Vicente 7/27/2024 6:00 PM  Dictation workstation:   [id number]  XR chest 1 view  Narrative: STUDY:  Chest Radiograph;  7/27/2024 at 15:22  INDICATION:  Weakness.  COMPARISON:  XR chest 1/16/2024  ACCESSION NUMBER(S):  [id number]  ORDERING CLINICIAN:  MEGAN GOMEZ  TECHNIQUE:  Frontal chest was obtained at 15:22 hours.  FINDINGS:  CARDIOMEDIASTINAL SILHOUETTE:  Heart is  at the upper limits of normal in size on today's exam.  There  are sternal wire sutures but there does not appear to be any definite  vascular congestion or edema.  ..     LUNGS:  There is question of atelectasis or possible small infiltrate at the  left base and there is a questionable small left effusion but the  lungs otherwise are clear.     ABDOMEN:  No remarkable upper abdominal findings.     BONES:  No acute osseous changes.  Impression: There is mild cardiomegaly but no definite vascular congestion.  There  is question of small infiltrate and/or atelectasis at the left base  with a possible small left effusion.  The lungs otherwise are clear..  Signed by Brendan Zamora MD  CT abdomen pelvis wo IV contrast  Narrative: STUDY:  CT Abdomen and Pelvis without IV Contrast; 7/27/2024 at 2:53 PM  INDICATION:  Abdominal pain.  COMPARISON:  CTA abdominal aorta 7/13/2023.  ACCESSION NUMBER(S):  [id number]  ORDERING CLINICIAN:  MEGAN GOMEZ  TECHNIQUE:  CT of the abdomen and pelvis was performed.  Contiguous axial images  were obtained at 3 mm slice thickness through the abdomen and pelvis.   Coronal and sagittal reconstructions at 3 mm slice thickness were  performed. No intravenous contrast was administered.    Automated mA/kV exposure control was utilized and patient examination  was performed in strict accordance with principles of ALARA.  FINDINGS:  Please note that the evaluation of vessels, lymph nodes and organs is  limited without intravenous contrast.      LOWER CHEST:  No cardiomegaly.  No pericardial effusion.  Patient is status post  median sternotomy.  Lung bases demonstrate chronic changes with mild  basilar atelectasis.  There is elevation of the right hemidiaphragm.   There is a small hiatal hernia.     ABDOMEN:     LIVER:  No hepatomegaly.  Smooth surface contour.  Liver demonstrates fatty  morphology.     BILE DUCTS:  No intrahepatic or extrahepatic biliary ductal dilatation.      GALLBLADDER:  The gallbladder is prominent without adjacent inflammatory changes.  STOMACH:  No abnormalities identified.     PANCREAS:  No masses or ductal dilatation.     SPLEEN:  No splenomegaly or focal splenic lesion.     ADRENAL GLANDS:  No thickening or nodules.     KIDNEYS AND URETERS:  Kidneys are normal in size and location.  No renal or ureteral  calculi.  There is hyperdense nodule mid pole left kidney measuring  1.4 x 1.1 cm and measuring 96 Hounsfield units.     PELVIS:     BLADDER:  No abnormalities identified.     REPRODUCTIVE ORGANS:  Uterus is unremarkable.  BOWEL:  There is bowel wall thickening and inflammatory changes of the colon  extending from the cecum to the rectum.  There are mildly prominent  small bowel loops suggesting component of enteritis.  There is  moderate sigmoid colon diverticulosis.     VESSELS:  No abnormalities identified.  Abdominal aorta is normal in caliber.      PERITONEUM/RETROPERITONEUM/LYMPH NODES:  No free fluid.  No pneumoperitoneum.  No lymphadenopathy.     ABDOMINAL WALL:  No abnormalities identified.  SOFT TISSUES:   No abnormalities identified.     BONES:  No acute fracture or aggressive osseous lesion.  There is disc space  narrowing and endplate degenerative changes with posterior disc  osteophyte formation at L2-3 and L3-4.  Impression: Pancolitis.  Small bowel enteritis.  Hypodense nodule left kidney.  Recommend follow-up ultrasound for  further evaluation.  Signed by Koko Fernandez DO      Physical Exam  Patient is awake and orient, not in apparent distress  Eyes: PERRLA, no conjunctival congestion  Chest: Bilateral Air entry, no crackles or wheezing  Heart: s1S2 regular, no murmur  Abdomen: Soft, non tender, BS present  Ext: Status post left BKA  Relevant Results               Assessment/Plan        1.  Sepsis present on admission with organ dysfunction of LUCRETIA  Patient was started on IV fluid as well as oral vancomycin.  ID was consulted and switched  vancomycin to oral fidaxomicin  continue on IV fluid and oral fidaxomicin  Monitor  Follow cultures-blood cultures negative so far  ID follow-up    2.  Severe C. difficile colitis  On oral fidaxomicin  ID on board  Monitor    3.  LUCRETIA on CKD stage III with metabolic acidosis  Continue on IV fluid  Monitor renal function  Renal ultrasound-no hydronephrosis  Nephrology on board      4.  Hypokalemia/hypomagnesemia  Replace and monitor    5.  A-fib  Rate fairly controlled  On Eliquis for anticoagulation    6.  Left kidney nodule  Follow ultrasound report-suggesting renal cyst    7.  PAD status post left BKA               Yaakov Fitch MD

## 2024-07-29 NOTE — PROGRESS NOTES
Occupational Therapy  Evaluation    Patient Name: Maggi Gordon  MRN: [id number]  Today's Date: 7/29/2024  Time Calculation  Start Time: 1022  Stop Time: 1040  Time Calculation (min): 18 min    Current Problem:   1. LUCRETIA (acute kidney injury) (CMS-HCC)    2. Dehydration    3. Diarrhea, unspecified type    4. 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    Precautions:   Hearing/Visual Limitations: [place], wears hearing aide  LE Weight Bearing Status: Left Non-Weight Bearing  Medical Precautions: Fall precautions, Infection precautions  Precautions Comment: CDIFF positive    ASSESSMENT  OT Assessment: OT eval completed. The patient is functioning below baseline for ADLs and mobility. can benefit from continued OT. Pt with Decreased ADL status, Decreased upper extremity strength, Decreased safe judgment during ADL, Decreased cognition, Decreased functional mobility, Decreased gross motor control, Decreased IADLs, Decreased endurance  Prognosis:    Barriers to discharge: Decreased caregiver support  Tolerance:      PLAN  Frequency: 3 times per week  Treatment Interventions: ADL retraining, Functional transfer training, UE strengthening/ROM, Endurance training, Cognitive reorientation, Patient/family training, Equipment evaluation/education, Neuromuscular reeducation  Discharge Recommendations: Low intensity level of continued care  OT OK to discharge: Yes    GENERAL VISIT INFORMATION   Start of session communication:    End of session communication: Bedside nurse  Family/caregiver present: No  Caregiver feedback:    Co-Treatment:  (no)  Reason for co-treatment:     Position Pt Received:  Bed, 3 rail up, Alarm on  End of session position: Up in chair, Alarm on    SUBJECTIVE  Home Living:  Type of Home: Long Term Care facility  Home Adaptive Equipment:  Wheelchair-manual  Home Layout: One level  Home Access: No concerns  Home Living Comments: [facility]     Prior Level of Function:  Receives Help From:  (caregivers)  ADL Assistance: Independent (pt reports she can perform own toileting, dressing, grooming, feeding. assist bathing)  Homemaking Assistance: Needs assistance  Ambulatory Assistance:  (mostly non ambulatory since BKA. per chart, pt may ambulate with PT at facility)  Transfers:  (mod independent stand pivot transfers to WC or toilet.)  Vocational: Retired      Pain:  Assessment: 0-10  Score:  (no rating given)  Type: Acute pain  Location:  (R foot and ankle, increased with WB)  Interventions: Repositioned, Ambulation/increased activity  Response to pain interventions:      OBJECTIVE    Cognition:  Overall Cognitive Status: Within Functional Limits (except mild confusion and decreased processing)  Orientation Level:  (oriented to person, place, time. not fully oriented to situation)             Current ADL function:   EATING:  Stand by (anticipate)     GROOMING: Minimal (anticipate)     BATHING: Maximal (anticipate)     UB DRESSING: Moderate (anticipate)     LB DRESSING: Stand by Don/doff R sock   TOILETING: Maximal (anticipate)    ADL comments:       Activity Tolerance:  Endurance: Decreased tolerance for upright activites  Activity Tolerance Comments: increased pain with WB on r foot    Bed Mobility/Transfers:   Bed Mobility 1  Bed Mobility 1: Supine to sitting  Level of Assistance 1: Moderate assistance (x1)  Transfers  Transfer: Yes  Transfer 1  Transfer From 1: Bed to  Transfer to 1: Chair with arms  Technique 1: Stand pivot (bear hug)  Transfer Level of Assistance 1: Maximum assistance (x1)  Trials/Comments 1: pt's RLE gave out during pivot to chair. pt required max assist to maintain balance and safely transfer to chair surface.    Ambulation/Gait Training:  Functional Mobility  Functional Mobility Performed: No    Sitting Balance:  Static  Sitting Balance  Static Sitting-Level of Assistance: Close supervision  Dynamic Sitting Balance  Dynamic Sitting-Comments: supv    Standing Balance:  Static Standing Balance  Static Standing-Level of Assistance: Maximum assistance  Dynamic Standing Balance  Dynamic Standing-Comments: dependent    Vision: Vision - Basic Assessment  Current Vision: Wears glasses all the time   and      Sensation:  Light Touch: No apparent deficits    Strength:  Strength Comments: BUE grossly 3+/5    Perception:  Inattention/Neglect: Appears intact    Coordination:  Movements are Fluid and Coordinated: Yes     Hand Function:  Hand Function  Gross Grasp: Functional    Extremities: RUE   RUE : Within Functional Limits and LUE   LUE: Within Functional Limits    Outcome Measures: [facility] Daily Activity  Putting on and taking off regular lower body clothing: A little  Bathing (including washing, rinsing, drying): A lot  Putting on and taking off regular upper body clothing: A lot  Toileting, which includes using toilet, bedpan or urinal: A lot  Taking care of personal grooming such as brushing teeth: A little  Eating Meals: A little  Daily Activity - Total Score: 15                    EDUCATION:     Education Documentation  ADL Training, taught by Nesha Chu OT at 7/29/2024  2:16 PM.  Learner: Patient  Readiness: Acceptance  Method: Explanation  Response: Needs Reinforcement    Education Comments  No comments found.        Goals:   Encounter Problems       Encounter Problems (Active)       ADLs       Patient will complete daily grooming tasks brushing teeth and washing face/hair with set-up and supervision level of assistance and PRN adaptive equipment while supported sitting. (Progressing)       Start:  07/29/24    Expected End:  08/12/24            Patient will complete toileting including hygiene clothing management/hygiene with set-up and supervision level of assistance and raised toilet seat, grab bars, and bedside commode.  (Progressing)       Start:  07/29/24    Expected End:  08/12/24               EXERCISE/STRENGTHENING       Patient with increase BUE by 1/2 MMT grade strength. (Progressing)       Start:  07/29/24    Expected End:  08/12/24               TRANSFERS       Patient will complete functional transfers with least restrictive device with modified independent level of assistance. (Progressing)       Start:  07/29/24    Expected End:  08/12/24

## 2024-07-29 NOTE — PROGRESS NOTES
Maggi Gordon is a 89 y.o. female on day 2 of admission presenting with LUCRETIA (acute kidney injury) (CMS-[facility]).      Subjective   .Interval History    Patient is doing well sitting in chair    ROS  Denies chest pain, shortness of breath,  Nausea, vomiting , diarrhea, fever or chills.     No change in Past Medical History        Objective          Vitals 24HR  Heart Rate:  [67-80]   Temp:  [36.1 °C (97 °F)-37.2 °C (98.9 °F)]   Resp:  [16]   BP: (100-124)/(48-61)   SpO2:  [90 %-93 %]       Intake/Output last 3 Shifts:    Intake/Output Summary (Last 24 hours) at 7/29/2024 1358  Last data filed at 7/29/2024 0400  Gross per 24 hour   Intake 1181.67 ml   Output --   Net 1181.67 ml       Physical Exam  Constitutional:       Appearance: Normal appearance.   HENT:      Mouth/Throat:      Mouth: Mucous membranes are dry.      Pharynx: Oropharynx is clear.   Eyes:      Extraocular Movements: Extraocular movements intact.      Pupils: Pupils are equal, round, and reactive to light.   Cardiovascular:      Rate and Rhythm: Normal rate and regular rhythm.   Pulmonary:      Effort: Pulmonary effort is normal.      Breath sounds: Normal breath sounds.   Abdominal:      General: Abdomen is flat. Bowel sounds are normal.      Palpations: Abdomen is soft.   Musculoskeletal:      Right lower leg: No edema.      Left lower leg: No edema.   Skin:     General: Skin is warm and dry.   Neurological:      Mental Status: She is alert and oriented to person, place, and time. Mental status is at baseline.   Psychiatric:         Mood and Affect: Mood normal.         Behavior: Behavior normal.         Relevant Results               Assessment/Plan      LUCRETIA 2/2 ATN /volume depletion N17.0  CKD IIIA N18.31  Hypokalemia. E87.6  Acute metabolic acidosis E87.21  Volume depletion E 86.9  Pancolitis  Recommendations  Cr starting to improve off fluids  Baseline 1.1-1.2  Acidosis likely from diarrhea, is non gap.  Slowly improving  Replete K.   Continue to  hold antihypertensive medication  Need SBP >100 for adequate renal perfusion.   Renal USG negative for hydronephrosis.   Avoid IV contrast.   Will follow.     Principal Problem:    LUCRETIA (acute kidney injury) (CMS-HCC)  Active Problems:    Coronary artery disease involving coronary bypass graft    A-fib (Multi)    Peripheral arterial disease (CMS-HCC)    Pancolitis (Multi)    Dehydration    Nodule of kidney         .    .Thank you for the consult and the opportunity to participate inn the care of this patient. Please do not hesitate to contact us with any questions or concern  ALEXIS Sullivan, CNP  [facility]  [phone]   Violet Cr APRKEIRY-CNP

## 2024-07-29 NOTE — PROGRESS NOTES
Maggi Gordon is a 89 y.o. female admitted for LUCRETIA (acute kidney injury) (CMS-HCC). Pharmacy reviewed the patient's mhlvv-[id number] medications and allergies for accuracy.    The list below reflects the PTA list prior to pharmacy medication history. A summary a changes to the PTA medication list has been listed below. Please review each medication in order reconciliation for additional clarification and justification.    Source of information:  Facility list    Medications added:  Valsartan 40mg bid  Pyridoxine 25mg every day   Oxycodone 5mg q6 prn  MOM 400mg/5ml 30ml hs prn  Melatonin 3mg hs  Gabapentin 100mg 2c hs  Fleet enema prn  Bisacodyl 10mg 1 supp every day prn  Bio freeze 4% AAA tid     Medications modified:  Apap 325mg 3t q8 prn --> 500mg 2t tid  Gabapentin 100mg tid --> 100mg bid   Miralax Powder 17g every day --> 17g every day prn    Medications to be removed:  Aminophylline 250mg/10ml  Xeroform  Folic acid 1mg  WesTab max  Rosuvastatin 10mg     Medications of concern:      Prior to Admission Medications   Prescriptions Last Dose Informant Patient Reported? Taking?   Praluent Pen 150 mg/mL pen injector   No No   Sig: INJECT 1 MILLILITER INTO SKIN EVERY 2 WEEKS IN ABDOMEN, THIGH, OR UPPER ARM ROTATING INJECTION SITES   acetaminophen (Tylenol) 325 mg tablet   No No   Sig: Take 3 tablets (975 mg) by mouth every 8 hours if needed (Any pain).   alendronate (Fosamax) 70 mg tablet   Yes No   Sig: Take 1 tablet (70 mg) by mouth every 7 days. On Mondays   amLODIPine (Norvasc) 5 mg tablet   Yes No   Sig: Take 1 tablet (5 mg) by mouth once daily.   aminophylline 250 mg/10 mL syringe   Yes No   Sig: Infuse 5 mL (125 mg) into a venous catheter.   apixaban (Eliquis) 2.5 mg tablet   No No   Sig: Take 1 tablet (2.5 mg) by mouth every 12 hours.   aspirin 81 mg EC tablet   Yes No   Sig: Take 1 tablet (81 mg) by mouth once daily.   atenolol (Tenormin) 50 mg tablet   Yes No   Sig: Take 1 tablet (50 mg) by mouth 2 times a  "day.   atorvastatin (Lipitor) 20 mg tablet   Yes No   Sig: Take 1 tablet (20 mg) by mouth once daily.   azelastine (Astelin) 137 mcg (0.1 %) nasal spray   Yes No   Sig: Administer 2 sprays into each nostril 2 times a day.   biotin 10,000 mcg capsule   Yes No   Sig: Take 1 capsule (10 mg) by mouth once daily.   bismuth tribrom-petrolatum,wh (Xeroform) 5 X 9 \" bandage   No No   Si XEROFORM CUT TO SIZE ON TOP OF WOUND DAILY   cholecalciferol (Vitamin D-3) 50 MCG (2000 UT) tablet   Yes No   Sig: Take 1 tablet (50 mcg) by mouth once daily.   escitalopram (Lexapro) 10 mg tablet   Yes No   Si tablet (10 mg) once daily.   ferrous sulfate 325 (65 Fe) MG tablet   Yes No   Sig: Take 1 tablet by mouth once daily.   folic acid (Folvite) 1 mg tablet   Yes No   Sig: Take 5 tablets (5 mg) by mouth once daily.   folic acid-vit B6-vit B12 (WesTab Max) 2.5-25-2 mg tablet   Yes No   Sig: Take 1 tablet by mouth once daily.   gabapentin (Neurontin) 100 mg capsule   No No   Sig: Take 1 capsule (100 mg) by mouth 3 times a day.   ipratropium (Atrovent) 21 mcg (0.03 %) nasal spray   Yes No   Sig: Administer 2 sprays into each nostril 2 times a day.   multivitamin with minerals tablet   No No   Sig: Take 1 tablet by mouth once daily.   polyethylene glycol (Glycolax, Miralax) 17 gram packet   No No   Sig: Take 17 g by mouth once daily.   rosuvastatin (Crestor) 10 mg tablet   No No   Sig: Take 1 tablet (10 mg) by mouth once daily at bedtime.      Facility-Administered Medications: None       Lorna Tom"

## 2024-07-29 NOTE — CARE PLAN
The patient's goals for the shift include  Keep patient comfortable and calm    The clinical goals for the shift include Pt will remain free from falls and injury overnight

## 2024-07-29 NOTE — CARE PLAN
The clinical goals for the shift include Pt will remain free from falls and injury overnight      Problem: Pain - Adult  Goal: Verbalizes/displays adequate comfort level or baseline comfort level  Outcome: Progressing     Problem: Safety - Adult  Goal: Free from fall injury  Outcome: Progressing     Problem: Discharge Planning  Goal: Discharge to home or other facility with appropriate resources  Outcome: Progressing     Problem: Chronic Conditions and Co-morbidities  Goal: Patient's chronic conditions and co-morbidity symptoms are monitored and maintained or improved  Outcome: Progressing     Problem: Skin  Goal: Decreased wound size/increased tissue granulation at next dressing change  Outcome: Progressing  Goal: Participates in plan/prevention/treatment measures  Outcome: Progressing  Goal: Prevent/manage excess moisture  Outcome: Progressing  Goal: Prevent/minimize sheer/friction injuries  Outcome: Progressing  Goal: Promote/optimize nutrition  Outcome: Progressing  Goal: Promote skin healing  Outcome: Progressing     Problem: Fall/Injury  Goal: Not fall by end of shift  Outcome: Progressing  Goal: Be free from injury by end of the shift  Outcome: Progressing  Goal: Verbalize understanding of personal risk factors for fall in the hospital  Outcome: Progressing  Goal: Verbalize understanding of risk factor reduction measures to prevent injury from fall in the home  Outcome: Progressing  Goal: Use assistive devices by end of the shift  Outcome: Progressing  Goal: Pace activities to prevent fatigue by end of the shift  Outcome: Progressing

## 2024-07-30 LAB
ANION GAP SERPL CALC-SCNC: 12 MMOL/L (ref 10–20)
BACTERIA UR CULT: NORMAL
BUN SERPL-MCNC: 40 MG/DL (ref 6–23)
CALCIUM SERPL-MCNC: 8.3 MG/DL (ref 8.6–10.3)
CHLORIDE SERPL-SCNC: 110 MMOL/L (ref 98–107)
CO2 SERPL-SCNC: 20 MMOL/L (ref 21–32)
CREAT SERPL-MCNC: 1.23 MG/DL (ref 0.5–1.05)
EGFRCR SERPLBLD CKD-EPI 2021: 43 ML/MIN/1.73M*2
ERYTHROCYTE [DISTWIDTH] IN BLOOD BY AUTOMATED COUNT: 14.5 % (ref 11.5–14.5)
GLUCOSE BLD MANUAL STRIP-MCNC: 125 MG/DL (ref 74–99)
GLUCOSE SERPL-MCNC: 113 MG/DL (ref 74–99)
HCT VFR BLD AUTO: 33.8 % (ref 36–46)
HGB BLD-MCNC: 11.5 G/DL (ref 12–16)
LACTOFERRIN STL QL IA: POSITIVE
MAGNESIUM SERPL-MCNC: 2.05 MG/DL (ref 1.6–2.4)
MCH RBC QN AUTO: 34.7 PG (ref 26–34)
MCHC RBC AUTO-ENTMCNC: 34 G/DL (ref 32–36)
MCV RBC AUTO: 102 FL (ref 80–100)
NRBC BLD-RTO: 0 /100 WBCS (ref 0–0)
PLATELET # BLD AUTO: 366 X10*3/UL (ref 150–450)
POTASSIUM SERPL-SCNC: 3.6 MMOL/L (ref 3.5–5.3)
RBC # BLD AUTO: 3.31 X10*6/UL (ref 4–5.2)
SODIUM SERPL-SCNC: 138 MMOL/L (ref 136–145)
WBC # BLD AUTO: 20 X10*3/UL (ref 4.4–11.3)

## 2024-07-30 PROCEDURE — 36415 COLL VENOUS BLD VENIPUNCTURE: CPT | Performed by: INTERNAL MEDICINE

## 2024-07-30 PROCEDURE — 2500000002 HC RX 250 W HCPCS SELF ADMINISTERED DRUGS (ALT 637 FOR MEDICARE OP, ALT 636 FOR OP/ED): Performed by: NURSE PRACTITIONER

## 2024-07-30 PROCEDURE — 80048 BASIC METABOLIC PNL TOTAL CA: CPT | Performed by: INTERNAL MEDICINE

## 2024-07-30 PROCEDURE — 82947 ASSAY GLUCOSE BLOOD QUANT: CPT

## 2024-07-30 PROCEDURE — 2500000004 HC RX 250 GENERAL PHARMACY W/ HCPCS (ALT 636 FOR OP/ED): Performed by: NURSE PRACTITIONER

## 2024-07-30 PROCEDURE — 1210000001 HC SEMI-PRIVATE ROOM DAILY

## 2024-07-30 PROCEDURE — 85027 COMPLETE CBC AUTOMATED: CPT | Performed by: INTERNAL MEDICINE

## 2024-07-30 PROCEDURE — 2500000001 HC RX 250 WO HCPCS SELF ADMINISTERED DRUGS (ALT 637 FOR MEDICARE OP): Performed by: INTERNAL MEDICINE

## 2024-07-30 PROCEDURE — 99233 SBSQ HOSP IP/OBS HIGH 50: CPT | Performed by: INTERNAL MEDICINE

## 2024-07-30 PROCEDURE — C9113 INJ PANTOPRAZOLE SODIUM, VIA: HCPCS | Performed by: NURSE PRACTITIONER

## 2024-07-30 PROCEDURE — 2500000001 HC RX 250 WO HCPCS SELF ADMINISTERED DRUGS (ALT 637 FOR MEDICARE OP): Performed by: NURSE PRACTITIONER

## 2024-07-30 PROCEDURE — 83735 ASSAY OF MAGNESIUM: CPT | Performed by: INTERNAL MEDICINE

## 2024-07-30 PROCEDURE — 97530 THERAPEUTIC ACTIVITIES: CPT | Mod: GO,CO

## 2024-07-30 PROCEDURE — 97110 THERAPEUTIC EXERCISES: CPT | Mod: GO,CO

## 2024-07-30 RX ORDER — VALSARTAN 40 MG/1
40 TABLET ORAL 2 TIMES DAILY
Status: DISCONTINUED | OUTPATIENT
Start: 2024-07-30 | End: 2024-08-01 | Stop reason: HOSPADM

## 2024-07-30 RX ADMIN — VALSARTAN 40 MG: 40 TABLET, FILM COATED ORAL at 22:24

## 2024-07-30 RX ADMIN — FIDAXOMICIN 200 MG: 200 TABLET, FILM COATED ORAL at 08:19

## 2024-07-30 RX ADMIN — FIDAXOMICIN 200 MG: 200 TABLET, FILM COATED ORAL at 22:24

## 2024-07-30 RX ADMIN — AZELASTINE HYDROCHLORIDE 2 SPRAY: 137 SPRAY, METERED NASAL at 08:19

## 2024-07-30 RX ADMIN — PANTOPRAZOLE SODIUM 40 MG: 40 INJECTION, POWDER, FOR SOLUTION INTRAVENOUS at 06:22

## 2024-07-30 RX ADMIN — GABAPENTIN 100 MG: 100 CAPSULE ORAL at 08:19

## 2024-07-30 RX ADMIN — APIXABAN 2.5 MG: 2.5 TABLET, FILM COATED ORAL at 08:19

## 2024-07-30 RX ADMIN — FOLIC ACID 5 MG: 1 TABLET ORAL at 08:19

## 2024-07-30 RX ADMIN — Medication 2000 UNITS: at 08:19

## 2024-07-30 RX ADMIN — POTASSIUM CITRATE 10 MEQ: 10 TABLET, EXTENDED RELEASE ORAL at 17:49

## 2024-07-30 RX ADMIN — ROSUVASTATIN 10 MG: 10 TABLET, FILM COATED ORAL at 22:24

## 2024-07-30 RX ADMIN — FERROUS SULFATE TAB 325 MG (65 MG ELEMENTAL FE) 1 TABLET: 325 (65 FE) TAB at 08:19

## 2024-07-30 RX ADMIN — POTASSIUM CITRATE 10 MEQ: 10 TABLET, EXTENDED RELEASE ORAL at 08:19

## 2024-07-30 RX ADMIN — VALSARTAN 40 MG: 40 TABLET, FILM COATED ORAL at 10:47

## 2024-07-30 RX ADMIN — AZELASTINE HYDROCHLORIDE 2 SPRAY: 137 SPRAY, METERED NASAL at 22:27

## 2024-07-30 RX ADMIN — ASPIRIN 81 MG: 81 TABLET, COATED ORAL at 08:19

## 2024-07-30 RX ADMIN — APIXABAN 2.5 MG: 2.5 TABLET, FILM COATED ORAL at 22:24

## 2024-07-30 RX ADMIN — ESCITALOPRAM OXALATE 10 MG: 10 TABLET ORAL at 08:19

## 2024-07-30 ASSESSMENT — COGNITIVE AND FUNCTIONAL STATUS - GENERAL
DAILY ACTIVITIY SCORE: 14
WALKING IN HOSPITAL ROOM: TOTAL
CLIMB 3 TO 5 STEPS WITH RAILING: TOTAL
CLIMB 3 TO 5 STEPS WITH RAILING: TOTAL
DAILY ACTIVITIY SCORE: 15
PERSONAL GROOMING: A LITTLE
DRESSING REGULAR LOWER BODY CLOTHING: A LOT
DRESSING REGULAR LOWER BODY CLOTHING: A LITTLE
DRESSING REGULAR UPPER BODY CLOTHING: A LOT
EATING MEALS: A LITTLE
STANDING UP FROM CHAIR USING ARMS: A LOT
STANDING UP FROM CHAIR USING ARMS: A LOT
TURNING FROM BACK TO SIDE WHILE IN FLAT BAD: A LITTLE
DAILY ACTIVITIY SCORE: 14
TOILETING: A LOT
WALKING IN HOSPITAL ROOM: TOTAL
MOVING TO AND FROM BED TO CHAIR: A LOT
MOBILITY SCORE: 13
DRESSING REGULAR UPPER BODY CLOTHING: A LOT
HELP NEEDED FOR BATHING: A LOT
EATING MEALS: A LITTLE
PERSONAL GROOMING: A LITTLE
TOILETING: A LOT
MOVING TO AND FROM BED TO CHAIR: A LOT
DRESSING REGULAR LOWER BODY CLOTHING: A LOT
MOBILITY SCORE: 13
TURNING FROM BACK TO SIDE WHILE IN FLAT BAD: A LITTLE
DRESSING REGULAR UPPER BODY CLOTHING: A LOT
PERSONAL GROOMING: A LITTLE
TOILETING: A LOT
HELP NEEDED FOR BATHING: A LOT
EATING MEALS: A LITTLE
HELP NEEDED FOR BATHING: A LOT

## 2024-07-30 ASSESSMENT — PAIN SCALES - PAIN ASSESSMENT IN ADVANCED DEMENTIA (PAINAD)
TOTALSCORE: 0
BREATHING: NORMAL
CONSOLABILITY: NO NEED TO CONSOLE
FACIALEXPRESSION: SMILING OR INEXPRESSIVE
TOTALSCORE: EMOTIONAL SUPPORT
BODYLANGUAGE: RELAXED

## 2024-07-30 ASSESSMENT — PAIN - FUNCTIONAL ASSESSMENT
PAIN_FUNCTIONAL_ASSESSMENT: 0-10
PAIN_FUNCTIONAL_ASSESSMENT: PAINAD (PAIN ASSESSMENT IN ADVANCED DEMENTIA SCALE)

## 2024-07-30 ASSESSMENT — PAIN SCALES - GENERAL
PAINLEVEL_OUTOF10: 0 - NO PAIN
PAINLEVEL_OUTOF10: 0 - NO PAIN
PAINLEVEL_OUTOF10: 8

## 2024-07-30 ASSESSMENT — PAIN SCALES - WONG BAKER: WONGBAKER_NUMERICALRESPONSE: HURTS WHOLE LOT

## 2024-07-30 NOTE — PROGRESS NOTES
Maggi Gordon is a 89 y.o. female on day 3 of admission presenting with LUCRETIA (acute kidney injury) (CMS-[facility], [facility] within [facility]).      Subjective   Maggi Gordon is a 89 y.o. female with PMHx s/f left BKA, coronary artery disease, aortic valve replacement, A-fib, peripheral arterial disease, chronic kidney disease, hypertension presenting with leukocytosis nausea vomiting diarrhea anorexia abdominal discomfort.  Patient was sent from [facility] where she is resident due to confusion and complaints of diarrhea.  Evidently patient had multiple loose stools anorexia nausea vomiting without fevers chills or rigors she does have some stomach discomfort.  Patient did have some antibiotics for a tooth abscess up to a couple months ago.  She also had a dose of erythromycin yesterday.  On presenting to the emergency department patient had temperature 97.4 heart rate 59 respiratory rate 16 blood pressure 110/48 SpO2 97% labs show sodium 135 potassium 3.6 chloride 106 bicarb 18 BUN 57 creatinine 2.24 glucose 93 albumin 2.5 other liver enzymes within normal limits CBC shows white blood cell count 33.8 hemoglobin 12.2 hematocrit 2.36.5 platelets 327 chest x-ray suggest there is question of atelectasis versus a possible small infiltrate to the left base.  CT of the abdomen pelvis shows basilar atelectasis small hiatal hernia pancolitis 1.4 x 1.1 cm hyperdense nodule midpole left kidney patient was given empiric dose of Zosyn due to her severe leukocytosis and subsequently started on vancomycin in light of her colitis marked leukocytosis anorexia.  Patient will be admitted for IV hydration further workup and treatment for her pancolitis     07/28: Patient was evaluated this morning, continues to have diarrhea, abdominal pain is improving, no more nausea or vomiting.  07/29: Patient was evaluated this morning, continues to have diarrhea but slowing down, no nausea or vomiting.  No fever or chills  07/30: Patient continues to have  diarrhea but is improving, no abdominal pain, no nausea or vomiting.       Objective     Last Recorded Vitals  /56 (BP Location: Left arm, Patient Position: Lying)   Pulse 76   Temp 36.8 °C (98.3 °F)   Resp 16   Wt 53.5 kg (117 lb 14.4 oz)   SpO2 95%   Intake/Output last 3 Shifts:    Intake/Output Summary (Last 24 hours) at 7/30/2024 1324  Last data filed at 7/30/2024 0957  Gross per 24 hour   Intake 410 ml   Output --   Net 410 ml       Admission Weight  Weight: 45.8 kg (101 lb) (07/27/24 1150)    Daily Weight  07/27/24 : 53.5 kg (117 lb 14.4 oz)    Image Results  US renal complete  Narrative: Interpreted By:  Maribel Vicente,   STUDY:  US RENAL COMPLETE; 7/27/2024 5:35 pm      INDICATION:  Signs/Symptoms:Renal nodule seen on CT scan.      COMPARISON:  None.      ACCESSION NUMBER(S):  [id number]      ORDERING CLINICIAN:  ANGELO APODACA      TECHNIQUE:  Grayscale and color Doppler imaging of the kidneys.      FINDINGS:  The right kidney measures 8.9 cm. Right renal cortical thickness of 8  mm.      The left kidney measures 8.1 cm. In the mid left renal cortex there  is a 1.5 cm cyst and a 1 cm cyst. Left renal cortical thickness of 9  mm.      There is no shadowing calculus, hydronephrosis, or solid mass  identified. The renal cortical thickness and echogenicity is normal.      Limited bladder evaluation: No stones or debris seen in the urinary  bladder.      Incidental note is made of sludge in the gallbladder.      Impression: 1. Two cysts are identified in the mid left renal cortex  corresponding to the hyperdense lesion and the hypodense lesion seen  on the recent CT.  2. No hydronephrosis  3. Sludge noted in the gallbladder      .      MACRO:  None.      Signed by: Maribel Vicente 7/27/2024 6:00 PM  Dictation workstation:   [id number]  XR chest 1 view  Narrative: STUDY:  Chest Radiograph;  7/27/2024 at 15:22  INDICATION:  Weakness.  COMPARISON:  XR chest 1/16/2024  ACCESSION  NUMBER(S):  [id number]  ORDERING CLINICIAN:  MEGAN GOMEZ  TECHNIQUE:  Frontal chest was obtained at 15:22 hours.  FINDINGS:  CARDIOMEDIASTINAL SILHOUETTE:  Heart is at the upper limits of normal in size on today's exam.  There  are sternal wire sutures but there does not appear to be any definite  vascular congestion or edema.  ..     LUNGS:  There is question of atelectasis or possible small infiltrate at the  left base and there is a questionable small left effusion but the  lungs otherwise are clear.     ABDOMEN:  No remarkable upper abdominal findings.     BONES:  No acute osseous changes.  Impression: There is mild cardiomegaly but no definite vascular congestion.  There  is question of small infiltrate and/or atelectasis at the left base  with a possible small left effusion.  The lungs otherwise are clear..  Signed by Brendan Zamora MD  CT abdomen pelvis wo IV contrast  Narrative: STUDY:  CT Abdomen and Pelvis without IV Contrast; 7/27/2024 at 2:53 PM  INDICATION:  Abdominal pain.  COMPARISON:  CTA abdominal aorta 7/13/2023.  ACCESSION NUMBER(S):  [id number]  ORDERING CLINICIAN:  MEGAN GOMEZ  TECHNIQUE:  CT of the abdomen and pelvis was performed.  Contiguous axial images  were obtained at 3 mm slice thickness through the abdomen and pelvis.   Coronal and sagittal reconstructions at 3 mm slice thickness were  performed. No intravenous contrast was administered.    Automated mA/kV exposure control was utilized and patient examination  was performed in strict accordance with principles of ALARA.  FINDINGS:  Please note that the evaluation of vessels, lymph nodes and organs is  limited without intravenous contrast.      LOWER CHEST:  No cardiomegaly.  No pericardial effusion.  Patient is status post  median sternotomy.  Lung bases demonstrate chronic changes with mild  basilar atelectasis.  There is elevation of the right hemidiaphragm.   There is a small hiatal hernia.     ABDOMEN:      LIVER:  No hepatomegaly.  Smooth surface contour.  Liver demonstrates fatty  morphology.     BILE DUCTS:  No intrahepatic or extrahepatic biliary ductal dilatation.     GALLBLADDER:  The gallbladder is prominent without adjacent inflammatory changes.  STOMACH:  No abnormalities identified.     PANCREAS:  No masses or ductal dilatation.     SPLEEN:  No splenomegaly or focal splenic lesion.     ADRENAL GLANDS:  No thickening or nodules.     KIDNEYS AND URETERS:  Kidneys are normal in size and location.  No renal or ureteral  calculi.  There is hyperdense nodule mid pole left kidney measuring  1.4 x 1.1 cm and measuring 96 Hounsfield units.     PELVIS:     BLADDER:  No abnormalities identified.     REPRODUCTIVE ORGANS:  Uterus is unremarkable.  BOWEL:  There is bowel wall thickening and inflammatory changes of the colon  extending from the cecum to the rectum.  There are mildly prominent  small bowel loops suggesting component of enteritis.  There is  moderate sigmoid colon diverticulosis.     VESSELS:  No abnormalities identified.  Abdominal aorta is normal in caliber.      PERITONEUM/RETROPERITONEUM/LYMPH NODES:  No free fluid.  No pneumoperitoneum.  No lymphadenopathy.     ABDOMINAL WALL:  No abnormalities identified.  SOFT TISSUES:   No abnormalities identified.     BONES:  No acute fracture or aggressive osseous lesion.  There is disc space  narrowing and endplate degenerative changes with posterior disc  osteophyte formation at L2-3 and L3-4.  Impression: Pancolitis.  Small bowel enteritis.  Hypodense nodule left kidney.  Recommend follow-up ultrasound for  further evaluation.  Signed by Koko Fernandez DO      Physical Exam  Patient is awake and orient, not in apparent distress  Eyes: PERRLA, no conjunctival congestion  Chest: Bilateral Air entry, no crackles or wheezing  Heart: s1S2 regular, no murmur  Abdomen: Soft, non tender, BS present  Ext: Status post left BKA  Relevant Results                Assessment/Plan        1.  Sepsis present on admission with organ dysfunction of LUCRETIA  Patient was started on IV fluid as well as oral vancomycin.  ID was consulted and switched vancomycin to oral fidaxomicin  continue on IV fluid and oral fidaxomicin  Monitor  Follow cultures-blood cultures negative so far  ID follow-up      2.  Severe C. difficile colitis  On oral fidaxomicin  ID on board  Monitor    3.  LUCRETIA on CKD stage III with metabolic acidosis  Continue on IV fluid  Monitor renal function  Renal ultrasound-no hydronephrosis  Nephrology on board      4.  Hypokalemia/hypomagnesemia  Replace and monitor    5.  A-fib  Rate fairly controlled  On Eliquis for anticoagulation    6.  Left kidney nodule  Follow ultrasound report-suggesting renal cyst    7.  PAD status post left BKA               Yaakov Fitch MD

## 2024-07-30 NOTE — PROGRESS NOTES
Maggi Gordon is a 89 y.o. female on day 3 of admission presenting with LUCRETIA (acute kidney injury) (CMS-HCC).      Subjective   .Interval History    Patient is doing well sitting in chair, now on fidaxomicin for management of severe C. difficile colitis  WBC counts have been improving    ROS  Denies chest pain, shortness of breath,  Nausea, vomiting , diarrhea, fever or chills.     No change in Past Medical History        Objective          Vitals 24HR  Heart Rate:  [62-85]   Temp:  [36.2 °C (97.2 °F)-36.8 °C (98.3 °F)]   Resp:  [12-16]   BP: (121-154)/(55-64)   SpO2:  [92 %-97 %]     Vitals:    07/29/24 2109 07/30/24 0044 07/30/24 0451 07/30/24 0957   BP: 130/64 130/63 154/55 121/56   BP Location: Left arm Left arm Left arm Left arm   Patient Position: Lying Lying Lying Lying   Pulse: 85 82 62 76   Resp: 16 12 12 16   Temp: 36.2 °C (97.2 °F) 36.3 °C (97.3 °F) 36.7 °C (98 °F) 36.8 °C (98.3 °F)   TempSrc: Temporal Temporal Temporal    SpO2: 94% 92% 97% 95%   Weight:       Height:           Intake/Output last 3 Shifts:    Intake/Output Summary (Last 24 hours) at 7/30/2024 1333  Last data filed at 7/30/2024 0957  Gross per 24 hour   Intake 410 ml   Output --   Net 410 ml       Physical Exam  Constitutional:       Appearance: Normal appearance.   HENT:      Mouth/Throat:      Mouth: Mucous membranes are dry.      Pharynx: Oropharynx is clear.   Eyes:      Extraocular Movements: Extraocular movements intact.      Pupils: Pupils are equal, round, and reactive to light.   Cardiovascular:      Rate and Rhythm: Normal rate and regular rhythm.   Pulmonary:      Effort: Pulmonary effort is normal.      Breath sounds: Normal breath sounds.   Abdominal:      General: Abdomen is flat. Bowel sounds are normal.      Palpations: Abdomen is soft.   Musculoskeletal:      Right lower leg: No edema.      Left lower leg: No edema.   Skin:     General: Skin is warm and dry.   Neurological:      Mental Status: She is alert and oriented to  person, place, and time. Mental status is at baseline.   Psychiatric:         Mood and Affect: Mood normal.         Behavior: Behavior normal.         Relevant Results  Results from last 7 days   Lab Units 07/30/24  0536 07/29/24 0408 07/28/24  0358   SODIUM mmol/L 138 136 136   POTASSIUM mmol/L 3.6 3.1* 2.8*   CHLORIDE mmol/L 110* 108* 109*   CO2 mmol/L 20* 19* 16*   BUN mg/dL 40* 53* 59*   CREATININE mg/dL 1.23* 1.73* 1.87*   EGFR mL/min/1.73m*2 43* 28* 26*   GLUCOSE mg/dL 113* 104* 79   CALCIUM mg/dL 8.3* 7.5* 6.6*     Results from last 7 days   Lab Units 07/30/24 0536 07/29/24 0408 07/28/24  0358   WBC AUTO x10*3/uL 20.0* 27.2* 26.5*   HEMOGLOBIN g/dL 11.5* 10.8* 9.8*   HEMATOCRIT % 33.8* 31.7* 28.9*   PLATELETS AUTO x10*3/uL 366 337 293                Assessment/Plan      LUCRETIA 2/2 ATN /volume depletion N17.0  CKD IIIA N18.31  Hypokalemia. E87.6  Acute metabolic acidosis E87.21  Volume depletion E 86.9  Pancolitis  Recommendations  Cr starting to improve off fluids  Baseline 1.1-1.2 gfr improved.     Acidosis likely from diarrhea, is non gap   Replete K.  On potassium citrate potassium is improved and so has the acidosis.  Continue to hold antihypertensive medication  Need SBP >100 for adequate renal perfusion.   Renal USG negative for hydronephrosis.   Avoid IV contrast.   Will follow.

## 2024-07-30 NOTE — PROGRESS NOTES
[facility] INFECTIOUS DISEASE PROGRESS NOTE    Patient Name: Maggi Gordon  MRN: [id number]    INTERVAL HISTORY:   Still with diarrhea.  Bowel movements and frequency is slowing down though.  No fevers or chills overnight.  WBC count is improved to 20 today from 33 initially    Patient Active Problem List   Diagnosis    Acquired hypothyroidism    Coronary artery disease involving coronary bypass graft    Benign neoplasm of skin of lower extremity, including hip    Bladder prolapse, female, acquired    Chronic kidney disease (CKD) stage G3a/A1, moderately decreased glomerular filtration rate (GFR) between 45-59 mL/min/1.73 square meter and albuminuria creatinine ratio less than 30 mg/g (CMS* (Multi)    Constipation    Incontinence of feces    Essential hypertension    OLLIE (generalized anxiety disorder)    History of prosthetic aortic valve    Homocysteinemia    Left foot pain    Right foot pain    Macular degeneration    Memory loss    Mixed hyperlipidemia    Nontoxic multinodular goiter    Occlusion and stenosis of bilateral carotid arteries    Onychodystrophy    Onychomycosis    Osteopenia    Osteoporosis    A-fib (Multi)    Perennial allergic rhinitis    Primary insomnia    Psychophysiologic insomnia    Pure hyperglyceridemia    Thoracic aortic aneurysm (TAA) (CMS-HCC)    Thyrotoxicosis with or without goiter    Claudication (CMS-HCC)    S/P AVR (aortic valve replacement)    Status post insertion of iliac artery stent    Anemia    Atelectasis    Bilateral carotid artery stenosis    Cellulitis    Herpes zoster    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    Dilated pancreatic duct ([facility]-HCC)    Fluid overload    Hyperbilirubinemia    Vertebral artery syndrome    Peripheral arterial disease (CMS-HCC)    Acute lower limb ischemia    Fall    History of elevated lipids    History of hypertension    History of hypothyroidism    Premature menopause    Primary osteoarthritis of both knees    Syncope    Fall  from standing    Blunt head trauma    Chronic anticoagulation    Open wound of left foot    Type 2 diabetes mellitus (Multi)    LUCRETIA (acute kidney injury) (CMS-HCC)    Pancolitis (Multi)    Dehydration    Nodule of kidney        ASSESSMENT:   Sepsis, present on admission  Severe C. difficile colitis  Acute renal failure on chronic kidney disease stage III  Atrial fibrillation  Left renal nodule  Peripheral vascular disease with left BKA  Coronary artery disease  History of aortic valve replacement           Plan  Continue the patient on oral Dificid as she meets criteria for risk factors for recurrent/severe C. Difficile  Stool log  Serial abdominal examinations  May need to send a prescription for Dificid for 9 days to outpatient pharmacy to screen for insurance coverage and feasibility of outpatient continuation of the Dificid    MEDICATIONS: reviewed.    Current Facility-Administered Medications:     apixaban (Eliquis) tablet 2.5 mg, 2.5 mg, oral, q12h, JACK Sims, 2.5 mg at 07/29/24 2028    aspirin EC tablet 81 mg, 81 mg, oral, Daily, JACK Sims, 81 mg at 07/29/24 0911    azelastine (Astelin) 137 mcg (0.1 %) nasal spray 2 spray, 2 spray, Each Nostril, BID, Yaakov Fitch MD, 2 spray at 07/29/24 2208    bisacodyl (Dulcolax) EC tablet 10 mg, 10 mg, oral, Daily PRN, JACK Sims    cholecalciferol (Vitamin D-3) tablet 2,000 Units, 2,000 Units, oral, Daily, Yaakov Fitch MD, 2,000 Units at 07/29/24 0911    escitalopram (Lexapro) tablet 10 mg, 10 mg, oral, Daily, Yaakov Fitch MD, 10 mg at 07/29/24 0911    ferrous sulfate (325 mg ferrous sulfate) tablet 1 tablet, 65 mg of iron, oral, Daily, Yaakov Fitch MD, 1 tablet at 07/29/24 0900    fidaxomicin (Dificid) tablet 200 mg, 200 mg, oral, BID, Andrés Lugo MD, 200 mg at 07/29/24 2028    folic acid (Folvite) tablet 5 mg, 5 mg, oral, Daily, Yaakov Fitch MD, 5 mg at 07/29/24 0911    gabapentin (Neurontin)  "capsule 100 mg, 100 mg, oral, Daily, Yaakov Fitch MD, 100 mg at 24 0911    guaiFENesin (Mucinex) 12 hr tablet 600 mg, 600 mg, oral, q12h PRN, JACK Sims    melatonin tablet 3 mg, 3 mg, oral, Nightly PRN, JACK Sims    ondansetron ODT (Zofran-ODT) disintegrating tablet 4 mg, 4 mg, oral, q8h PRN **OR** ondansetron (Zofran) injection 4 mg, 4 mg, intravenous, q8h PRN, ALEXIS Sims-CNP, 4 mg at 24 1445    pantoprazole (ProtoNix) EC tablet 40 mg, 40 mg, oral, Daily before breakfast, 40 mg at 24 0617 **OR** pantoprazole (ProtoNix) injection 40 mg, 40 mg, intravenous, Daily before breakfast, JACK Sims, 40 mg at 24 0622    potassium citrate CR (Urocit-K-10) ER tablet 10 mEq, 10 mEq, oral, BID, Gaurav Ewing MD, 10 mEq at 24 1708    rosuvastatin (Crestor) tablet 10 mg, 10 mg, oral, Nightly, ALEXIS Sims-CNP, 10 mg at 24     PHYSICAL EXAM:  Vital signs: /55 (BP Location: Left arm, Patient Position: Lying)   Pulse 62   Temp 36.7 °C (98 °F) (Temporal)   Resp 12   Ht 1.6 m (5' 3\")   Wt 53.5 kg (117 lb 14.4 oz)   SpO2 97%   BMI 20.89 kg/m²   Temp (24hrs), Av.4 °C (97.5 °F), Min:36.1 °C (97 °F), Max:36.7 °C (98.1 °F)    General: alert, oriented, NAD  Lungs: bilaterally clear to auscultation  Heart: regular rate and rhythm  Abdomen: soft, non tender, non distended, BS+  Extremities: no edema  No rashes  No joint inflammation  Neck supple  Lines ok  No CVAT    Labs:    Results for orders placed or performed during the hospital encounter of 24 (from the past 96 hour(s))   CBC and Auto Differential   Result Value Ref Range    WBC 33.8 (H) 4.4 - 11.3 x10*3/uL    nRBC 0.0 0.0 - 0.0 /100 WBCs    RBC 3.49 (L) 4.00 - 5.20 x10*6/uL    Hemoglobin 12.2 12.0 - 16.0 g/dL    Hematocrit 36.5 36.0 - 46.0 %     (H) 80 - 100 fL    MCH 35.0 (H) 26.0 - 34.0 pg    MCHC 33.4 32.0 - 36.0 g/dL    RDW 14.4 " 11.5 - 14.5 %    Platelets 327 150 - 450 x10*3/uL    Immature Granulocytes %, Automated 1.2 (H) 0.0 - 0.9 %    Immature Granulocytes Absolute, Automated 0.40 0.00 - 0.50 x10*3/uL   Basic metabolic panel   Result Value Ref Range    Glucose 93 74 - 99 mg/dL    Sodium 135 (L) 136 - 145 mmol/L    Potassium 3.6 3.5 - 5.3 mmol/L    Chloride 106 98 - 107 mmol/L    Bicarbonate 18 (L) 21 - 32 mmol/L    Anion Gap 15 10 - 20 mmol/L    Urea Nitrogen 57 (H) 6 - 23 mg/dL    Creatinine 2.24 (H) 0.50 - 1.05 mg/dL    eGFR 21 (L) >60 mL/min/1.73m*2    Calcium 6.9 (L) 8.6 - 10.3 mg/dL   Hepatic function panel   Result Value Ref Range    Albumin 2.5 (L) 3.4 - 5.0 g/dL    Bilirubin, Total 0.4 0.0 - 1.2 mg/dL    Bilirubin, Direct 0.0 0.0 - 0.3 mg/dL    Alkaline Phosphatase 117 33 - 136 U/L    ALT 22 7 - 45 U/L    AST 29 9 - 39 U/L    Total Protein 5.0 (L) 6.4 - 8.2 g/dL   Lipase   Result Value Ref Range    Lipase 14 9 - 82 U/L   Blood Gas Venous Full Panel   Result Value Ref Range    POCT pH, Venous 7.23 (LL) 7.33 - 7.43 pH    POCT pCO2, Venous 40 (L) 41 - 51 mm Hg    POCT pO2, Venous 32 (L) 35 - 45 mm Hg    POCT SO2, Venous 46 45 - 75 %    POCT Oxy Hemoglobin, Venous 45.0 45.0 - 75.0 %    POCT Hematocrit Calculated, Venous 32.0 (L) 36.0 - 46.0 %    POCT Sodium, Venous 131 (L) 136 - 145 mmol/L    POCT Potassium, Venous 3.8 3.5 - 5.3 mmol/L    POCT Chloride, Venous 106 98 - 107 mmol/L    POCT Ionized Calicum, Venous 0.95 (L) 1.10 - 1.33 mmol/L    POCT Glucose, Venous 93 74 - 99 mg/dL    POCT Lactate, Venous 1.3 0.4 - 2.0 mmol/L    POCT Base Excess, Venous -10.1 (L) -2.0 - 3.0 mmol/L    POCT HCO3 Calculated, Venous 16.8 (L) 22.0 - 26.0 mmol/L    POCT Hemoglobin, Venous 10.5 (L) 12.0 - 16.0 g/dL    POCT Anion Gap, Venous 12.0 10.0 - 25.0 mmol/L    Patient Temperature 37.0 degrees Celsius    FiO2 21 %   Manual Differential   Result Value Ref Range    Neutrophils %, Manual 89.0 40.0 - 80.0 %    Bands %, Manual 7.0 0.0 - 5.0 %    Lymphocytes  %, Manual 3.0 13.0 - 44.0 %    Monocytes %, Manual 1.0 2.0 - 10.0 %    Eosinophils %, Manual 0.0 0.0 - 6.0 %    Basophils %, Manual 0.0 0.0 - 2.0 %    Seg Neutrophils Absolute, Manual 30.08 (H) 1.60 - 5.00 x10*3/uL    Bands Absolute, Manual 2.37 (H) 0.00 - 0.50 x10*3/uL    Lymphocytes Absolute, Manual 1.01 0.80 - 3.00 x10*3/uL    Monocytes Absolute, Manual 0.34 0.05 - 0.80 x10*3/uL    Eosinophils Absolute, Manual 0.00 0.00 - 0.40 x10*3/uL    Basophils Absolute, Manual 0.00 0.00 - 0.10 x10*3/uL    Total Cells Counted 100     Neutrophils Absolute, Manual 32.45 (H) 1.60 - 5.50 x10*3/uL    RBC Morphology See Below     Ball Ground Cells Few    Blood Culture    Specimen: Peripheral Venipuncture; Blood culture   Result Value Ref Range    Blood Culture No growth at 2 days    Blood Culture    Specimen: Peripheral Venipuncture; Blood culture   Result Value Ref Range    Blood Culture No growth at 2 days    Blood Culture    Specimen: Peripheral Venipuncture; Blood culture   Result Value Ref Range    Blood Culture No growth at 2 days    Blood Culture    Specimen: Peripheral Venipuncture; Blood culture   Result Value Ref Range    Blood Culture No growth at 2 days    C. difficile, PCR    Specimen: Stool   Result Value Ref Range    C. difficile, PCR Detected (A) Not Detected   Stool Pathogen Panel, PCR    Specimen: Stool   Result Value Ref Range    Campylobacter Group Not Detected Not Detected    Salmonella species Not Detected Not Detected    Shigella species Not Detected Not Detected    Vibrio Group Not Detected Not Detected    Yersinia Enterocolitica Not Detected Not Detected    Shiga Toxin 1 Not Detected Not Detected    Shiga Toxin 2 Not Detected Not Detected    Norovirus GI/GII Not Detected Not Detected    Rotavirus A Not Detected Not Detected   Clostridium Difficile EIA    Specimen: Stool   Result Value Ref Range    C. difficile (Toxin A/B) Positive (A) Negative, Indeterminate   Protime-INR   Result Value Ref Range    Protime 21.8  (H) 9.8 - 12.8 seconds    INR 1.9 (H) 0.9 - 1.1   Basic Metabolic Panel   Result Value Ref Range    Glucose 79 74 - 99 mg/dL    Sodium 136 136 - 145 mmol/L    Potassium 2.8 (LL) 3.5 - 5.3 mmol/L    Chloride 109 (H) 98 - 107 mmol/L    Bicarbonate 16 (L) 21 - 32 mmol/L    Anion Gap 14 10 - 20 mmol/L    Urea Nitrogen 59 (H) 6 - 23 mg/dL    Creatinine 1.87 (H) 0.50 - 1.05 mg/dL    eGFR 26 (L) >60 mL/min/1.73m*2    Calcium 6.6 (L) 8.6 - 10.3 mg/dL   CBC and Auto Differential   Result Value Ref Range    WBC 26.5 (H) 4.4 - 11.3 x10*3/uL    nRBC 0.0 0.0 - 0.0 /100 WBCs    RBC 2.80 (L) 4.00 - 5.20 x10*6/uL    Hemoglobin 9.8 (L) 12.0 - 16.0 g/dL    Hematocrit 28.9 (L) 36.0 - 46.0 %     (H) 80 - 100 fL    MCH 35.0 (H) 26.0 - 34.0 pg    MCHC 33.9 32.0 - 36.0 g/dL    RDW 14.5 11.5 - 14.5 %    Platelets 293 150 - 450 x10*3/uL    Neutrophils % 90.5 40.0 - 80.0 %    Immature Granulocytes %, Automated 1.2 (H) 0.0 - 0.9 %    Lymphocytes % 3.6 13.0 - 44.0 %    Monocytes % 4.1 2.0 - 10.0 %    Eosinophils % 0.3 0.0 - 6.0 %    Basophils % 0.3 0.0 - 2.0 %    Neutrophils Absolute 23.96 (H) 1.60 - 5.50 x10*3/uL    Immature Granulocytes Absolute, Automated 0.32 0.00 - 0.50 x10*3/uL    Lymphocytes Absolute 0.94 0.80 - 3.00 x10*3/uL    Monocytes Absolute 1.08 (H) 0.05 - 0.80 x10*3/uL    Eosinophils Absolute 0.08 0.00 - 0.40 x10*3/uL    Basophils Absolute 0.09 0.00 - 0.10 x10*3/uL   Magnesium   Result Value Ref Range    Magnesium 1.54 (L) 1.60 - 2.40 mg/dL   Morphology   Result Value Ref Range    RBC Morphology No significant RBC morphology present    Urinalysis with Reflex Culture and Microscopic   Result Value Ref Range    Color, Urine Yellow Light-Yellow, Yellow, Dark-Yellow    Appearance, Urine Clear Clear    Specific Gravity, Urine 1.024 1.005 - 1.035    pH, Urine 5.5 5.0, 5.5, 6.0, 6.5, 7.0, 7.5, 8.0    Protein, Urine 30 (1+) (A) NEGATIVE, 10 (TRACE), 20 (TRACE) mg/dL    Glucose, Urine Normal Normal mg/dL    Blood, Urine 0.03  (TRACE) (A) NEGATIVE    Ketones, Urine NEGATIVE NEGATIVE mg/dL    Bilirubin, Urine NEGATIVE NEGATIVE    Urobilinogen, Urine Normal Normal mg/dL    Nitrite, Urine NEGATIVE NEGATIVE    Leukocyte Esterase, Urine 25 Albert/µL (A) NEGATIVE   Extra Urine Gray Tube   Result Value Ref Range    Extra Tube Hold for add-ons.    Microscopic Only, Urine   Result Value Ref Range    WBC, Urine 6-10 (A) 1-5, NONE /HPF    RBC, Urine 1-2 NONE, 1-2, 3-5 /HPF    Squamous Epithelial Cells, Urine 1-9 (SPARSE) Reference range not established. /HPF    Hyaline Casts, Urine 2+ (A) NONE /LPF   Basic Metabolic Panel   Result Value Ref Range    Glucose 104 (H) 74 - 99 mg/dL    Sodium 136 136 - 145 mmol/L    Potassium 3.1 (L) 3.5 - 5.3 mmol/L    Chloride 108 (H) 98 - 107 mmol/L    Bicarbonate 19 (L) 21 - 32 mmol/L    Anion Gap 12 10 - 20 mmol/L    Urea Nitrogen 53 (H) 6 - 23 mg/dL    Creatinine 1.73 (H) 0.50 - 1.05 mg/dL    eGFR 28 (L) >60 mL/min/1.73m*2    Calcium 7.5 (L) 8.6 - 10.3 mg/dL   CBC   Result Value Ref Range    WBC 27.2 (H) 4.4 - 11.3 x10*3/uL    nRBC 0.0 0.0 - 0.0 /100 WBCs    RBC 3.05 (L) 4.00 - 5.20 x10*6/uL    Hemoglobin 10.8 (L) 12.0 - 16.0 g/dL    Hematocrit 31.7 (L) 36.0 - 46.0 %     (H) 80 - 100 fL    MCH 35.4 (H) 26.0 - 34.0 pg    MCHC 34.1 32.0 - 36.0 g/dL    RDW 14.5 11.5 - 14.5 %    Platelets 337 150 - 450 x10*3/uL   Magnesium   Result Value Ref Range    Magnesium 2.32 1.60 - 2.40 mg/dL   Basic Metabolic Panel   Result Value Ref Range    Glucose 113 (H) 74 - 99 mg/dL    Sodium 138 136 - 145 mmol/L    Potassium 3.6 3.5 - 5.3 mmol/L    Chloride 110 (H) 98 - 107 mmol/L    Bicarbonate 20 (L) 21 - 32 mmol/L    Anion Gap 12 10 - 20 mmol/L    Urea Nitrogen 40 (H) 6 - 23 mg/dL    Creatinine 1.23 (H) 0.50 - 1.05 mg/dL    eGFR 43 (L) >60 mL/min/1.73m*2    Calcium 8.3 (L) 8.6 - 10.3 mg/dL   CBC   Result Value Ref Range    WBC 20.0 (H) 4.4 - 11.3 x10*3/uL    nRBC 0.0 0.0 - 0.0 /100 WBCs    RBC 3.31 (L) 4.00 - 5.20 x10*6/uL     Hemoglobin 11.5 (L) 12.0 - 16.0 g/dL    Hematocrit 33.8 (L) 36.0 - 46.0 %     (H) 80 - 100 fL    MCH 34.7 (H) 26.0 - 34.0 pg    MCHC 34.0 32.0 - 36.0 g/dL    RDW 14.5 11.5 - 14.5 %    Platelets 366 150 - 450 x10*3/uL   Magnesium   Result Value Ref Range    Magnesium 2.05 1.60 - 2.40 mg/dL        Microbiology data: reviewed    Imaging data: reviewed      Andrés Lugo  Pager:[phone]

## 2024-07-30 NOTE — CARE PLAN
Problem: Pain - Adult  Goal: Verbalizes/displays adequate comfort level or baseline comfort level  Outcome: Progressing     Problem: Safety - Adult  Goal: Free from fall injury  Outcome: Progressing     Problem: Discharge Planning  Goal: Discharge to home or other facility with appropriate resources  Outcome: Progressing     Problem: Chronic Conditions and Co-morbidities  Goal: Patient's chronic conditions and co-morbidity symptoms are monitored and maintained or improved  Outcome: Progressing     Problem: Skin  Goal: Decreased wound size/increased tissue granulation at next dressing change  Outcome: Progressing  Goal: Participates in plan/prevention/treatment measures  Outcome: Progressing  Goal: Prevent/manage excess moisture  Outcome: Progressing  Goal: Prevent/minimize sheer/friction injuries  Outcome: Progressing  Flowsheets (Taken 7/29/2024 2357)  Prevent/minimize sheer/friction injuries: Use pull sheet  Goal: Promote/optimize nutrition  Outcome: Progressing  Goal: Promote skin healing  Outcome: Progressing     Problem: Fall/Injury  Goal: Not fall by end of shift  Outcome: Progressing  Goal: Be free from injury by end of the shift  Outcome: Progressing  Goal: Verbalize understanding of personal risk factors for fall in the hospital  Outcome: Progressing  Goal: Verbalize understanding of risk factor reduction measures to prevent injury from fall in the home  Outcome: Progressing  Goal: Use assistive devices by end of the shift  Outcome: Progressing  Goal: Pace activities to prevent fatigue by end of the shift  Outcome: Progressing

## 2024-07-30 NOTE — PROGRESS NOTES
[facility] INFECTIOUS DISEASE PROGRESS NOTE    Patient Name: Maggi Gordon  MRN: [id number]    INTERVAL HISTORY:   Still with diarrhea.  Bowel movements and frequency is slowing down though.  No fevers or chills overnight.    Patient Active Problem List   Diagnosis    Acquired hypothyroidism    Coronary artery disease involving coronary bypass graft    Benign neoplasm of skin of lower extremity, including hip    Bladder prolapse, female, acquired    Chronic kidney disease (CKD) stage G3a/A1, moderately decreased glomerular filtration rate (GFR) between 45-59 mL/min/1.73 square meter and albuminuria creatinine ratio less than 30 mg/g (CMS* (Multi)    Constipation    Incontinence of feces    Essential hypertension    OLLIE (generalized anxiety disorder)    History of prosthetic aortic valve    Homocysteinemia    Left foot pain    Right foot pain    Macular degeneration    Memory loss    Mixed hyperlipidemia    Nontoxic multinodular goiter    Occlusion and stenosis of bilateral carotid arteries    Onychodystrophy    Onychomycosis    Osteopenia    Osteoporosis    A-fib (Multi)    Perennial allergic rhinitis    Primary insomnia    Psychophysiologic insomnia    Pure hyperglyceridemia    Thoracic aortic aneurysm (TAA) (CMS-HCC)    Thyrotoxicosis with or without goiter    Claudication (CMS-HCC)    S/P AVR (aortic valve replacement)    Status post insertion of iliac artery stent    Anemia    Atelectasis    Bilateral carotid artery stenosis    Cellulitis    Herpes zoster    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    Dilated pancreatic duct ([facility]-HCC)    Fluid overload    Hyperbilirubinemia    Vertebral artery syndrome    Peripheral arterial disease (CMS-HCC)    Acute lower limb ischemia    Fall    History of elevated lipids    History of hypertension    History of hypothyroidism    Premature menopause    Primary osteoarthritis of both knees    Syncope    Fall from standing    Blunt head trauma    Chronic  anticoagulation    Open wound of left foot    Type 2 diabetes mellitus (Multi)    LUCRETIA (acute kidney injury) (CMS-HCC)    Pancolitis (Multi)    Dehydration    Nodule of kidney        ASSESSMENT:   Sepsis, present on admission  Severe C. difficile colitis  Acute renal failure on chronic kidney disease stage III  Atrial fibrillation  Left renal nodule  Peripheral vascular disease with left BKA  Coronary artery disease  History of aortic valve replacement           Plan  Continue the patient on oral Dificid as she meets criteria for risk factors for recurrent/severe C. Difficile  Stool log  Serial abdominal examinations  May need to send a prescription for Dificid for 9 days to outpatient pharmacy to screen for insurance coverage and feasibility of outpatient continuation of the Dificid    MEDICATIONS: reviewed.    Current Facility-Administered Medications:     apixaban (Eliquis) tablet 2.5 mg, 2.5 mg, oral, q12h, JACK Sims, 2.5 mg at 07/29/24 2028    aspirin EC tablet 81 mg, 81 mg, oral, Daily, JACK Sims, 81 mg at 07/29/24 0911    azelastine (Astelin) 137 mcg (0.1 %) nasal spray 2 spray, 2 spray, Each Nostril, BID, Yaakov Fitch MD, 2 spray at 07/29/24 2208    bisacodyl (Dulcolax) EC tablet 10 mg, 10 mg, oral, Daily PRN, JACK Sims    cholecalciferol (Vitamin D-3) tablet 2,000 Units, 2,000 Units, oral, Daily, Yaakov Fitch MD, 2,000 Units at 07/29/24 0911    escitalopram (Lexapro) tablet 10 mg, 10 mg, oral, Daily, Yaakov Fitch MD, 10 mg at 07/29/24 0911    ferrous sulfate (325 mg ferrous sulfate) tablet 1 tablet, 65 mg of iron, oral, Daily, Yaakov Fitch MD, 1 tablet at 07/29/24 0900    fidaxomicin (Dificid) tablet 200 mg, 200 mg, oral, BID, Andrés Lugo MD, 200 mg at 07/29/24 2028    folic acid (Folvite) tablet 5 mg, 5 mg, oral, Daily, Yaakov Fitch MD, 5 mg at 07/29/24 0911    gabapentin (Neurontin) capsule 100 mg, 100 mg, oral, Daily, Yaakov MEADOWS  "MD Constantine, 100 mg at 24 0911    guaiFENesin (Mucinex) 12 hr tablet 600 mg, 600 mg, oral, q12h PRN, ALEXIS Sims-CNP    melatonin tablet 3 mg, 3 mg, oral, Nightly PRN, ALEXIS Sims-CNP    ondansetron ODT (Zofran-ODT) disintegrating tablet 4 mg, 4 mg, oral, q8h PRN **OR** ondansetron (Zofran) injection 4 mg, 4 mg, intravenous, q8h PRN, ALEXIS Sims-CNP, 4 mg at 24 1445    pantoprazole (ProtoNix) EC tablet 40 mg, 40 mg, oral, Daily before breakfast, 40 mg at 24 0617 **OR** pantoprazole (ProtoNix) injection 40 mg, 40 mg, intravenous, Daily before breakfast, ALEXIS Sims-CNP, 40 mg at 24 0622    potassium citrate CR (Urocit-K-10) ER tablet 10 mEq, 10 mEq, oral, BID, Gaurav Ewing MD, 10 mEq at 24 1708    rosuvastatin (Crestor) tablet 10 mg, 10 mg, oral, Nightly, ALEXIS Sims-CNP, 10 mg at 24     PHYSICAL EXAM:  Vital signs: /55 (BP Location: Left arm, Patient Position: Lying)   Pulse 62   Temp 36.7 °C (98 °F) (Temporal)   Resp 12   Ht 1.6 m (5' 3\")   Wt 53.5 kg (117 lb 14.4 oz)   SpO2 97%   BMI 20.89 kg/m²   Temp (24hrs), Av.4 °C (97.5 °F), Min:36.1 °C (97 °F), Max:36.7 °C (98.1 °F)    General: alert, oriented, NAD  Lungs: bilaterally clear to auscultation  Heart: regular rate and rhythm  Abdomen: soft, non tender, non distended, BS+  Extremities: no edema  No rashes  No joint inflammation  Neck supple  Lines ok  No CVAT    Labs:    Results for orders placed or performed during the hospital encounter of 24 (from the past 96 hour(s))   CBC and Auto Differential   Result Value Ref Range    WBC 33.8 (H) 4.4 - 11.3 x10*3/uL    nRBC 0.0 0.0 - 0.0 /100 WBCs    RBC 3.49 (L) 4.00 - 5.20 x10*6/uL    Hemoglobin 12.2 12.0 - 16.0 g/dL    Hematocrit 36.5 36.0 - 46.0 %     (H) 80 - 100 fL    MCH 35.0 (H) 26.0 - 34.0 pg    MCHC 33.4 32.0 - 36.0 g/dL    RDW 14.4 11.5 - 14.5 %    Platelets 327 150 - 450 " x10*3/uL    Immature Granulocytes %, Automated 1.2 (H) 0.0 - 0.9 %    Immature Granulocytes Absolute, Automated 0.40 0.00 - 0.50 x10*3/uL   Basic metabolic panel   Result Value Ref Range    Glucose 93 74 - 99 mg/dL    Sodium 135 (L) 136 - 145 mmol/L    Potassium 3.6 3.5 - 5.3 mmol/L    Chloride 106 98 - 107 mmol/L    Bicarbonate 18 (L) 21 - 32 mmol/L    Anion Gap 15 10 - 20 mmol/L    Urea Nitrogen 57 (H) 6 - 23 mg/dL    Creatinine 2.24 (H) 0.50 - 1.05 mg/dL    eGFR 21 (L) >60 mL/min/1.73m*2    Calcium 6.9 (L) 8.6 - 10.3 mg/dL   Hepatic function panel   Result Value Ref Range    Albumin 2.5 (L) 3.4 - 5.0 g/dL    Bilirubin, Total 0.4 0.0 - 1.2 mg/dL    Bilirubin, Direct 0.0 0.0 - 0.3 mg/dL    Alkaline Phosphatase 117 33 - 136 U/L    ALT 22 7 - 45 U/L    AST 29 9 - 39 U/L    Total Protein 5.0 (L) 6.4 - 8.2 g/dL   Lipase   Result Value Ref Range    Lipase 14 9 - 82 U/L   Blood Gas Venous Full Panel   Result Value Ref Range    POCT pH, Venous 7.23 (LL) 7.33 - 7.43 pH    POCT pCO2, Venous 40 (L) 41 - 51 mm Hg    POCT pO2, Venous 32 (L) 35 - 45 mm Hg    POCT SO2, Venous 46 45 - 75 %    POCT Oxy Hemoglobin, Venous 45.0 45.0 - 75.0 %    POCT Hematocrit Calculated, Venous 32.0 (L) 36.0 - 46.0 %    POCT Sodium, Venous 131 (L) 136 - 145 mmol/L    POCT Potassium, Venous 3.8 3.5 - 5.3 mmol/L    POCT Chloride, Venous 106 98 - 107 mmol/L    POCT Ionized Calicum, Venous 0.95 (L) 1.10 - 1.33 mmol/L    POCT Glucose, Venous 93 74 - 99 mg/dL    POCT Lactate, Venous 1.3 0.4 - 2.0 mmol/L    POCT Base Excess, Venous -10.1 (L) -2.0 - 3.0 mmol/L    POCT HCO3 Calculated, Venous 16.8 (L) 22.0 - 26.0 mmol/L    POCT Hemoglobin, Venous 10.5 (L) 12.0 - 16.0 g/dL    POCT Anion Gap, Venous 12.0 10.0 - 25.0 mmol/L    Patient Temperature 37.0 degrees Celsius    FiO2 21 %   Manual Differential   Result Value Ref Range    Neutrophils %, Manual 89.0 40.0 - 80.0 %    Bands %, Manual 7.0 0.0 - 5.0 %    Lymphocytes %, Manual 3.0 13.0 - 44.0 %    Monocytes  %, Manual 1.0 2.0 - 10.0 %    Eosinophils %, Manual 0.0 0.0 - 6.0 %    Basophils %, Manual 0.0 0.0 - 2.0 %    Seg Neutrophils Absolute, Manual 30.08 (H) 1.60 - 5.00 x10*3/uL    Bands Absolute, Manual 2.37 (H) 0.00 - 0.50 x10*3/uL    Lymphocytes Absolute, Manual 1.01 0.80 - 3.00 x10*3/uL    Monocytes Absolute, Manual 0.34 0.05 - 0.80 x10*3/uL    Eosinophils Absolute, Manual 0.00 0.00 - 0.40 x10*3/uL    Basophils Absolute, Manual 0.00 0.00 - 0.10 x10*3/uL    Total Cells Counted 100     Neutrophils Absolute, Manual 32.45 (H) 1.60 - 5.50 x10*3/uL    RBC Morphology See Below     [place] Cells Few    Blood Culture    Specimen: Peripheral Venipuncture; Blood culture   Result Value Ref Range    Blood Culture No growth at 2 days    Blood Culture    Specimen: Peripheral Venipuncture; Blood culture   Result Value Ref Range    Blood Culture No growth at 2 days    Blood Culture    Specimen: Peripheral Venipuncture; Blood culture   Result Value Ref Range    Blood Culture No growth at 2 days    Blood Culture    Specimen: Peripheral Venipuncture; Blood culture   Result Value Ref Range    Blood Culture No growth at 2 days    C. difficile, PCR    Specimen: Stool   Result Value Ref Range    C. difficile, PCR Detected (A) Not Detected   Stool Pathogen Panel, PCR    Specimen: Stool   Result Value Ref Range    Campylobacter Group Not Detected Not Detected    Salmonella species Not Detected Not Detected    Shigella species Not Detected Not Detected    Vibrio Group Not Detected Not Detected    Yersinia Enterocolitica Not Detected Not Detected    Shiga Toxin 1 Not Detected Not Detected    Shiga Toxin 2 Not Detected Not Detected    Norovirus GI/GII Not Detected Not Detected    Rotavirus A Not Detected Not Detected   Clostridium Difficile EIA    Specimen: Stool   Result Value Ref Range    C. difficile (Toxin A/B) Positive (A) Negative, Indeterminate   Protime-INR   Result Value Ref Range    Protime 21.8 (H) 9.8 - 12.8 seconds    INR 1.9 (H) 0.9  - 1.1   Basic Metabolic Panel   Result Value Ref Range    Glucose 79 74 - 99 mg/dL    Sodium 136 136 - 145 mmol/L    Potassium 2.8 (LL) 3.5 - 5.3 mmol/L    Chloride 109 (H) 98 - 107 mmol/L    Bicarbonate 16 (L) 21 - 32 mmol/L    Anion Gap 14 10 - 20 mmol/L    Urea Nitrogen 59 (H) 6 - 23 mg/dL    Creatinine 1.87 (H) 0.50 - 1.05 mg/dL    eGFR 26 (L) >60 mL/min/1.73m*2    Calcium 6.6 (L) 8.6 - 10.3 mg/dL   CBC and Auto Differential   Result Value Ref Range    WBC 26.5 (H) 4.4 - 11.3 x10*3/uL    nRBC 0.0 0.0 - 0.0 /100 WBCs    RBC 2.80 (L) 4.00 - 5.20 x10*6/uL    Hemoglobin 9.8 (L) 12.0 - 16.0 g/dL    Hematocrit 28.9 (L) 36.0 - 46.0 %     (H) 80 - 100 fL    MCH 35.0 (H) 26.0 - 34.0 pg    MCHC 33.9 32.0 - 36.0 g/dL    RDW 14.5 11.5 - 14.5 %    Platelets 293 150 - 450 x10*3/uL    Neutrophils % 90.5 40.0 - 80.0 %    Immature Granulocytes %, Automated 1.2 (H) 0.0 - 0.9 %    Lymphocytes % 3.6 13.0 - 44.0 %    Monocytes % 4.1 2.0 - 10.0 %    Eosinophils % 0.3 0.0 - 6.0 %    Basophils % 0.3 0.0 - 2.0 %    Neutrophils Absolute 23.96 (H) 1.60 - 5.50 x10*3/uL    Immature Granulocytes Absolute, Automated 0.32 0.00 - 0.50 x10*3/uL    Lymphocytes Absolute 0.94 0.80 - 3.00 x10*3/uL    Monocytes Absolute 1.08 (H) 0.05 - 0.80 x10*3/uL    Eosinophils Absolute 0.08 0.00 - 0.40 x10*3/uL    Basophils Absolute 0.09 0.00 - 0.10 x10*3/uL   Magnesium   Result Value Ref Range    Magnesium 1.54 (L) 1.60 - 2.40 mg/dL   Morphology   Result Value Ref Range    RBC Morphology No significant RBC morphology present    Urinalysis with Reflex Culture and Microscopic   Result Value Ref Range    Color, Urine Yellow Light-Yellow, Yellow, Dark-Yellow    Appearance, Urine Clear Clear    Specific Gravity, Urine 1.024 1.005 - 1.035    pH, Urine 5.5 5.0, 5.5, 6.0, 6.5, 7.0, 7.5, 8.0    Protein, Urine 30 (1+) (A) NEGATIVE, 10 (TRACE), 20 (TRACE) mg/dL    Glucose, Urine Normal Normal mg/dL    Blood, Urine 0.03 (TRACE) (A) NEGATIVE    Ketones, Urine NEGATIVE  NEGATIVE mg/dL    Bilirubin, Urine NEGATIVE NEGATIVE    Urobilinogen, Urine Normal Normal mg/dL    Nitrite, Urine NEGATIVE NEGATIVE    Leukocyte Esterase, Urine 25 Albert/µL (A) NEGATIVE   Extra Urine Gray Tube   Result Value Ref Range    Extra Tube Hold for add-ons.    Microscopic Only, Urine   Result Value Ref Range    WBC, Urine 6-10 (A) 1-5, NONE /HPF    RBC, Urine 1-2 NONE, 1-2, 3-5 /HPF    Squamous Epithelial Cells, Urine 1-9 (SPARSE) Reference range not established. /HPF    Hyaline Casts, Urine 2+ (A) NONE /LPF   Basic Metabolic Panel   Result Value Ref Range    Glucose 104 (H) 74 - 99 mg/dL    Sodium 136 136 - 145 mmol/L    Potassium 3.1 (L) 3.5 - 5.3 mmol/L    Chloride 108 (H) 98 - 107 mmol/L    Bicarbonate 19 (L) 21 - 32 mmol/L    Anion Gap 12 10 - 20 mmol/L    Urea Nitrogen 53 (H) 6 - 23 mg/dL    Creatinine 1.73 (H) 0.50 - 1.05 mg/dL    eGFR 28 (L) >60 mL/min/1.73m*2    Calcium 7.5 (L) 8.6 - 10.3 mg/dL   CBC   Result Value Ref Range    WBC 27.2 (H) 4.4 - 11.3 x10*3/uL    nRBC 0.0 0.0 - 0.0 /100 WBCs    RBC 3.05 (L) 4.00 - 5.20 x10*6/uL    Hemoglobin 10.8 (L) 12.0 - 16.0 g/dL    Hematocrit 31.7 (L) 36.0 - 46.0 %     (H) 80 - 100 fL    MCH 35.4 (H) 26.0 - 34.0 pg    MCHC 34.1 32.0 - 36.0 g/dL    RDW 14.5 11.5 - 14.5 %    Platelets 337 150 - 450 x10*3/uL   Magnesium   Result Value Ref Range    Magnesium 2.32 1.60 - 2.40 mg/dL   Basic Metabolic Panel   Result Value Ref Range    Glucose 113 (H) 74 - 99 mg/dL    Sodium 138 136 - 145 mmol/L    Potassium 3.6 3.5 - 5.3 mmol/L    Chloride 110 (H) 98 - 107 mmol/L    Bicarbonate 20 (L) 21 - 32 mmol/L    Anion Gap 12 10 - 20 mmol/L    Urea Nitrogen 40 (H) 6 - 23 mg/dL    Creatinine 1.23 (H) 0.50 - 1.05 mg/dL    eGFR 43 (L) >60 mL/min/1.73m*2    Calcium 8.3 (L) 8.6 - 10.3 mg/dL   CBC   Result Value Ref Range    WBC 20.0 (H) 4.4 - 11.3 x10*3/uL    nRBC 0.0 0.0 - 0.0 /100 WBCs    RBC 3.31 (L) 4.00 - 5.20 x10*6/uL    Hemoglobin 11.5 (L) 12.0 - 16.0 g/dL    Hematocrit  33.8 (L) 36.0 - 46.0 %     (H) 80 - 100 fL    MCH 34.7 (H) 26.0 - 34.0 pg    MCHC 34.0 32.0 - 36.0 g/dL    RDW 14.5 11.5 - 14.5 %    Platelets 366 150 - 450 x10*3/uL   Magnesium   Result Value Ref Range    Magnesium 2.05 1.60 - 2.40 mg/dL        Microbiology data: reviewed    Imaging data: reviewed      Andrés Lugo  Pager:[phone]

## 2024-07-30 NOTE — PROGRESS NOTES
Occupational Therapy    OT Treatment    Patient Name: Maggi Gordon  MRN: [id number]  Today's Date: 7/30/2024  Time Calculation  Start Time: 0945  Stop Time: 1009  Time Calculation (min): 24 min        Assessment:  OT Assessment: Pt continues to require max A for functional transfers with max verbal cues for safety, sequencing and transfer technique.  End of Session Communication: Bedside nurse  End of Session Patient Position: Up in chair, Alarm on     Plan:  Treatment Interventions: ADL retraining, Functional transfer training, UE strengthening/ROM, Endurance training, Cognitive reorientation, Patient/family training, Equipment evaluation/education, Neuromuscular reeducation  OT Frequency: 3 times per week  OT Discharge Recommendations: Low intensity level of continued care  OT - OK to Discharge: Yes  Treatment Interventions: ADL retraining, Functional transfer training, UE strengthening/ROM, Endurance training, Cognitive reorientation, Patient/family training, Equipment evaluation/education, Neuromuscular reeducation    Subjective   Previous Visit Info:  OT Last Visit  OT Received On: 07/30/24  General:  General  Prior to Session Communication: Bedside nurse  Patient Position Received: Bed, 3 rail up, Alarm on  General Comment: Pt agreeable abd cooperative to therapy.       Pain:  Pain Assessment  Pain Assessment: 0-10  0-10 (Numeric) Pain Score: 0 - No pain    Objective    Cognition:  Cognition  Orientation Level: Disoriented to situation, Disoriented to time      Functional Standing Tolerance:  Activity: Pt completed x5 static stands tolerating for 5-15 seconds with max A using FWW for stability.  Bed Mobility/Transfers: Bed Mobility 1  Bed Mobility 1: Supine to sitting  Level of Assistance 1: Moderate assistance    Transfer 1  Transfer From 1: Bed to  Transfer to 1: Chair with arms  Technique 1: Stand pivot  Transfer Device 1: Walker  Transfer Level of Assistance 1: Maximum assistance                Therapy/Activity: Therapeutic Exercise  Therapeutic Exercise Performed: Yes  Therapeutic Exercise Activity 1: Pt instructed on BUE ther ex completing x15 reps of elbow flexion/extension, horizontal abd and shoulder flexion. Pt completed x10 reps of lateral leaning and cross body reaching with trunk rotation.    Therapeutic Activity  Therapeutic Activity Performed: Yes  Therapeutic Activity 1: Pt tolerated sitting EOB for 10 minutes with SBA.      Outcome Measures:[facility] Daily Activity  Putting on and taking off regular lower body clothing: A lot  Bathing (including washing, rinsing, drying): A lot  Putting on and taking off regular upper body clothing: A lot  Toileting, which includes using toilet, bedpan or urinal: A lot  Taking care of personal grooming such as brushing teeth: A little  Eating Meals: A little  Daily Activity - Total Score: 14        Education Documentation  Body Mechanics, taught by TRISTA Koo at 7/30/2024 11:47 AM.  Learner: Patient  Readiness: Acceptance  Method: Explanation  Response: Needs Reinforcement    Education Comments  No comments found.            Goals:  Encounter Problems       Encounter Problems (Active)       ADLs       Patient will complete daily grooming tasks brushing teeth and washing face/hair with set-up and supervision level of assistance and PRN adaptive equipment while supported sitting. (Progressing)       Start:  07/29/24    Expected End:  08/12/24            Patient will complete toileting including hygiene clothing management/hygiene with set-up and supervision level of assistance and raised toilet seat, grab bars, and bedside commode. (Progressing)       Start:  07/29/24    Expected End:  08/12/24               EXERCISE/STRENGTHENING       Patient with increase BUE by 1/2 MMT grade strength. (Progressing)       Start:  07/29/24    Expected End:  08/12/24               TRANSFERS       Patient will complete functional transfers with least restrictive  device with modified independent level of assistance. (Progressing)       Start:  07/29/24    Expected End:  08/12/24

## 2024-07-30 NOTE — DOCUMENTATION CLARIFICATION NOTE
"    PATIENT:               TOMASA BALTAZAR  ACCT #:                  [id number]  MRN:                       [id number]  :                       1935  ADMIT DATE:       2024 11:36 AM  DISCH DATE:  RESPONDING PROVIDER #:        [id number]          PROVIDER RESPONSE TEXT:    Metabolic encephalopathy 2/2 Dehydration, LUCRETIA, Cdiff    CDI QUERY TEXT:    Clarification        Instruction:    Based on your assessment of the patient and the clinical information, please provide the requested documentation by clicking on the appropriate radio button and enter any additional information if prompted.    Question: Please further clarify the most likely etiology of the altered mental status as    When answering this query, please exercise your independent professional judgment. The fact that a question is being asked, does not imply that any particular answer is desired or expected.    The patient's clinical indicators include:  Clinical Information:  - 89yo female admitted with Cdiff pancolitis, Sepsis, LUCRETIA with ATN, hypokalemia, hypomag,  and confusion.  PMH includes L BKA, chronic leg pain, hypothyroid, HTN, CKD, CAD, DM, CABG, carotid stenosis, renal artery stenosis and AV replaced.    Clinical Indicators:  - ED Dr Barraza  : \" According to nursing home report also with seemed confused and off of from her baseline mental status self.  - H&P NP Matchett : \"Patient was sent from [facility] nursing [facility] where she is resident due to confusion and complaints of diarrhea\"    - RN Neuro assessments:     Alert, Oriented x4, forgetful     Alert, Oriented x4, forgetful     Disoriented to time, situation, poor judgement, forgetful     Disoriented to time, situation, follows commands, poor attention/concentration, short term memory loss, forgetful      Treatment: Nephrology consult, IVF boluses, Zosyn, Vancomycin, electrolyte replacements.    Risk Factors: Sepsis, LUCRETIA, Cdiff pancolitis, hypok, " hypomag.  Options provided:  -- Metabolic encephalopathy 2/2 Dehydration, LUCRETIA, Cdiff  -- Metabolic encephalopathy 2/2 Dehydration, LUCRETIA, Cdiff superimposed on possible dementia  -- Possible dementia  -- Other - I will add my own diagnosis  -- Refer to Clinical Documentation Reviewer    Query created by: Ariella Nava on 7/30/2024 11:59 AM      Electronically signed by:  REANRD BAIN MD 7/30/2024 1:18 PM

## 2024-07-31 LAB
ANION GAP SERPL CALC-SCNC: 8 MMOL/L (ref 10–20)
BACTERIA BLD CULT: NORMAL
BACTERIA BLD CULT: NORMAL
BUN SERPL-MCNC: 27 MG/DL (ref 6–23)
CALCIUM SERPL-MCNC: 8.5 MG/DL (ref 8.6–10.3)
CHLORIDE SERPL-SCNC: 107 MMOL/L (ref 98–107)
CO2 SERPL-SCNC: 28 MMOL/L (ref 21–32)
CREAT SERPL-MCNC: 1.11 MG/DL (ref 0.5–1.05)
CRYPTOSP AG STL QL IA: NEGATIVE
EGFRCR SERPLBLD CKD-EPI 2021: 48 ML/MIN/1.73M*2
ERYTHROCYTE [DISTWIDTH] IN BLOOD BY AUTOMATED COUNT: 14.7 % (ref 11.5–14.5)
G LAMBLIA AG STL QL IA: NEGATIVE
GLUCOSE SERPL-MCNC: 105 MG/DL (ref 74–99)
HCT VFR BLD AUTO: 32.5 % (ref 36–46)
HGB BLD-MCNC: 10.8 G/DL (ref 12–16)
MAGNESIUM SERPL-MCNC: 1.67 MG/DL (ref 1.6–2.4)
MCH RBC QN AUTO: 34.7 PG (ref 26–34)
MCHC RBC AUTO-ENTMCNC: 33.2 G/DL (ref 32–36)
MCV RBC AUTO: 105 FL (ref 80–100)
NRBC BLD-RTO: 0 /100 WBCS (ref 0–0)
PLATELET # BLD AUTO: 353 X10*3/UL (ref 150–450)
POTASSIUM SERPL-SCNC: 3.5 MMOL/L (ref 3.5–5.3)
RBC # BLD AUTO: 3.11 X10*6/UL (ref 4–5.2)
SODIUM SERPL-SCNC: 139 MMOL/L (ref 136–145)
WBC # BLD AUTO: 16 X10*3/UL (ref 4.4–11.3)

## 2024-07-31 PROCEDURE — 2500000001 HC RX 250 WO HCPCS SELF ADMINISTERED DRUGS (ALT 637 FOR MEDICARE OP): Performed by: NURSE PRACTITIONER

## 2024-07-31 PROCEDURE — 1210000001 HC SEMI-PRIVATE ROOM DAILY

## 2024-07-31 PROCEDURE — 82374 ASSAY BLOOD CARBON DIOXIDE: CPT | Performed by: INTERNAL MEDICINE

## 2024-07-31 PROCEDURE — 2500000001 HC RX 250 WO HCPCS SELF ADMINISTERED DRUGS (ALT 637 FOR MEDICARE OP): Performed by: INTERNAL MEDICINE

## 2024-07-31 PROCEDURE — 85027 COMPLETE CBC AUTOMATED: CPT | Performed by: INTERNAL MEDICINE

## 2024-07-31 PROCEDURE — 36415 COLL VENOUS BLD VENIPUNCTURE: CPT | Performed by: INTERNAL MEDICINE

## 2024-07-31 PROCEDURE — 99233 SBSQ HOSP IP/OBS HIGH 50: CPT | Performed by: INTERNAL MEDICINE

## 2024-07-31 PROCEDURE — 83735 ASSAY OF MAGNESIUM: CPT | Performed by: INTERNAL MEDICINE

## 2024-07-31 PROCEDURE — 2500000002 HC RX 250 W HCPCS SELF ADMINISTERED DRUGS (ALT 637 FOR MEDICARE OP, ALT 636 FOR OP/ED): Performed by: NURSE PRACTITIONER

## 2024-07-31 RX ADMIN — APIXABAN 2.5 MG: 2.5 TABLET, FILM COATED ORAL at 20:51

## 2024-07-31 RX ADMIN — AZELASTINE HYDROCHLORIDE 2 SPRAY: 137 SPRAY, METERED NASAL at 20:51

## 2024-07-31 RX ADMIN — VALSARTAN 40 MG: 40 TABLET, FILM COATED ORAL at 20:51

## 2024-07-31 RX ADMIN — AZELASTINE HYDROCHLORIDE 2 SPRAY: 137 SPRAY, METERED NASAL at 09:00

## 2024-07-31 RX ADMIN — FIDAXOMICIN 200 MG: 200 TABLET, FILM COATED ORAL at 08:57

## 2024-07-31 RX ADMIN — ROSUVASTATIN 10 MG: 10 TABLET, FILM COATED ORAL at 20:51

## 2024-07-31 RX ADMIN — POTASSIUM CITRATE 10 MEQ: 10 TABLET, EXTENDED RELEASE ORAL at 17:00

## 2024-07-31 RX ADMIN — GABAPENTIN 100 MG: 100 CAPSULE ORAL at 08:57

## 2024-07-31 RX ADMIN — Medication 3 MG: at 00:21

## 2024-07-31 RX ADMIN — ASPIRIN 81 MG: 81 TABLET, COATED ORAL at 08:57

## 2024-07-31 RX ADMIN — POTASSIUM CITRATE 10 MEQ: 10 TABLET, EXTENDED RELEASE ORAL at 08:57

## 2024-07-31 RX ADMIN — FOLIC ACID 5 MG: 1 TABLET ORAL at 08:57

## 2024-07-31 RX ADMIN — APIXABAN 2.5 MG: 2.5 TABLET, FILM COATED ORAL at 08:57

## 2024-07-31 RX ADMIN — FIDAXOMICIN 200 MG: 200 TABLET, FILM COATED ORAL at 20:51

## 2024-07-31 RX ADMIN — ESCITALOPRAM OXALATE 10 MG: 10 TABLET ORAL at 08:57

## 2024-07-31 RX ADMIN — Medication 3 MG: at 20:51

## 2024-07-31 RX ADMIN — VALSARTAN 40 MG: 40 TABLET, FILM COATED ORAL at 08:57

## 2024-07-31 RX ADMIN — FERROUS SULFATE TAB 325 MG (65 MG ELEMENTAL FE) 1 TABLET: 325 (65 FE) TAB at 08:57

## 2024-07-31 RX ADMIN — Medication 2000 UNITS: at 08:57

## 2024-07-31 ASSESSMENT — COGNITIVE AND FUNCTIONAL STATUS - GENERAL
DAILY ACTIVITIY SCORE: 20
TURNING FROM BACK TO SIDE WHILE IN FLAT BAD: A LITTLE
DRESSING REGULAR UPPER BODY CLOTHING: A LITTLE
HELP NEEDED FOR BATHING: A LITTLE
DRESSING REGULAR LOWER BODY CLOTHING: A LITTLE
MOVING TO AND FROM BED TO CHAIR: A LITTLE
MOBILITY SCORE: 17
CLIMB 3 TO 5 STEPS WITH RAILING: A LOT
MOVING TO AND FROM BED TO CHAIR: A LITTLE
MOVING FROM LYING ON BACK TO SITTING ON SIDE OF FLAT BED WITH BEDRAILS: A LITTLE
DRESSING REGULAR UPPER BODY CLOTHING: A LITTLE
DAILY ACTIVITIY SCORE: 20
TOILETING: A LITTLE
WALKING IN HOSPITAL ROOM: A LITTLE
TOILETING: A LITTLE
STANDING UP FROM CHAIR USING ARMS: A LITTLE
CLIMB 3 TO 5 STEPS WITH RAILING: A LOT
STANDING UP FROM CHAIR USING ARMS: A LITTLE
MOBILITY SCORE: 17
DRESSING REGULAR LOWER BODY CLOTHING: A LITTLE
WALKING IN HOSPITAL ROOM: A LOT
TURNING FROM BACK TO SIDE WHILE IN FLAT BAD: A LITTLE
HELP NEEDED FOR BATHING: A LITTLE

## 2024-07-31 ASSESSMENT — PAIN - FUNCTIONAL ASSESSMENT
PAIN_FUNCTIONAL_ASSESSMENT: 0-10
PAIN_FUNCTIONAL_ASSESSMENT: 0-10

## 2024-07-31 ASSESSMENT — PAIN SCALES - GENERAL
PAINLEVEL_OUTOF10: 0 - NO PAIN

## 2024-07-31 NOTE — PROGRESS NOTES
Patient pending AM lab work tomorrow for improvement. Patients discharge plan remains to return to [facility], No insurance auth needed. .TCC to follow.

## 2024-07-31 NOTE — PROGRESS NOTES
Maggi Gordon is a 89 y.o. female on day 4 of admission presenting with LUCRETIA (acute kidney injury) (CMS-[facility]).      Subjective   Maggi Gordon is a 89 y.o. female with PMHx s/f left BKA, coronary artery disease, aortic valve replacement, A-fib, peripheral arterial disease, chronic kidney disease, hypertension presenting with leukocytosis nausea vomiting diarrhea anorexia abdominal discomfort.  Patient was sent from [facility] where she is resident due to confusion and complaints of diarrhea.  Evidently patient had multiple loose stools anorexia nausea vomiting without fevers chills or rigors she does have some stomach discomfort.  Patient did have some antibiotics for a tooth abscess up to a couple months ago.  She also had a dose of erythromycin yesterday.  On presenting to the emergency department patient had temperature 97.4 heart rate 59 respiratory rate 16 blood pressure 110/48 SpO2 97% labs show sodium 135 potassium 3.6 chloride 106 bicarb 18 BUN 57 creatinine 2.24 glucose 93 albumin 2.5 other liver enzymes within normal limits CBC shows white blood cell count 33.8 hemoglobin 12.2 hematocrit 2.36.5 platelets 327 chest x-ray suggest there is question of atelectasis versus a possible small infiltrate to the left base.  CT of the abdomen pelvis shows basilar atelectasis small hiatal hernia pancolitis 1.4 x 1.1 cm hyperdense nodule midpole left kidney patient was given empiric dose of Zosyn due to her severe leukocytosis and subsequently started on vancomycin in light of her colitis marked leukocytosis anorexia.  Patient will be admitted for IV hydration further workup and treatment for her pancolitis     07/28: Patient was evaluated this morning, continues to have diarrhea, abdominal pain is improving, no more nausea or vomiting.  07/29: Patient was evaluated this morning, continues to have diarrhea but slowing down, no nausea or vomiting.  No fever or chills  07/30: Patient continues to have  diarrhea but is improving, no abdominal pain, no nausea or vomiting.  07/31: Patient was evaluated this morning, diarrhea is improving, renal function as well as WBC improving.  RN reported that patient did not have good sleep overnight and was having hallucination.       Objective     Last Recorded Vitals  /69 (BP Location: Right arm, Patient Position: Lying)   Pulse 96   Temp 36.5 °C (97.7 °F) (Temporal)   Resp 18   Wt 53.5 kg (117 lb 14.4 oz)   SpO2 96%   Intake/Output last 3 Shifts:    Intake/Output Summary (Last 24 hours) at 7/31/2024 1124  Last data filed at 7/31/2024 0816  Gross per 24 hour   Intake 300 ml   Output --   Net 300 ml       Admission Weight  Weight: 45.8 kg (101 lb) (07/27/24 1150)    Daily Weight  07/27/24 : 53.5 kg (117 lb 14.4 oz)    Image Results  US renal complete  Narrative: Interpreted By:  Maribel Vicente,   STUDY:  US RENAL COMPLETE; 7/27/2024 5:35 pm      INDICATION:  Signs/Symptoms:Renal nodule seen on CT scan.      COMPARISON:  None.      ACCESSION NUMBER(S):  [id number]      ORDERING CLINICIAN:  ANGELO APODACA      TECHNIQUE:  Grayscale and color Doppler imaging of the kidneys.      FINDINGS:  The right kidney measures 8.9 cm. Right renal cortical thickness of 8  mm.      The left kidney measures 8.1 cm. In the mid left renal cortex there  is a 1.5 cm cyst and a 1 cm cyst. Left renal cortical thickness of 9  mm.      There is no shadowing calculus, hydronephrosis, or solid mass  identified. The renal cortical thickness and echogenicity is normal.      Limited bladder evaluation: No stones or debris seen in the urinary  bladder.      Incidental note is made of sludge in the gallbladder.      Impression: 1. Two cysts are identified in the mid left renal cortex  corresponding to the hyperdense lesion and the hypodense lesion seen  on the recent CT.  2. No hydronephrosis  3. Sludge noted in the gallbladder      .      MACRO:  None.      Signed by: Maribel Vicente 7/27/2024 6:00  PM  Dictation workstation:   [id number]  XR chest 1 view  Narrative: STUDY:  Chest Radiograph;  7/27/2024 at 15:22  INDICATION:  Weakness.  COMPARISON:  XR chest 1/16/2024  ACCESSION NUMBER(S):  [id number]  ORDERING CLINICIAN:  MEGAN GOMEZ  TECHNIQUE:  Frontal chest was obtained at 15:22 hours.  FINDINGS:  CARDIOMEDIASTINAL SILHOUETTE:  Heart is at the upper limits of normal in size on today's exam.  There  are sternal wire sutures but there does not appear to be any definite  vascular congestion or edema.  ..     LUNGS:  There is question of atelectasis or possible small infiltrate at the  left base and there is a questionable small left effusion but the  lungs otherwise are clear.     ABDOMEN:  No remarkable upper abdominal findings.     BONES:  No acute osseous changes.  Impression: There is mild cardiomegaly but no definite vascular congestion.  There  is question of small infiltrate and/or atelectasis at the left base  with a possible small left effusion.  The lungs otherwise are clear..  Signed by Brendan Zamora MD  CT abdomen pelvis wo IV contrast  Narrative: STUDY:  CT Abdomen and Pelvis without IV Contrast; 7/27/2024 at 2:53 PM  INDICATION:  Abdominal pain.  COMPARISON:  CTA abdominal aorta 7/13/2023.  ACCESSION NUMBER(S):  [id number]  ORDERING CLINICIAN:  MEGAN GOMEZ  TECHNIQUE:  CT of the abdomen and pelvis was performed.  Contiguous axial images  were obtained at 3 mm slice thickness through the abdomen and pelvis.   Coronal and sagittal reconstructions at 3 mm slice thickness were  performed. No intravenous contrast was administered.    Automated mA/kV exposure control was utilized and patient examination  was performed in strict accordance with principles of ALARA.  FINDINGS:  Please note that the evaluation of vessels, lymph nodes and organs is  limited without intravenous contrast.      LOWER CHEST:  No cardiomegaly.  No pericardial effusion.  Patient is status post  median  sternotomy.  Lung bases demonstrate chronic changes with mild  basilar atelectasis.  There is elevation of the right hemidiaphragm.   There is a small hiatal hernia.     ABDOMEN:     LIVER:  No hepatomegaly.  Smooth surface contour.  Liver demonstrates fatty  morphology.     BILE DUCTS:  No intrahepatic or extrahepatic biliary ductal dilatation.     GALLBLADDER:  The gallbladder is prominent without adjacent inflammatory changes.  STOMACH:  No abnormalities identified.     PANCREAS:  No masses or ductal dilatation.     SPLEEN:  No splenomegaly or focal splenic lesion.     ADRENAL GLANDS:  No thickening or nodules.     KIDNEYS AND URETERS:  Kidneys are normal in size and location.  No renal or ureteral  calculi.  There is hyperdense nodule mid pole left kidney measuring  1.4 x 1.1 cm and measuring 96 Hounsfield units.     PELVIS:     BLADDER:  No abnormalities identified.     REPRODUCTIVE ORGANS:  Uterus is unremarkable.  BOWEL:  There is bowel wall thickening and inflammatory changes of the colon  extending from the cecum to the rectum.  There are mildly prominent  small bowel loops suggesting component of enteritis.  There is  moderate sigmoid colon diverticulosis.     VESSELS:  No abnormalities identified.  Abdominal aorta is normal in caliber.      PERITONEUM/RETROPERITONEUM/LYMPH NODES:  No free fluid.  No pneumoperitoneum.  No lymphadenopathy.     ABDOMINAL WALL:  No abnormalities identified.  SOFT TISSUES:   No abnormalities identified.     BONES:  No acute fracture or aggressive osseous lesion.  There is disc space  narrowing and endplate degenerative changes with posterior disc  osteophyte formation at L2-3 and L3-4.  Impression: Pancolitis.  Small bowel enteritis.  Hypodense nodule left kidney.  Recommend follow-up ultrasound for  further evaluation.  Signed by Koko Fernandez DO      Physical Exam  Patient is awake and orient, not in apparent distress  Eyes: PERRLA, no conjunctival congestion  Chest:  Bilateral Air entry, no crackles or wheezing  Heart: s1S2 regular, no murmur  Abdomen: Soft, non tender, BS present  Ext: Status post left BKA  Relevant Results               Assessment/Plan        1.  Sepsis present on admission with organ dysfunction of LUCRETIA  Patient was started on IV fluid as well as oral vancomycin.  ID was consulted and switched vancomycin to oral fidaxomicin  Monitor  Follow cultures-blood cultures negative so far  ID follow-up  Discharge plan in a.m. if patient condition continues to improve, lab better and cleared by ID/nephrology.      2.  Severe C. difficile colitis  On oral fidaxomicin  ID on board  Monitor    3.  LUCRETIA on CKD stage III with metabolic acidosis  Started on IV fluid, renal function gradually improving  Monitor renal function  Renal ultrasound-no hydronephrosis  Nephrology on board      4.  Hypokalemia/hypomagnesemia  Replace and monitor    5.  A-fib  Rate fairly controlled  On Eliquis for anticoagulation    6.  Left kidney nodule  Follow ultrasound report-suggesting renal cyst    7.  PAD status post left BKA      8.  Hallucination  Could be secondary to lack of sleep and hospital associated delirium,  Frequent reorientation  Minimize disturbance and interruption at night                 Yaakov Fitch MD

## 2024-07-31 NOTE — CARE PLAN
Problem: Skin  Goal: Decreased wound size/increased tissue granulation at next dressing change  Flowsheets (Taken 7/31/2024 1114)  Decreased wound size/increased tissue granulation at next dressing change: Protective dressings over bony prominences  Goal: Participates in plan/prevention/treatment measures  Flowsheets (Taken 7/31/2024 1114)  Participates in plan/prevention/treatment measures: Increase activity/out of bed for meals  Goal: Prevent/manage excess moisture  Flowsheets (Taken 7/31/2024 1114)  Prevent/manage excess moisture: Cleanse incontinence/protect with barrier cream  Goal: Prevent/minimize sheer/friction injuries  Flowsheets (Taken 7/31/2024 1114)  Prevent/minimize sheer/friction injuries: Complete micro-shifts as needed if patient unable. Adjust patient position to relieve pressure points, not a full turn   The patient's goals for the shift include      The clinical goals for the shift include maintain pt safety - no falls

## 2024-07-31 NOTE — PROGRESS NOTES
Maggi Gordon is a 89 y.o. female on day 4 of admission presenting with LUCRETIA (acute kidney injury) (CMS-[facility]).      Subjective   .Interval History    Diarrhea improved  Lying in bed  WBC counts have been improving    ROS  Denies chest pain, shortness of breath,  Nausea, vomiting , diarrhea, fever or chills.     No change in Past Medical History        Objective          Vitals 24HR  Heart Rate:  []   Temp:  [36.4 °C (97.6 °F)-37 °C (98.6 °F)]   Resp:  [14-18]   BP: (116-165)/(52-69)   SpO2:  [93 %-96 %]     Vitals:    07/30/24 2138 07/31/24 0131 07/31/24 0531 07/31/24 0851   BP: 164/52 165/67 160/68 150/69   BP Location: Right arm Right arm Right arm Right arm   Patient Position: Lying Lying Lying Lying   Pulse: 102 85 87 96   Resp: 14 15 16 18   Temp: 37 °C (98.6 °F) 36.5 °C (97.7 °F) 36.4 °C (97.6 °F) 36.5 °C (97.7 °F)   TempSrc: Temporal Temporal Temporal Temporal   SpO2: 93% 94% 93% 96%   Weight:       Height:           Intake/Output last 3 Shifts:    Intake/Output Summary (Last 24 hours) at 7/31/2024 1118  Last data filed at 7/31/2024 0816  Gross per 24 hour   Intake 300 ml   Output --   Net 300 ml       Physical Exam  Constitutional:       Appearance: Normal appearance.   HENT:      Mouth/Throat:      Mouth: Mucous membranes are dry.      Pharynx: Oropharynx is clear.   Eyes:      Extraocular Movements: Extraocular movements intact.      Pupils: Pupils are equal, round, and reactive to light.   Cardiovascular:      Rate and Rhythm: Normal rate and regular rhythm.   Pulmonary:      Effort: Pulmonary effort is normal.      Breath sounds: Normal breath sounds.   Abdominal:      General: Abdomen is flat. Bowel sounds are normal.      Palpations: Abdomen is soft.   Musculoskeletal:      Right lower leg: No edema.      Left lower leg: No edema.   Skin:     General: Skin is warm and dry.   Neurological:      Mental Status: She is alert and oriented to person, place, and time. Mental status is at baseline.    Psychiatric:         Mood and Affect: Mood normal.         Behavior: Behavior normal.         Relevant Results  Results from last 7 days   Lab Units 07/31/24  0645 07/30/24  0536 07/29/24  0408   SODIUM mmol/L 139 138 136   POTASSIUM mmol/L 3.5 3.6 3.1*   CHLORIDE mmol/L 107 110* 108*   CO2 mmol/L 28 20* 19*   BUN mg/dL 27* 40* 53*   CREATININE mg/dL 1.11* 1.23* 1.73*   EGFR mL/min/1.73m*2 48* 43* 28*   GLUCOSE mg/dL 105* 113* 104*   CALCIUM mg/dL 8.5* 8.3* 7.5*     Results from last 7 days   Lab Units 07/31/24  0645 07/30/24  0536 07/29/24  0408   WBC AUTO x10*3/uL 16.0* 20.0* 27.2*   HEMOGLOBIN g/dL 10.8* 11.5* 10.8*   HEMATOCRIT % 32.5* 33.8* 31.7*   PLATELETS AUTO x10*3/uL 353 366 337                Assessment/Plan      LUCRETIA 2/2 ATN /volume depletion N17.0  CKD IIIA N18.31  Hypokalemia. E87.6  Acute metabolic acidosis E87.21  Volume depletion E 86.9  Pancolitis  Recommendations  Cr to Baseline 1.1-1.2 gfr improved.   Acidosis improved  Replete K.   No objection for discharge  Okay for discharge, BMP in 3 days and follow-up in office in 1 week  Avoid IV contrast.   Will follow.   .Thank you for the consult and the opportunity to participate inn the care of this patient. Please do not hesitate to contact us with any questions or concern  ALEXIS Sullivan, CNP  [facility]  [phone]

## 2024-08-01 VITALS
RESPIRATION RATE: 16 BRPM | OXYGEN SATURATION: 95 % | BODY MASS INDEX: 20.89 KG/M2 | HEIGHT: 63 IN | HEART RATE: 86 BPM | SYSTOLIC BLOOD PRESSURE: 149 MMHG | DIASTOLIC BLOOD PRESSURE: 83 MMHG | WEIGHT: 117.9 LBS | TEMPERATURE: 97.7 F

## 2024-08-01 PROBLEM — E86.0 DEHYDRATION: Status: RESOLVED | Noted: 2024-07-27 | Resolved: 2024-08-01

## 2024-08-01 PROBLEM — N17.9 AKI (ACUTE KIDNEY INJURY) (CMS-HCC): Status: RESOLVED | Noted: 2024-07-27 | Resolved: 2024-08-01

## 2024-08-01 LAB
ANION GAP SERPL CALC-SCNC: 11 MMOL/L (ref 10–20)
BACTERIA BLD CULT: NORMAL
BACTERIA BLD CULT: NORMAL
BUN SERPL-MCNC: 24 MG/DL (ref 6–23)
CALCIUM SERPL-MCNC: 8.1 MG/DL (ref 8.6–10.3)
CHLORIDE SERPL-SCNC: 106 MMOL/L (ref 98–107)
CO2 SERPL-SCNC: 25 MMOL/L (ref 21–32)
CREAT SERPL-MCNC: 0.97 MG/DL (ref 0.5–1.05)
EGFRCR SERPLBLD CKD-EPI 2021: 57 ML/MIN/1.73M*2
ERYTHROCYTE [DISTWIDTH] IN BLOOD BY AUTOMATED COUNT: 14.6 % (ref 11.5–14.5)
GLUCOSE SERPL-MCNC: 79 MG/DL (ref 74–99)
HCT VFR BLD AUTO: 33.4 % (ref 36–46)
HGB BLD-MCNC: 10.8 G/DL (ref 12–16)
MAGNESIUM SERPL-MCNC: 1.74 MG/DL (ref 1.6–2.4)
MCH RBC QN AUTO: 34.5 PG (ref 26–34)
MCHC RBC AUTO-ENTMCNC: 32.3 G/DL (ref 32–36)
MCV RBC AUTO: 107 FL (ref 80–100)
NRBC BLD-RTO: 0 /100 WBCS (ref 0–0)
PLATELET # BLD AUTO: 341 X10*3/UL (ref 150–450)
POTASSIUM SERPL-SCNC: 4.8 MMOL/L (ref 3.5–5.3)
RBC # BLD AUTO: 3.13 X10*6/UL (ref 4–5.2)
SODIUM SERPL-SCNC: 137 MMOL/L (ref 136–145)
WBC # BLD AUTO: 14.1 X10*3/UL (ref 4.4–11.3)

## 2024-08-01 PROCEDURE — 36415 COLL VENOUS BLD VENIPUNCTURE: CPT | Performed by: INTERNAL MEDICINE

## 2024-08-01 PROCEDURE — 2500000001 HC RX 250 WO HCPCS SELF ADMINISTERED DRUGS (ALT 637 FOR MEDICARE OP): Performed by: NURSE PRACTITIONER

## 2024-08-01 PROCEDURE — 2500000001 HC RX 250 WO HCPCS SELF ADMINISTERED DRUGS (ALT 637 FOR MEDICARE OP): Performed by: REGISTERED NURSE

## 2024-08-01 PROCEDURE — 2500000001 HC RX 250 WO HCPCS SELF ADMINISTERED DRUGS (ALT 637 FOR MEDICARE OP): Performed by: INTERNAL MEDICINE

## 2024-08-01 PROCEDURE — 85027 COMPLETE CBC AUTOMATED: CPT | Performed by: INTERNAL MEDICINE

## 2024-08-01 PROCEDURE — 80048 BASIC METABOLIC PNL TOTAL CA: CPT | Performed by: INTERNAL MEDICINE

## 2024-08-01 PROCEDURE — 83735 ASSAY OF MAGNESIUM: CPT | Performed by: INTERNAL MEDICINE

## 2024-08-01 PROCEDURE — 99239 HOSP IP/OBS DSCHRG MGMT >30: CPT | Performed by: INTERNAL MEDICINE

## 2024-08-01 RX ORDER — GABAPENTIN 100 MG/1
100 CAPSULE ORAL 2 TIMES DAILY
Start: 2024-08-01

## 2024-08-01 RX ORDER — OXYCODONE HYDROCHLORIDE 5 MG/1
5 TABLET ORAL EVERY 6 HOURS PRN
Qty: 120 TABLET | Refills: 0 | Status: SHIPPED | OUTPATIENT
Start: 2024-08-01 | End: 2024-08-31

## 2024-08-01 RX ORDER — AMLODIPINE BESYLATE 5 MG/1
5 TABLET ORAL DAILY
Status: DISCONTINUED | OUTPATIENT
Start: 2024-08-01 | End: 2024-08-01 | Stop reason: HOSPADM

## 2024-08-01 RX ORDER — ACETAMINOPHEN 325 MG/1
1000 TABLET ORAL 3 TIMES DAILY
Start: 2024-08-01

## 2024-08-01 RX ORDER — POLYETHYLENE GLYCOL 3350 17 G/17G
17 POWDER, FOR SOLUTION ORAL DAILY PRN
Start: 2024-08-01

## 2024-08-01 RX ADMIN — VALSARTAN 40 MG: 40 TABLET, FILM COATED ORAL at 08:27

## 2024-08-01 RX ADMIN — POTASSIUM CITRATE 10 MEQ: 10 TABLET, EXTENDED RELEASE ORAL at 08:26

## 2024-08-01 RX ADMIN — ASPIRIN 81 MG: 81 TABLET, COATED ORAL at 08:27

## 2024-08-01 RX ADMIN — AZELASTINE HYDROCHLORIDE 2 SPRAY: 137 SPRAY, METERED NASAL at 08:37

## 2024-08-01 RX ADMIN — FERROUS SULFATE TAB 325 MG (65 MG ELEMENTAL FE) 1 TABLET: 325 (65 FE) TAB at 08:26

## 2024-08-01 RX ADMIN — FOLIC ACID 5 MG: 1 TABLET ORAL at 08:27

## 2024-08-01 RX ADMIN — PANTOPRAZOLE SODIUM 40 MG: 40 TABLET, DELAYED RELEASE ORAL at 06:38

## 2024-08-01 RX ADMIN — Medication 2000 UNITS: at 08:27

## 2024-08-01 RX ADMIN — APIXABAN 2.5 MG: 2.5 TABLET, FILM COATED ORAL at 08:26

## 2024-08-01 RX ADMIN — FIDAXOMICIN 200 MG: 200 TABLET, FILM COATED ORAL at 08:26

## 2024-08-01 RX ADMIN — GABAPENTIN 100 MG: 100 CAPSULE ORAL at 08:26

## 2024-08-01 RX ADMIN — AMLODIPINE BESYLATE 5 MG: 5 TABLET ORAL at 09:28

## 2024-08-01 RX ADMIN — ESCITALOPRAM OXALATE 10 MG: 10 TABLET ORAL at 08:27

## 2024-08-01 ASSESSMENT — COGNITIVE AND FUNCTIONAL STATUS - GENERAL
WALKING IN HOSPITAL ROOM: A LOT
MOVING TO AND FROM BED TO CHAIR: A LITTLE
TURNING FROM BACK TO SIDE WHILE IN FLAT BAD: A LITTLE
CLIMB 3 TO 5 STEPS WITH RAILING: A LOT
MOBILITY SCORE: 17
STANDING UP FROM CHAIR USING ARMS: A LITTLE

## 2024-08-01 ASSESSMENT — PAIN - FUNCTIONAL ASSESSMENT: PAIN_FUNCTIONAL_ASSESSMENT: 0-10

## 2024-08-01 ASSESSMENT — PAIN SCALES - GENERAL: PAINLEVEL_OUTOF10: 0 - NO PAIN

## 2024-08-01 NOTE — DISCHARGE SUMMARY
DISCHARGE SUMMARY     Discharge Diagnosis  LUCRETIA (acute kidney injury) (CMS-HCC)    This discharge took greater than 35 minutes.    Test Results Pending At Discharge  Pending Labs       Order Current Status    Ova and Parasite Examination In process    Ova/Para + Giardia/Cryptosporidium Antigen In process            Hospital Course   Maggi Gordon is a 89 y.o. female with PMHx s/f left BKA, coronary artery disease, aortic valve replacement, A-fib, peripheral arterial disease, chronic kidney disease, hypertension presenting with leukocytosis nausea vomiting diarrhea anorexia abdominal discomfort.  Patient was sent from [facility] where she is resident due to confusion and complaints of diarrhea.  Evidently patient had multiple loose stools anorexia nausea vomiting without fevers chills or rigors she does have some stomach discomfort.  Patient did have some antibiotics for a tooth abscess up to a couple months ago.  She also had a dose of erythromycin yesterday.  On presenting to the emergency department patient had temperature 97.4 heart rate 59 respiratory rate 16 blood pressure 110/48 SpO2 97% labs show sodium 135 potassium 3.6 chloride 106 bicarb 18 BUN 57 creatinine 2.24 glucose 93 albumin 2.5 other liver enzymes within normal limits CBC shows white blood cell count 33.8 hemoglobin 12.2 hematocrit 2.36.5 platelets 327 chest x-ray suggest there is question of atelectasis versus a possible small infiltrate to the left base.  CT of the abdomen pelvis shows basilar atelectasis small hiatal hernia pancolitis 1.4 x 1.1 cm hyperdense nodule midpole left kidney patient was given empiric dose of Zosyn due to her severe leukocytosis and subsequently started on vancomycin in light of her colitis marked leukocytosis anorexia.  Patient will be admitted for IV hydration further workup and treatment for her pancolitis      07/28: Patient was evaluated this morning, continues to have diarrhea, abdominal pain  is improving, no more nausea or vomiting.  07/29: Patient was evaluated this morning, continues to have diarrhea but slowing down, no nausea or vomiting.  No fever or chills  07/30: Patient continues to have diarrhea but is improving, no abdominal pain, no nausea or vomiting.  07/31: Patient was evaluated this morning, diarrhea is improving, renal function as well as WBC improving.  RN reported that patient did not have good sleep overnight and was having hallucination.    1.  Sepsis present on admission with organ dysfunction of LUCRETIA  Patient was started on IV fluid as well as oral vancomycin.  ID was consulted and switched vancomycin to oral fidaxomicin stop date 8/7  Monitor  Follow cultures-blood cultures negative so far     2.  Severe C. difficile colitis  On oral fidaxomicin stop date 8/7  ID on board     3.  LUCRETIA on CKD stage III with metabolic acidosis  -resolved with volume resuscitation      4.  Hypokalemia/hypomagnesemia  Replaced PRN     5.  A-fib  Rate fairly controlled  On Eliquis for anticoagulation     6.  Left kidney nodule  Follow ultrasound report-suggesting renal cyst     7.  PAD status post left BKA      8.  Hallucination  Could be secondary to lack of sleep and hospital associated delirium,  Frequent reorientation  Minimize disturbance and interruption at night    Pertinent Physical Exam At Time of Discharge  Patient is awake and orient, not in apparent distress  Eyes: PERRLA, no conjunctival congestion  Chest: Bilateral Air entry, no crackles or wheezing  Heart: s1S2 regular, no murmur  Abdomen: Soft, non tender, BS present  Ext: Status post left BKA    Home Medications     Medication List      START taking these medications     fidaxomicin 200 mg tablet; Commonly known as: Dificid; Take 1 tablet   (200 mg) by mouth 2 times a day. Last dose evening of 8/7     CHANGE how you take these medications     acetaminophen 325 mg tablet; Commonly known as: Tylenol; Take 3 tablets   (975 mg) by mouth 3  times a day.; What changed: when to take this, reasons   to take this   oxyCODONE 5 mg immediate release tablet; Commonly known as: Roxicodone;   Take 1 tablet (5 mg) by mouth every 6 hours if needed for severe pain (7 -   10). G89.4; What changed: additional instructions   polyethylene glycol 17 gram packet; Commonly known as: Glycolax,   Miralax; Take 17 g by mouth once daily as needed (constipation). DO NOT   restart this medication until patient has completed fidaxomicin and is no   longer having diarrhea; What changed: when to take this, reasons to take   this, additional instructions     CONTINUE taking these medications     alendronate 70 mg tablet; Commonly known as: Fosamax   amLODIPine 5 mg tablet; Commonly known as: Norvasc   apixaban 2.5 mg tablet; Commonly known as: Eliquis; Take 1 tablet (2.5   mg) by mouth every 12 hours.   aspirin 81 mg EC tablet   atenolol 50 mg tablet; Commonly known as: Tenormin   atorvastatin 20 mg tablet; Commonly known as: Lipitor   azelastine 137 mcg (0.1 %) nasal spray; Commonly known as: Astelin   Biofreeze (menthol) 4 % gel gel; Generic drug: menthol   biotin 10,000 mcg capsule   cholecalciferol 50 MCG (2000 UT) tablet; Commonly known as: Vitamin D-3   Dulcolax (bisacodyl) 10 mg suppository; Generic drug: bisacodyl   escitalopram 10 mg tablet; Commonly known as: Lexapro   ferrous sulfate (325 mg ferrous sulfate) tablet   * gabapentin 100 mg capsule; Commonly known as: Neurontin   * gabapentin 100 mg capsule; Commonly known as: Neurontin; Take 1   capsule (100 mg) by mouth 2 times a day.   ipratropium 21 mcg (0.03 %) nasal spray; Commonly known as: Atrovent   magnesium hydroxide 400 mg/5 mL suspension; Commonly known as: Milk of   Magnesia   melatonin 3 mg capsule   mineral oil enema   multivitamin with minerals tablet; Take 1 tablet by mouth once daily.   Praluent Pen 150 mg/mL pen injector; Generic drug: alirocumab; INJECT 1   MILLILITER INTO SKIN EVERY 2 WEEKS IN ABDOMEN,  "THIGH, OR UPPER ARM   ROTATING INJECTION SITES   pyridoxine 25 mg tablet; Commonly known as: Vitamin B-6   valsartan 40 mg tablet; Commonly known as: Diovan   Xeroform 5 X 9 \" bandage; Generic drug: bismuth tribrom-petrolatum,wh; 1   XEROFORM CUT TO SIZE ON TOP OF WOUND DAILY  * This list has 2 medication(s) that are the same as other medications   prescribed for you. Read the directions carefully, and ask your doctor or   other care provider to review them with you.     STOP taking these medications     rosuvastatin 10 mg tablet; Commonly known as: Crestor       Outpatient Follow-Up  No follow-ups on file.     Koko Valentin MD PhD  8/1/2024  9:33 AM    "

## 2024-08-01 NOTE — CARE PLAN
The patient's goals for the shift include      The clinical goals for the shift include patient to have no falls this shift    Over the shift, the patient is making adequate progress towards stated care plan goals

## 2024-08-01 NOTE — NURSING NOTE
Called [facility] and spoke with Carlos who transfered to nurses station. Ara the nurse was given nurse to nurse report.

## 2024-08-01 NOTE — NURSING NOTE
Called [facility] to give nurse to nurse report. Spoke with  Joan who transferred me to Cady the dietician who transferred me to nurse floor and no answer.  Called back and spoke with Joan and gave number to call  back when ready to receive report.

## 2024-08-01 NOTE — PROGRESS NOTES
08/01/24 1116   Discharge Planning   Does the patient need discharge transport arranged? Yes   RoundTrip coordination needed? Yes   Has discharge transport been arranged? Yes   What day is the transport expected? 08/01/24   What time is the transport expected? 1200     Notified RN and voicemail left for patients daughter of transport time of 1200 to [place] . Answered all questions and verbalized understanding.  Report number is  [phone]

## 2024-08-01 NOTE — PROGRESS NOTES
Maggi Gordon is a 89 y.o. female on day 5 of admission presenting with LUCRETIA (acute kidney injury) (CMS-[facility]).      Assessment/Plan:    #Severe C. difficile colitis  #LUCRETIA resolved  #Atrial fibrillation  #Left renal nodule  #Peripheral vascular disease with left BKA  #Coronary artery disease  #History of aortic valve replacement    Recommendations:    -Cont Difcid 200mg q12h through 8/7/2024 to finish 10 days of antibiotics  -Continue enteric cautions while inpatient  -Stool log  -Patient can be discharged from ID standpoint      Jesus Narvaez MD  Date of service: 8/1/2024  Time of service: 8:44 AM      Subjective   Interval History: No acute events overnight.  Patient denies any new complaint.  Denies any diarrhea since 07/30        Review of Systems  Denies: fever, chills, nausea, vomiting, diarrhea, dysuria    Objective   Range of Vitals (last 24 hours)  Heart Rate:  [64-98]   Temp:  [36.1 °C (97 °F)-36.9 °C (98.4 °F)]   Resp:  [16-18]   BP: (123-193)/(54-77)   SpO2:  [90 %-96 %]  Daily Weight  07/27/24 : 53.5 kg (117 lb 14.4 oz)   Body mass index is 20.89 kg/m².      General: alert, oriented, NAD  HEENT: No conjunctival pallor, no scleral icterus  Neck: No LAD, No JVD  Abdomen: soft, non tender, non distended,  Neuro: AAO x 3, PERRL, CN grossly intact  Extremities: no edema, no cyanosis, L BKA  Skin: No rashes or ulcers  MSK: No joint inflammation      Antibiotics  fidaxomicin - 200 mg      Relevant Results  Labs  Results from last 72 hours   Lab Units 08/01/24  0512 07/31/24  0645 07/30/24  0536   WBC AUTO x10*3/uL 14.1* 16.0* 20.0*   HEMOGLOBIN g/dL 10.8* 10.8* 11.5*   HEMATOCRIT % 33.4* 32.5* 33.8*   PLATELETS AUTO x10*3/uL 341 353 366     Results from last 72 hours   Lab Units 08/01/24  0512 07/31/24  0645 07/30/24  0536   SODIUM mmol/L 137 139 138   POTASSIUM mmol/L 4.8 3.5 3.6   CHLORIDE mmol/L 106 107 110*   CO2 mmol/L 25 28 20*   BUN mg/dL 24* 27* 40*   CREATININE mg/dL 0.97 1.11* 1.23*   GLUCOSE mg/dL 79  105* 113*   CALCIUM mg/dL 8.1* 8.5* 8.3*   ANION GAP mmol/L 11 8* 12   EGFR mL/min/1.73m*2 57* 48* 43*         Estimated Creatinine Clearance: 33.8 mL/min (by C-G formula based on SCr of 0.97 mg/dL).  C-Reactive Protein   Date Value Ref Range Status   02/16/2024 3.15 (H) <1.00 mg/dL Final       Microbiology  No results found for the last 14 days.      Imaging    US renal complete    Result Date: 7/27/2024  1. Two cysts are identified in the mid left renal cortex corresponding to the hyperdense lesion and the hypodense lesion seen on the recent CT. 2. No hydronephrosis 3. Sludge noted in the gallbladder   .   MACRO: None.   Signed by: Maribel Vicente 7/27/2024 6:00 PM Dictation workstation:   [id number]    XR chest 1 view    Result Date: 7/27/2024  There is mild cardiomegaly but no definite vascular congestion.  There is question of small infiltrate and/or atelectasis at the left base with a possible small left effusion.  The lungs otherwise are clear.. Signed by Brendan Zamora MD    CT abdomen pelvis wo IV contrast    Result Date: 7/27/2024  Pancolitis. Small bowel enteritis. Hypodense nodule left kidney.  Recommend follow-up ultrasound for further evaluation. Signed by Koko Fernandez DO

## 2024-08-01 NOTE — PROGRESS NOTES
Maggi Gordon is a 89 y.o. female on day 5 of admission presenting with LCURETIA (acute kidney injury) (CMS-[facility]).      Subjective   .Interval History    Diarrhea improved  Lying in bed  Plans for discharge today    ROS  Denies chest pain, shortness of breath,  Nausea, vomiting , diarrhea, fever or chills.     No change in Past Medical History        Objective          Vitals 24HR  Heart Rate:  [64-98]   Temp:  [36.1 °C (97 °F)-36.9 °C (98.4 °F)]   Resp:  [16-17]   BP: (123-193)/(54-77)   SpO2:  [90 %-96 %]     Vitals:    07/31/24 2028 08/01/24 0105 08/01/24 0507 08/01/24 0824   BP: 127/54 133/70 170/68 (!) 193/63   BP Location: Right arm Right arm Right arm Right arm   Patient Position: Lying Lying Lying Lying   Pulse: 78 77 98 81   Resp: 16 17 16 16   Temp: 36.8 °C (98.2 °F) 36.4 °C (97.6 °F) 36.9 °C (98.4 °F) 36.6 °C (97.9 °F)   TempSrc: Temporal Temporal Temporal Temporal   SpO2: 95% 93% 96% 96%   Weight:       Height:           Intake/Output last 3 Shifts:  No intake or output data in the 24 hours ending 08/01/24 0915      Physical Exam  Constitutional:       Appearance: Normal appearance.   HENT:      Mouth/Throat:      Mouth: Mucous membranes are dry.      Pharynx: Oropharynx is clear.   Eyes:      Extraocular Movements: Extraocular movements intact.      Pupils: Pupils are equal, round, and reactive to light.   Cardiovascular:      Rate and Rhythm: Normal rate and regular rhythm.   Pulmonary:      Effort: Pulmonary effort is normal.      Breath sounds: Normal breath sounds.   Abdominal:      General: Abdomen is flat. Bowel sounds are normal.      Palpations: Abdomen is soft.   Musculoskeletal:      Right lower leg: No edema.      Left lower leg: No edema.      Comments: lBKA   Skin:     General: Skin is warm and dry.   Neurological:      Mental Status: She is alert and oriented to person, place, and time. Mental status is at baseline.   Psychiatric:         Mood and Affect: Mood normal.         Behavior: Behavior  normal.         Relevant Results  Results from last 7 days   Lab Units 08/01/24  0512 07/31/24  0645 07/30/24  0536   SODIUM mmol/L 137 139 138   POTASSIUM mmol/L 4.8 3.5 3.6   CHLORIDE mmol/L 106 107 110*   CO2 mmol/L 25 28 20*   BUN mg/dL 24* 27* 40*   CREATININE mg/dL 0.97 1.11* 1.23*   EGFR mL/min/1.73m*2 57* 48* 43*   GLUCOSE mg/dL 79 105* 113*   CALCIUM mg/dL 8.1* 8.5* 8.3*     Results from last 7 days   Lab Units 08/01/24  0512 07/31/24  0645 07/30/24  0536   WBC AUTO x10*3/uL 14.1* 16.0* 20.0*   HEMOGLOBIN g/dL 10.8* 10.8* 11.5*   HEMATOCRIT % 33.4* 32.5* 33.8*   PLATELETS AUTO x10*3/uL 341 353 366                Assessment/Plan      LUCRETIA 2/2 ATN /volume depletion resolved N17.0  CKD IIIA N18.31  Hypokalemia. Resolved E87.6  Acute metabolic acidosis resolved  E87.21  Volume depletion E 86.9  Hypertension (Z86.79)  Pancolitis  Recommendations  Cr below BL, Baseline 1.1-1.2 gfr improved.   Acidosis improved  K improved   Blood pressure elevated this morning restarted home Norvas  Okay for discharge, BMP in 3 days and follow-up in office in 1 week  Avoid IV contrast.   Will follow.   .Thank you for the consult and the opportunity to participate inn the care of this patient. Please do not hesitate to contact us with any questions or concern  ALEXIS Sullivan, CNP  [facility]  [phone]

## 2024-08-02 LAB — O+P STL MICRO: NEGATIVE

## 2024-10-22 ENCOUNTER — OFFICE VISIT (OUTPATIENT)
Dept: CARDIOLOGY | Facility: CLINIC | Age: 89
End: 2024-10-22
Payer: COMMERCIAL

## 2024-10-22 ENCOUNTER — HOSPITAL ENCOUNTER (OUTPATIENT)
Dept: CARDIOLOGY | Facility: CLINIC | Age: 89
Discharge: HOME | End: 2024-10-22
Payer: COMMERCIAL

## 2024-10-22 VITALS
WEIGHT: 107 LBS | HEART RATE: 75 BPM | SYSTOLIC BLOOD PRESSURE: 171 MMHG | DIASTOLIC BLOOD PRESSURE: 73 MMHG | BODY MASS INDEX: 18.95 KG/M2

## 2024-10-22 DIAGNOSIS — Z95.2 S/P AVR (AORTIC VALVE REPLACEMENT): Primary | ICD-10-CM

## 2024-10-22 DIAGNOSIS — I65.23 OCCLUSION AND STENOSIS OF BILATERAL CAROTID ARTERIES: ICD-10-CM

## 2024-10-22 DIAGNOSIS — E78.2 MIXED HYPERLIPIDEMIA: ICD-10-CM

## 2024-10-22 DIAGNOSIS — Z95.2 HISTORY OF PROSTHETIC AORTIC VALVE: ICD-10-CM

## 2024-10-22 DIAGNOSIS — I25.810 ATHEROSCLEROSIS OF CABG W/O ANGINA PECTORIS: ICD-10-CM

## 2024-10-22 DIAGNOSIS — I10 ESSENTIAL HYPERTENSION: ICD-10-CM

## 2024-10-22 DIAGNOSIS — I25.810 CORONARY ARTERY DISEASE INVOLVING CORONARY BYPASS GRAFT OF NATIVE HEART WITHOUT ANGINA PECTORIS: ICD-10-CM

## 2024-10-22 DIAGNOSIS — Z86.79 HISTORY OF HYPERTENSION: ICD-10-CM

## 2024-10-22 DIAGNOSIS — I48.0 PAF (PAROXYSMAL ATRIAL FIBRILLATION) (MULTI): ICD-10-CM

## 2024-10-22 LAB
AORTIC VALVE MEAN GRADIENT: 5.7 MMHG
AORTIC VALVE PEAK VELOCITY: 1.82 M/S
AV PEAK GRADIENT: 13.2 MMHG
EJECTION FRACTION: 63 %
LEFT ATRIUM VOLUME AREA LENGTH INDEX BSA: 16.7 ML/M2
LEFT VENTRICLE INTERNAL DIMENSION DIASTOLE: 3.29 CM (ref 3.5–6)
MITRAL VALVE E/A RATIO: 1.42
RIGHT VENTRICLE FREE WALL PEAK S': 7 CM/S

## 2024-10-22 PROCEDURE — 99214 OFFICE O/P EST MOD 30 MIN: CPT | Performed by: INTERNAL MEDICINE

## 2024-10-22 PROCEDURE — 93306 TTE W/DOPPLER COMPLETE: CPT

## 2024-10-22 PROCEDURE — 1036F TOBACCO NON-USER: CPT | Performed by: INTERNAL MEDICINE

## 2024-10-22 PROCEDURE — 1160F RVW MEDS BY RX/DR IN RCRD: CPT | Performed by: INTERNAL MEDICINE

## 2024-10-22 PROCEDURE — 1159F MED LIST DOCD IN RCRD: CPT | Performed by: INTERNAL MEDICINE

## 2024-10-22 PROCEDURE — 3078F DIAST BP <80 MM HG: CPT | Performed by: INTERNAL MEDICINE

## 2024-10-22 PROCEDURE — 93306 TTE W/DOPPLER COMPLETE: CPT | Performed by: INTERNAL MEDICINE

## 2024-10-22 PROCEDURE — 3077F SYST BP >= 140 MM HG: CPT | Performed by: INTERNAL MEDICINE

## 2024-10-22 NOTE — PROGRESS NOTES
Chief Complaint:   Valve Disorder     History of Present Illness     Maggi Gordno is a 89 y.o. female presenting with aortic valve disease s/p bioprosthetic aortic valve replacement (#21 CE) 4/22/11, prior CABG x 3 1995, PAF and PAD.  The patient has no cardiac complaints.  The patient denies chest pain, shortness of breath, or any systemic symptoms.  The patient is compliant with aspirin and antibiotic prophylaxis to prevent endocarditis.  Their most recent echocardiogram demonstrates normal prosthetic valve function.  Since last OV she is s/p left BKA for non-healing ulcer and PAD.  Now in SNF.    Review of Systems  All pertinent systems have been reviewed and are negative except for what is stated in the history of present illness.    All other systems have been reviewed and are negative and noncontributory to this patient's current ailments.  .       Previous History     Past Medical History:  She has a past medical history of Asymptomatic premature menopause, Atherosclerosis of coronary artery bypass graft(s) without angina pectoris (10/11/2021), Coronary artery disease involving coronary bypass graft of native heart without angina pectoris (10/22/2024), Occlusion and stenosis of bilateral carotid arteries (01/09/2020), PAF (paroxysmal atrial fibrillation) (Multi) (10/22/2024), Personal history of other diseases of the circulatory system, Personal history of other diseases of the circulatory system, Personal history of other diseases of the circulatory system, Personal history of other endocrine, nutritional and metabolic disease, Personal history of other endocrine, nutritional and metabolic disease, Personal history of other endocrine, nutritional and metabolic disease, and S/P AVR (aortic valve replacement) (10/18/2023).    Past Surgical History:  She has a past surgical history that includes Other surgical history (12/03/2018); Other surgical history (12/03/2018); Other surgical history (12/03/2018); Other  Writer called pt and advised him on Dr. Ochoa's message below.  He understood, will contact his PCP's office to discuss a MRI shoulder further.    "surgical history (12/03/2018); Other surgical history (12/03/2018); and CT angio aorta and bilateral iliofemoral runoff w and or wo IV contrast (7/13/2023).      Social History:  She reports that she has never smoked. She has never been exposed to tobacco smoke. She has never used smokeless tobacco. Alcohol use questions deferred to the physician. She reports that she does not use drugs.    Family History:  No family history on file.     Allergies:  Ezetimibe and Hydrocodone    Outpatient Medications:  Current Outpatient Medications   Medication Instructions    acetaminophen (TYLENOL) 975 mg, oral, 3 times daily    alendronate (FOSAMAX) 70 mg, Every 7 days    amLODIPine (NORVASC) 5 mg, Daily    apixaban (ELIQUIS) 2.5 mg, oral, Every 12 hours    aspirin 81 mg, Daily    atenolol (TENORMIN) 50 mg, 2 times daily    atorvastatin (LIPITOR) 20 mg, Daily    azelastine (Astelin) 137 mcg (0.1 %) nasal spray 2 sprays, 2 times daily    biotin 10,000 mcg capsule 1 capsule, Daily    bisacodyl (DULCOLAX (BISACODYL)) 10 mg, Daily PRN    bismuth tribrom-petrolatum,wh (Xeroform) 5 X 9 \" bandage 1 XEROFORM CUT TO SIZE ON TOP OF WOUND DAILY    cholecalciferol (VITAMIN D-3) 50 mcg, Daily    escitalopram (LEXAPRO) 10 mg, Daily    ferrous sulfate 325 (65 Fe) MG tablet 65 mg of iron, Daily    fidaxomicin (DIFICID) 200 mg, oral, 2 times daily, Last dose evening of 8/7    gabapentin (NEURONTIN) 200 mg, Nightly    gabapentin (NEURONTIN) 100 mg, oral, 2 times daily    ipratropium (Atrovent) 21 mcg (0.03 %) nasal spray 2 sprays, 2 times daily    magnesium hydroxide (Milk of Magnesia) 400 mg/5 mL suspension 30 mL, Daily PRN    melatonin 3 mg, Nightly    menthol (Biofreeze, menthol,) 4 % gel gel 1 Application, 3 times daily    mineral oil enema 1 enema, As needed    multivitamin with minerals tablet 1 tablet, oral, Daily    polyethylene glycol (GLYCOLAX, MIRALAX) 17 g, oral, Daily PRN, DO NOT restart this medication until patient has completed " fidaxomicin and is no longer having diarrhea    Praluent Pen 150 mg/mL pen injector INJECT 1 MILLILITER INTO SKIN EVERY 2 WEEKS IN ABDOMEN, THIGH, OR UPPER ARM ROTATING INJECTION SITES    pyridoxine (VITAMIN B-6) 25 mg, Daily    valsartan (DIOVAN) 40 mg, 2 times daily       Physical Examination   Vitals:  Visit Vitals  /73 (BP Location: Left arm, Patient Position: Sitting, BP Cuff Size: Adult)   Pulse 75   Wt 48.5 kg (107 lb) Comment: pt reported   BMI 18.95 kg/m²   OB Status Postmenopausal   Smoking Status Never   BSA 1.47 m²    Physical Exam  Vitals reviewed.   Constitutional:       General: She is not in acute distress.     Appearance: Normal appearance.   HENT:      Head: Normocephalic and atraumatic.      Nose: Nose normal.   Eyes:      Conjunctiva/sclera: Conjunctivae normal.   Cardiovascular:      Rate and Rhythm: Normal rate and regular rhythm.      Pulses: Normal pulses.      Heart sounds: Murmur heard.      Systolic murmur is present with a grade of 2/6.   Pulmonary:      Effort: Pulmonary effort is normal. No respiratory distress.      Breath sounds: Normal breath sounds. No wheezing, rhonchi or rales.   Abdominal:      General: Bowel sounds are normal. There is no distension.      Palpations: Abdomen is soft.      Tenderness: There is no abdominal tenderness.   Musculoskeletal:         General: No swelling.      Right lower leg: No edema.      Left lower leg: No edema.   Skin:     General: Skin is warm and dry.      Capillary Refill: Capillary refill takes less than 2 seconds.   Neurological:      General: No focal deficit present.      Mental Status: She is alert.   Psychiatric:         Mood and Affect: Mood normal.             Labs/Imaging/Cardiac Studies     Last Labs:  CBC -  Lab Results   Component Value Date    WBC 14.1 (H) 08/01/2024    HGB 10.8 (L) 08/01/2024    HCT 33.4 (L) 08/01/2024     (H) 08/01/2024     08/01/2024       CMP -  Lab Results   Component Value Date    CALCIUM  "8.1 (L) 08/01/2024    PHOS 2.2 (L) 03/01/2024    PROT 5.0 (L) 07/27/2024    ALBUMIN 2.5 (L) 07/27/2024    AST 29 07/27/2024    ALT 22 07/27/2024    ALKPHOS 117 07/27/2024    BILITOT 0.4 07/27/2024       LIPID PANEL -   No results found for: \"CHOL\", \"HDL\", \"CHHDL\", \"LDL\", \"VLDL\", \"TRIG\", \"NHDL\"    RENAL FUNCTION PANEL -   Lab Results   Component Value Date    K 4.8 08/01/2024    PHOS 2.2 (L) 03/01/2024       No results found for: \"BNP\", \"HGBA1C\"    ECG:    Echo:  Transthoracic Echo (TTE) Complete    Result Date: 1/12/2024   [facility], [address] [place] [address]                Tel [phone] and Fax [phone] TRANSTHORACIC ECHOCARDIOGRAM REPORT  Patient Name:      TOMASA Nino Physician:    [id number] Florencio Ledbetter MD Study Date:        1/12/2024           Ordering Provider:    [id number] NELL GAMINO MRN/PID:           [id number]            Fellow: Accession#:        [id number]        Nurse: Date of Birth/Age: 1935 / 89 years Sonographer:          Veronica Koenig RDCS Gender:            F                   Additional Staff: Height:            160.02 cm           Admit Date:           1/11/2024 Weight:            44.00 kg            Admission Status:     Inpatient - Routine BSA:               1.42 m2             Encounter#:           [id number]                                        Department Location:  [facility] Non                                                              Invasive Blood Pressure: 134 /66 mmHg Study Type:    TRANSTHORACIC ECHO (TTE) COMPLETE Diagnosis/ICD: Presence of prosthetic heart valve-Z95.2 Indication:    TAVR CPT Code:      Echo Complete w Full Doppler-[id number] Patient History: Pertinent History: HTN, Hyperlipidemia and A-Fib. Aortic stenosis,TAVR #21 CE                    AVR (10/18/23),CABG,TAA,CKD. Study Detail: The following Echo studies were performed: 2D, M-Mode, Doppler and               color flow. Technically challenging study due to body " habitus and               patient lying in supine position.  PHYSICIAN INTERPRETATION: Left Ventricle: The left ventricular systolic function is normal, with an estimated ejection fraction of 70%. There are no regional wall motion abnormalities. The left ventricular cavity size is decreased. The left ventricular septal wall thickness is mildly increased. There is left ventricular concentric remodeling. Abnormal (paradoxical) septal motion consistent with post-operative status. Spectral Doppler shows a pseudonormal pattern of left ventricular diastolic filling. Left Atrium: The left atrium is moderate to severely dilated. Right Ventricle: The right ventricle is normal in size. There is mildly reduced right ventricular systolic function. Right Atrium: The right atrium is mildly dilated. Aortic Valve: There is a prosthetic aortic valve present. The aortic valve dimensionless index is 0.49. There is a Enrico bovine aortic valve bioprosthesis, with a 21 mm reported size. There is no ashley-prosthetic aortic valve regurgitation. There is no evidence of aortic valve regurgitation. The peak instantaneous gradient of the aortic valve is 23.1 mmHg. The mean gradient of the aortic valve is 11.0 mmHg. Mitral Valve: The mitral valve is mildly thickened. There is mild mitral valve regurgitation. Tricuspid Valve: The tricuspid valve is structurally normal. There is trace tricuspid regurgitation. The right ventricular systolic pressure is unable to be estimated. Pulmonic Valve: The pulmonic valve is structurally normal. There is physiologic pulmonic valve regurgitation. Pericardium: There is no pericardial effusion noted. Aorta: The aortic root is normal. The aortic root is at the upper limits of normal size. In comparison to the previous echocardiogram(s): Compared with study from 10/18/2023, the aortic gradients have increased slightly (were 18 and 8 mmHg).  CONCLUSIONS:  1. Left ventricular systolic function is normal with a  70% estimated ejection fraction.  2. Abnormal septal motion consistent with post-operative status.  3. Spectral Doppler shows a pseudonormal pattern of left ventricular diastolic filling.  4. There is mildly reduced right ventricular systolic function.  5. The left atrium is moderate to severely dilated.  6. There is a bovine aortic valve bioprosthesis.  7. Left ventricular cavity size is decreased. QUANTITATIVE DATA SUMMARY: 2D MEASUREMENTS:                          Normal Ranges: LAs:           3.20 cm   (2.7-4.0cm) IVSd:          1.20 cm   (0.6-1.1cm) LVPWd:         1.11 cm   (0.6-1.1cm) LVIDd:         3.05 cm   (3.9-5.9cm) LVIDs:         2.06 cm LV Mass Index: 73.4 g/m2 LV % FS        32.5 % LA VOLUME:                               Normal Ranges: LA Vol A4C:        66.9 ml    (22+/-6mL/m2) LA Vol A2C:        21.9 ml LA Vol BP:         41.8 ml LA Vol Index A4C:  47.1ml/m2 LA Vol Index A2C:  15.4 ml/m2 LA Vol Index BP:   29.4 ml/m2 LA Area A4C:       21.0 cm2 LA Area A2C:       11.0 cm2 LA Major Axis A4C: 5.6 cm LA Major Axis A2C: 4.7 cm LA Volume Index:   29.0 ml/m2 LA Vol A4C:        63.0 ml LA Vol A2C:        21.0 ml RA VOLUME BY A/L METHOD:                               Normal Ranges: RA Vol A4C:        53.8 ml    (8.3-19.5ml) RA Vol Index A4C:  37.9 ml/m2 RA Area A4C:       19.0 cm2 RA Major Axis A4C: 5.7 cm M-MODE MEASUREMENTS:                  Normal Ranges: Ao Root: 3.60 cm (2.0-3.7cm) LAs:     3.20 cm (2.7-4.0cm) AORTA MEASUREMENTS:                    Normal Ranges: Asc Ao, d: 2.90 cm (2.1-3.4cm) Ao Arch:   2.50 cm (2.0-3.6cm) LV SYSTOLIC FUNCTION BY 2D PLANIMETRY (MOD):                     Normal Ranges: EF-A4C View: 75.0 % (>=55%) EF-A2C View: 73.7 % EF-Biplane:  74.5 % LV DIASTOLIC FUNCTION:                               Normal Ranges: MV Peak E:        1.30 m/s    (0.7-1.2 m/s) MV Peak A:        1.01 m/s    (0.42-0.7 m/s) E/A Ratio:        1.28        (1.0-2.2) MV lateral e'     0.09 m/s MV medial e'       0.04 m/s MV A Dur:         136.10 msec PulmV Sys Ruddy:    75.80 cm/s PulmV Quiñonez Ruddy:   72.16 cm/s PulmV S/D Ruddy:    1.05 PulmV A Revs Ruddy: 19.36 cm/s PulmV A Revs Dur: 115.34 msec MITRAL VALVE:                 Normal Ranges: MV DT: 207 msec (150-240msec) AORTIC VALVE:                                    Normal Ranges: AoV Vmax:                2.40 m/s  (<=1.7m/s) AoV Peak P.1 mmHg (<20mmHg) AoV Mean P.0 mmHg (1.7-11.5mmHg) LVOT Max Ruddy:            1.08 m/s  (<=1.1m/s) AoV VTI:                 50.77 cm  (18-25cm) LVOT VTI:                24.70 cm LVOT Diameter:           1.65 cm   (1.8-2.4cm) AoV Area, VTI:           1.04 cm2  (2.5-5.5cm2) AoV Area,Vmax:           0.96 cm2  (2.5-4.5cm2) AoV Dimensionless Index: 0.49  RIGHT VENTRICLE: RV Basal 3.00 cm RV Mid   1.50 cm RV Major 5.9 cm TAPSE:   14.0 mm RV s'    0.10 m/s PULMONIC VALVE:                         Normal Ranges: PV Accel Time: 99 msec  (>120ms) PV Max Ruddy:    0.9 m/s  (0.6-0.9m/s) PV Max PG:     3.2 mmHg Pulmonary Veins: PulmV A Revs Dur: 115.34 msec PulmV A Revs Ruddy: 19.36 cm/s PulmV Quiñonez Ruddy:   72.16 cm/s PulmV S/D Ruddy:    1.05 PulmV Sys Ruddy:    75.80 cm/s  [id number] Florencio Ledbetter MD Electronically signed on 2024 at 2:52:57 PM  ** Final **         Assessment and Recommendations     Assessment/Plan       1. Atherosclerosis of CABG w/o angina pectoris  The patient's CAD, as detailed in the HPI, has been clinically stable, without any anginal symptoms or dyspnea.  The patient will continue treatment with guideline-directed medical therapy with antiplatelet and statin medications and was advised regular exercise and a heart healthy diet.        - Follow Up In Cardiology    2. Essential hypertension  The patient's blood pressure has been well-controlled at today's appointment or by recent OP measurements at rehab/home measurements and meets their goal blood pressure per the ACC/AHA guidelines.  The patient has been compliant  with their anti-hypertensive medications and is following a low sodium/DASH diet. I advised continuation of their present medical treatment and lifestyle modification.      - Follow Up In Cardiology    3. History of prosthetic aortic valve  Stable and asymptomatic s/p bioprosthetic AVR.  Echocardiogram today shows normal LV function and normal prosthetic valve function without significant aortic insufficiency or stenosis.  Will continue treatment with ASA 81 mg every day and lifelong SBE antibiotic prophylaxis.     - Follow Up In Cardiology    4. Mixed hyperlipidemia  The patient's lipids are well controlled on chronic statin and Praluient therapy and they are meeting their goal LDL cholesterol per the ACC/AHA guidelines.      - Follow Up In Cardiology    5. Occlusion and stenosis of bilateral carotid arteries  Stable and ASX  - Follow Up In Cardiology      6. PAF (paroxysmal atrial fibrillation) (Multi)  The patient has been clinically stable, asymptomatic, and maintaining normal sinus rhythm.  They will continue treatment with rate-controlling medications and anticoagulation for stroke prophylaxis based upon their present [id number] score, the risks and benefits of which were discussed with the patient/family/caregiver.      Maggi Gordon will return in 1 year for an office visit with Echo.       Jose Weathers MD    Exclusive of any other services or procedures performed, I, Jose Weathers MD , spent 30 minutes in duration for this visit today.  This time consisted of chart review, obtaining history, and/or performing the exam as documented above as well as documenting the clinical information for the encounter in the electronic record, discussing treatment options, plans, and/or goals with patient, family, and/or caregiver, refilling medications, updating the electronic record, ordering medicines, lab work, imaging, referrals, and/or procedures as documented above and communicating with other [facility]  professionals. I have discussed the results of laboratory, radiology, and cardiology studies with the patient and their family/caregiver.

## 2024-11-26 ENCOUNTER — TELEPHONE (OUTPATIENT)
Dept: CARDIOLOGY | Facility: CLINIC | Age: 89
End: 2024-11-26
Payer: COMMERCIAL

## 2024-11-26 NOTE — TELEPHONE ENCOUNTER
Pt called and wanted us to know she is moving to [place] and was wanting to see if she needed anything to give to a new doctor in [place]. I called pt back and let her know that once she finds a doctor they can request records to be sent directly to them. Pt verbalized understanding

## 2025-02-21 ENCOUNTER — APPOINTMENT (OUTPATIENT)
Dept: RADIOLOGY | Facility: CLINIC | Age: 89
End: 2025-02-21
Payer: COMMERCIAL

## (undated) DEVICE — STRAP, CIRCUMFERENTIAL, 2 X 76""

## (undated) DEVICE — SPONGE, LAP, XRAY DECT, 18IN X 18IN, W/MASTER DMT, STERILE

## (undated) DEVICE — CATHETER, UNIVERSAL FLUSH, 5FR, 65CM

## (undated) DEVICE — CONTAINER, SPECIMEN, 120 ML, STERILE

## (undated) DEVICE — GUIDEWIRE, HI-TORQUE, COMMAND ES, 0.014 X 300CM

## (undated) DEVICE — DRAPE, SHEET, FAN FOLDED, HALF, 44 X 58 IN, DISPOSABLE, LF, STERILE

## (undated) DEVICE — SUTURE, VICRYL, 3-0,18 IN, SH, UNDYED

## (undated) DEVICE — CATHETER, BALLOON, EVERCROSS, 6MM X 40MM X 80CM, OTW PTA

## (undated) DEVICE — GUIDEWIRE, COMMAND ST, HI-TORQUE, 0.18 X 300CM

## (undated) DEVICE — Device

## (undated) DEVICE — APPLICATOR, PREP, CHLORAPREP, W/ORANGE TINT, 10.5ML

## (undated) DEVICE — GUIDEWIRE, ANGLE TIP,  .035 DIA, 180 CM, 3 CM TIP"

## (undated) DEVICE — GUIDEWIRE, J-TIP, STARTER, 0.035 IN X 180 CM

## (undated) DEVICE — DRESSING, ISLAND, TELFA, 4 X 5 IN

## (undated) DEVICE — WOUND SYSTEM, DEBRIDEMENT & CLEANING, O.R DUOPAK

## (undated) DEVICE — ADHESIVE, SKIN, LIQUIBAND EXCEED

## (undated) DEVICE — SPONGE, HEMOSTAT, SURGICEL FIBRILLAR, ABS, 4 X 4, LF

## (undated) DEVICE — PREP TRAY, SKIN, DRY, W/GLOVES

## (undated) DEVICE — DRAPE, TIBURON, SPLIT SHEET, REINF ADH STRIP, 77X122

## (undated) DEVICE — COVER, CART, 45 X 27 X 48 IN, CLEAR

## (undated) DEVICE — MANIFOLD, 4 PORT NEPTUNE STANDARD

## (undated) DEVICE — TORQUE DEVICE, ACCOMODATES WIRES W/DIA .010 TO .038"."

## (undated) DEVICE — CATHETER, BALLOON, EVERCROSS, 6MM X 80MM X 135XM, OTW PTA

## (undated) DEVICE — PROTECTOR, NERVE, ULNAR, PINK

## (undated) DEVICE — SUTURE, VICRYL, 3-0, 27 IN, SH

## (undated) DEVICE — BLADE, SAW, SAGITTAL, SAFEDGE, STERNUM, REVISION, 40.5 X 47.5 X 0.64 MM

## (undated) DEVICE — DRAPE, PAD, PREP, W/ 9 IN CUFF, 24 X 41, LF, NS

## (undated) DEVICE — BANDAGE, COFLEX, 6 X 5 YDS, FOAM TAN, STERILE, LF

## (undated) DEVICE — CATHETER, CXI SUPPORT, .018 2.6F X 65CM, STR TIP

## (undated) DEVICE — TIP, SUCTION, YANKAUER, BULB, ADULT

## (undated) DEVICE — CATHETER, 5 FR. 100CM, ANGLE TAPER AT TIP

## (undated) DEVICE — TOWEL, SURGICAL, NEURO, O/R, 16 X 26, BLUE, STERILE

## (undated) DEVICE — DRESSING, GAUZE, 16 PLY, 4 X 4 IN, STERILE

## (undated) DEVICE — GUIDEWIRE, ANGLE TIP,  .035 DIA, 260 CM, 3 CM TIP"

## (undated) DEVICE — CATHETER, BALLOON, EVERCROSS, 5MM X 80MM X 135CM, OTW PTA

## (undated) DEVICE — CATHETER, CXI SUPPORT, .018 X 150CM, ANG TIP

## (undated) DEVICE — DRESSING, GAUZE, WASHED FLUFF, LARGE, STERILE

## (undated) DEVICE — COUNTER, NEEDLE, FOAM BLOCK, POP-N-COUNT, W/BLADEGUARD, W/ADHESIVE 40 COUNT, RED

## (undated) DEVICE — BLADE, SAW, SAGITTAL, 18.5 X 73 X 0.76 MM, STAINLESS STEEL, STERILE

## (undated) DEVICE — TOWEL, OR, XRAY DETECT 5 PK, WHITE, 17X26, W/DMT TAG, ST

## (undated) DEVICE — GLOVE, SURGICAL, PROTEXIS PI MICRO, 5.5, PF, LF

## (undated) DEVICE — DEVICE, INFLATION, ENCORE 20

## (undated) DEVICE — FLOSEAL, MATRIX, HEMOSTATIC, FULL STERILE PREP, 5ML

## (undated) DEVICE — INSERT, CLAMP, SURGICAL, SOFT/TRACTION, STEALTH, 1 MM

## (undated) DEVICE — SUTURE, SILK, 2-0, 30 IN, SH, BLACK

## (undated) DEVICE — DRAPE, INCISE, ANTIMICROBIAL, IOBAN 2, LARGE, 17 X 23 IN, DISPOSABLE, STERILE

## (undated) DEVICE — SUTURE, SILK, 3-0, 18 IN, MULTIPACK, BLACK

## (undated) DEVICE — STAPLER, SKIN PROXIMATE, 35 WIDE

## (undated) DEVICE — SHEATH, PINNACLE, 10 CM,  5FR INTRODUCER, 5FR DIA, 2.5 CM DIALATOR

## (undated) DEVICE — SUTURE, VICRYL 0, TAPER POINT, CT-1 VIOLET 27 INCH

## (undated) DEVICE — CATHETER, QUICKCROSS SUPPORT .035 X 90CM

## (undated) DEVICE — INTRODUCER SET, MICROPUNCT, STIFF, 4FR 10CM,PLATINUM TIP,NITINOLWIRE

## (undated) DEVICE — SUTURE, SILK, 2-0, 18 IN, BLACK

## (undated) DEVICE — BOLSTER, HIP

## (undated) DEVICE — WAX, BONE, 2.5 GM

## (undated) DEVICE — CATHETER, QUICKCROSS SUPPORT .035 X 150CM

## (undated) DEVICE — DRAPE, PAD, INSTRUMENT, MAGNETIC, MEDIUM, 10 X 16 IN, DISPOSABLE

## (undated) DEVICE — CATHETER TRAY, SURESTEP, 16FR, URINE METER W/STATLOCK

## (undated) DEVICE — SUTURE, PROLENE, 6-0, 30 IN, BV-1 BV-1

## (undated) DEVICE — DRAPE, SHEET, EXTREMITY, W/ARM BOARD COVERS, 87 X 106 X 128 IN, DISPOSABLE, LF, STERILE

## (undated) DEVICE — GUIDEWIRE, .035 X 180 BENTSON STR

## (undated) DEVICE — SHEATH, PINNACLE, 10 CM,  8FR INTRODUCER, 8FR DIA, 2.5 CM DIALATOR

## (undated) DEVICE — SHEATH, PINNACLE, 10 CM,  6FR INTRODUCER, 6FR DIA, 2.5 CM DIALATOR

## (undated) DEVICE — SUTURE, MONOCRYL, 4-0, 18 IN, PS2, UNDYED

## (undated) DEVICE — CLOSURE DEVICE, VASCULAR, MYNX CONTROL, 6FR/7FR

## (undated) DEVICE — SUTURE, SILK, 3-0, 30 IN, BR SH, BLACK